# Patient Record
Sex: MALE | NOT HISPANIC OR LATINO | Employment: OTHER | URBAN - METROPOLITAN AREA
[De-identification: names, ages, dates, MRNs, and addresses within clinical notes are randomized per-mention and may not be internally consistent; named-entity substitution may affect disease eponyms.]

---

## 2017-02-13 ENCOUNTER — MYC MEDICAL ADVICE (OUTPATIENT)
Dept: FAMILY MEDICINE | Facility: CLINIC | Age: 72
End: 2017-02-13

## 2017-02-13 ENCOUNTER — TELEPHONE (OUTPATIENT)
Dept: FAMILY MEDICINE | Facility: CLINIC | Age: 72
End: 2017-02-13

## 2017-05-11 ENCOUNTER — RADIANT APPOINTMENT (OUTPATIENT)
Dept: GENERAL RADIOLOGY | Facility: CLINIC | Age: 72
End: 2017-05-11
Attending: FAMILY MEDICINE
Payer: COMMERCIAL

## 2017-05-11 ENCOUNTER — OFFICE VISIT (OUTPATIENT)
Dept: FAMILY MEDICINE | Facility: CLINIC | Age: 72
End: 2017-05-11
Payer: COMMERCIAL

## 2017-05-11 ENCOUNTER — TELEPHONE (OUTPATIENT)
Dept: FAMILY MEDICINE | Facility: CLINIC | Age: 72
End: 2017-05-11

## 2017-05-11 ENCOUNTER — DOCUMENTATION ONLY (OUTPATIENT)
Dept: VASCULAR SURGERY | Facility: CLINIC | Age: 72
End: 2017-05-11

## 2017-05-11 VITALS
BODY MASS INDEX: 26.66 KG/M2 | OXYGEN SATURATION: 95 % | TEMPERATURE: 98.7 F | HEART RATE: 76 BPM | WEIGHT: 180 LBS | DIASTOLIC BLOOD PRESSURE: 80 MMHG | HEIGHT: 69 IN | SYSTOLIC BLOOD PRESSURE: 126 MMHG

## 2017-05-11 DIAGNOSIS — M25.562 ACUTE PAIN OF LEFT KNEE: ICD-10-CM

## 2017-05-11 DIAGNOSIS — Z13.6 SCREENING FOR AAA (ABDOMINAL AORTIC ANEURYSM): ICD-10-CM

## 2017-05-11 DIAGNOSIS — M25.562 ACUTE PAIN OF LEFT KNEE: Primary | ICD-10-CM

## 2017-05-11 PROCEDURE — 73560 X-RAY EXAM OF KNEE 1 OR 2: CPT | Mod: LT

## 2017-05-11 PROCEDURE — 99213 OFFICE O/P EST LOW 20 MIN: CPT | Performed by: FAMILY MEDICINE

## 2017-05-11 RX ORDER — NAPROXEN SODIUM 220 MG
220 TABLET ORAL 2 TIMES DAILY WITH MEALS
Qty: 180 TABLET | Refills: 3 | COMMUNITY
Start: 2017-05-11 | End: 2017-12-29

## 2017-05-11 NOTE — NURSING NOTE
"Chief Complaint   Patient presents with     Musculoskeletal Problem       Initial /80 (BP Location: Right arm, Patient Position: Chair, Cuff Size: Adult Large)  Pulse 76  Temp 98.7  F (37.1  C) (Oral)  Ht 5' 9\" (1.753 m)  Wt 180 lb (81.6 kg)  SpO2 95%  BMI 26.58 kg/m2 Estimated body mass index is 26.58 kg/(m^2) as calculated from the following:    Height as of this encounter: 5' 9\" (1.753 m).    Weight as of this encounter: 180 lb (81.6 kg).  Medication Reconciliation: complete  "

## 2017-05-11 NOTE — MR AVS SNAPSHOT
After Visit Summary   5/11/2017    Amadou Wade    MRN: [id number]           Patient Information     Date Of Birth          1945        Visit Information        Provider Department      5/11/2017 11:40 AM Amadou Lema MD [facility]        Today's Diagnoses     Acute pain of left knee    -  1    Screening for AAA (abdominal aortic aneurysm)          Care Instructions      Naproxen or ibuprofen as needed for pain    Apply ice as needed for pain and after exercise.    Light range of motion and strengthening exercises.        Follow-ups after your visit        Your next 10 appointments already scheduled     May 11, 2017  1:15 PM CDT   XR KNEE STANDING LEFT 2 VIEWS with [facility]   [facility]    [address] [place] [id number] [phone]           Please bring a list of your current medicines to your exam. (Include vitamins, minerals and over-thecounter medicines.) Leave your valuables at home.  Tell your doctor if there is a chance you may be pregnant.  You do not need to do anything special for this exam.              Who to contact     If you have questions or need follow up information about today's clinic visit or your schedule please contact [facility] directly at [phone].  Normal or non-critical lab and imaging results will be communicated to you by MyChart, letter or phone within 4 business days after the clinic has received the results. If you do not hear from us within 7 days, please contact the clinic through MyChart or phone. If you have a critical or abnormal lab result, we will notify you by phone as soon as possible.  Submit refill requests through [employer] or call your pharmacy and they will forward the refill request to us. Please allow 3 business days for your refill to be completed.          Additional Information About Your Visit        MyChart Information     Doc[employer]  "gives you secure access to your electronic health record. If you see a primary care provider, you can also send messages to your care team and make appointments. If you have questions, please call your primary care clinic.  If you do not have a primary care provider, please call [phone] and they will assist you.        Care EveryWhere ID     This is your Care EveryWhere ID. This could be used by other organizations to access your [place] medical records  [id number]        Your Vitals Were     Pulse Temperature Height Pulse Oximetry BMI (Body Mass Index)       76 98.7  F (37.1  C) (Oral) 5' 9\" (1.753 m) 95% 26.58 kg/m2        Blood Pressure from Last 3 Encounters:   05/11/17 126/80   09/15/16 128/86   06/16/15 118/78    Weight from Last 3 Encounters:   05/11/17 180 lb (81.6 kg)   09/15/16 180 lb (81.6 kg)   06/16/15 178 lb (80.7 kg)              We Performed the Following     Abdominal Aortic Aneurysm Screening/Tracking          Today's Medication Changes          These changes are accurate as of: 5/11/17  1:12 PM.  If you have any questions, ask your nurse or doctor.               Start taking these medicines.        Dose/Directions    naproxen sodium 220 MG tablet   Commonly known as:  ALEVE   Used for:  Acute pain of left knee   Started by:  Amadou Lema MD        Dose:  220 mg   Take 1 tablet (220 mg) by mouth 2 times daily (with meals)   Quantity:  180 tablet   Refills:  3         Stop taking these medicines if you haven't already. Please contact your care team if you have questions.     atovaquone-proguanil 250-100 MG per tablet   Commonly known as:  MALARONE   Stopped by:  Amadou Lema MD           ciprofloxacin 500 MG tablet   Commonly known as:  CIPRO   Stopped by:  Amadou Lema MD                Where to get your medicines      Some of these will need a paper prescription and others can be bought over the counter.  Ask your nurse if you have questions.     You don't need a " prescription for these medications     naproxen sodium 220 MG tablet                Primary Care Provider Office Phone # Fax #    Johnathan Sampson [phone]       [facility] [address] [place] [id number]        Thank you!     Thank you for choosing [facility]  for your care. Our goal is always to provide you with excellent care. Hearing back from our patients is one way we can continue to improve our services. Please take a few minutes to complete the written survey that you may receive in the mail after your visit with us. Thank you!             Your Updated Medication List - Protect others around you: Learn how to safely use, store and throw away your medicines at www.disposemymeds.org.          This list is accurate as of: 5/11/17  1:12 PM.  Always use your most recent med list.                   Brand Name Dispense Instructions for use    aspirin 81 MG tablet      1 tab po QD (Once per day)       emollient cream      Apply topically as needed for other       naproxen sodium 220 MG tablet    ALEVE    180 tablet    Take 1 tablet (220 mg) by mouth 2 times daily (with meals)       simvastatin 40 MG tablet    ZOCOR    90 tablet    Take 1 tablet (40 mg) by mouth At Bedtime       vardenafil 20 MG tablet    LEVITRA    18 tablet    Take 1 tablet by mouth daily as needed.

## 2017-05-11 NOTE — TELEPHONE ENCOUNTER
Patient stopped at  on his way out of the clinic and inquired how long he should take the Aleve that Dr. Lema prescribed for him.  Can send him a [employer] message with this information or call him back at [phone].    Toyin Ojeda  Patient Representative

## 2017-05-11 NOTE — PATIENT INSTRUCTIONS
Naproxen or ibuprofen as needed for pain    Apply ice as needed for pain and after exercise.    Light range of motion and strengthening exercises.

## 2017-05-11 NOTE — PROGRESS NOTES
"  SUBJECTIVE:                                                    Amadou Wade is a 72 year old male who presents to clinic today for the following health issues:    Knee Pain     Onset: 2 weeks    Description:   Location: left knee  Character: Dull ache    Intensity: mild    Progression of Symptoms: worse    Accompanying Signs & Symptoms:  Other symptoms: radiation of pain to the hip  Pain with standing and walking     History:   Previous similar pain: no  Family history of arthritis: YES       Precipitating factors:   Trauma or overuse: pt started walking 7 days a week in January- 2 weeks ago walked 9 miles in one day and that is about the time of the pain starting- has not walked in 1 week      Alleviating factors:  Improved by: nothing    The symptoms started after the patient began doing different exercises. He states that the pain lessens with additional movement.     Therapies Tried and outcome: stanton fair did not help- tylenol helped some      Problem list and histories reviewed & adjusted, as indicated.  Additional history: as documented    ROS:  Constitutional, HEENT, cardiovascular, pulmonary, GI, , musculoskeletal, neuro, skin, endocrine and psych systems are negative, except as otherwise noted.    This document serves as a record of the services and decisions personally performed and made by Amadou Lema MD. It was created on his behalf by Malorie Garcia, a trained medical scribe. The creation of this document is based on the provider's statements to the medical scribe.  Malorie Garcia 8:20 AM 5/11/2017  OBJECTIVE:                                                    /80 (BP Location: Right arm, Patient Position: Chair, Cuff Size: Adult Large)  Pulse 76  Temp 98.7  F (37.1  C) (Oral)  Ht 1.753 m (5' 9\")  Wt 81.6 kg (180 lb)  SpO2 95%  BMI 26.58 kg/m2 Body mass index is 26.58 kg/(m^2).   GENERAL: healthy, alert, well nourished, well hydrated, no distress  HENT: ear canals- normal; TMs- " normal; Nose- normal; Mouth- no ulcers, no lesions  NECK: no tenderness, no adenopathy, no asymmetry, no masses, no stiffness; thyroid- normal to palpation  RESP: lungs clear to auscultation - no rales, no rhonchi, no wheezes  CV: regular rates and rhythm, normal S1 S2, no S3 or S4 and no murmur, no click or rub -  ABDOMEN: soft, no tenderness, no  hepatosplenomegaly, no masses, normal bowel sounds  MS: Left Knee: pain with palpation of the medial joint line along distal femur, normal ROM, ligaments intact x4, negative meniscus test, no effusion, increased heat at the affected area, otherwise extremities- no gross deformities noted, no edema  SKIN: no suspicious lesions, no rashes  BACK: no CVA tenderness, no paralumbar tenderness    Diagnostic test results:  Xray (Left Knee) - normal     ASSESSMENT/PLAN:         Amadou was seen today for musculoskeletal problem.    Diagnoses and all orders for this visit:    Acute pain of left knee - Naproxen or ibuprofen as needed for pain, apply ice as need, and light ROM and strengthening exercises. Formal physical therapy if symptoms persist. Consider steroid injection if the symptoms worsen.  -     XR Knee Standing Left 2 Views; Future  -     naproxen sodium (ALEVE) 220 MG tablet; Take 1 tablet (220 mg) by mouth 2 times daily (with meals)    Screening for AAA (abdominal aortic aneurysm)  -     Abdominal Aortic Aneurysm Screening/Tracking        Risks, benefits and alternatives of treatments discussed. Plan agreed on.      Followup: As needed    Will call, return to clinic, or go to ED if worsening or symptoms not improving as discussed.    See patient instructions.       Health Maintenance Topics with due status: Overdue       Topic Date Due    AORTIC ANEURYSM SCREENING (SYSTEM ASSIGNED) 02/27/2012    MEDICARE ANNUAL WELLNESS VISIT 02/27/2013       Health maintenance reviewed/updated? Yes    The information in this document, created by a scribe for me, accurately reflects the  services I personally performed and the decisions made by me. I have reviewed and approved this document for accuracy.      Alex Lema MD

## 2017-12-28 ASSESSMENT — ACTIVITIES OF DAILY LIVING (ADL)
CURRENT_FUNCTION: NO ASSISTANCE NEEDED
I_NEED_ASSISTANCE_FOR_THE_FOLLOWING_DAILY_ACTIVITIES:: NO ASSISTANCE IS NEEDED

## 2017-12-29 ENCOUNTER — TELEPHONE (OUTPATIENT)
Dept: FAMILY MEDICINE | Facility: CLINIC | Age: 72
End: 2017-12-29

## 2017-12-29 ENCOUNTER — OFFICE VISIT (OUTPATIENT)
Dept: FAMILY MEDICINE | Facility: CLINIC | Age: 72
End: 2017-12-29
Payer: COMMERCIAL

## 2017-12-29 VITALS
BODY MASS INDEX: 26.96 KG/M2 | DIASTOLIC BLOOD PRESSURE: 94 MMHG | SYSTOLIC BLOOD PRESSURE: 168 MMHG | WEIGHT: 182 LBS | OXYGEN SATURATION: 97 % | HEART RATE: 69 BPM | TEMPERATURE: 98 F | HEIGHT: 69 IN

## 2017-12-29 DIAGNOSIS — N52.9 ERECTILE DYSFUNCTION, UNSPECIFIED ERECTILE DYSFUNCTION TYPE: ICD-10-CM

## 2017-12-29 DIAGNOSIS — Z12.5 SCREENING FOR PROSTATE CANCER: ICD-10-CM

## 2017-12-29 DIAGNOSIS — I10 HYPERTENSION GOAL BP (BLOOD PRESSURE) < 140/90: ICD-10-CM

## 2017-12-29 DIAGNOSIS — Z12.11 SCREEN FOR COLON CANCER: ICD-10-CM

## 2017-12-29 DIAGNOSIS — Z51.81 MEDICATION MONITORING ENCOUNTER: ICD-10-CM

## 2017-12-29 DIAGNOSIS — Z91.81 AT RISK FOR FALLING: ICD-10-CM

## 2017-12-29 DIAGNOSIS — E78.5 HYPERLIPIDEMIA LDL GOAL <130: ICD-10-CM

## 2017-12-29 DIAGNOSIS — I49.9 CARDIAC ARRHYTHMIA, UNSPECIFIED CARDIAC ARRHYTHMIA TYPE: ICD-10-CM

## 2017-12-29 DIAGNOSIS — Z00.00 ENCOUNTER FOR ROUTINE ADULT HEALTH EXAMINATION WITHOUT ABNORMAL FINDINGS: Primary | ICD-10-CM

## 2017-12-29 LAB
ALBUMIN SERPL-MCNC: 3.6 G/DL (ref 3.4–5)
ALBUMIN UR-MCNC: NEGATIVE MG/DL
ALP SERPL-CCNC: 72 U/L (ref 40–150)
ALT SERPL W P-5'-P-CCNC: 22 U/L (ref 0–70)
ANION GAP SERPL CALCULATED.3IONS-SCNC: 7 MMOL/L (ref 3–14)
APPEARANCE UR: CLEAR
AST SERPL W P-5'-P-CCNC: 20 U/L (ref 0–45)
BILIRUB SERPL-MCNC: 0.7 MG/DL (ref 0.2–1.3)
BILIRUB UR QL STRIP: NEGATIVE
BUN SERPL-MCNC: 12 MG/DL (ref 7–30)
CALCIUM SERPL-MCNC: 8.6 MG/DL (ref 8.5–10.1)
CHLORIDE SERPL-SCNC: 107 MMOL/L (ref 94–109)
CHOLEST SERPL-MCNC: 184 MG/DL
CK SERPL-CCNC: 80 U/L (ref 30–300)
CO2 SERPL-SCNC: 28 MMOL/L (ref 20–32)
COLOR UR AUTO: YELLOW
CREAT SERPL-MCNC: 0.82 MG/DL (ref 0.66–1.25)
CREAT UR-MCNC: 146 MG/DL
ERYTHROCYTE [DISTWIDTH] IN BLOOD BY AUTOMATED COUNT: 13.1 % (ref 10–15)
GFR SERPL CREATININE-BSD FRML MDRD: >90 ML/MIN/1.7M2
GLUCOSE SERPL-MCNC: 81 MG/DL (ref 70–99)
GLUCOSE UR STRIP-MCNC: NEGATIVE MG/DL
HCT VFR BLD AUTO: 49.9 % (ref 40–53)
HDLC SERPL-MCNC: 57 MG/DL
HGB BLD-MCNC: 16.4 G/DL (ref 13.3–17.7)
HGB UR QL STRIP: NEGATIVE
KETONES UR STRIP-MCNC: NEGATIVE MG/DL
LDLC SERPL CALC-MCNC: 113 MG/DL
LEUKOCYTE ESTERASE UR QL STRIP: NEGATIVE
MCH RBC QN AUTO: 31 PG (ref 26.5–33)
MCHC RBC AUTO-ENTMCNC: 32.9 G/DL (ref 31.5–36.5)
MCV RBC AUTO: 94 FL (ref 78–100)
MICROALBUMIN UR-MCNC: 10 MG/L
MICROALBUMIN/CREAT UR: 7.12 MG/G CR (ref 0–17)
NITRATE UR QL: NEGATIVE
NONHDLC SERPL-MCNC: 127 MG/DL
PH UR STRIP: 6 PH (ref 5–7)
PLATELET # BLD AUTO: 146 10E9/L (ref 150–450)
POTASSIUM SERPL-SCNC: 4.8 MMOL/L (ref 3.4–5.3)
PROT SERPL-MCNC: 6.7 G/DL (ref 6.8–8.8)
PSA SERPL-ACNC: 3.85 UG/L (ref 0–4)
RBC # BLD AUTO: 5.29 10E12/L (ref 4.4–5.9)
SODIUM SERPL-SCNC: 142 MMOL/L (ref 133–144)
SOURCE: NORMAL
SP GR UR STRIP: 1.01 (ref 1–1.03)
TRIGL SERPL-MCNC: 69 MG/DL
TSH SERPL DL<=0.005 MIU/L-ACNC: 1.41 MU/L (ref 0.4–4)
UROBILINOGEN UR STRIP-ACNC: 0.2 EU/DL (ref 0.2–1)
WBC # BLD AUTO: 4.5 10E9/L (ref 4–11)

## 2017-12-29 PROCEDURE — 80061 LIPID PANEL: CPT | Performed by: FAMILY MEDICINE

## 2017-12-29 PROCEDURE — 85027 COMPLETE CBC AUTOMATED: CPT | Performed by: FAMILY MEDICINE

## 2017-12-29 PROCEDURE — G0103 PSA SCREENING: HCPCS | Performed by: FAMILY MEDICINE

## 2017-12-29 PROCEDURE — 80053 COMPREHEN METABOLIC PANEL: CPT | Performed by: FAMILY MEDICINE

## 2017-12-29 PROCEDURE — 81003 URINALYSIS AUTO W/O SCOPE: CPT | Performed by: FAMILY MEDICINE

## 2017-12-29 PROCEDURE — 84443 ASSAY THYROID STIM HORMONE: CPT | Performed by: FAMILY MEDICINE

## 2017-12-29 PROCEDURE — 82550 ASSAY OF CK (CPK): CPT | Performed by: FAMILY MEDICINE

## 2017-12-29 PROCEDURE — G0439 PPPS, SUBSEQ VISIT: HCPCS | Performed by: FAMILY MEDICINE

## 2017-12-29 PROCEDURE — 82043 UR ALBUMIN QUANTITATIVE: CPT | Performed by: FAMILY MEDICINE

## 2017-12-29 PROCEDURE — 36415 COLL VENOUS BLD VENIPUNCTURE: CPT | Performed by: FAMILY MEDICINE

## 2017-12-29 RX ORDER — SIMVASTATIN 40 MG
40 TABLET ORAL AT BEDTIME
Qty: 90 TABLET | Refills: 3 | Status: SHIPPED | OUTPATIENT
Start: 2017-12-29 | End: 2019-01-07

## 2017-12-29 RX ORDER — LOSARTAN POTASSIUM AND HYDROCHLOROTHIAZIDE 12.5; 5 MG/1; MG/1
1 TABLET ORAL DAILY
Qty: 90 TABLET | Refills: 3 | Status: SHIPPED | OUTPATIENT
Start: 2017-12-29 | End: 2018-01-23

## 2017-12-29 RX ORDER — DIPHENHYDRAMINE HCL 25 MG
50 TABLET ORAL AT BEDTIME
Qty: 60 TABLET | Refills: 1 | Status: ON HOLD | COMMUNITY
Start: 2017-12-29 | End: 2021-12-18

## 2017-12-29 ASSESSMENT — ACTIVITIES OF DAILY LIVING (ADL): CURRENT_FUNCTION: NO ASSISTANCE NEEDED

## 2017-12-29 NOTE — TELEPHONE ENCOUNTER
[facility].  Medication was filled yesterday.  Shannan Gonzalez RN - Triage  [facility]

## 2017-12-29 NOTE — TELEPHONE ENCOUNTER
Reason for Call:  Medication or medication refill:    Do you use a [facility]?  Name of the pharmacy and phone number for the current request:  [facility] in [facility] . [phone]    Name of the medication requested: losartan - hydrochlorothiazide 50-12.5 MG    Other request: [facility] Sampson's AVS & lab tests    Can we leave a detailed message on this number? YES    Phone number patient can be reached at: Home number on file [phone] (home)    Best Time: any    Call taken on 12/29/2017 at 10:42 AM by Tana Moreno

## 2017-12-29 NOTE — PATIENT INSTRUCTIONS
Initiate use of Losartan-Hydrochlorothiazide 50-12.5 mg daily, as direceted    [facility], as discussed    Follow up in 3-4 weeks for recheck      [facility]                        To reach your care team during and after hours:   [phone]  To reach our pharmacy:        [phone]    Clinic Hours                        Our clinic hours are:    Monday   7:30 am to 7:00 pm                  Tuesday through Friday 7:30 am to 5:00 pm                             Saturday   8:00 am to 12:00 pm      Sunday   Closed      Pharmacy Hours                        Our pharmacy hours are:    Monday   8:30 am to 7:00 pm       Tuesday to Friday  8:30 am to 6:00 pm                       Saturday    9:00 am to 1:00 pm              Sunday    Closed              There is also information available at our web site:  [email or web address]    If your provider ordered any lab tests and you do not receive the results within 10 business days, please call the clinic.    If you need a medication refill please contact your pharmacy.  Please allow 2-3 business days for your refill to be completed.    Our clinic offers telephone visits and e visits.  Please ask one of your team members to explain more.      Use [employer] (secure email communication and access to your chart) to send your primary care provider a message or make an appointment. Ask someone on your Team how to sign up for [employer].  Immunizations                      Immunization History   Administered Date(s) Administered     Influenza (IIV3) PF 11/30/2004, 10/20/2011, 10/30/2012, 10/15/2015     Influenza Vaccine IM 3yrs+ 4 Valent IIV4 10/15/2017     MMR 10/15/2004     Pneumo Conj 13-V (2010&after) 06/16/2015     Pneumococcal 23 valent 04/06/2006, 12/20/2012     Poliovirus, inactivated (IPV) 10/15/2004     TD (ADULT, 7+) 07/30/2001     TDAP Vaccine (Boostrix) 10/29/2009     Twinrix A/B 10/15/2004, 11/30/2004, 04/06/2006     Typhoid IM 10/15/2004, 10/14/2016      Typhoid Oral 10/29/2009     Zoster vaccine, live 12/30/2011        Health Maintenance                         Health Maintenance Due   Topic Date Due     Medicare Annual Wellness Visit  02/27/1963     Flu Vaccine - yearly  09/01/2017     FALL RISK ASSESSMENT  09/15/2017     Cholesterol Lab - yearly  09/15/2017     Prostate Test (PSA) - yearly  09/15/2017     Wellness Visit with your Primary Provider - yearly  09/15/2017     Colon Cancer Screening - FIT Test - yearly  09/25/2017       Preventive Health Recommendations:   Male Ages 65 and over    Yearly exam:             See your health care provider every year in order to  o   Review health changes.   o   Discuss preventive care.    o   Review your medicines if your doctor has prescribed any.    Talk with your health care provider about whether you should have a test to screen for prostate cancer (PSA).    Every 3 years, have a diabetes test (fasting glucose). If you are at risk for diabetes, you should have this test more often.    Every 5 years, have a cholesterol test. Have this test more often if you are at risk for high cholesterol or heart disease.     Every 10 years, have a colonoscopy. Or, have a yearly FIT test (stool test). These exams will check for colon cancer.    Talk to with your health care provider about screening for Abdominal Aortic Aneurysm if you have a family history of AAA or have a history of smoking.    Shots:     Get a flu shot each year.     Get a tetanus shot every 10 years.     Talk to your doctor about your pneumonia vaccines. There are now two you should receive - Pneumovax (PPSV 23) and Prevnar (PCV 13).     Talk to your doctor about a shingles vaccine.     Talk to your doctor about the hepatitis B vaccine.  Nutrition:     Eat at least 5 servings of fruits and vegetables each day.     Eat whole-grain bread, whole-wheat pasta and brown rice instead of white grains and rice.     Talk to your provider about Calcium and Vitamin D.    Lifestyle    Exercise for at least 150 minutes a week (30 minutes a day, 5 days a week). This will help you control your weight and prevent disease.     Limit alcohol to one drink per day.     No smoking.     Wear sunscreen to prevent skin cancer.     See your dentist every six months for an exam and cleaning.     See your eye doctor every 1 to 2 years to screen for conditions such as glaucoma, macular degeneration, cataracts, etc

## 2017-12-29 NOTE — LETTER
[facility]  [address] [place] [address]                  [phone]   January 2, 2018    Amadou Wade  [address] [address]      Dear Amadou,    Here is a summary of your recent test results:    Labs are overall quite good,     Minimally low platelets, stable for many years, watch for any unusual bleeding.     We advise:     Continue current cares.   Balanced low cholesterol diet.   Regular activity.     For additional lab test information, labtestsonline.org is an excellent reference.     Your test results are enclosed.      Please contact me if you have any questions.    In addition, here is a list of due or overdue Health Maintenance reminders.    Health Maintenance Due   Topic Date Due     Colon Cancer Screening - FIT Test - yearly  09/25/2017       Please call us at [phone] (or use [employer]) to address the above recommendations.            Thank you very much for trusting [facility]..     Healthy regards,        Johnathan Sampson M.D.        Results for orders placed or performed in visit on 12/29/17   Comprehensive metabolic panel   Result Value Ref Range    Sodium 142 133 - 144 mmol/L    Potassium 4.8 3.4 - 5.3 mmol/L    Chloride 107 94 - 109 mmol/L    Carbon Dioxide 28 20 - 32 mmol/L    Anion Gap 7 3 - 14 mmol/L    Glucose 81 70 - 99 mg/dL    Urea Nitrogen 12 7 - 30 mg/dL    Creatinine 0.82 0.66 - 1.25 mg/dL    GFR Estimate >90 >60 mL/min/1.7m2    GFR Estimate If Black >90 >60 mL/min/1.7m2    Calcium 8.6 8.5 - 10.1 mg/dL    Bilirubin Total 0.7 0.2 - 1.3 mg/dL    Albumin 3.6 3.4 - 5.0 g/dL    Protein Total 6.7 (L) 6.8 - 8.8 g/dL    Alkaline Phosphatase 72 40 - 150 U/L    ALT 22 0 - 70 U/L    AST 20 0 - 45 U/L   Lipid panel reflex to direct LDL Fasting   Result Value Ref Range    Cholesterol 184 <200 mg/dL    Triglycerides 69 <150 mg/dL    HDL Cholesterol 57 >39 mg/dL    LDL Cholesterol Calculated 113 (H) <100 mg/dL     Non HDL Cholesterol 127 <130 mg/dL   CK total   Result Value Ref Range    CK Total 80 30 - 300 U/L   CBC with platelets   Result Value Ref Range    WBC 4.5 4.0 - 11.0 10e9/L    RBC Count 5.29 4.4 - 5.9 10e12/L    Hemoglobin 16.4 13.3 - 17.7 g/dL    Hematocrit 49.9 40.0 - 53.0 %    MCV 94 78 - 100 fl    MCH 31.0 26.5 - 33.0 pg    MCHC 32.9 31.5 - 36.5 g/dL    RDW 13.1 10.0 - 15.0 %    Platelet Count 146 (L) 150 - 450 10e9/L   TSH with free T4 reflex   Result Value Ref Range    TSH 1.41 0.40 - 4.00 mU/L   Prostate spec antigen screen   Result Value Ref Range    PSA 3.85 0 - 4 ug/L   Albumin Random Urine Quantitative with Creat Ratio   Result Value Ref Range    Creatinine Urine 146 mg/dL    Albumin Urine mg/L 10 mg/L    Albumin Urine mg/g Cr 7.12 0 - 17 mg/g Cr   *UA reflex to Microscopic and Culture ([facility] (except [place] and [place])   Result Value Ref Range    Color Urine Yellow     Appearance Urine Clear     Glucose Urine Negative NEG^Negative mg/dL    Bilirubin Urine Negative NEG^Negative    Ketones Urine Negative NEG^Negative mg/dL    Specific Gravity Urine 1.015 1.003 - 1.035    Blood Urine Negative NEG^Negative    pH Urine 6.0 5.0 - 7.0 pH    Protein Albumin Urine Negative NEG^Negative mg/dL    Urobilinogen Urine 0.2 0.2 - 1.0 EU/dL    Nitrite Urine Negative NEG^Negative    Leukocyte Esterase Urine Negative NEG^Negative    Source Midstream Urine

## 2017-12-29 NOTE — MR AVS SNAPSHOT
After Visit Summary   12/29/2017    Amadou Wade    MRN: [id number]           Patient Information     Date Of Birth          1945        Visit Information        Provider Department      12/29/2017 8:40 AM Johnathan Sampson MD [facility]  [place]        Today's Diagnoses     Encounter for routine adult health examination without abnormal findings    -  1    Hypertension goal BP (blood pressure) < 140/90        Hyperlipidemia LDL goal <130        Cardiac arrhythmia, unspecified cardiac arrhythmia type        Erectile dysfunction, unspecified erectile dysfunction type        Screening for prostate cancer        Screen for colon cancer        Medication monitoring encounter        At risk for falling          Care Instructions    Initiate use of Losartan-Hydrochlorothiazide 50-12.5 mg daily, as direceted    [facility]    Follow up in 3-4 weeks for [facility] [place]      [facility]  [place]                        To reach your care team during and after hours:   [phone]  To reach our pharmacy:        [phone]    Clinic Hours                        Our clinic hours are:    Monday   7:30 am to 7:00 pm                  Tuesday through Friday 7:30 am to 5:00 pm                             Saturday   8:00 am to 12:00 pm      Sunday   Closed      Pharmacy Hours                        Our pharmacy hours are:    Monday   8:30 am to 7:00 pm       Tuesday to Friday  8:30 am to 6:00 pm                       Saturday    9:00 am to 1:00 pm              Sunday    Closed              There is also information available at our web site:  [email or web address]    If your provider ordered any lab tests and you do not receive the results within 10 business days, please call the clinic.    If you need a medication refill please contact your pharmacy.  Please allow 2-3 business days for your refill to be completed.    Our clinic offers telephone visits and e visits.  Please ask one of your  team members to explain more.      Use [employer] (secure email communication and access to your chart) to send your primary care provider a message or make an appointment. Ask someone on your Team how to sign up for [employer].  Immunizations                      Immunization History   Administered Date(s) Administered     Influenza (IIV3) PF 11/30/2004, 10/20/2011, 10/30/2012, 10/15/2015     Influenza Vaccine IM 3yrs+ 4 Valent IIV4 10/15/2017     MMR 10/15/2004     Pneumo Conj 13-V (2010&after) 06/16/2015     Pneumococcal 23 valent 04/06/2006, 12/20/2012     Poliovirus, inactivated (IPV) 10/15/2004     TD (ADULT, 7+) 07/30/2001     TDAP Vaccine (Boostrix) 10/29/2009     Twinrix A/B 10/15/2004, 11/30/2004, 04/06/2006     Typhoid IM 10/15/2004, 10/14/2016     Typhoid Oral 10/29/2009     Zoster vaccine, live 12/30/2011        Health Maintenance                         Health Maintenance Due   Topic Date Due     Medicare Annual Wellness Visit  02/27/1963     Flu Vaccine - yearly  09/01/2017     FALL RISK ASSESSMENT  09/15/2017     Cholesterol Lab - yearly  09/15/2017     Prostate Test (PSA) - yearly  09/15/2017     Wellness Visit with your Primary Provider - yearly  09/15/2017     Colon Cancer Screening - FIT Test - yearly  09/25/2017       Preventive Health Recommendations:   Male Ages 65 and over    Yearly exam:             See your health care provider every year in order to  o   Review health changes.   o   Discuss preventive care.    o   Review your medicines if your doctor has prescribed any.    Talk with your health care provider about whether you should have a test to screen for prostate cancer (PSA).    Every 3 years, have a diabetes test (fasting glucose). If you are at risk for diabetes, you should have this test more often.    Every 5 years, have a cholesterol test. Have this test more often if you are at risk for high cholesterol or heart disease.     Every 10 years, have a colonoscopy. Or, have a yearly FIT  test (stool test). These exams will check for colon cancer.    Talk to with your health care provider about screening for Abdominal Aortic Aneurysm if you have a family history of AAA or have a history of smoking.    Shots:     Get a flu shot each year.     Get a tetanus shot every 10 years.     Talk to your doctor about your pneumonia vaccines. There are now two you should receive - Pneumovax (PPSV 23) and Prevnar (PCV 13).     Talk to your doctor about a shingles vaccine.     Talk to your doctor about the hepatitis B vaccine.  Nutrition:     Eat at least 5 servings of fruits and vegetables each day.     Eat whole-grain bread, whole-wheat pasta and brown rice instead of white grains and rice.     Talk to your provider about Calcium and Vitamin D.   Lifestyle    Exercise for at least 150 minutes a week (30 minutes a day, 5 days a week). This will help you control your weight and prevent disease.     Limit alcohol to one drink per day.     No smoking.     Wear sunscreen to prevent skin cancer.     See your dentist every six months for an exam and cleaning.     See your eye doctor every 1 to 2 years to screen for conditions such as glaucoma, macular degeneration, cataracts, etc           Follow-ups after your visit        Future tests that were ordered for you today     Open Future Orders        Priority Expected Expires Ordered    Fecal colorectal cancer screen (FIT) Routine 1/19/2018 3/23/2018 12/29/2017            Who to contact     If you have questions or need follow up information about today's clinic visit or your schedule please contact [facility] directly at [phone].  Normal or non-critical lab and imaging results will be communicated to you by MyChart, letter or phone within 4 business days after the clinic has received the results. If you do not hear from us within 7 days, please contact the clinic through MyChart or phone. If you have a critical or abnormal lab result, we will notify  "you by phone as soon as possible.  Submit refill requests through [employer] or call your pharmacy and they will forward the refill request to us. Please allow 3 business days for your refill to be completed.          Additional Information About Your Visit        [employer] [facility] Information     [employer] gives you secure access to your electronic health record. If you see a primary care provider, you can also send messages to your care team and make appointments. If you have questions, please call your primary care clinic.  If you do not have a primary care provider, please call [phone] and they will assist you.        Care EveryWhere ID     This is your Care EveryWhere ID. This could be used by other organizations to access your [facility] medical records  [id number]        Your Vitals Were     Pulse Temperature Height Pulse Oximetry BMI (Body Mass Index)       69 98  F (36.7  C) (Oral) 5' 9\" (1.753 m) 97% 26.88 kg/m2        Blood Pressure from Last 3 Encounters:   12/29/17 (!) 168/94   05/11/17 126/80   09/15/16 128/86    Weight from Last 3 Encounters:   12/29/17 182 lb (82.6 kg)   05/11/17 180 lb (81.6 kg)   09/15/16 180 lb (81.6 kg)              We Performed the Following     *UA reflex to Microscopic and Culture ([place] and [facility] (except [place] and [place])     Albumin Random Urine Quantitative with Creat Ratio     CBC with platelets     CK total     Comprehensive metabolic panel     Lipid panel reflex to direct LDL Fasting     Prostate spec antigen screen     TSH with free T4 reflex          Today's Medication Changes          These changes are accurate as of: 12/29/17  9:35 AM.  If you have any questions, ask your nurse or doctor.               Start taking these medicines.        Dose/Directions    losartan-hydrochlorothiazide 50-12.5 MG per tablet   Commonly known as:  HYZAAR   Used for:  Hypertension goal BP (blood pressure) < 140/90   Started by:  Johnathan Sampson MD        Dose:  1 tablet   Take 1 " tablet by mouth daily   Quantity:  90 tablet   Refills:  3            Where to get your medicines      Some of these will need a paper prescription and others can be bought over the counter.  Ask your nurse if you have questions.     Bring a paper prescription for each of these medications     losartan-hydrochlorothiazide 50-12.5 MG per tablet    simvastatin 40 MG tablet                Primary Care Provider Office Phone # Fax #    Johnathan Sampson [phone]       [address] [place] [id number]        Equal Access to Services     DESIREE FAM : Hadii aad ku hadasho Soomaali, waaxda luqadaha, qaybta kaalmada adeegyada, waxay idiin hayaan adeeg hudson sifuentes. So [facility] [phone].    ATENCIÓN: Si habla español, tiene a martinez disposición servicios gratuitos de asistencia lingüística. [facility] [phone].    We comply with applicable federal civil rights laws and [place] laws. We do not discriminate on the basis of race, color, national origin, age, disability, sex, sexual orientation, or gender identity.            Thank you!     Thank you for choosing [facility]  for your care. Our goal is always to provide you with excellent care. Hearing back from our patients is one way we can continue to improve our services. Please take a few minutes to complete the written survey that you may receive in the mail after your visit with us. Thank you!             Your Updated Medication List - Protect others around you: Learn how to safely use, store and throw away your medicines at www.disposemymeds.org.          This list is accurate as of: 12/29/17  9:35 AM.  Always use your most recent med list.                   Brand Name Dispense Instructions for use Diagnosis    aspirin 81 MG tablet      1 tab po QD (Once per day)    Other specified counseling, Need for prophylactic vaccination and inoculation against other combinations of diseases       diphenhydrAMINE 25 MG tablet    BENADRYL     60 tablet    Take 1-2 tablets (25-50 mg) by mouth every 6 hours as needed for itching or allergies        emollient cream      Apply topically as needed for other        losartan-hydrochlorothiazide 50-12.5 MG per tablet    HYZAAR    90 tablet    Take 1 tablet by mouth daily    Hypertension goal BP (blood pressure) < 140/90       simvastatin 40 MG tablet    ZOCOR    90 tablet    Take 1 tablet (40 mg) by mouth At Bedtime    Hyperlipidemia LDL goal <130       vardenafil 20 MG tablet    LEVITRA    18 tablet    Take 1 tablet by mouth daily as needed.    ED (erectile dysfunction)

## 2017-12-29 NOTE — PROGRESS NOTES
"SUBJECTIVE:   Amadou Wade is a 72 year old male who presents for Preventive Visit.    Are you in the first 12 months of your Medicare coverage?  No    Physical   Annual:     Getting at least 3 servings of Calcium per day::  Yes    Bi-annual eye exam::  Yes    Dental care twice a year::  Yes    Sleep apnea or symptoms of sleep apnea::  Daytime drowsiness    Diet::  Regular (no restrictions)    Frequency of exercise::  4-5 days/week    Duration of exercise::  30-45 minutes    Taking medications regularly::  Yes    Medication side effects::  Not applicable    Ability to successfully perform activities of daily living: no assistance needed  Home Safety:  No safety concerns identified  Hearing Impairment: difficulty following a conversation in a noisy restaurant or crowded room, need to ask people to speak up or repeat themselves and difficulty understanding soft or whispered speech    Cardiac Dysrhythmia -- No concerns at this time.     Chronic Rhinitis -- Resolved at this time.     Sleep -- Amadou stated that he has not been using his mouth guard to help with sleeping - he and his wife felt that it \"was reshaping\" his mouth. [facility].     ED -- Levitra 20 mg prn.     Elevated Blood Pressure -- Amadou stated that he has been monitoring his BP at home recently, and has been having consistently elevated numbers. No hx of medication intervention.     BP Readings from Last 3 Encounters:   12/29/17 (!) 168/94   05/11/17 126/80   09/15/16 128/86     Lipids -- Simvastatin 40 mg daily, Aspirin 81 mg daily.     Recent Labs   Lab Test  09/15/16   0827  06/16/15   0919  04/16/14   0907   CHOL  194  145  147   HDL  52  50  43   LDL  131*  84  90   TRIG  57  53  74   CHOLHDLRATIO   --   2.9  3.4       Past/recent records reviewed and discussed for -- family hx, social hx, surgical hx, medications, immunizations, allergies.      Fall risk:  Fallen 2 or more times in the past year?: No  Any fall with " injury in the past year?: No    COGNITIVE SCREEN  1) Repeat 3 items (Banana, Sunrise, Chair)    2) Clock draw: NORMAL  3) 3 item recall: Recalls 2 objects   Results: NORMAL clock, 1-2 items recalled: COGNITIVE IMPAIRMENT LESS LIKELY    Mini-CogTM Copyright JOSE Rucker. Licensed by the author for use in [facility]; reprinted with permission (mike@[facility]). All rights reserved.      Reviewed and updated as needed this visit by clinical staff  Tobacco  Allergies  Meds  Med Hx  Surg Hx  Fam Hx  Soc Hx      Reviewed and updated as needed this visit by Provider  Allergies        Social History   Substance Use Topics     Smoking status: Never Smoker     Smokeless tobacco: Never Used     Alcohol use 0.0 - 0.5 oz/week     0 - 1 Standard drinks or equivalent per week      Comment: 0-1 per week avg       Alcohol Use 12/28/2017   If you drink alcohol, do you typically have greater than 3 drinks per day OR greater than 7 drinks per week?   No     Today's PHQ-2 Score:   PHQ-2 ( 1999 [employer]) 12/28/2017   Q1: Little interest or pleasure in doing things 0   Q2: Feeling down, depressed or hopeless 0   PHQ-2 Score 0   Q1: Little interest or pleasure in doing things Not at all   Q2: Feeling down, depressed or hopeless Not at all   PHQ-2 Score 0     Do you feel safe in your environment - Yes    Do you have a Health Care Directive?: Yes: Advance Directive has been received and scanned.    Current providers sharing in care for this patient include:   Patient Care Team:  Johnathan Sampson MD as PCP - General    The following health maintenance items are reviewed in Epic and correct as of today:  Health Maintenance   Topic Date Due     FALL RISK ASSESSMENT  09/15/2017     LIPID MONITORING Q1 YEAR  09/15/2017     PSA Q1 YR  09/15/2017     FIT Q1 YR  09/25/2017     WELLNESS VISIT Q1 YR  12/29/2018     TETANUS Q10 YR  10/29/2019     ADVANCE DIRECTIVE PLANNING Q5 YRS  10/05/2020     COLONOSCOPY Q10 YR  01/03/2022     INFLUENZA  VACCINE (SYSTEM ASSIGNED)  Completed     PNEUMOCOCCAL  Completed     AORTIC ANEURYSM SCREENING (SYSTEM ASSIGNED)  Completed     HEPATITIS C SCREENING  Completed       Health Maintenance     Colonoscopy:  10 years, due 1/22 (last 1/12)   FIT:  Yearly, due (last 9/16)              PSA:  Yearly, due (last 9/16)   DEXA:  n/a    Health Maintenance Due   Topic Date Due     FALL RISK ASSESSMENT  09/15/2017     LIPID MONITORING Q1 YEAR  09/15/2017     PSA Q1 YR  09/15/2017     FIT Q1 YR  09/25/2017       Current Problem List    Patient Active Problem List   Diagnosis     Cardiac dysrhythmia     Hyperlipidemia LDL goal <130     Advanced directives, counseling/discussion     ED (erectile dysfunction)     Hypertension goal BP (blood pressure) < 140/90       Past Medical History    Past Medical History:   Diagnosis Date     Cardiac dysrhythmia, unspecified 3/07    few pac's, few pvc's, few runs SVT     Chronic rhinitis     resolved     ED (erectile dysfunction)      [facility]     Hyperlipidemia LDL goal <130 2002     Hypertension goal BP (blood pressure) < 140/90 12/2017     Pneumonia, organism unspecified(486) 1993     Snoring     no sleep apnea - mouth guard     Unspecified hemorrhoids without mention of complication 4/06    internal       Past Surgical History    Past Surgical History:   Procedure Laterality Date     COLONOSCOPY  1/3/2012    incomplete - negative, ACBE incomplete - negative, CT colography negative     HC COLONOSCOPY THRU STOMA, DIAGNOSTIC  5/06    dr tejeda - normal - internal hemorrhiods - due 10 yrs     HC REMOVE TONSILS/ADENOIDS,<13 Y/O  1953    T & A <14y.o.     SIGMOIDOSCOPY,DIAGNOSTIC  1997    normal     STRESS ECHO (METRO)  3/07    normal few pac's, few pvc's       Current Medications    Current Outpatient Prescriptions   Medication Sig Dispense Refill     diphenhydrAMINE (BENADRYL) 25 MG tablet Take 1-2 tablets (25-50 mg) by mouth every 6 hours as needed for itching or allergies 60 tablet  1     simvastatin (ZOCOR) 40 MG tablet Take 1 tablet (40 mg) by mouth At Bedtime 90 tablet 3     losartan-hydrochlorothiazide (HYZAAR) 50-12.5 MG per tablet Take 1 tablet by mouth daily 90 tablet 3     emollient (VANICREAM) cream Apply topically as needed for other       vardenafil (LEVITRA) 20 MG tablet Take 1 tablet by mouth daily as needed. 18 tablet 3     ASPIRIN 81 MG OR TABS 1 tab po QD (Once per day)       [DISCONTINUED] simvastatin (ZOCOR) 40 MG tablet Take 1 tablet (40 mg) by mouth At Bedtime 90 tablet 3       Allergies    No Known Allergies    Immunizations    Immunization History   Administered Date(s) Administered     Influenza (IIV3) PF 2004, 10/20/2011, 10/30/2012, 10/15/2015     Influenza Vaccine IM 3yrs+ 4 Valent IIV4 10/15/2017     MMR 10/15/2004     Pneumo Conj 13-V (2010&after) 2015     Pneumococcal 23 valent 2006, 2012     Poliovirus, inactivated (IPV) 10/15/2004     TD (ADULT, 7+) 2001     TDAP Vaccine (Boostrix) 10/29/2009     Twinrix A/B 10/15/2004, 2004, 2006     Typhoid IM 10/15/2004, 10/14/2016     Typhoid Oral 10/29/2009     Zoster vaccine, live 2011       Family History    Family History   Problem Relation Age of Onset     Hypertension Mother      CANCER Father      lung     Eye Disorder Father      Glaucoma     Alzheimer Disease Father      HEART DISEASE Father      DIABETES Father      type 2     DIABETES Brother      type 2     Coronary Artery Disease Brother      CEREBROVASCULAR DISEASE Maternal Grandmother      HEART DISEASE Maternal Grandfather       young heartattack     DIABETES Paternal Grandfather      CANCER Brother      melanoma     Alzheimer Disease Brother      Prostate Cancer Brother      CEREBROVASCULAR DISEASE Brother        Social History    Social History     Social History     Marital status:      Spouse name: Dania     Number of children: 3     Years of education: 19     Occupational History         "    Social History Main Topics     Smoking status: Never Smoker     Smokeless tobacco: Never Used     Alcohol use 0.0 - 0.5 oz/week     0 - 1 Standard drinks or equivalent per week      Comment: 0-1 per week avg     Drug use: No     Sexual activity: Yes     Partners: Female     Other Topics Concern     Caffeine Concern Yes     occas     Exercise Yes     active     Seat Belt Yes     Parent/Sibling W/ Cabg, Mi Or Angioplasty Before 65f 55m? No     Social History Narrative         Review of Systems  Constitutional, HEENT, cardiovascular, pulmonary, GI, , musculoskeletal, neuro, skin, endocrine and psych systems are negative, except as in HPI or otherwise noted     This document serves as a record of the services and decisions personally performed and made by Johnathan Sampson MD [facility]. It was created on their behalf by Dixon Sibley, a trained medical scribe. The creation of this document is based the provider's statements to the medical scribe.  Dixon Sibley December 29, 2017 9:25 AM      OBJECTIVE:   BP (!) 168/94  Pulse 69  Temp 98  F (36.7  C) (Oral)  Ht 5' 9\" (1.753 m)  Wt 182 lb (82.6 kg)  SpO2 97%  BMI 26.88 kg/m2 Estimated body mass index is 26.88 kg/(m^2) as calculated from the following:    Height as of this encounter: 5' 9\" (1.753 m).    Weight as of this encounter: 182 lb (82.6 kg).  Physical Exam  GENERAL: healthy, alert and no distress  HENT: ear canals and TM's normal upon viewing with otoscope, nose and mouth without ulcers or lesions upon viewing with otoscope  RESP: lungs clear to auscultation - no rales, rhonchi or wheezes  CV: regular rate and rhythm, normal S1 S2, no S3 or S4, no murmur, click or rub, no peripheral edema and peripheral pulses strong - no carotid bruits   ABDOMEN: soft, nontender, no hepatosplenomegaly, no masses and bowel sounds normal   (male): testicles normal without atrophy or masses, no hernias and penis normal without urethral discharge  RECTAL: normal sphincter tone, no " rectal masses and prostate 1+ in size, smooth, nontender without masses/nodules  MS: no gross musculoskeletal defects noted, no edema  SKIN: no suspicious lesions or rashes to visible skin  NEURO: mentation intact and speech normal  PSYCH: mentation appears normal, affect normal/bright    ASSESSMENT / PLAN:       ICD-10-CM    1. Encounter for routine adult health examination without abnormal findings Z00.00 Comprehensive metabolic panel     Lipid panel reflex to direct LDL Fasting     CK total     CBC with platelets     TSH with free T4 reflex     Prostate spec antigen screen     Albumin Random Urine Quantitative with Creat Ratio     *UA reflex to Microscopic and Culture ([facility] (except [place] and [place])     Fecal colorectal cancer screen (FIT)   2. Hypertension goal BP (blood pressure) < 140/90 I10 losartan-hydrochlorothiazide (HYZAAR) 50-12.5 MG per tablet   3. Hyperlipidemia LDL goal <130 E78.5 Comprehensive metabolic panel     Albumin Random Urine Quantitative with Creat Ratio     *UA reflex to Microscopic and Culture ([facility] (except [place] and [place])     simvastatin (ZOCOR) 40 MG tablet   4. Cardiac arrhythmia, unspecified cardiac arrhythmia type I49.9    5. Erectile dysfunction, unspecified erectile dysfunction type N52.9    6. Screening for prostate cancer Z12.5 Prostate spec antigen screen   7. Screen for colon cancer Z12.11 Fecal colorectal cancer screen (FIT)   8. Medication monitoring encounter Z51.81 Comprehensive metabolic panel     Lipid panel reflex to direct LDL Fasting     CK total     CBC with platelets     TSH with free T4 reflex     Albumin Random Urine Quantitative with Creat Ratio     *UA reflex to Microscopic and Culture ([facility] (except [place] and [place])   9. At risk for falling Z91.81      Discussed treatment/modality options, including risk and benefits, he desires advised alcohol consumption 1oz per day or  "less, advised aspirin 81 mg po daily, advised 1 multivitamin per day, advised calcium 5920-0965 mg/d and Vitamin D 800-1200 IU/d, advised dentist every 6 months, advised diet and exercise, advised opthalmologist every 1-2 years, advised blood pressure checks, advised self testicular exam q month, diet discussed, follow up with another visit, further diagnostic(s), further health care maintenance, further lab(s), medication refill(s), new medications (Losartan-HCTZ 50-12.5 mg), OTC meds, and observation. All diagnosis above reviewed and noted above, otherwise stable.  See Ahaali orders for further details.  Follow up in 3-4 week(s) and as needed.    Health Maintenance Due   Topic Date Due     FALL RISK ASSESSMENT  09/15/2017     LIPID MONITORING Q1 YEAR  09/15/2017     PSA Q1 YR  09/15/2017     FIT Q1 YR  09/25/2017     End of Life Planning:  Patient currently has an advanced directive: Yes.  Practitioner is supportive of decision.    COUNSELING:  Reviewed preventive health counseling, as reflected in patient instructions    BP Screening:   Last 3 BP Readings:    BP Readings from Last 3 Encounters:   12/29/17 (!) 168/94   05/11/17 126/80   09/15/16 128/86     The following was recommended to the patient:  Re-screen within 4 weeks and recommend lifestyle modifications    Estimated body mass index is 26.88 kg/(m^2) as calculated from the following:    Height as of this encounter: 5' 9\" (1.753 m).    Weight as of this encounter: 182 lb (82.6 kg).     reports that he has never smoked. He has never used smokeless tobacco.    Appropriate preventive services were discussed with this patient, including applicable screening as appropriate for cardiovascular disease, diabetes, osteopenia/osteoporosis, and glaucoma.  As appropriate for age/gender, discussed screening for colorectal cancer, prostate cancer, breast cancer, and cervical cancer. Checklist reviewing preventive services available has been given to the " patient.    Reviewed patients plan of care and provided an AVS. The Basic Care Plan (routine screening as documented in Health Maintenance) for Amadou meets the Care Plan requirement. This Care Plan has been established and reviewed with the Patient.    Counseling Resources:  ATP IV Guidelines  Pooled Cohorts Equation Calculator  Breast Cancer Risk Calculator  FRAX Risk Assessment  ICSI Preventive Guidelines  Dietary Guidelines for Americans, 2010  USDA's MyPlate  ASA Prophylaxis  Lung CA Screening    The information in this document, created by the medical scribe for me, accurately reflects the services I personally performed and the decisions made by me. I have reviewed and approved this document for accuracy.   Johnathan Sampson MD FAAFP            Johnathan Sampson MD, FAAFP    [address]  [address] [phone] Fax

## 2017-12-29 NOTE — NURSING NOTE
"Chief Complaint   Patient presents with     Wellness Visit       Initial BP (!) 168/94  Pulse 69  Temp 98  F (36.7  C) (Oral)  Ht 5' 9\" (1.753 m)  Wt 182 lb (82.6 kg)  SpO2 97%  BMI 26.88 kg/m2 Estimated body mass index is 26.88 kg/(m^2) as calculated from the following:    Height as of this encounter: 5' 9\" (1.753 m).    Weight as of this encounter: 182 lb (82.6 kg)..  BP completed using cuff size: luis Rg MA  "

## 2018-01-02 PROCEDURE — G0328 FECAL BLOOD SCRN IMMUNOASSAY: HCPCS | Performed by: FAMILY MEDICINE

## 2018-01-04 DIAGNOSIS — Z12.11 SCREEN FOR COLON CANCER: ICD-10-CM

## 2018-01-04 DIAGNOSIS — Z00.00 ENCOUNTER FOR ROUTINE ADULT HEALTH EXAMINATION WITHOUT ABNORMAL FINDINGS: ICD-10-CM

## 2018-01-04 LAB — HEMOCCULT STL QL IA: NEGATIVE

## 2018-01-30 ENCOUNTER — OFFICE VISIT (OUTPATIENT)
Dept: FAMILY MEDICINE | Facility: CLINIC | Age: 73
End: 2018-01-30
Payer: COMMERCIAL

## 2018-01-30 VITALS
BODY MASS INDEX: 26.96 KG/M2 | SYSTOLIC BLOOD PRESSURE: 110 MMHG | OXYGEN SATURATION: 96 % | TEMPERATURE: 97.8 F | HEART RATE: 76 BPM | DIASTOLIC BLOOD PRESSURE: 72 MMHG | WEIGHT: 182 LBS | HEIGHT: 69 IN

## 2018-01-30 DIAGNOSIS — E78.5 HYPERLIPIDEMIA LDL GOAL <130: ICD-10-CM

## 2018-01-30 DIAGNOSIS — Z51.81 MEDICATION MONITORING ENCOUNTER: ICD-10-CM

## 2018-01-30 DIAGNOSIS — I10 HYPERTENSION GOAL BP (BLOOD PRESSURE) < 140/90: Primary | ICD-10-CM

## 2018-01-30 LAB
ANION GAP SERPL CALCULATED.3IONS-SCNC: 6 MMOL/L (ref 3–14)
BUN SERPL-MCNC: 18 MG/DL (ref 7–30)
CALCIUM SERPL-MCNC: 8.9 MG/DL (ref 8.5–10.1)
CHLORIDE SERPL-SCNC: 112 MMOL/L (ref 94–109)
CO2 SERPL-SCNC: 28 MMOL/L (ref 20–32)
CREAT SERPL-MCNC: 1 MG/DL (ref 0.66–1.25)
GFR SERPL CREATININE-BSD FRML MDRD: 74 ML/MIN/1.7M2
GLUCOSE SERPL-MCNC: 82 MG/DL (ref 70–99)
POTASSIUM SERPL-SCNC: 4.1 MMOL/L (ref 3.4–5.3)
SODIUM SERPL-SCNC: 146 MMOL/L (ref 133–144)

## 2018-01-30 PROCEDURE — 36415 COLL VENOUS BLD VENIPUNCTURE: CPT | Performed by: FAMILY MEDICINE

## 2018-01-30 PROCEDURE — 99214 OFFICE O/P EST MOD 30 MIN: CPT | Performed by: FAMILY MEDICINE

## 2018-01-30 PROCEDURE — 80048 BASIC METABOLIC PNL TOTAL CA: CPT | Performed by: FAMILY MEDICINE

## 2018-01-30 NOTE — NURSING NOTE
"Chief Complaint   Patient presents with     Hypertension       Initial /72  Pulse 76  Temp 97.8  F (36.6  C) (Oral)  Ht 5' 9\" (1.753 m)  Wt 182 lb (82.6 kg)  SpO2 96%  BMI 26.88 kg/m2 Estimated body mass index is 26.88 kg/(m^2) as calculated from the following:    Height as of this encounter: 5' 9\" (1.753 m).    Weight as of this encounter: 182 lb (82.6 kg)..  BP completed using cuff size: luis Rg MA  "

## 2018-01-30 NOTE — MR AVS SNAPSHOT
After Visit Summary   1/30/2018    Amadou Wade    MRN: [id number]           Patient Information     Date Of Birth          1945        Visit Information        Provider Department      1/30/2018 8:00 AM Johnathan Sampson MD [facility]        Today's Diagnoses     Hypertension goal BP (blood pressure) < 140/90    -  1    Hyperlipidemia LDL goal <130        Medication monitoring encounter          Care Instructions        [facility]                        To reach your care team during and after hours:   [phone]  To reach our pharmacy:        [phone]    Clinic Hours                        Our clinic hours are:    Monday   7:30 am to 7:00 pm                  Tuesday through Friday 7:30 am to 5:00 pm                             Saturday   8:00 am to 12:00 pm      Sunday   Closed      Pharmacy Hours                        Our pharmacy hours are:    Monday   8:30 am to 7:00 pm       Tuesday to Friday  8:30 am to 6:00 pm                       Saturday    9:00 am to 1:00 pm              Sunday    Closed              There is also information available at our web site:  [email or web address]    If your provider ordered any lab tests and you do not receive the results within 10 business days, please call the clinic.    If you need a medication refill please contact your pharmacy.  Please allow 2-3 business days for your refill to be completed.    Our clinic offers telephone visits and e visits.  Please ask one of your team members to explain more.      Use [employer] (secure email communication and access to your chart) to send your primary care provider a message or make an appointment. Ask someone on your Team how to sign up for [employer].  Immunizations                      Immunization History   Administered Date(s) Administered     Influenza (IIV3) PF 11/30/2004, 10/20/2011, 10/30/2012, 10/15/2015     Influenza Vaccine IM 3yrs+ 4 Valent IIV4 10/15/2017     MMR  "10/15/2004     Pneumo Conj 13-V (2010&after) 06/16/2015     Pneumococcal 23 valent 04/06/2006, 12/20/2012     Poliovirus, inactivated (IPV) 10/15/2004     TD (ADULT, 7+) 07/30/2001     TDAP Vaccine (Boostrix) 10/29/2009     Twinrix A/B 10/15/2004, 11/30/2004, 04/06/2006     Typhoid IM 10/15/2004, 10/14/2016     Typhoid Oral 10/29/2009     Zoster vaccine, live 12/30/2011        Health Maintenance                         There are no preventive care reminders to display for this patient.            Follow-ups after your visit        Who to contact     If you have questions or need follow up information about today's clinic visit or your schedule please contact [facility] directly at [phone].  Normal or non-critical lab and imaging results will be communicated to you by MyChart, letter or phone within 4 business days after the clinic has received the results. If you do not hear from us within 7 days, please contact the clinic through [employer] or phone. If you have a critical or abnormal lab result, we will notify you by phone as soon as possible.  Submit refill requests through [employer] or call your pharmacy and they will forward the refill request to us. Please allow 3 business days for your refill to be completed.          Additional Information About Your Visit        MyChart Information     [employer] gives you secure access to your electronic health record. If you see a primary care provider, you can also send messages to your care team and make appointments. If you have questions, please call your primary care clinic.  If you do not have a primary care provider, please call [phone] and they will assist you.        Care EveryWhere ID     This is your Care EveryWhere ID. This could be used by other organizations to access your [place] medical records  [id number]        Your Vitals Were     Pulse Temperature Height Pulse Oximetry BMI (Body Mass Index)       76 97.8  F (36.6  C) (Oral) 5' 9\" " (1.753 m) 96% 26.88 kg/m2        Blood Pressure from Last 3 Encounters:   01/30/18 110/72   12/29/17 (!) 168/94   05/11/17 126/80    Weight from Last 3 Encounters:   01/30/18 182 lb (82.6 kg)   12/29/17 182 lb (82.6 kg)   05/11/17 180 lb (81.6 kg)              We Performed the Following     Basic metabolic panel  (Ca, Cl, CO2, Creat, Gluc, K, Na, BUN)        Primary Care Provider Office Phone # Fax #    Johnathan Sampson [phone]       [address] [facility] [address]        Equal Access to Services     JOVANI FAM : Hadii amena hernandezo Sorosemary, waaxda luqadaha, qaybta kaalmada adeegyabryan, ephraim sifuentes. So [facility] [phone].    ATENCIÓN: Si habla español, tiene a martinez disposición servicios gratuitos de asistencia lingüística. Llame al [phone].    We comply with applicable federal civil rights laws and [place] laws. We do not discriminate on the basis of race, color, national origin, age, disability, sex, sexual orientation, or gender identity.            Thank you!     Thank you for choosing [facility]  for your care. Our goal is always to provide you with excellent care. Hearing back from our patients is one way we can continue to improve our services. Please take a few minutes to complete the written survey that you may receive in the mail after your visit with us. Thank you!             Your Updated Medication List - Protect others around you: Learn how to safely use, store and throw away your medicines at www.disposemymeds.org.          This list is accurate as of 1/30/18  8:34 AM.  Always use your most recent med list.                   Brand Name Dispense Instructions for use Diagnosis    aspirin 81 MG tablet      1 tab po QD (Once per day)    Other specified counseling, Need for prophylactic vaccination and inoculation against other combinations of diseases       diphenhydrAMINE 25 MG tablet    BENADRYL    60 tablet    Take 1-2  tablets (25-50 mg) by mouth every 6 hours as needed for itching or allergies        emollient cream      Apply topically as needed for other        losartan-hydrochlorothiazide 50-12.5 MG per tablet    HYZAAR    45 tablet    1/2 tablet daily    Hypertension goal BP (blood pressure) < 140/90       simvastatin 40 MG tablet    ZOCOR    90 tablet    Take 1 tablet (40 mg) by mouth At Bedtime    Hyperlipidemia LDL goal <130       vardenafil 20 MG tablet    LEVITRA    18 tablet    Take 1 tablet by mouth daily as needed.    ED (erectile dysfunction)

## 2018-01-30 NOTE — PROGRESS NOTES
SUBJECTIVE:   Amadou Wade is a 72 year old male who presents to clinic today for the following health issues:    Hypertension Follow-up      Outpatient blood pressures are being checked at home.  Results are normal up to 1 week ago when BP dropped to 80-91/70's - started taking 1/2 pill and it's normal now.    Low Salt Diet: no added salt      Amount of exercise or physical activity: 6-7 days/week for an average of less than 15 minutes    Problems taking medications regularly: No    Medication side effects: none    Diet: regular (no restrictions)    Blood pressure doing well today. He isn't planning any vacations in the near future. His last blood pressure was high, 168/94, at recent CPX. Patient states that he used to be able to count his heart beats when laying down it was so high. He was started on the Hyzaar 50 mg daily, after a while on that med his BP started dropping too low, he hit 79/57 at one point in time and called in, told him to go to a half pill a day, and it has been doing well since then. He had some dizziness when his BP was low. He has started an exercise program since christmas. Denies vision changes.     Problem list and histories reviewed & adjusted, as indicated.  Additional history: as documented    BP Readings from Last 3 Encounters:   01/30/18 110/72   12/29/17 (!) 168/94   05/11/17 126/80     Creatinine   Date Value Ref Range Status   12/29/2017 0.82 0.66 - 1.25 mg/dL Final     Wt Readings from Last 4 Encounters:   01/30/18 182 lb (82.6 kg)   12/29/17 182 lb (82.6 kg)   05/11/17 180 lb (81.6 kg)   09/15/16 180 lb (81.6 kg)       Health Maintenance    There are no preventive care reminders to display for this patient.    Current Problem List    Patient Active Problem List   Diagnosis     Cardiac dysrhythmia     Hyperlipidemia LDL goal <130     Advanced directives, counseling/discussion     ED (erectile dysfunction)     Hypertension goal BP (blood pressure) < 140/90       Past Medical  History    Past Medical History:   Diagnosis Date     Cardiac dysrhythmia, unspecified 3/07    few pac's, few pvc's, few runs SVT     Chronic rhinitis     resolved     ED (erectile dysfunction)      Hard of hearing     [facility]     Hyperlipidemia LDL goal <130 2002     Hypertension goal BP (blood pressure) < 140/90 12/2017     Pneumonia, organism unspecified(486) 1993     Snoring     no sleep apnea - mouth guard     Unspecified hemorrhoids without mention of complication 4/06    internal       Past Surgical History    Past Surgical History:   Procedure Laterality Date     COLONOSCOPY  1/3/2012    incomplete - negative, ACBE incomplete - negative, CT colography negative     HC COLONOSCOPY THRU STOMA, DIAGNOSTIC  5/06    dr tejeda - normal - internal hemorrhiods - due 10 yrs     HC REMOVE TONSILS/ADENOIDS,<13 Y/O  1953    T & A <14y.o.     SIGMOIDOSCOPY,DIAGNOSTIC  1997    normal     STRESS ECHO (METRO)  3/07    normal few pac's, few pvc's       Current Medications    Current Outpatient Prescriptions   Medication Sig Dispense Refill     losartan-hydrochlorothiazide (HYZAAR) 50-12.5 MG per tablet 1/2 tablet daily 45 tablet 3     diphenhydrAMINE (BENADRYL) 25 MG tablet Take 1-2 tablets (25-50 mg) by mouth every 6 hours as needed for itching or allergies 60 tablet 1     simvastatin (ZOCOR) 40 MG tablet Take 1 tablet (40 mg) by mouth At Bedtime 90 tablet 3     emollient (VANICREAM) cream Apply topically as needed for other       vardenafil (LEVITRA) 20 MG tablet Take 1 tablet by mouth daily as needed. 18 tablet 3     ASPIRIN 81 MG OR TABS 1 tab po QD (Once per day)         Allergies    No Known Allergies    Immunizations    Immunization History   Administered Date(s) Administered     Influenza (IIV3) PF 11/30/2004, 10/20/2011, 10/30/2012, 10/15/2015     Influenza Vaccine IM 3yrs+ 4 Valent IIV4 10/15/2017     MMR 10/15/2004     Pneumo Conj 13-V (2010&after) 06/16/2015     Pneumococcal 23 valent 04/06/2006, 12/20/2012      "Poliovirus, inactivated (IPV) 10/15/2004     TD (ADULT, 7+) 2001     TDAP Vaccine (Boostrix) 10/29/2009     Twinrix A/B 10/15/2004, 2004, 2006     Typhoid IM 10/15/2004, 10/14/2016     Typhoid Oral 10/29/2009     Zoster vaccine, live 2011       Family History    Family History   Problem Relation Age of Onset     Hypertension Mother      CANCER Father      lung     Eye Disorder Father      Glaucoma     Alzheimer Disease Father      HEART DISEASE Father      DIABETES Father      type 2     DIABETES Brother      type 2     Coronary Artery Disease Brother      CEREBROVASCULAR DISEASE Maternal Grandmother      HEART DISEASE Maternal Grandfather       young heartattack     DIABETES Paternal Grandfather      CANCER Brother      melanoma     Alzheimer Disease Brother      Prostate Cancer Brother      CEREBROVASCULAR DISEASE Brother        Social History    Social History     Social History     Marital status:      Spouse name: Dania     Number of children: 3     Years of education: 19     Occupational History            Social History Main Topics     Smoking status: Never Smoker     Smokeless tobacco: Never Used     Alcohol use 0.0 - 0.5 oz/week     0 - 1 Standard drinks or equivalent per week      Comment: 0-1 per week avg     Drug use: No     Sexual activity: Yes     Partners: Female     Other Topics Concern     Caffeine Concern Yes     occas     Exercise Yes     active     Seat Belt Yes     Parent/Sibling W/ Cabg, Mi Or Angioplasty Before 65f 55m? No     Social History Narrative       All above reviewed and updated, all stable unless otherwise noted    Recent labs reviewed    ROS:  Constitutional, HEENT, cardiovascular, pulmonary, GI, , musculoskeletal, neuro, skin, endocrine and psych systems are negative, except as in HPI or otherwise noted       OBJECTIVE:                                                    /72  Pulse 76  Temp 97.8  F (36.6  C) (Oral)  Ht 5' 9\" " (1.753 m)  Wt 182 lb (82.6 kg)  SpO2 96%  BMI 26.88 kg/m2  Body mass index is 26.88 kg/(m^2).  GENERAL: healthy, alert and no distress  HENT: ear canals and TM's normal upon viewing with otoscope, nose and mouth without ulcers or lesions upon viewing with otoscope  RESP: lungs clear to auscultation - no rales, no rhonchi, no wheezes  CV: regular rates and rhythm, normal S1 S2, no S3 or S4 and no murmur, no click or rub -  ABDOMEN: soft, no tenderness, no  hepatosplenomegaly, no masses, normal bowel sounds  MS: extremities- no gross deformities noted, no edema  SKIN: small mole on right side of face, other wise normal  NEURO: Mentation intact and speech normal  PSYCH: Alert and oriented times 3; speech- coherent , normal rate and volume; able to articulate logical thoughts, able to abstract reason, no tangential thoughts, no hallucinations or delusions, affect- normal      DIAGNOSTICS/PROCEDURES:                                                      No results found for this or any previous visit (from the past 24 hour(s)).     Reviewed recent cpx labs     ASSESSMENT/PLAN:                                                        ICD-10-CM    1. Hypertension goal BP (blood pressure) < 140/90 I10 Basic metabolic panel  (Ca, Cl, CO2, Creat, Gluc, K, Na, BUN)   2. Hyperlipidemia LDL goal <130 E78.5    3. Medication monitoring encounter Z51.81 Basic metabolic panel  (Ca, Cl, CO2, Creat, Gluc, K, Na, BUN)       Discussed treatment/modality options, including risk and benefits, he desires medication refill(s), labs, follow BP at home, low salt, regular exercise. All diagnosis above reviewed and noted above, otherwise stable.  See [place] orders for further details.  Follow up in 4-6 month(s) and as needed.      There are no preventive care reminders to display for this patient.    See Patient Instructions    The information in this document, created by the medical scribe for me, accurately reflects the services I  personally performed and the decisions made by me. I have reviewed and approved this document for accuracy.   Johnathan Sampson MD FAAFP              Johnathan Sampson MD FAAFP  [address]  [id number] [phone] Fax

## 2018-01-30 NOTE — PATIENT INSTRUCTIONS
[facility]                        To reach your care team during and after hours:   [phone]  To reach our pharmacy:        [phone]    Clinic Hours                        Our clinic hours are:    Monday   7:30 am to 7:00 pm                  Tuesday through Friday 7:30 am to 5:00 pm                             Saturday   8:00 am to 12:00 pm      Sunday   Closed      Pharmacy Hours                        Our pharmacy hours are:    Monday   8:30 am to 7:00 pm       Tuesday to Friday  8:30 am to 6:00 pm                       Saturday    9:00 am to 1:00 pm              Sunday    Closed              There is also information available at our web site:  [email or web address]    If your provider ordered any lab tests and you do not receive the results within 10 business days, please call the clinic.    If you need a medication refill please contact your pharmacy.  Please allow 2-3 business days for your refill to be completed.    Our clinic offers telephone visits and e visits.  Please ask one of your team members to explain more.      Use Drillstert (secure email communication and access to your chart) to send your primary care provider a message or make an appointment. Ask someone on your Team how to sign up for [employer].  Immunizations                      Immunization History   Administered Date(s) Administered     Influenza (IIV3) PF 11/30/2004, 10/20/2011, 10/30/2012, 10/15/2015     Influenza Vaccine IM 3yrs+ 4 Valent IIV4 10/15/2017     MMR 10/15/2004     Pneumo Conj 13-V (2010&after) 06/16/2015     Pneumococcal 23 valent 04/06/2006, 12/20/2012     Poliovirus, inactivated (IPV) 10/15/2004     TD (ADULT, 7+) 07/30/2001     TDAP Vaccine (Boostrix) 10/29/2009     Twinrix A/B 10/15/2004, 11/30/2004, 04/06/2006     Typhoid IM 10/15/2004, 10/14/2016     Typhoid Oral 10/29/2009     Zoster vaccine, live 12/30/2011        Health Maintenance                         There are no preventive care reminders  to display for this patient.

## 2018-01-30 NOTE — LETTER
[facility]  [address] [place] [address], [place] [address]                  [phone]   January 31, 2018    Amadou Wade  [address] [facility] [address]      Dear Amadou,    Here is a summary of your recent test results:    Labs are overall quite good, except minimally elevated sodium and chloride.     We advise:     Continue current cares.   Recheck labs in 2-4 weeks (BMP).     For additional lab test information, [email or web address] is an excellent reference.     Let us know if you have any questions or concerns.     Your test results are enclosed.      Please contact me if you have any questions.    In addition, here is a list of due or overdue Health Maintenance reminders.    There are no preventive care reminders to display for this patient.    Please call us at [phone] (or use [employer]) to address the above recommendations.            Thank you very much for trusting [facility]..     Healthy regards,        Johnathan Sampson M.D.        Results for orders placed or performed in visit on 01/30/18   Basic metabolic panel  (Ca, Cl, CO2, Creat, Gluc, K, Na, BUN)   Result Value Ref Range    Sodium 146 (H) 133 - 144 mmol/L    Potassium 4.1 3.4 - 5.3 mmol/L    Chloride 112 (H) 94 - 109 mmol/L    Carbon Dioxide 28 20 - 32 mmol/L    Anion Gap 6 3 - 14 mmol/L    Glucose 82 70 - 99 mg/dL    Urea Nitrogen 18 7 - 30 mg/dL    Creatinine 1.00 0.66 - 1.25 mg/dL    GFR Estimate 74 >60 mL/min/1.7m2    GFR Estimate If Black 89 >60 mL/min/1.7m2    Calcium 8.9 8.5 - 10.1 mg/dL

## 2018-02-25 ENCOUNTER — MYC MEDICAL ADVICE (OUTPATIENT)
Dept: FAMILY MEDICINE | Facility: CLINIC | Age: 73
End: 2018-02-25

## 2018-02-25 DIAGNOSIS — E87.0 SERUM SODIUM ELEVATED: Primary | ICD-10-CM

## 2018-02-26 NOTE — TELEPHONE ENCOUNTER
"Per 01/30/2018 Result Note: \"Recheck labs in 2-4 weeks (BMP).\"  Order futured    [employer] Message sent    Domitila Davis RN  [facility]    "

## 2018-02-27 DIAGNOSIS — E87.0 SERUM SODIUM ELEVATED: ICD-10-CM

## 2018-02-27 LAB
ANION GAP SERPL CALCULATED.3IONS-SCNC: 4 MMOL/L (ref 3–14)
BUN SERPL-MCNC: 16 MG/DL (ref 7–30)
CALCIUM SERPL-MCNC: 8.5 MG/DL (ref 8.5–10.1)
CHLORIDE SERPL-SCNC: 104 MMOL/L (ref 94–109)
CO2 SERPL-SCNC: 32 MMOL/L (ref 20–32)
CREAT SERPL-MCNC: 0.96 MG/DL (ref 0.66–1.25)
GFR SERPL CREATININE-BSD FRML MDRD: 78 ML/MIN/1.7M2
GLUCOSE SERPL-MCNC: 82 MG/DL (ref 70–99)
POTASSIUM SERPL-SCNC: 3.4 MMOL/L (ref 3.4–5.3)
SODIUM SERPL-SCNC: 140 MMOL/L (ref 133–144)

## 2018-02-27 PROCEDURE — 80048 BASIC METABOLIC PNL TOTAL CA: CPT | Performed by: FAMILY MEDICINE

## 2018-02-27 PROCEDURE — 36415 COLL VENOUS BLD VENIPUNCTURE: CPT | Performed by: FAMILY MEDICINE

## 2018-03-20 ENCOUNTER — OFFICE VISIT (OUTPATIENT)
Dept: FAMILY MEDICINE | Facility: CLINIC | Age: 73
End: 2018-03-20
Payer: COMMERCIAL

## 2018-03-20 ENCOUNTER — TELEPHONE (OUTPATIENT)
Dept: FAMILY MEDICINE | Facility: CLINIC | Age: 73
End: 2018-03-20

## 2018-03-20 ENCOUNTER — RADIANT APPOINTMENT (OUTPATIENT)
Dept: GENERAL RADIOLOGY | Facility: CLINIC | Age: 73
End: 2018-03-20
Attending: PHYSICIAN ASSISTANT
Payer: COMMERCIAL

## 2018-03-20 DIAGNOSIS — R07.81 RIB PAIN ON RIGHT SIDE: ICD-10-CM
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
## 2018-03-20 DIAGNOSIS — R20.2 PARESTHESIA: ICD-10-CM

## 2018-03-20 PROCEDURE — 99213 OFFICE O/P EST LOW 20 MIN: CPT | Performed by: PHYSICIAN ASSISTANT

## 2018-03-20 PROCEDURE — 71101 X-RAY EXAM UNILAT RIBS/CHEST: CPT | Mod: RT

## 2018-03-20 NOTE — TELEPHONE ENCOUNTER
Musculoskeletal problem/pain      Duration: 1 hour ago    Description  Location: Patient fell on right side of ribs while walking    Intensity:  moderate, 7/10    Accompanying signs and symptoms: radiation of pain to chest toward collar bone, worse with deep breathing    History  Previous similar problem: no   Previous evaluation:  none    Precipitating or alleviating factors:  Trauma or overuse: YES- fall on right side of ribs  Aggravating factors include: lifting right arm above shoulder    Therapies tried and outcome: nothing       He denies hitting his head at this time.    Patient can be reached [phone].       Patient scheduled with Mary HOLT At [facility] at 1:20 this afternoon.  He verbalized understanding and agreed with plan.      Jessica Sawyer, BS, RN, PHN  [facility] Triage  Ph) [phone]

## 2018-03-20 NOTE — MR AVS SNAPSHOT
After Visit Summary   3/20/2018    Amadou Wade    MRN: [id number]           Patient Information     Date Of Birth          1945        Visit Information        Provider Department      3/20/2018 1:20 PM Mary Iraheta PA-C [facility]        Today's Diagnoses     Fall    -  1      Care Instructions    I'm sorry you fell.  Will notify of different reading by radiology regarding x-ray.  Contusion versus fracture, but treatment is the same.  Ok to take pain reliever, ice and take it easy.  Please do practice taking deep breaths to protect against pneumonia and do not splint you chest with ACE wrap.  Re-check anytime with persistent concerns.    Electronically Signed By: Mary Iraheta PA-C            Follow-ups after your visit        Who to contact     If you have questions or need follow up information about today's clinic visit or your schedule please contact [facility] directly at [phone].  Normal or non-critical lab and imaging results will be communicated to you by Apps4Prohart, letter or phone within 4 business days after the clinic has received the results. If you do not hear from us within 7 days, please contact the clinic through [employer] or phone. If you have a critical or abnormal lab result, we will notify you by phone as soon as possible.  Submit refill requests through Testt or call your pharmacy and they will forward the refill request to us. Please allow 3 business days for your refill to be completed.          Additional Information About Your Visit        Apps4Prohart Information     Testt gives you secure access to your electronic health record. If you see a primary care provider, you can also send messages to your care team and make appointments. If you have questions, please call your primary care clinic.  If you do not have a primary care provider, please call [phone] and they will assist you.        Care EveryWhere ID     This is  "your Care EveryWhere ID. This could be used by other organizations to access your [facility] medical records  [id number]        Your Vitals Were     Pulse Temperature Height Pulse Oximetry BMI (Body Mass Index)       89 98.3  F (36.8  C) (Oral) 5' 9\" (1.753 m) 94% 27.02 kg/m2        Blood Pressure from Last 3 Encounters:   03/20/18 124/78   01/30/18 110/72   12/29/17 (!) 168/94    Weight from Last 3 Encounters:   03/20/18 183 lb (83 kg)   01/30/18 182 lb (82.6 kg)   12/29/17 182 lb (82.6 kg)               Primary Care Provider Office Phone # Fax #    Johnathan Sampson [phone] [address] [place] [address]        Equal Access to Services     [facility]: Hadii amena degladillo hadasho Sorosemary, waaxda luqadaha, qaybta kaalmada adeegyada, ephraim elkins . So [facility] [phone].    ATENCIÓN: Si habla español, tiene a martinez disposición servicios gratuitos de asistencia lingüística. Chuy al [phone].    We comply with applicable federal civil rights laws and [place] laws. We do not discriminate on the basis of race, color, national origin, age, disability, sex, sexual orientation, or gender identity.            Thank you!     Thank you for choosing [facility]  for your care. Our goal is always to provide you with excellent care. Hearing back from our patients is one way we can continue to improve our services. Please take a few minutes to complete the written survey that you may receive in the mail after your visit with us. Thank you!             Your Updated Medication List - Protect others around you: Learn how to safely use, store and throw away your medicines at www.disposemymeds.org.          This list is accurate as of 3/20/18  2:12 PM.  Always use your most recent med list.                   Brand Name Dispense Instructions for use Diagnosis    aspirin 81 MG tablet      1 tab po QD (Once per day)    Other specified counseling, Need for " prophylactic vaccination and inoculation against other combinations of diseases       diphenhydrAMINE 25 MG tablet    BENADRYL    60 tablet    Take 1-2 tablets (25-50 mg) by mouth every 6 hours as needed for itching or allergies        emollient cream      Apply topically as needed for other        losartan-hydrochlorothiazide 50-12.5 MG per tablet    HYZAAR    45 tablet    1/2 tablet daily    Hypertension goal BP (blood pressure) < 140/90       simvastatin 40 MG tablet    ZOCOR    90 tablet    Take 1 tablet (40 mg) by mouth At Bedtime    Hyperlipidemia LDL goal <130       vardenafil 20 MG tablet    LEVITRA    18 tablet    Take 1 tablet by mouth daily as needed.    ED (erectile dysfunction)

## 2018-03-20 NOTE — NURSING NOTE
"Chief Complaint   Patient presents with     Fall       Initial /78 (BP Location: Right arm, Cuff Size: Adult Regular)  Pulse 89  Temp 98.3  F (36.8  C) (Oral)  Ht 5' 9\" (1.753 m)  Wt 183 lb (83 kg)  SpO2 94%  BMI 27.02 kg/m2 Estimated body mass index is 27.02 kg/(m^2) as calculated from the following:    Height as of this encounter: 5' 9\" (1.753 m).    Weight as of this encounter: 183 lb (83 kg).  Medication Reconciliation: complete    "

## 2018-03-20 NOTE — PATIENT INSTRUCTIONS
I'm sorry you fell.  Will notify of different reading by radiology regarding x-ray.  Contusion versus fracture, but treatment is the same.  Ok to take pain reliever, ice and take it easy.  Please do practice taking deep breaths to protect against pneumonia and do not splint your chest with ACE wrap.  Re-check anytime with persistent concerns.    Electronically Signed By: Mary Irahtea PA-C

## 2018-03-20 NOTE — PROGRESS NOTES
SUBJECTIVE:   Amadou Wade is a 73 year old male who presents to clinic today for the following health issues:      Musculoskeletal problem/pain       Duration: happened about 1145    Description  Location: Patient fell on right side of ribs while walking - slipped on some ice/snow. Denies any head injury, LOC or vomiting.  No bruising since then.  No cough or hemoptysis.  Hurts in R ribs with taking a deep breath, but not SOB otherwise.  Non-smoker.  Remote hx of pneumonia many years ago.       Intensity: currently hurting a little worse then before 7-8/10    Accompanying signs and symptoms: radiation of pain to chest toward breast bone, worse with deep breathing    History  Previous similar problem: no   Previous evaluation:  none    Precipitating or alleviating factors:  Trauma or overuse: YES- fall on right side of ribs  Aggravating factors include: lifting right arm above shoulder    Therapies tried and outcome: 200mg of ibuprofen which helped a little.     No hx of GI bleed or CKD.      Problem list and histories reviewed & adjusted, as indicated.  Additional history: as documented    Patient Active Problem List   Diagnosis     Cardiac dysrhythmia     Hyperlipidemia LDL goal <130     Advanced directives, counseling/discussion     ED (erectile dysfunction)     Hypertension goal BP (blood pressure) < 140/90     Past Surgical History:   Procedure Laterality Date     COLONOSCOPY  1/3/2012    incomplete - negative, ACBE incomplete - negative, CT colography negative     HC COLONOSCOPY THRU STOMA, DIAGNOSTIC  5/06    dr tejeda - normal - internal hemorrhiods - due 10 yrs     HC REMOVE TONSILS/ADENOIDS,<11 Y/O  1953    T & A <14y.o.     SIGMOIDOSCOPY,DIAGNOSTIC  1997    normal     STRESS ECHO ([facility]RO)  3/07    normal few pac's, few pvc's       Social History   Substance Use Topics     Smoking status: Never Smoker     Smokeless tobacco: Never Used     Alcohol use 0.0 - 0.5 oz/week     0 - 1 Standard drinks or  equivalent per week      Comment: 0-1 per week avg     Family History   Problem Relation Age of Onset     Hypertension Mother      CANCER Father      lung     Eye Disorder Father      Glaucoma     Alzheimer Disease Father      HEART DISEASE Father      DIABETES Father      type 2     DIABETES Brother      type 2     Coronary Artery Disease Brother      CEREBROVASCULAR DISEASE Maternal Grandmother      HEART DISEASE Maternal Grandfather       young heartattack     DIABETES Paternal Grandfather      CANCER Brother      melanoma     Alzheimer Disease Brother      Prostate Cancer Brother      CEREBROVASCULAR DISEASE Brother          Current Outpatient Prescriptions   Medication Sig Dispense Refill     losartan-hydrochlorothiazide (HYZAAR) 50-12.5 MG per tablet 1/2 tablet daily 45 tablet 3     diphenhydrAMINE (BENADRYL) 25 MG tablet Take 1-2 tablets (25-50 mg) by mouth every 6 hours as needed for itching or allergies 60 tablet 1     simvastatin (ZOCOR) 40 MG tablet Take 1 tablet (40 mg) by mouth At Bedtime 90 tablet 3     emollient (VANICREAM) cream Apply topically as needed for other       vardenafil (LEVITRA) 20 MG tablet Take 1 tablet by mouth daily as needed. 18 tablet 3     ASPIRIN 81 MG OR TABS 1 tab po QD (Once per day)       No Known Allergies    Reviewed and updated as needed this visit by clinical staff  Tobacco  Allergies  Meds  Med Hx  Surg Hx  Fam Hx  Soc Hx      Reviewed and updated as needed this visit by Provider  Tobacco  Allergies  Meds  Med Hx  Surg Hx  Fam Hx  Soc Hx        ROS:  Constitutional, HEENT, cardiovascular, pulmonary, MSK, integumentary systems are negative, except as otherwise noted.  + for sensation like he got a bee sting on his L medial ankle. Onset a couple times over the past 1 week. Just lasts for a few seconds and then resolves. Can occur at rest or with walking. Pain was severe when it did occur. Wonders about the possibility of shingles. Never got a rash. Did have  "chicken pox when he was a kid.   No issues today. Not really worried about it, but wanted to bring it up as long as he was here.    OBJECTIVE:     /78 (BP Location: Right arm, Cuff Size: Adult Regular)  Pulse 89  Temp 98.3  F (36.8  C) (Oral)  Ht 5' 9\" (1.753 m)  Wt 183 lb (83 kg)  SpO2 94%  BMI 27.02 kg/m2  Body mass index is 27.02 kg/(m^2).  GENERAL: healthy, alert and no distress  EYES: Eyes grossly normal to inspection, PERRL and conjunctivae and sclerae normal  NECK: no adenopathy, no asymmetry, nieves.  RESP: lungs clear to auscultation - no rales, rhonchi or wheezes. Breath sounds symmetric.  CV: regular rate and rhythm, no murmur, click or rub.  MS: Is TTP along R distal lateral rib cage especially in region of T9-10 ribs in mid axillary line. Palpation here causes radiation of discomfort along R costosternal junctions as well. No deformity.  SKIN: no current ecchymosis or swelling is noted to R lateral chest.   NEURO: pt reports transient burning sensation along medial aspect of his L ankle, but there is no appreciable rash or evidence of shingles in this region.     Diagnostic Test Results:  Xray - personal reading shows no acute rib fracture or appreciable bony irregularity. No pleural effusion or infiltrate. Will await final reading by radiology.      ASSESSMENT/PLAN:       ICD-10-CM    1. Fall, initial encounter W19.XXXA XR Ribs & Chest Right G/E 3 Views   2. Rib pain on right side R07.81    3. Paresthesia R20.2    See Patient Instructions  Pt also brought up self-limited bee-sting type paresthesia along L medial ankle over the past week and I let him know it's unclear the origin of this. His main concern was for shingles, but there was no evidence for this on exam today. I encouraged him to follow-up should he develop any rash or with persistent concerns.  Patient Instructions   I'm sorry you fell.  Will notify of different reading by radiology regarding x-ray.  Contusion versus fracture, " but treatment is the same.  Ok to take pain reliever, ice and take it easy.  Please do practice taking deep breaths to protect against pneumonia and do not splint your chest with ACE wrap.  Re-check anytime with persistent concerns.    Electronically Signed By: Mary Iraheta PA-C

## 2018-03-21 NOTE — PROGRESS NOTES
Please call or write patient with the following results:    -Final xray report by radiology reports no rib fracture. Continue supportive management as discussed in clinic.    Electronically Signed By: Mary Iraheta PA-C

## 2018-03-24 VITALS
TEMPERATURE: 98.3 F | OXYGEN SATURATION: 95 % | BODY MASS INDEX: 27.11 KG/M2 | WEIGHT: 183 LBS | HEIGHT: 69 IN | HEART RATE: 89 BPM | DIASTOLIC BLOOD PRESSURE: 78 MMHG | SYSTOLIC BLOOD PRESSURE: 124 MMHG

## 2018-10-15 ENCOUNTER — TELEPHONE (OUTPATIENT)
Dept: FAMILY MEDICINE | Facility: CLINIC | Age: 73
End: 2018-10-15

## 2018-10-15 NOTE — TELEPHONE ENCOUNTER
Reason for Call:  Same Day Appointment, Requested Provider:  Johnathan Sampson MD    PCP: Johnathan Sampson    Reason for visit: The patient wants to see Dr. Sampson for left knee pain. He only wants to see Dr. Sampson, hopefully next week.    Duration of symptoms: Two days    Have you been treated for this in the past? No    Can we leave a detailed message on this number? YES    Phone number patient can be reached at: Cell number on file:    Telephone Information:   Mobile [phone]     Best Time: Anytime    Call taken on 10/15/2018 at 10:02 AM by Saundra Warren

## 2018-10-26 ENCOUNTER — RADIANT APPOINTMENT (OUTPATIENT)
Dept: GENERAL RADIOLOGY | Facility: CLINIC | Age: 73
End: 2018-10-26
Attending: FAMILY MEDICINE
Payer: COMMERCIAL

## 2018-10-26 ENCOUNTER — OFFICE VISIT (OUTPATIENT)
Dept: FAMILY MEDICINE | Facility: CLINIC | Age: 73
End: 2018-10-26
Payer: COMMERCIAL

## 2018-10-26 VITALS
SYSTOLIC BLOOD PRESSURE: 106 MMHG | TEMPERATURE: 98 F | HEART RATE: 77 BPM | OXYGEN SATURATION: 95 % | HEIGHT: 69 IN | WEIGHT: 183 LBS | DIASTOLIC BLOOD PRESSURE: 74 MMHG | BODY MASS INDEX: 27.11 KG/M2

## 2018-10-26 DIAGNOSIS — M79.672 LEFT FOOT PAIN: Primary | ICD-10-CM

## 2018-10-26 DIAGNOSIS — M79.672 LEFT FOOT PAIN: ICD-10-CM

## 2018-10-26 DIAGNOSIS — I10 HYPERTENSION GOAL BP (BLOOD PRESSURE) < 140/90: ICD-10-CM
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
## 2018-10-26 DIAGNOSIS — Z51.81 MEDICATION MONITORING ENCOUNTER: ICD-10-CM

## 2018-10-26 LAB
ERYTHROCYTE [DISTWIDTH] IN BLOOD BY AUTOMATED COUNT: 13 % (ref 10–15)
ERYTHROCYTE [SEDIMENTATION RATE] IN BLOOD BY WESTERGREN METHOD: 5 MM/H (ref 0–20)
HCT VFR BLD AUTO: 48 % (ref 40–53)
HGB BLD-MCNC: 15.6 G/DL (ref 13.3–17.7)
MCH RBC QN AUTO: 30.6 PG (ref 26.5–33)
MCHC RBC AUTO-ENTMCNC: 32.5 G/DL (ref 31.5–36.5)
MCV RBC AUTO: 94 FL (ref 78–100)
PLATELET # BLD AUTO: 164 10E9/L (ref 150–450)
RBC # BLD AUTO: 5.09 10E12/L (ref 4.4–5.9)
WBC # BLD AUTO: 4.6 10E9/L (ref 4–11)

## 2018-10-26 PROCEDURE — 99000 SPECIMEN HANDLING OFFICE-LAB: CPT | Performed by: FAMILY MEDICINE

## 2018-10-26 PROCEDURE — 86140 C-REACTIVE PROTEIN: CPT | Performed by: FAMILY MEDICINE

## 2018-10-26 PROCEDURE — 86757 RICKETTSIA ANTIBODY: CPT | Mod: 90 | Performed by: FAMILY MEDICINE

## 2018-10-26 PROCEDURE — 73630 X-RAY EXAM OF FOOT: CPT | Mod: LT

## 2018-10-26 PROCEDURE — 36415 COLL VENOUS BLD VENIPUNCTURE: CPT | Performed by: FAMILY MEDICINE

## 2018-10-26 PROCEDURE — 80053 COMPREHEN METABOLIC PANEL: CPT | Performed by: FAMILY MEDICINE

## 2018-10-26 PROCEDURE — 99214 OFFICE O/P EST MOD 30 MIN: CPT | Performed by: FAMILY MEDICINE

## 2018-10-26 PROCEDURE — 86666 EHRLICHIA ANTIBODY: CPT | Mod: 90 | Performed by: FAMILY MEDICINE

## 2018-10-26 PROCEDURE — 85652 RBC SED RATE AUTOMATED: CPT | Performed by: FAMILY MEDICINE

## 2018-10-26 PROCEDURE — 85027 COMPLETE CBC AUTOMATED: CPT | Performed by: FAMILY MEDICINE

## 2018-10-26 PROCEDURE — 86618 LYME DISEASE ANTIBODY: CPT | Performed by: FAMILY MEDICINE

## 2018-10-26 NOTE — PROGRESS NOTES
SUBJECTIVE:   Amadou Wade is a 73 year old male who presents to clinic today for the following health issues:    Left Foot Pain    Onset: X3 months     Description:   Location: Left Foot   Character: Sharp and Dull ache    Intensity: moderate    Progression of Symptoms: same    Accompanying Signs & Symptoms:  Other symptoms: none    History:   Previous similar pain: no       Precipitating factors:   Trauma or overuse: no     Alleviating factors:  Improved by: nothing  Therapies Tried and outcome: none   Patient reports that it does not wake him up at night and he denies pain while sitting. Patient reports that wearing sandals around the house helps with the pain. Patient reports a stinging pain every once and a while on his left inner calf where he was bit by a bug a few months ago. Patient reports he noticed the bug bite about 2 months ago.    Hypertension: Stable. Patient presents today as normotensive. Patient is currently prescribed 1/2 of 50-12.5 mg Losartan-hydrochlorothiazide tablet daily for hypertension management.    BP Readings from Last 5 Encounters:   10/26/18 106/74   03/20/18 124/78   01/30/18 110/72   12/29/17 (!) 168/94   05/11/17 126/80     Creatinine   Date Value Ref Range Status   02/27/2018 0.96 0.66 - 1.25 mg/dL Final     Hyperlipidemia: Stable. Patient is currently prescribed 40 mg Simvastatin daily for hyperlipidemia management.    Recent Labs   Lab Test  12/29/17   0855  09/15/16   0827  06/16/15   0919  04/16/14   0907   CHOL  184  194  145  147   HDL  57  52  50  43   LDL  113*  131*  84  90   TRIG  69  57  53  74   CHOLHDLRATIO   --    --   2.9  3.4     Problem list and histories reviewed & adjusted, as indicated.  Additional history: as documented    body mass index is 27.02 kg/(m^2).    Wt Readings from Last 4 Encounters:   10/26/18 183 lb (83 kg)   03/20/18 183 lb (83 kg)   01/30/18 182 lb (82.6 kg)   12/29/17 182 lb (82.6 kg)       Health Maintenance    Health Maintenance Due    Topic Date Due     INFLUENZA VACCINE (1) 09/01/2018       Current Problem List    Patient Active Problem List   Diagnosis     Cardiac dysrhythmia     Hyperlipidemia LDL goal <130     Advanced directives, counseling/discussion     ED (erectile dysfunction)     Hypertension goal BP (blood pressure) < 140/90       Past Medical History    Past Medical History:   Diagnosis Date     Cardiac dysrhythmia, unspecified 3/07    few pac's, few pvc's, few runs SVT     Chronic rhinitis     resolved     ED (erectile dysfunction)      Hard of hearing     [place]     Hyperlipidemia LDL goal <130 2002     Hypertension goal BP (blood pressure) < 140/90 12/2017     Pneumonia, organism unspecified(486) 1993     Snoring     no sleep apnea - mouth guard     Unspecified hemorrhoids without mention of complication 4/06    internal       Past Surgical History    Past Surgical History:   Procedure Laterality Date     COLONOSCOPY  1/3/2012    incomplete - negative, ACBE incomplete - negative, CT colography negative     HC COLONOSCOPY THRU STOMA, DIAGNOSTIC  5/06    dr tejeda - normal - internal hemorrhiods - due 10 yrs     HC REMOVE TONSILS/ADENOIDS,<13 Y/O  1953    T & A <14y.o.     SIGMOIDOSCOPY,DIAGNOSTIC  1997    normal     STRESS ECHO ([facility])  3/07    normal few pac's, few pvc's       Current Medications    Current Outpatient Prescriptions   Medication Sig Dispense Refill     losartan-hydrochlorothiazide (HYZAAR) 50-12.5 MG per tablet 1/2 tablet daily 45 tablet 3     simvastatin (ZOCOR) 40 MG tablet Take 1 tablet (40 mg) by mouth At Bedtime 90 tablet 3     ASPIRIN 81 MG OR TABS 1 tab po QD (Once per day)       diphenhydrAMINE (BENADRYL) 25 MG tablet Take 1-2 tablets (25-50 mg) by mouth every 6 hours as needed for itching or allergies 60 tablet 1     emollient (VANICREAM) cream Apply topically as needed for other       vardenafil (LEVITRA) 20 MG tablet Take 1 tablet by mouth daily as needed. 18 tablet 3       Allergies    No Known  Allergies    Immunizations    Immunization History   Administered Date(s) Administered     Influenza (IIV3) PF 2004, 10/20/2011, 10/30/2012, 10/15/2015     Influenza Vaccine IM 3yrs+ 4 Valent IIV4 10/15/2017     MMR 10/15/2004     Pneumo Conj 13-V (2010&after) 2015     Pneumococcal 23 valent 2006, 2012     Poliovirus, inactivated (IPV) 10/15/2004     TD (ADULT, 7+) 2001     TDAP Vaccine (Boostrix) 10/29/2009     Twinrix A/B 10/15/2004, 2004, 2006     Typhoid IM 10/15/2004, 10/14/2016     Typhoid Oral 10/29/2009     Zoster vaccine, live 2011       Family History    Family History   Problem Relation Age of Onset     Hypertension Mother      Cancer Father      lung     Eye Disorder Father      Glaucoma     Alzheimer Disease Father      HEART DISEASE Father      Diabetes Father      type 2     Diabetes Brother      type 2     Coronary Artery Disease Brother      Cerebrovascular Disease Maternal Grandmother      HEART DISEASE Maternal Grandfather       young heartattack     Diabetes Paternal Grandfather      Cancer Brother      melanoma     Alzheimer Disease Brother      Prostate Cancer Brother      Cerebrovascular Disease Brother        Social History    Social History     Social History     Marital status:      Spouse name: Dania     Number of children: 3     Years of education: 19     Occupational History            Social History Main Topics     Smoking status: Never Smoker     Smokeless tobacco: Never Used     Alcohol use 0.0 - 0.5 oz/week     0 - 1 Standard drinks or equivalent per week      Comment: 0-1 per week avg     Drug use: No     Sexual activity: Yes     Partners: Female     Other Topics Concern     Caffeine Concern Yes     occas     Exercise Yes     active     Seat Belt Yes     Parent/Sibling W/ Cabg, Mi Or Angioplasty Before 65f 55m? No     Social History Narrative       All above reviewed and updated, all stable unless otherwise  "noted    Recent labs reviewed    ROS:  Constitutional, HEENT, cardiovascular, pulmonary, GI, , musculoskeletal, neuro, skin, endocrine and psych systems are negative, except as otherwise noted.    OBJECTIVE:                                                    /74  Pulse 77  Temp 98  F (36.7  C) (Oral)  Ht 5' 9\" (1.753 m)  Wt 183 lb (83 kg)  SpO2 95%  BMI 27.02 kg/m2  Body mass index is 27.02 kg/(m^2).  GENERAL: healthy, alert and no distress  EYES: Eyes grossly normal to inspection  HENT:ear canals and TM's normal upon viewing with otoscope, nose and mouth without ulcers or lesions upon viewing with otoscope  NECK: no tenderness, no adenopathy, no asymmetry, no masses, no stiffness; thyroid- normal to palpation  RESP: lungs clear to auscultation - no rales, no rhonchi, no wheezes  CV: regular rates and rhythm, normal S1 S2, no S3 or S4 and no murmur, no click or rub -  ABDOMEN: soft, no tenderness, no  hepatosplenomegaly, no masses, normal bowel sounds  MS: extremities- no gross deformities noted, no edema  SKIN: no suspicious lesions, no rashes, 4 mm residual red bite on left middle inner calf  NEURO: strength and tone- normal, sensory exam- grossly normal, mentation- intact, speech- normal  BACK: no CVA tenderness, no paralumbar tenderness  PSYCH: Alert and oriented times 3; speech- coherent , normal rate and volume; able to articulate logical thoughts, able to abstract reason, no tangential thoughts, no hallucinations or delusions, affect- normal    DIAGNOSTICS/PROCEDURES:                                                      Results for orders placed or performed in visit on 10/26/18 (from the past 24 hour(s))   CBC with platelets   Result Value Ref Range    WBC 4.6 4.0 - 11.0 10e9/L    RBC Count 5.09 4.4 - 5.9 10e12/L    Hemoglobin 15.6 13.3 - 17.7 g/dL    Hematocrit 48.0 40.0 - 53.0 %    MCV 94 78 - 100 fl    MCH 30.6 26.5 - 33.0 pg    MCHC 32.5 31.5 - 36.5 g/dL    RDW 13.0 10.0 - 15.0 %    " Platelet Count 164 150 - 450 10e9/L   Erythrocyte sedimentation rate auto   Result Value Ref Range    Sed Rate 5 0 - 20 mm/h      Xray: Negative for fractures     ASSESSMENT/PLAN:                                                        ICD-10-CM    1. Left foot pain M79.672 Comprehensive metabolic panel     CBC with platelets     Erythrocyte sedimentation rate auto     CRP inflammation     XR Foot Left G/E 3 Views   2. Tick bite, initial encounter W57.XXXA Lyme Disease Vee with reflex to WB Serum     Anaplasma phagocytoph antibody IgG IgM     Rickettsia rickettsii antibody IgG & IgM   3. Hypertension goal BP (blood pressure) < 140/90 I10 Comprehensive metabolic panel   4. Medication monitoring encounter Z51.81        Discussed treatment/modality options, including risk and benefits, he desires advised aspirin 81 mg po daily, advised dentist every 6 months, advised diet and exercise and advised opthalmologist every 1-2 years. All diagnosis above reviewed and noted above, otherwise stable.  See [employer] orders for further details.  Follow up as needed.    1) Patient presents today with left foot pain that started 3 months ago. Patient had Xray performed today and the results were negative for fractures. Recommend Heat/Ice/Stretching and 800 mg Ibuprofen every 8 hours for foot pain treatment. Recommend stiff shoes for greater support. Consider MRI, PT, Podiatrist, Orthopedic Specialist for further treatment. Follow up if symptoms persist or worsen.    2) Patient presents today as normotensive. Patient is currently prescribed 1/2 of 50-12.5 mg Losartan-hydrochlorothiazide tablet daily for hypertension management. Advised continued use.    3) Patient is currently prescribed 40 mg Simvastatin daily for hyperlipidemia management. Advised continued use.    4) Recommend flu shot. Recommend Shingrix vaccine.    5) Follow up in 3 months for annual physical.    Health Maintenance Due   Topic Date Due     INFLUENZA VACCINE (1)  09/01/2018     This document serves as a record of the services and decisions personally performed and made by Johnathan Sampson MD. It was created on his behalf by Boaz Ley, a trained medical scribe. The creation of this document is based on the provider's statements to the medical scribe.  Boaz Ley October 26, 2018 3:13 PM     The information in this document, created by the medical scribe for me, accurately reflects the services I personally performed and the decisions made by me. I have reviewed and approved this document for accuracy prior to leaving the patient care area.  October 26, 2018            Johnathan Sampson MD [address]  [address] [phone] Fax

## 2018-10-26 NOTE — PATIENT INSTRUCTIONS
Recommend Heat/Ice/Stretching and 800 mg Ibuprofen every 8 hours for foot pain treatment. Recommend stiff shoes for greater support. Consider MRI, PT, Pediatrist, Orthopedic Specialist for further treatment.    [facility] [place]                        To reach your care team during and after hours:   [phone]  To reach our pharmacy:        [phone]    Clinic Hours                        Our clinic hours are:    Monday   7:30 am to 7:00 pm                  Tuesday through Friday 7:30 am to 5:00 pm                             Saturday   8:00 am to 12:00 pm      Sunday   Closed      Pharmacy Hours                        Our pharmacy hours are:    Monday   8:30 am to 7:00 pm       Tuesday to Friday  8:30 am to 6:00 pm                       Saturday    9:00 am to 1:00 pm              Sunday    Closed              There is also information available at our web site:  [email or web address]    If your provider ordered any lab tests and you do not receive the results within 10 business days, please call the clinic.    If you need a medication refill please contact your pharmacy.  Please allow 2-3 business days for your refill to be completed.    Our clinic offers telephone visits and e visits.  Please ask one of your team members to explain more.      Use [employer] (secure email communication and access to your chart) to send your primary care provider a message or make an appointment. Ask someone on your Team how to sign up for [employer].  Immunizations                      Immunization History   Administered Date(s) Administered     Influenza (IIV3) PF 11/30/2004, 10/20/2011, 10/30/2012, 10/15/2015     Influenza Vaccine IM 3yrs+ 4 Valent IIV4 10/15/2017     MMR 10/15/2004     Pneumo Conj 13-V (2010&after) 06/16/2015     Pneumococcal 23 valent 04/06/2006, 12/20/2012     Poliovirus, inactivated (IPV) 10/15/2004     TD (ADULT, 7+) 07/30/2001     TDAP Vaccine (Boostrix) 10/29/2009     Twinrix A/B 10/15/2004,  11/30/2004, 04/06/2006     Typhoid IM 10/15/2004, 10/14/2016     Typhoid Oral 10/29/2009     Zoster vaccine, live 12/30/2011        Health Maintenance                         Health Maintenance Due   Topic Date Due     Flu Vaccine (1) 09/01/2018

## 2018-10-26 NOTE — MR AVS SNAPSHOT
After Visit Summary   10/26/2018    Amadou Wade    MRN: [id number]           Patient Information     Date Of Birth          1945        Visit Information        Provider Department      10/26/2018 3:00 PM Johanthan Sampson MD [facility]        Today's Diagnoses     Left foot pain    -  1    Tick bite, initial encounter        Hypertension goal BP (blood pressure) < 140/90        Medication monitoring encounter          Care Instructions    Recommend Heat/Ice/Stretching and 800 mg Ibuprofen every 8 hours for foot pain treatment.    [facility]                        To reach your care team during and after hours:   [phone]  To reach our pharmacy:        [phone]    Clinic Hours                        Our clinic hours are:    Monday   7:30 am to 7:00 pm                  Tuesday through Friday 7:30 am to 5:00 pm                             Saturday   8:00 am to 12:00 pm      Sunday   Closed      Pharmacy Hours                        Our pharmacy hours are:    Monday   8:30 am to 7:00 pm       Tuesday to Friday  8:30 am to 6:00 pm                       Saturday    9:00 am to 1:00 pm              Sunday    Closed              There is also information available at our web site:  [email or web address]    If your provider ordered any lab tests and you do not receive the results within 10 business days, please call the clinic.    If you need a medication refill please contact your pharmacy.  Please allow 2-3 business days for your refill to be completed.    Our clinic offers telephone visits and e visits.  Please ask one of your team members to explain more.      Use BookMyShowhart (secure email communication and access to your chart) to send your primary care provider a message or make an appointment. Ask someone on your Team how to sign up for [employer].  Immunizations                      Immunization History   Administered Date(s) Administered     Influenza (IIV3) PF  11/30/2004, 10/20/2011, 10/30/2012, 10/15/2015     Influenza Vaccine IM 3yrs+ 4 Valent IIV4 10/15/2017     MMR 10/15/2004     Pneumo Conj 13-V (2010&after) 06/16/2015     Pneumococcal 23 valent 04/06/2006, 12/20/2012     Poliovirus, inactivated (IPV) 10/15/2004     TD (ADULT, 7+) 07/30/2001     TDAP Vaccine (Boostrix) 10/29/2009     Twinrix A/B 10/15/2004, 11/30/2004, 04/06/2006     Typhoid IM 10/15/2004, 10/14/2016     Typhoid Oral 10/29/2009     Zoster vaccine, live 12/30/2011        Health Maintenance                         Health Maintenance Due   Topic Date Due     Flu Vaccine (1) 09/01/2018               Follow-ups after your visit        Follow-up notes from your care team     Return in about 3 months (around 1/26/2019), or if symptoms worsen or fail to improve, for Physical Exam.      Who to contact     If you have questions or need follow up information about today's clinic visit or your schedule please contact [facility] directly at [phone].  Normal or non-critical lab and imaging results will be communicated to you by MyChart, letter or phone within 4 business days after the clinic has received the results. If you do not hear from us within 7 days, please contact the clinic through [employer] or phone. If you have a critical or abnormal lab result, we will notify you by phone as soon as possible.  Submit refill requests through [employer] App or call your pharmacy and they will forward the refill request to us. Please allow 3 business days for your refill to be completed.          Additional Information About Your Visit        [employer] Information     TV [employer] Wizard App gives you secure access to your electronic health record. If you see a primary care provider, you can also send messages to your care team and make appointments. If you have questions, please call your primary care clinic.  If you do not have a primary care provider, please call [phone] and they will assist you.        Care  "EveryWhere ID     This is your Care EveryWhere ID. This could be used by other organizations to access your [facility] medical records  [id number]        Your Vitals Were     Pulse Temperature Height Pulse Oximetry BMI (Body Mass Index)       77 98  F (36.7  C) (Oral) 5' 9\" (1.753 m) 95% 27.02 kg/m2        Blood Pressure from Last 3 Encounters:   10/26/18 106/74   03/20/18 124/78   01/30/18 110/72    Weight from Last 3 Encounters:   10/26/18 183 lb (83 kg)   03/20/18 183 lb (83 kg)   01/30/18 182 lb (82.6 kg)              We Performed the Following     Anaplasma phagocytoph antibody IgG IgM     CBC with platelets     Comprehensive metabolic panel     CRP inflammation     Erythrocyte sedimentation rate auto     Lyme Disease Vee with reflex to WB Serum     Rickettsia rickettsii antibody IgG & IgM        Primary Care Provider Office Phone # Fax #    Johnathan Sampson [phone]       [address] [place] [address]        Equal Access to Services     [facility] : Hadii amena ku hadasho Soomaali, waaxda luqadaha, qaybta kaalmada adeegyada, waxay idiin hayqin meeta elkins . So [place] [phone].    ATENCIÓN: Si habla español, tiene a martinez disposición servicios gratuitos de asistencia lingüística. Llame al [phone].    We comply with applicable federal civil rights laws and [place] laws. We do not discriminate on the basis of race, color, national origin, age, disability, sex, sexual orientation, or gender identity.            Thank you!     Thank you for choosing [facility]  for your care. Our goal is always to provide you with excellent care. Hearing back from our patients is one way we can continue to improve our services. Please take a few minutes to complete the written survey that you may receive in the mail after your visit with us. Thank you!             Your Updated Medication List - Protect others around you: Learn how to safely use, store and throw away your " medicines at www.disposemymeds.org.          This list is accurate as of 10/26/18  3:43 PM.  Always use your most recent med list.                   Brand Name Dispense Instructions for use Diagnosis    aspirin 81 MG tablet      1 tab po QD (Once per day)    Other specified counseling, Need for prophylactic vaccination and inoculation against other combinations of diseases       diphenhydrAMINE 25 MG tablet    BENADRYL    60 tablet    Take 1-2 tablets (25-50 mg) by mouth every 6 hours as needed for itching or allergies        emollient cream      Apply topically as needed for other        losartan-hydrochlorothiazide 50-12.5 MG per tablet    HYZAAR    45 tablet    1/2 tablet daily    Hypertension goal BP (blood pressure) < 140/90       simvastatin 40 MG tablet    ZOCOR    90 tablet    Take 1 tablet (40 mg) by mouth At Bedtime    Hyperlipidemia LDL goal <130       vardenafil 20 MG tablet    LEVITRA    18 tablet    Take 1 tablet by mouth daily as needed.    ED (erectile dysfunction)

## 2018-10-26 NOTE — LETTER
[facility]  [address] [place] [address], [place] [address]                  [phone]   October 30, 2018    Amadou Wade  [address] [address] [facility] [address]      Dear Amadou,    Here is a summary of your recent test results:    Labs are overall quite good.     We advise:     Continue cares as discussed.   Follow up if any issues persist.     Your test results are enclosed.      Please contact me if you have any questions.            Thank you very much for trusting [facility]..     Healthy regards,        Johnathan Sampson M.D.        Results for orders placed or performed in visit on 10/26/18   Comprehensive metabolic panel   Result Value Ref Range    Sodium 141 133 - 144 mmol/L    Potassium 4.1 3.4 - 5.3 mmol/L    Chloride 107 94 - 109 mmol/L    Carbon Dioxide 28 20 - 32 mmol/L    Anion Gap 6 3 - 14 mmol/L    Glucose 85 70 - 99 mg/dL    Urea Nitrogen 17 7 - 30 mg/dL    Creatinine 0.83 0.66 - 1.25 mg/dL    GFR Estimate >90 >60 mL/min/1.7m2    GFR Estimate If Black >90 >60 mL/min/1.7m2    Calcium 9.0 8.5 - 10.1 mg/dL    Bilirubin Total 0.5 0.2 - 1.3 mg/dL    Albumin 3.4 3.4 - 5.0 g/dL    Protein Total 6.9 6.8 - 8.8 g/dL    Alkaline Phosphatase 58 40 - 150 U/L    ALT 20 0 - 70 U/L    AST 24 0 - 45 U/L   CBC with platelets   Result Value Ref Range    WBC 4.6 4.0 - 11.0 10e9/L    RBC Count 5.09 4.4 - 5.9 10e12/L    Hemoglobin 15.6 13.3 - 17.7 g/dL    Hematocrit 48.0 40.0 - 53.0 %    MCV 94 78 - 100 fl    MCH 30.6 26.5 - 33.0 pg    MCHC 32.5 31.5 - 36.5 g/dL    RDW 13.0 10.0 - 15.0 %    Platelet Count 164 150 - 450 10e9/L   Erythrocyte sedimentation rate auto   Result Value Ref Range    Sed Rate 5 0 - 20 mm/h   CRP inflammation   Result Value Ref Range    CRP Inflammation <2.9 0.0 - 8.0 mg/L   Lyme Disease Vee with reflex to WB Serum   Result Value Ref Range    Lyme Disease Antibodies Serum 0.05 0.00 - 0.89   Anaplasma phagocytoph antibody IgG IgM   Result Value Ref  Range    A Phagocytophil IgG <1:80 <1:80    A Phagocytophil IgM < 1:16 <1:16   Rickettsia rickettsii antibody IgG & IgM   Result Value Ref Range    Rickettsia IgG Antibody <1:64 <1:64    Rickettsial Fever IgM <1:64 <1:64

## 2018-10-27 LAB
ALBUMIN SERPL-MCNC: 3.4 G/DL (ref 3.4–5)
ALP SERPL-CCNC: 58 U/L (ref 40–150)
ALT SERPL W P-5'-P-CCNC: 20 U/L (ref 0–70)
ANION GAP SERPL CALCULATED.3IONS-SCNC: 6 MMOL/L (ref 3–14)
AST SERPL W P-5'-P-CCNC: 24 U/L (ref 0–45)
BILIRUB SERPL-MCNC: 0.5 MG/DL (ref 0.2–1.3)
BUN SERPL-MCNC: 17 MG/DL (ref 7–30)
CALCIUM SERPL-MCNC: 9 MG/DL (ref 8.5–10.1)
CHLORIDE SERPL-SCNC: 107 MMOL/L (ref 94–109)
CO2 SERPL-SCNC: 28 MMOL/L (ref 20–32)
CREAT SERPL-MCNC: 0.83 MG/DL (ref 0.66–1.25)
CRP SERPL-MCNC: <2.9 MG/L (ref 0–8)
GFR SERPL CREATININE-BSD FRML MDRD: >90 ML/MIN/1.7M2
GLUCOSE SERPL-MCNC: 85 MG/DL (ref 70–99)
POTASSIUM SERPL-SCNC: 4.1 MMOL/L (ref 3.4–5.3)
PROT SERPL-MCNC: 6.9 G/DL (ref 6.8–8.8)
SODIUM SERPL-SCNC: 141 MMOL/L (ref 133–144)

## 2018-10-28 LAB
R RICKETTSI IGG TITR SER IF: NORMAL {TITER}
R RICKETTSI IGM TITR SER IF: NORMAL {TITER}

## 2018-10-29 LAB
A PHAGOCYTOPH IGG TITR SER IF: NORMAL {TITER}
A PHAGOCYTOPH IGM TITR SER IF: NORMAL {TITER}
B BURGDOR IGG+IGM SER QL: 0.05 (ref 0–0.89)

## 2019-01-07 ENCOUNTER — OFFICE VISIT (OUTPATIENT)
Dept: FAMILY MEDICINE | Facility: CLINIC | Age: 74
End: 2019-01-07
Payer: COMMERCIAL

## 2019-01-07 VITALS
HEART RATE: 79 BPM | BODY MASS INDEX: 27.11 KG/M2 | TEMPERATURE: 97.6 F | DIASTOLIC BLOOD PRESSURE: 76 MMHG | OXYGEN SATURATION: 95 % | HEIGHT: 69 IN | WEIGHT: 183 LBS | SYSTOLIC BLOOD PRESSURE: 118 MMHG

## 2019-01-07 DIAGNOSIS — N52.9 ERECTILE DYSFUNCTION, UNSPECIFIED ERECTILE DYSFUNCTION TYPE: ICD-10-CM

## 2019-01-07 DIAGNOSIS — I10 HYPERTENSION GOAL BP (BLOOD PRESSURE) < 140/90: ICD-10-CM

## 2019-01-07 DIAGNOSIS — Z51.81 MEDICATION MONITORING ENCOUNTER: ICD-10-CM

## 2019-01-07 DIAGNOSIS — Z00.00 ENCOUNTER FOR ROUTINE ADULT HEALTH EXAMINATION WITHOUT ABNORMAL FINDINGS: Primary | ICD-10-CM

## 2019-01-07 DIAGNOSIS — E78.5 HYPERLIPIDEMIA LDL GOAL <130: ICD-10-CM

## 2019-01-07 DIAGNOSIS — I49.9 CARDIAC ARRHYTHMIA, UNSPECIFIED CARDIAC ARRHYTHMIA TYPE: ICD-10-CM

## 2019-01-07 DIAGNOSIS — B00.1 RECURRENT COLD SORES: ICD-10-CM

## 2019-01-07 DIAGNOSIS — Z12.11 SCREEN FOR COLON CANCER: ICD-10-CM

## 2019-01-07 DIAGNOSIS — Z12.5 SCREENING FOR PROSTATE CANCER: ICD-10-CM

## 2019-01-07 DIAGNOSIS — Z23 NEED FOR TDAP VACCINATION: ICD-10-CM

## 2019-01-07 LAB
ALBUMIN SERPL-MCNC: 3.6 G/DL (ref 3.4–5)
ALBUMIN UR-MCNC: NEGATIVE MG/DL
ALP SERPL-CCNC: 64 U/L (ref 40–150)
ALT SERPL W P-5'-P-CCNC: 22 U/L (ref 0–70)
ANION GAP SERPL CALCULATED.3IONS-SCNC: 8 MMOL/L (ref 3–14)
APPEARANCE UR: CLEAR
AST SERPL W P-5'-P-CCNC: 21 U/L (ref 0–45)
BILIRUB SERPL-MCNC: 0.8 MG/DL (ref 0.2–1.3)
BILIRUB UR QL STRIP: NEGATIVE
BUN SERPL-MCNC: 21 MG/DL (ref 7–30)
CALCIUM SERPL-MCNC: 9 MG/DL (ref 8.5–10.1)
CHLORIDE SERPL-SCNC: 106 MMOL/L (ref 94–109)
CHOLEST SERPL-MCNC: 184 MG/DL
CK SERPL-CCNC: 68 U/L (ref 30–300)
CO2 SERPL-SCNC: 26 MMOL/L (ref 20–32)
COLOR UR AUTO: YELLOW
CREAT SERPL-MCNC: 0.9 MG/DL (ref 0.66–1.25)
CREAT UR-MCNC: 169 MG/DL
ERYTHROCYTE [DISTWIDTH] IN BLOOD BY AUTOMATED COUNT: 13 % (ref 10–15)
GFR SERPL CREATININE-BSD FRML MDRD: 85 ML/MIN/{1.73_M2}
GLUCOSE SERPL-MCNC: 84 MG/DL (ref 70–99)
GLUCOSE UR STRIP-MCNC: NEGATIVE MG/DL
HCT VFR BLD AUTO: 50.4 % (ref 40–53)
HDLC SERPL-MCNC: 51 MG/DL
HGB BLD-MCNC: 16.8 G/DL (ref 13.3–17.7)
HGB UR QL STRIP: NEGATIVE
KETONES UR STRIP-MCNC: NEGATIVE MG/DL
LDLC SERPL CALC-MCNC: 116 MG/DL
LEUKOCYTE ESTERASE UR QL STRIP: NEGATIVE
MCH RBC QN AUTO: 31.3 PG (ref 26.5–33)
MCHC RBC AUTO-ENTMCNC: 33.3 G/DL (ref 31.5–36.5)
MCV RBC AUTO: 94 FL (ref 78–100)
MICROALBUMIN UR-MCNC: 8 MG/L
MICROALBUMIN/CREAT UR: 4.6 MG/G CR (ref 0–17)
NITRATE UR QL: NEGATIVE
NONHDLC SERPL-MCNC: 133 MG/DL
PH UR STRIP: 6 PH (ref 5–7)
PLATELET # BLD AUTO: 154 10E9/L (ref 150–450)
POTASSIUM SERPL-SCNC: 3.9 MMOL/L (ref 3.4–5.3)
PROT SERPL-MCNC: 6.9 G/DL (ref 6.8–8.8)
PSA SERPL-ACNC: 3.83 UG/L (ref 0–4)
RBC # BLD AUTO: 5.37 10E12/L (ref 4.4–5.9)
SODIUM SERPL-SCNC: 140 MMOL/L (ref 133–144)
SOURCE: NORMAL
SP GR UR STRIP: 1.02 (ref 1–1.03)
TRIGL SERPL-MCNC: 86 MG/DL
TSH SERPL DL<=0.005 MIU/L-ACNC: 1.53 MU/L (ref 0.4–4)
UROBILINOGEN UR STRIP-ACNC: 0.2 EU/DL (ref 0.2–1)
WBC # BLD AUTO: 4.8 10E9/L (ref 4–11)

## 2019-01-07 PROCEDURE — 80061 LIPID PANEL: CPT | Performed by: FAMILY MEDICINE

## 2019-01-07 PROCEDURE — 84443 ASSAY THYROID STIM HORMONE: CPT | Performed by: FAMILY MEDICINE

## 2019-01-07 PROCEDURE — 82550 ASSAY OF CK (CPK): CPT | Performed by: FAMILY MEDICINE

## 2019-01-07 PROCEDURE — G0439 PPPS, SUBSEQ VISIT: HCPCS | Performed by: FAMILY MEDICINE

## 2019-01-07 PROCEDURE — 90471 IMMUNIZATION ADMIN: CPT | Performed by: FAMILY MEDICINE

## 2019-01-07 PROCEDURE — G0103 PSA SCREENING: HCPCS | Performed by: FAMILY MEDICINE

## 2019-01-07 PROCEDURE — 36415 COLL VENOUS BLD VENIPUNCTURE: CPT | Performed by: FAMILY MEDICINE

## 2019-01-07 PROCEDURE — 81003 URINALYSIS AUTO W/O SCOPE: CPT | Performed by: FAMILY MEDICINE

## 2019-01-07 PROCEDURE — 82043 UR ALBUMIN QUANTITATIVE: CPT | Performed by: FAMILY MEDICINE

## 2019-01-07 PROCEDURE — 80053 COMPREHEN METABOLIC PANEL: CPT | Performed by: FAMILY MEDICINE

## 2019-01-07 PROCEDURE — 85027 COMPLETE CBC AUTOMATED: CPT | Performed by: FAMILY MEDICINE

## 2019-01-07 PROCEDURE — 90715 TDAP VACCINE 7 YRS/> IM: CPT | Performed by: FAMILY MEDICINE

## 2019-01-07 RX ORDER — SIMVASTATIN 40 MG
40 TABLET ORAL AT BEDTIME
Qty: 90 TABLET | Refills: 3 | Status: SHIPPED | OUTPATIENT
Start: 2019-01-07 | End: 2020-02-24

## 2019-01-07 RX ORDER — VALACYCLOVIR HYDROCHLORIDE 1 G/1
TABLET, FILM COATED ORAL
Qty: 20 TABLET | Refills: 1 | Status: SHIPPED | OUTPATIENT
Start: 2019-01-07 | End: 2019-11-12

## 2019-01-07 RX ORDER — LOSARTAN POTASSIUM AND HYDROCHLOROTHIAZIDE 12.5; 5 MG/1; MG/1
TABLET ORAL
Qty: 45 TABLET | Refills: 3 | Status: SHIPPED | OUTPATIENT
Start: 2019-01-07 | End: 2020-02-24

## 2019-01-07 ASSESSMENT — MIFFLIN-ST. JEOR: SCORE: 1575.46

## 2019-01-07 NOTE — PROGRESS NOTES
"  SUBJECTIVE:   Amadou Wade is a 73 year old male who presents for Preventive Visit.    Are you in the first 12 months of your Medicare Part B coverage?  No    Physical Health:    In general, how would you rate your overall physical health? good    Outside of work, how many days during the week do you exercise? 2-3 days/week    Outside of work, approximately how many minutes a day do you exercise?30-45 minutes    If you drink alcohol do you typically have >3 drinks per day or >7 drinks per week? No    Do you usually eat at least 4 servings of fruit and vegetables a day, include whole grains & fiber and avoid regularly eating high fat or \"junk\" foods? Yes    Do you have any problems taking medications regularly?  No    Do you have any side effects from medications? none    Needs assistance for the following daily activities: no assistance needed    Which of the following safety concerns are present in your home?  none identified     Hearing impairment: No    In the past 6 months, have you been bothered by leaking of urine? no    Mental Health:    In general, how would you rate your overall mental or emotional health? excellent  PHQ-2 Score:      Do you feel safe in your environment? Yes    Do you have a Health Care Directive? Yes: Advance Directive has been received and scanned.    Additional concerns to address?  YES - growth in right ear    Fall risk:  Fallen 2 or more times in the past year?: Yes  Any fall with injury in the past year?: Yes    Cognitive Screenin) Repeat 3 items (Leader, Season, Table)    2) Clock draw: NORMAL  3) 3 item recall: Recalls 1 object   Results: NORMAL clock, 1-2 items recalled: COGNITIVE IMPAIRMENT LESS LIKELY    Mini-CogTM Copyright JOSE Rucker. Licensed by the author for use in [facility]; reprinted with permission ([email or web address]). All rights reserved.      Do you have sleep apnea, excessive snoring or daytime drowsiness?: yes    Hypertension: Patient presents today " as normotensive. Patient is currently prescribed 25-6.25 Losartan-hydrochlorothiazide daily for hypertension management. Patient has a BP log that he brought in recording his blood pressures for the months of December and January. Readings are overall mostly normotensive.     BP Readings from Last 5 Encounters:   01/07/19 118/76   10/26/18 106/74   03/20/18 124/78   01/30/18 110/72   12/29/17 (!) 168/94     Creatinine   Date Value Ref Range Status   10/26/2018 0.83 0.66 - 1.25 mg/dL Final     Hyperlipidemia: Patient's hyperlipidemia is well controlled. Patient is currently prescribed 40 mg Simvastatin daily for hyperlipidemia management.    Recent Labs   Lab Test 12/29/17  0855 09/15/16  0827 06/16/15  0919 04/16/14  0907   CHOL 184 194 145 147   HDL 57 52 50 43   * 131* 84 90   TRIG 69 57 53 74   CHOLHDLRATIO  --   --  2.9 3.4     Sleep: Patient has been tested for sleep apnea and was informed that he does not have sleep apnea. Patient is currently prescribed a Mouth Guard at night. Patient reports that he only uses his mouth guard when he travels.     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 Patient reports they are not as bad as when he was younger.    Reviewed and updated as needed this visit by clinical staff  Tobacco  Allergies  Meds  Med Hx  Surg Hx  Fam Hx  Soc Hx      Reviewed and updated as needed this visit by Provider  Allergies        Social History     Tobacco Use     Smoking status: Never Smoker     Smokeless tobacco: Never Used   Substance Use Topics     Alcohol use: Yes     Alcohol/week: 0.0 - 0.5 oz     Comment: 0-1 per week avg                           Current providers sharing in care for this patient include:   Patient Care Team:  Johnathan Sampson MD as PCP - General  Johnathan Sampson MD as PCP - Assigned PCP    The following health maintenance items are reviewed in Epic and correct as of today:  Health Maintenance   Topic Date Due     ZOSTER IMMUNIZATION (2 of 3)  02/24/2012     LIPID MONITORING Q1 YEAR  12/29/2018     PSA Q1 YR  12/29/2018     FALL RISK ASSESSMENT  12/29/2018     WELLNESS VISIT Q1 YR  12/29/2018     FIT Q1 YR  01/02/2019     DTAP/TDAP/TD IMMUNIZATION (3 - Td) 10/29/2019     PHQ-2 Q1 YR  01/07/2020     COLONOSCOPY Q10 YR  01/03/2022     ADVANCE DIRECTIVE PLANNING Q5 YRS  01/07/2024     INFLUENZA VACCINE  Completed     PNEUMOVAX IMMUNIZATION 65+ LOW/MEDIUM RISK  Completed     AORTIC ANEURYSM SCREENING (SYSTEM ASSIGNED)  Completed     HEPATITIS C SCREENING  Completed     IPV IMMUNIZATION  Aged Out     MENINGITIS IMMUNIZATION  Aged Out     Labs reviewed in [facility]    Health Maintenance     Colonoscopy:  Last 1/3/2012, Due 1/2022   FIT:  Last 1/2/2018, Due              PSA:  Last 12/29/2017, Due   DEXA:  N/A    Health Maintenance Due   Topic Date Due     ZOSTER IMMUNIZATION (2 of 3) 02/24/2012     LIPID MONITORING Q1 YEAR  12/29/2018     PSA Q1 YR  12/29/2018     FALL RISK ASSESSMENT  12/29/2018     WELLNESS VISIT Q1 YR  12/29/2018     FIT Q1 YR  01/02/2019       Current Problem List    Patient Active Problem List   Diagnosis     Cardiac dysrhythmia     Hyperlipidemia LDL goal <130     Advanced directives, counseling/discussion     ED (erectile dysfunction)     Hypertension goal BP (blood pressure) < 140/90     Recurrent cold sores       Past Medical History    Past Medical History:   Diagnosis Date     Cardiac dysrhythmia, unspecified 3/07    few pac's, few pvc's, few runs SVT     Chronic rhinitis     resolved     ED (erectile dysfunction)      [facility]     Hyperlipidemia LDL goal <130 2002     Hypertension goal BP (blood pressure) < 140/90 12/2017     Pneumonia, organism unspecified(486) 1993     Snoring     no sleep apnea - mouth guard     Unspecified hemorrhoids without mention of complication 4/06    internal       Past Surgical History    Past Surgical History:   Procedure Laterality Date     COLONOSCOPY  1/3/2012    incomplete - negative, ACBE  incomplete - negative, CT colography negative     HC COLONOSCOPY THRU STOMA, DIAGNOSTIC  5/06    dr tejeda - normal - internal hemorrhiods - due 10 yrs     HC REMOVE TONSILS/ADENOIDS,<13 Y/O  1953    T & A <14y.o.     SIGMOIDOSCOPY,DIAGNOSTIC  1997    normal     STRESS ECHO (METRO)  3/07    normal few pac's, few pvc's       Current Medications    Current Outpatient Medications   Medication Sig Dispense Refill     ASPIRIN 81 MG OR TABS 1 tab po QD (Once per day)       diphenhydrAMINE (BENADRYL) 25 MG tablet Take 1-2 tablets (25-50 mg) by mouth every 6 hours as needed for itching or allergies 60 tablet 1     emollient (VANICREAM) cream Apply topically as needed for other       losartan-hydrochlorothiazide (HYZAAR) 50-12.5 MG tablet 1/2 tablet daily 45 tablet 3     simvastatin (ZOCOR) 40 MG tablet Take 1 tablet (40 mg) by mouth At Bedtime 90 tablet 3     valACYclovir (VALTREX) 1000 mg tablet 2 tabs at onset of cold sore, repeat in 12 hrs 20 tablet 1       Allergies    No Known Allergies    Immunizations    Immunization History   Administered Date(s) Administered     Influenza (IIV3) PF 11/30/2004, 10/20/2011, 10/30/2012, 10/15/2015     Influenza Vaccine IM 3yrs+ 4 Valent IIV4 10/15/2017, 11/01/2018     MMR 10/15/2004     Pneumo Conj 13-V (2010&after) 06/16/2015     Pneumococcal 23 valent 04/06/2006, 12/20/2012     Poliovirus, inactivated (IPV) 10/15/2004     TD (ADULT, 7+) 07/30/2001     TDAP Vaccine (Boostrix) 10/29/2009     Twinrix A/B 10/15/2004, 11/30/2004, 04/06/2006     Typhoid IM 10/15/2004, 10/14/2016     Typhoid Oral 10/29/2009     Zoster vaccine, live 12/30/2011       Family History    Family History   Problem Relation Age of Onset     Hypertension Mother      Cancer Father         lung     Eye Disorder Father         Glaucoma     Alzheimer Disease Father      Heart Disease Father      Diabetes Father         type 2     Diabetes Brother         type 2     Coronary Artery Disease Brother      Cerebrovascular  "Disease Maternal Grandmother      Heart Disease Maternal Grandfather          young heartattack     Diabetes Paternal Grandfather      Cancer Brother         melanoma     Alzheimer Disease Brother      Prostate Cancer Brother      Cerebrovascular Disease Brother        Social History    Social History     Socioeconomic History     Marital status:      Spouse name: Dania     Number of children: 3     Years of education: 19     Highest education level: Not on file   Social Needs     Financial resource strain: Not on file     Food insecurity - worry: Not on file     Food insecurity - inability: Not on file     Transportation needs - medical: Not on file     Transportation needs - non-medical: Not on file   Occupational History     Occupation:       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   Tobacco Use     Smoking status: Never Smoker     Smokeless tobacco: Never Used   Substance and Sexual Activity     Alcohol use: Yes     Alcohol/week: 0.0 - 0.5 oz     Comment: 0-1 per week avg     Drug use: No     Sexual activity: Yes     Partners: Female   Other Topics Concern      Service Not Asked     Blood Transfusions Not Asked     Caffeine Concern Yes     Comment: occas     Occupational Exposure Not Asked     Hobby Hazards Not Asked     Sleep Concern Not Asked     Stress Concern Not Asked     Weight Concern Not Asked     Special Diet Not Asked     Back Care Not Asked     Exercise Yes     Comment: active     Bike Helmet Not Asked     Seat Belt Yes     Self-Exams Not Asked     Parent/sibling w/ CABG, MI or angioplasty before 65F 55M? No   Social History Narrative     Not on file       ROS:  Constitutional, HEENT, cardiovascular, pulmonary, GI, , musculoskeletal, neuro, skin, endocrine and psych systems are negative, except as otherwise noted.    OBJECTIVE:   /76   Pulse 79   Temp 97.6  F (36.4  C) (Oral)   Ht 1.753 m (5' 9\")   Wt 83 kg (183 lb)   SpO2 95%   BMI 27.02 kg/m   Estimated body mass index " "is 27.02 kg/m  as calculated from the following:    Height as of this encounter: 1.753 m (5' 9\").    Weight as of this encounter: 83 kg (183 lb).  EXAM:   GENERAL: healthy, alert and no distress  EYES: Eyes grossly normal to inspection  HENT:ear canals and TM's normal upon viewing with otoscope, nose and mouth without ulcers or lesions upon viewing with otoscope, 8 mm Seborheic Keratosis in inner right ear - [facility]  NECK: no adenopathy, no asymmetry, masses, or scars and thyroid normal to palpation  RESP: lungs clear to auscultation - no rales, rhonchi or wheezes  CV: regular rate and rhythm, normal S1 S2, no S3 or S4, no murmur, click or rub, no peripheral edema and peripheral pulses strong  ABDOMEN: soft, nontender, no hepatosplenomegaly, no masses and bowel sounds normal   (male): normal male genitalia without lesions or urethral discharge, no hernia, 1x2 cm right epididymal cyst - unchanged  RECTAL: normal sphincter tone, no rectal masses, prostate 1+ size, smooth, nontender without nodules or masses  MS: no gross musculoskeletal defects noted, no edema  SKIN: no suspicious lesions or rashes  NEURO: Normal strength and tone, mentation intact and speech normal  PSYCH: mentation appears normal, affect normal/bright  BACK: no CVA tenderness, no paralumbar tenderness    Diagnostic Test Results:  Results for orders placed or performed in visit on 01/07/19 (from the past 24 hour(s))   CBC with platelets   Result Value Ref Range    WBC 4.8 4.0 - 11.0 10e9/L    RBC Count 5.37 4.4 - 5.9 10e12/L    Hemoglobin 16.8 13.3 - 17.7 g/dL    Hematocrit 50.4 40.0 - 53.0 %    MCV 94 78 - 100 fl    MCH 31.3 26.5 - 33.0 pg    MCHC 33.3 31.5 - 36.5 g/dL    RDW 13.0 10.0 - 15.0 %    Platelet Count 154 150 - 450 10e9/L   UA reflex to Microscopic and Culture   Result Value Ref Range    Color Urine Yellow     Appearance Urine Clear     Glucose Urine Negative NEG^Negative mg/dL    Bilirubin Urine Negative NEG^Negative    " Ketones Urine Negative NEG^Negative mg/dL    Specific Gravity Urine 1.020 1.003 - 1.035    Blood Urine Negative NEG^Negative    pH Urine 6.0 5.0 - 7.0 pH    Protein Albumin Urine Negative NEG^Negative mg/dL    Urobilinogen Urine 0.2 0.2 - 1.0 EU/dL    Nitrite Urine Negative NEG^Negative    Leukocyte Esterase Urine Negative NEG^Negative    Source Midstream Urine         Labs Pending    ASSESSMENT / PLAN:       ICD-10-CM    1. Encounter for routine adult health examination without abnormal findings Z00.00 Comprehensive metabolic panel     Lipid panel reflex to direct LDL Fasting     CK total     CBC with platelets     TSH with free T4 reflex     Prostate spec antigen screen     Albumin Random Urine Quantitative with Creat Ratio     UA reflex to Microscopic and Culture     Fecal colorectal cancer screen (FIT)   2. Hypertension goal BP (blood pressure) < 140/90 I10 Comprehensive metabolic panel     Albumin Random Urine Quantitative with Creat Ratio     UA reflex to Microscopic and Culture     losartan-hydrochlorothiazide (HYZAAR) 50-12.5 MG tablet   3. Hyperlipidemia LDL goal <130 E78.5 Comprehensive metabolic panel     Lipid panel reflex to direct LDL Fasting     CK total     simvastatin (ZOCOR) 40 MG tablet   4. Recurrent cold sores B00.1 valACYclovir (VALTREX) 1000 mg tablet   5. Cardiac arrhythmia, unspecified cardiac arrhythmia type I49.9    6. Erectile dysfunction, unspecified erectile dysfunction type N52.9    7. Screen for colon cancer Z12.11 Fecal colorectal cancer screen (FIT)   8. Screening for prostate cancer Z12.5 Prostate spec antigen screen   9. Medication monitoring encounter Z51.81 Comprehensive metabolic panel     Lipid panel reflex to direct LDL Fasting     CK total     CBC with platelets     TSH with free T4 reflex     Albumin Random Urine Quantitative with Creat Ratio     UA reflex to Microscopic and Culture   10. Need for Tdap vaccination Z23 TDAP, IM (10 - 64 YRS) - Adacel     Discussed  "treatment/modality options, including risk and benefits, he desires advised aspirin 81 mg po daily, advised 1 multivitamin per day, advised dentist every 6 months, advised diet and exercise and advised opthalmologist every 1-2 years. All diagnosis above reviewed and noted above, otherwise stable.  See [facility] orders for further details.      1) Patient presents today as normotensive. Patient is currently prescribed 25-6.25 Losartan-hydrochlorothiazide daily for hypertension management. Advised continued use.     2) Patient's hyperlipidemia is well controlled. Patient is currently prescribed 40 mg Simvastatin daily for hyperlipidemia management. Advised continued use.     3) Patient prescribed Valtrex today for cold sore management.     4) Received Tdap today. Recommend Shingrix vaccine.    5) Follow up in 1 year for annual physical exam.     [facility]    Health Maintenance Due   Topic Date Due     ZOSTER IMMUNIZATION (2 of 3) 02/24/2012     LIPID MONITORING Q1 YEAR  12/29/2018     PSA Q1 YR  12/29/2018     FALL RISK ASSESSMENT  12/29/2018     WELLNESS VISIT Q1 YR  12/29/2018     FIT Q1 YR  01/02/2019       End of Life Planning:  Patient currently has an advanced directive: Yes.  Practitioner is supportive of decision.    COUNSELING:  Reviewed preventive health counseling, as reflected in patient instructions    BP Readings from Last 1 Encounters:   01/07/19 118/76     Estimated body mass index is 27.02 kg/m  as calculated from the following:    Height as of this encounter: 1.753 m (5' 9\").    Weight as of this encounter: 83 kg (183 lb).     reports that  has never smoked. he has never used smokeless tobacco.    Appropriate preventive services were discussed with this patient, including applicable screening as appropriate for cardiovascular disease, diabetes, osteopenia/osteoporosis, and glaucoma.  As appropriate for age/gender, discussed screening for colorectal cancer, prostate " cancer, breast cancer, and cervical cancer. Checklist reviewing preventive services available has been given to the patient.    Reviewed patients plan of care and provided an AVS. The Basic Care Plan (routine screening as documented in Health Maintenance) for Amadou meets the Care Plan requirement. This Care Plan has been established and reviewed with the Patient.    Counseling Resources:  ATP IV Guidelines  Pooled Cohorts Equation Calculator  Breast Cancer Risk Calculator  FRAX Risk Assessment  ICSI Preventive Guidelines  Dietary Guidelines for Americans, 2010  USDA's MyPlate  ASA Prophylaxis  Lung CA Screening    This document serves as a record of the services and decisions personally performed and made by Johnathan Sapmson MD. It was created on his behalf by Boaz Ley, a trained medical scribe. The creation of this document is based on the provider's statements to the medical scribe.  Boaz Ley January 7, 2019 7:55 AM     The information in this document, created by the medical scribe for me, accurately reflects the services I personally performed and the decisions made by me. I have reviewed and approved this document for accuracy prior to leaving the patient care area.  January 7, 2019          Johnathan Sampson MD, FAAFP    [address]  [address] [phone] Fax

## 2019-01-07 NOTE — LETTER
[facility]  [address] [place] [address]                  [phone]   January 8, 2019    Amadou Wade  [address] [address] [address]      Dear Amadou,    Here is a summary of your recent test results:    Labs are overall quite good.     We advise:     Continue current cares.   Balanced low cholesterol diet.   Regular exercise.     For additional lab test information, [email or web address] is an excellent reference.     Your test results are enclosed.      Please contact me if you have any questions.    In addition, here is a list of due or overdue Health Maintenance reminders.    Health Maintenance Due   Topic Date Due     Zoster (Chicken Pox) Vaccine (2 of 3) 02/24/2012     FALL RISK ASSESSMENT  12/29/2018     Colon Cancer Screening - FIT Test - yearly  01/02/2019       Please call us at [phone] (or use [facility]) to address the above recommendations.            Thank you very much for trusting [facility]..     Healthy regards,        Johnathan Sampson M.D.        Results for orders placed or performed in visit on 01/07/19   Comprehensive metabolic panel   Result Value Ref Range    Sodium 140 133 - 144 mmol/L    Potassium 3.9 3.4 - 5.3 mmol/L    Chloride 106 94 - 109 mmol/L    Carbon Dioxide 26 20 - 32 mmol/L    Anion Gap 8 3 - 14 mmol/L    Glucose 84 70 - 99 mg/dL    Urea Nitrogen 21 7 - 30 mg/dL    Creatinine 0.90 0.66 - 1.25 mg/dL    GFR Estimate 85 >60 mL/min/[1.73_m2]    GFR Estimate If Black >90 >60 mL/min/[1.73_m2]    Calcium 9.0 8.5 - 10.1 mg/dL    Bilirubin Total 0.8 0.2 - 1.3 mg/dL    Albumin 3.6 3.4 - 5.0 g/dL    Protein Total 6.9 6.8 - 8.8 g/dL    Alkaline Phosphatase 64 40 - 150 U/L    ALT 22 0 - 70 U/L    AST 21 0 - 45 U/L   Lipid panel reflex to direct LDL Fasting   Result Value Ref Range    Cholesterol 184 <200 mg/dL    Triglycerides 86 <150 mg/dL    HDL Cholesterol 51 >39 mg/dL    LDL Cholesterol Calculated 116 (H) <100  mg/dL    Non HDL Cholesterol 133 (H) <130 mg/dL   CK total   Result Value Ref Range    CK Total 68 30 - 300 U/L   CBC with platelets   Result Value Ref Range    WBC 4.8 4.0 - 11.0 10e9/L    RBC Count 5.37 4.4 - 5.9 10e12/L    Hemoglobin 16.8 13.3 - 17.7 g/dL    Hematocrit 50.4 40.0 - 53.0 %    MCV 94 78 - 100 fl    MCH 31.3 26.5 - 33.0 pg    MCHC 33.3 31.5 - 36.5 g/dL    RDW 13.0 10.0 - 15.0 %    Platelet Count 154 150 - 450 10e9/L   TSH with free T4 reflex   Result Value Ref Range    TSH 1.53 0.40 - 4.00 mU/L   Prostate spec antigen screen   Result Value Ref Range    PSA 3.83 0 - 4 ug/L   Albumin Random Urine Quantitative with Creat Ratio   Result Value Ref Range    Creatinine Urine 169 mg/dL    Albumin Urine mg/L 8 mg/L    Albumin Urine mg/g Cr 4.60 0 - 17 mg/g Cr   UA reflex to Microscopic and Culture   Result Value Ref Range    Color Urine Yellow     Appearance Urine Clear     Glucose Urine Negative NEG^Negative mg/dL    Bilirubin Urine Negative NEG^Negative    Ketones Urine Negative NEG^Negative mg/dL    Specific Gravity Urine 1.020 1.003 - 1.035    Blood Urine Negative NEG^Negative    pH Urine 6.0 5.0 - 7.0 pH    Protein Albumin Urine Negative NEG^Negative mg/dL    Urobilinogen Urine 0.2 0.2 - 1.0 EU/dL    Nitrite Urine Negative NEG^Negative    Leukocyte Esterase Urine Negative NEG^Negative    Source Midstream Urine

## 2019-01-07 NOTE — PATIENT INSTRUCTIONS
Received Tdap tetanus booster today. Recommend Shingrix vaccine for shingles. Check with insurance to see where the Shingrix vaccine is the cheapest. Follow up 2-6 months after receiving the first shot for the second shot. Prescribed Valtrex today for cold sore management.     [facility]                        To reach your care team during and after hours:   [phone]  To reach our pharmacy:        [phone]    Clinic Hours                        Our clinic hours are:    Monday   7:30 am to 7:00 pm                  Tuesday through Friday 7:30 am to 5:00 pm                             Saturday   8:00 am to 12:00 pm      Sunday   Closed      Pharmacy Hours                        Our pharmacy hours are:    Monday   8:30 am to 7:00 pm       Tuesday to Friday  8:30 am to 6:00 pm                       Saturday    9:00 am to 1:00 pm              Sunday    Closed              There is also information available at our web site:  [email or web address]    If your provider ordered any lab tests and you do not receive the results within 10 business days, please call the clinic.    If you need a medication refill please contact your pharmacy.  Please allow 2-3 business days for your refill to be completed.    Our clinic offers telephone visits and e visits.  Please ask one of your team members to explain more.      Use [facility] (secure email communication and access to your chart) to send your primary care provider a message or make an appointment. Ask someone on your Team how to sign up for [facility].  Immunizations                      Immunization History   Administered Date(s) Administered     Influenza (IIV3) PF 11/30/2004, 10/20/2011, 10/30/2012, 10/15/2015     Influenza Vaccine IM 3yrs+ 4 Valent IIV4 10/15/2017, 11/01/2018     MMR 10/15/2004     Pneumo Conj 13-V (2010&after) 06/16/2015     Pneumococcal 23 valent 04/06/2006, 12/20/2012     Poliovirus, inactivated (IPV) 10/15/2004     TD (ADULT, 7+)  07/30/2001     TDAP Vaccine (Boostrix) 10/29/2009     Twinrix A/B 10/15/2004, 11/30/2004, 04/06/2006     Typhoid IM 10/15/2004, 10/14/2016     Typhoid Oral 10/29/2009     Zoster vaccine, live 12/30/2011        Health Maintenance                         Health Maintenance Due   Topic Date Due     Zoster (Chicken Pox) Vaccine (2 of 3) 02/24/2012     Cholesterol Lab - yearly  12/29/2018     Prostate Test (PSA) - yearly  12/29/2018     FALL RISK ASSESSMENT  12/29/2018     Wellness Visit with your Primary Provider - yearly  12/29/2018     Colon Cancer Screening - FIT Test - yearly  01/02/2019       Preventive Health Recommendations:     See your health care provider every year to    Review health changes.     Discuss preventive care.      Review your medicines if your doctor has prescribed any.    Talk with your health care provider about whether you should have a test to screen for prostate cancer (PSA).    Every 3 years, have a diabetes test (fasting glucose). If you are at risk for diabetes, you should have this test more often.    Every 5 years, have a cholesterol test. Have this test more often if you are at risk for high cholesterol or heart disease.     Every 10 years, have a colonoscopy. Or, have a yearly FIT test (stool test). These exams will check for colon cancer.    Talk to with your health care provider about screening for Abdominal Aortic Aneurysm if you have a family history of AAA or have a history of smoking.  Shots:     Get a flu shot each year.     Get a tetanus shot every 10 years.     Talk to your doctor about your pneumonia vaccines. There are now two you should receive - Pneumovax (PPSV 23) and Prevnar (PCV 13).    Talk to your pharmacist about a shingles vaccine.     Talk to your doctor about the hepatitis B vaccine.  Nutrition:     Eat at least 5 servings of fruits and vegetables each day.     Eat whole-grain bread, whole-wheat pasta and brown rice instead of white grains and rice.     Get  adequate Calcium and Vitamin D.   Lifestyle    Exercise for at least 150 minutes a week (30 minutes a day, 5 days a week). This will help you control your weight and prevent disease.     Limit alcohol to one drink per day.     No smoking.     Wear sunscreen to prevent skin cancer.     See your dentist every six months for an exam and cleaning.     See your eye doctor every 1 to 2 years to screen for conditions such as glaucoma, macular degeneration and cataracts.    Personalized Prevention Plan  You are due for the preventive services outlined below.  Your care team is available to assist you in scheduling these services.  If you have already completed any of these items, please share that information with your care team to update in your medical record.    Health Maintenance Due   Topic Date Due     Zoster (Chicken Pox) Vaccine (2 of 3) 02/24/2012     Flu Vaccine (1) 09/01/2018     Cholesterol Lab - yearly  12/29/2018     Prostate Test (PSA) - yearly  12/29/2018     FALL RISK ASSESSMENT  12/29/2018     Wellness Visit with your Primary Provider - yearly  12/29/2018     Colon Cancer Screening - FIT Test - yearly  01/02/2019

## 2019-01-09 PROCEDURE — G0328 FECAL BLOOD SCRN IMMUNOASSAY: HCPCS | Performed by: FAMILY MEDICINE

## 2019-01-12 DIAGNOSIS — Z00.00 ENCOUNTER FOR ROUTINE ADULT HEALTH EXAMINATION WITHOUT ABNORMAL FINDINGS: ICD-10-CM

## 2019-01-12 DIAGNOSIS — Z12.11 SCREEN FOR COLON CANCER: ICD-10-CM

## 2019-01-12 LAB — HEMOCCULT STL QL IA: NEGATIVE

## 2019-04-23 ENCOUNTER — MYC MEDICAL ADVICE (OUTPATIENT)
Dept: FAMILY MEDICINE | Facility: CLINIC | Age: 74
End: 2019-04-23

## 2019-04-23 DIAGNOSIS — M79.672 LEFT FOOT PAIN: Primary | ICD-10-CM

## 2019-04-29 ENCOUNTER — OFFICE VISIT (OUTPATIENT)
Dept: PODIATRY | Facility: CLINIC | Age: 74
End: 2019-04-29
Payer: COMMERCIAL

## 2019-04-29 VITALS
BODY MASS INDEX: 27.11 KG/M2 | DIASTOLIC BLOOD PRESSURE: 70 MMHG | WEIGHT: 183 LBS | HEIGHT: 69 IN | SYSTOLIC BLOOD PRESSURE: 104 MMHG

## 2019-04-29 DIAGNOSIS — M21.42 PES PLANUS OF BOTH FEET: ICD-10-CM

## 2019-04-29 DIAGNOSIS — M77.8 CAPSULITIS OF FOOT, RIGHT: ICD-10-CM

## 2019-04-29 DIAGNOSIS — M79.671 RIGHT FOOT PAIN: Primary | ICD-10-CM

## 2019-04-29 DIAGNOSIS — M21.41 PES PLANUS OF BOTH FEET: ICD-10-CM

## 2019-04-29 PROCEDURE — 99203 OFFICE O/P NEW LOW 30 MIN: CPT | Performed by: PODIATRIST

## 2019-04-29 ASSESSMENT — MIFFLIN-ST. JEOR: SCORE: 1570.46

## 2019-04-29 NOTE — PROGRESS NOTES
PATIENT HISTORY:  Dr. Sampson requested I see this patient for their foot issue.  Amadou Wade is a 74 year old male who presents to clinic for pain to right foot. Notes that it has been going on 6 months. Denies injury. Notes it can be 6/10. Worse with walking. Better with rest and ibuprofen. Has tried stiffer shoes which help some. Did have xrays done. Wondering what is causing the pain and what can be done for it.     Review of Systems:  Patient denies fever, chills, rash, wound, numbness, weakness, heart burn, blood in stool, chest pain with activity, calf pain when walking, shortness of breath with activity, chronic cough, easy bleeding/bruising, swelling of ankles, excessive thirst, fatigue, depression, anxiety.  Patient admits to limping, stiffness.     PAST MEDICAL HISTORY:   Past Medical History:   Diagnosis Date     Cardiac dysrhythmia, unspecified 3/07    few pac's, few pvc's, few runs SVT     Chronic rhinitis     resolved     ED (erectile dysfunction)      Hard of hearing     [facility]     Hyperlipidemia LDL goal <130 2002     Hypertension goal BP (blood pressure) < 140/90 12/2017     Pneumonia, organism unspecified(486) 1993     Snoring     no sleep apnea - mouth guard     Unspecified hemorrhoids without mention of complication 4/06    internal        PAST SURGICAL HISTORY:   Past Surgical History:   Procedure Laterality Date     COLONOSCOPY  1/3/2012    incomplete - negative, ACBE incomplete - negative, CT colography negative     HC COLONOSCOPY THRU STOMA, DIAGNOSTIC  5/06    dr tejeda - normal - internal hemorrhiods - due 10 yrs     HC REMOVE TONSILS/ADENOIDS,<11 Y/O  1953    T & A <14y.o.     SIGMOIDOSCOPY,DIAGNOSTIC  1997    normal     STRESS ECHO (METRO)  3/07    normal few pac's, few pvc's        MEDICATIONS:   Current Outpatient Medications:      ASPIRIN 81 MG OR TABS, 1 tab po QD (Once per day), Disp: , Rfl:      diphenhydrAMINE (BENADRYL) 25 MG tablet, Take 1-2 tablets (25-50 mg) by mouth every 6 hours  as needed for itching or allergies, Disp: 60 tablet, Rfl: 1     emollient (VANICREAM) cream, Apply topically as needed for other, Disp: , Rfl:      losartan-hydrochlorothiazide (HYZAAR) 50-12.5 MG tablet, 1/2 tablet daily, Disp: 45 tablet, Rfl: 3     simvastatin (ZOCOR) 40 MG tablet, Take 1 tablet (40 mg) by mouth At Bedtime, Disp: 90 tablet, Rfl: 3     valACYclovir (VALTREX) 1000 mg tablet, 2 tabs at onset of cold sore, repeat in 12 hrs, Disp: 20 tablet, Rfl: 1     ALLERGIES:  No Known Allergies     SOCIAL HISTORY:   Social History     Socioeconomic History     Marital status:      Spouse name: Dania     Number of children: 3     Years of education: 19     Highest education level: Not on file   Occupational History     Occupation:       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   Social Needs     Financial resource strain: Not on file     Food insecurity:     Worry: Not on file     Inability: Not on file     Transportation needs:     Medical: Not on file     Non-medical: Not on file   Tobacco Use     Smoking status: Never Smoker     Smokeless tobacco: Never Used   Substance and Sexual Activity     Alcohol use: Yes     Alcohol/week: 0.0 - 0.5 oz     Comment: 0-1 per week avg     Drug use: No     Sexual activity: Yes     Partners: Female   Lifestyle     Physical activity:     Days per week: Not on file     Minutes per session: Not on file     Stress: Not on file   Relationships     Social connections:     Talks on phone: Not on file     Gets together: Not on file     Attends Synagogue service: Not on file     Active member of club or organization: Not on file     Attends meetings of clubs or organizations: Not on file     Relationship status: Not on file     Intimate partner violence:     Fear of current or ex partner: Not on file     Emotionally abused: Not on file     Physically abused: Not on file     Forced sexual activity: Not on file   Other Topics Concern      Service Not Asked     Blood  "Transfusions Not Asked     Caffeine Concern Yes     Comment: occas     Occupational Exposure Not Asked     Hobby Hazards Not Asked     Sleep Concern Not Asked     Stress Concern Not Asked     Weight Concern Not Asked     Special Diet Not Asked     Back Care Not Asked     Exercise Yes     Comment: active     Bike Helmet Not Asked     Seat Belt Yes     Self-Exams Not Asked     Parent/sibling w/ CABG, MI or angioplasty before 65F 55M? No   Social History Narrative     Not on file        FAMILY HISTORY:   Family History   Problem Relation Age of Onset     Hypertension Mother      Cancer Father         lung     Eye Disorder Father         Glaucoma     Alzheimer Disease Father      Heart Disease Father      Diabetes Father         type 2     Diabetes Brother         type 2     Coronary Artery Disease Brother      Cerebrovascular Disease Maternal Grandmother      Heart Disease Maternal Grandfather          young heartattack     Diabetes Paternal Grandfather      Cancer Brother         melanoma     Alzheimer Disease Brother      Prostate Cancer Brother      Cerebrovascular Disease Brother         EXAM:Vitals: Ht 1.753 m (5' 9\")   Wt 83 kg (183 lb)   BMI 27.02 kg/m    BMI= Body mass index is 27.02 kg/m .    General appearance: Patient is alert and fully cooperative with history & exam.  No sign of distress is noted during the visit.     Psychiatric: Affect is pleasant & appropriate.  Patient appears motivated to improve health.     Respiratory: Breathing is regular & unlabored while sitting.     HEENT: Hearing is intact to spoken word.  Speech is clear.  No gross evidence of visual impairment that would impact ambulation.     Dermatologic: Skin is intact to both lower extremities without significant lesions, rash or abrasion.  No paronychia or evidence of soft tissue infection is noted.     Vascular: DP & PT pulses are intact & regular bilaterally.  No significant edema or varicosities noted.  CFT and skin temperature " is normal to both lower extremities.     Neurologic: Lower extremity sensation is intact to light touch.  No evidence of weakness or contracture in the lower extremities.  No evidence of neuropathy.     Musculoskeletal: Patient is ambulatory without assistive device or brace.     Radiographs:  Right foot xray - Bones are normally aligned. No acute fracture. Elongated 2nd metatarsal with minimal arthritic changes to 1st metatarsal phalangeal joint.      ASSESSMENT:    Right foot pain  Capsulitis of foot, right  Pes planus of both feet    PLAN:  Review[facility] in [facility].  Reviewed xrays. Reviewed and discussed causes of capsulitis.  Talked about how the elongated 2nd metatarsal can cause more pressure to occur to the joint.  We talked about treatments such as padding, orthotics, injection, physical therapy, immobilization, MRI, and possible surgery to shorten metatarsal.    Recommend orthotics, icing, topical pain cream and not going barefoot around the house.        Katie Izaguirre DPM, Podiatry/Foot and Ankle Surgery    Weight management plan: Patient was referred to their PCP to discuss a diet and exercise plan.    Recommended to Amadou Wade to follow up with Primary Care provider regarding elevated blood pressure.

## 2019-04-29 NOTE — PATIENT INSTRUCTIONS
Thank you for choosing [facility]!    DR. LIU'S CLINIC SCHEDULE  MONDAY AM - [place] TUESDAY - [place]   [address] [address] [place] [address] [place] [address]   [phone] / [phone] / [phone]       WEDNESDAY - [place] FRIDAY AM - [facility]   [address]   [place] [address] [place] [id number] [phone] / [phone]       FRIDAY PM - [place] SCHEDULE SURGERY: [phone]   [address] BILLING QUESTIONS: [phone]   [place] [address] AFTER HOURS: [phone]   [phone] / [phone] APPOINTMENTS: [phone]     Consumer Price Line (CPL) [phone]       [place] [facility] LOCATIONS  [facility]  [address]  [place] [address]  Phone: [phone]  Fax: [phone] [facility]  [address]  [place] [address]  Phone: [phone]   Fax: [phone]   [facility]  [address]  [place] [address]  Phone: [phone]  Fax: [phone] [facility]  [address]  [place] [address]  Phone: [phone]   Fax: [phone]   [facility]   [address]  [place] [address]  Phone: [phone]   Fax: [phone] * Please call any location listed to make an appointment for a casting/fitting. Your referral was sent to their central office and they will all have the order on file.     WEARING YOUR CUSTOM FOOT ORTHOTICS   Most insurance plans cover one pair of orthotics per year. You must check with your   insurance plan to see what your payment responsibility will be. Please call your   insurance company by calling the number on the back of your insurance card.   Orthotic's are non-refundable and non-returnable.   Orthotics are made of various designs. Some  orthotics are covered with material that extends beyond your toes. If your orthotic is of this design, you will likely need to trim the toe end to get a proper fit. The insole from your shoe can be used as a template. Simply overlay the shoe insert on top of the custom orthotic. Align the heel end while tracing the length of the insert onto the custom orthotic. Use a large scissor to trim the toe end until you get a proper fit in the shoe.   The orthotic needs to be pushed as far back in the shoe as possible. The heel portion should not ride forward so as not to irritate your heel.   Orthotics are designed to work with socks. Excessive perspiration will shorten the life span of the orthotics. Remove the orthotic from the shoe frequently for proper drying.   The break-in period lasts for weeks. People new to orthotics will likely experience new aches and pains. The orthotic is forcing your foot into a new position. Arch, foot and leg muscle aches and fatigue are common during these weeks. Minor discomfort can be considered normal break in phenomenon. Start wearing your orthotic around your home your first day. Limited activity for one to two hours is recommended. You can increase one or two additional hours each day provided the aches and pains are subsiding. The degree of discomfort, fatigue and problems will dictate the speed of break in. You may require multiple weeks to work up to full time use.   Do not continue wearing your orthotics if they are creating problems such as blisters or sores. Do not hesitate to call the clinic to speak with a nurse regarding orthotic   break in, fit, trimming, etc. You may also need to see the doctor if the orthotics are   simply not working out. Adjustments are sometimes made to improve orthotic   function.     Orthotics will only work in certain styles and types of shoes. Orthotics rarely work in dress shoes. Slip-ons, clogs, sandals and heels are particularly troublesome.  Specially designed orthotics may be necessary for these types of shoes. Your custom orthotic was designed for activities that require appropriate walking or running shoes. Lace up athletic shoes, walking shoes or work boots should work appropriately. You may need a wider or longer shoe. Shoes with a removable  or insert work best. In general, you want to remove an insert from the shoe before placing the orthotic into the shoe. Shoes without a removable liner may not work as well.     When purchasing new shoes, bring your orthotics along to get a proper fit. Shop at stores that are familiar with orthotics.   Frequent washing of the orthotic may shorten the life span of the top cover. The top cover can be replaced but will generally last one to five years depending on use and foot perspiration.       CAPSULITIS / METATARSALGIA  All joints in the body are surrounded by a capsule, or a covering of soft tissue and ligaments. The capsule holds bones together and secretes joint fluid to help lubricate the joint. If a joint capsule is exposed to excessive force, it can develop microscopic tears and become inflamed. This commonly occurs in the foot due to mild variation in anatomy. Hammertoes, bunions, irregular bone length, joint immobility, etc. can all lead to excessive force on the joint. Capsule injury can also occur due to repetitive stress from exercise, insufficient support from shoes, excessive bare foot walking and excessive weight.      Conservative treatments include ice, rest from the aggravating activity, weight loss, orthotic inserts, improving shoes and shoe modifications. Appropriate shoes will protect the inflamed tissue improving the chances of healing. Avoidance of standing or walking barefoot, including around the house, is necessary to allow healing. Casts are sometimes used for more aggressive protection.  NSAIDs such as Advil are also used to help with pain and decreasing inflammation. If  pain continues over a period of weeks with continuous rest and icing, Corticosteroid injections can be a treatment option to try and help decrease inflammation.    Surgery is often necessary to correct the underlying structural problem. Surgery might include shortening an excessively long bone, repairing bunion or hammertoe, lengthening a tight Achilles  tendon, etc. These are same day surgeries that might be pursued if more conservative measures fail to provide relief.      The inflamed joint capsule has the potential to completely tear. This will allow the toe to drift off the ground, curving toward the other toes. The involved toe may under or overlap the adjacent toes as drift continues. The pain may improve after the joint tears or this new position will be permanent. Surgery can address the toe alignment. Your goal of treating capsulitis is to avoid this scenario.              BODY WEIGHT AND YOUR FEET  The following information is included in the after visit summary for all patients. Body weight can be a sensitive issue to discuss in clinic, but we think the following information is very important. Although we focus on the feet and ankles, we do support the overall health of our patients.     Many things can cause foot and ankle problems. Foot structure, activity level, foot mechanics and injuries are common causes of pain. One very important issue that often goes unmentioned, is body weight. Extra weight can cause increased stress on muscles, ligaments, bones and tendons. Sometimes just a few extra pounds is all it takes to put one over her/his threshold. Without reducing that stress, it can be difficult to alleviate pain. As Foot & Ankle specialists, our job is addressing the lower extremity problem and possible causes. Regarding extra body weight, we encourage patients to discuss diet and weight management plans with their primary care doctors. It is this team approach that gives you the best opportunity  for pain relief and getting you back on your feet.      [facility] has a Comprehensive Weight Management Program. This program includes counseling, education, non-surgical and surgical approaches to weight loss. If you are interested in learning more either talk to you primary care provider or call [phone].

## 2019-04-29 NOTE — LETTER
4/29/2019         RE: Amadou Wade  [id number] [facility] [address]        Dear Colleague,    Thank you for referring your patient, Amadou Wade, to the [facility]. Please see a copy of my visit note below.    PATIENT HISTORY:  Dr. Sampson requested I see this patient for their foot issue.  Amadou Wade is a 74 year old male who presents to clinic for pain to right foot. Notes that it has been going on 6 months. Denies injury. Notes it can be 6/10. Worse with walking. Better with rest and ibuprofen. Has tried stiffer shoes which help some. Did have xrays done. Wondering what is causing the pain and what can be done for it.     Review of Systems:  Patient denies fever, chills, rash, wound, numbness, weakness, heart burn, blood in stool, chest pain with activity, calf pain when walking, shortness of breath with activity, chronic cough, easy bleeding/bruising, swelling of ankles, excessive thirst, fatigue, depression, anxiety.  Patient admits to limping, stiffness.     PAST MEDICAL HISTORY:   Past Medical History:   Diagnosis Date     Cardiac dysrhythmia, unspecified 3/07    few pac's, few pvc's, few runs SVT     Chronic rhinitis     resolved     ED (erectile dysfunction)      [place] of hearing     [place]     Hyperlipidemia LDL goal <130 2002     Hypertension goal BP (blood pressure) < 140/90 12/2017     Pneumonia, organism unspecified(486) 1993     Snoring     no sleep apnea - mouth guard     Unspecified hemorrhoids without mention of complication 4/06    internal        PAST SURGICAL HISTORY:   Past Surgical History:   Procedure Laterality Date     COLONOSCOPY  1/3/2012    incomplete - negative, ACBE incomplete - negative, CT colography negative     HC COLONOSCOPY THRU STOMA, DIAGNOSTIC  5/06    dr tejeda - normal - internal hemorrhiods - due 10 yrs     HC REMOVE TONSILS/ADENOIDS,<15 Y/O  1953    T & A <14y.o.     SIGMOIDOSCOPY,DIAGNOSTIC  1997    normal     STRESS ECHO (METRO)  3/07    normal  few pac's, few pvc's        MEDICATIONS:   Current Outpatient Medications:      ASPIRIN 81 MG OR TABS, 1 tab po QD (Once per day), Disp: , Rfl:      diphenhydrAMINE (BENADRYL) 25 MG tablet, Take 1-2 tablets (25-50 mg) by mouth every 6 hours as needed for itching or allergies, Disp: 60 tablet, Rfl: 1     emollient (VANICREAM) cream, Apply topically as needed for other, Disp: , Rfl:      losartan-hydrochlorothiazide (HYZAAR) 50-12.5 MG tablet, 1/2 tablet daily, Disp: 45 tablet, Rfl: 3     simvastatin (ZOCOR) 40 MG tablet, Take 1 tablet (40 mg) by mouth At Bedtime, Disp: 90 tablet, Rfl: 3     valACYclovir (VALTREX) 1000 mg tablet, 2 tabs at onset of cold sore, repeat in 12 hrs, Disp: 20 tablet, Rfl: 1     ALLERGIES:  No Known Allergies     SOCIAL HISTORY:   Social History     Socioeconomic History     Marital status:      Spouse name: Dania     Number of children: 3     Years of education: 19     Highest education level: Not on file   Occupational History     Occupation:       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   Social Needs     Financial resource strain: Not on file     Food insecurity:     Worry: Not on file     Inability: Not on file     Transportation needs:     Medical: Not on file     Non-medical: Not on file   Tobacco Use     Smoking status: Never Smoker     Smokeless tobacco: Never Used   Substance and Sexual Activity     Alcohol use: Yes     Alcohol/week: 0.0 - 0.5 oz     Comment: 0-1 per week avg     Drug use: No     Sexual activity: Yes     Partners: Female   Lifestyle     Physical activity:     Days per week: Not on file     Minutes per session: Not on file     Stress: Not on file   Relationships     Social connections:     Talks on phone: Not on file     Gets together: Not on file     Attends Baptism service: Not on file     Active member of club or organization: Not on file     Attends meetings of clubs or organizations: Not on file     Relationship status: Not on file     Intimate partner  "violence:     Fear of current or ex partner: Not on file     Emotionally abused: Not on file     Physically abused: Not on file     Forced sexual activity: Not on file   Other Topics Concern      Service Not Asked     Blood Transfusions Not Asked     Caffeine Concern Yes     Comment: occas     Occupational Exposure Not Asked     Hobby Hazards Not Asked     Sleep Concern Not Asked     Stress Concern Not Asked     Weight Concern Not Asked     Special Diet Not Asked     Back Care Not Asked     Exercise Yes     Comment: active     Bike Helmet Not Asked     Seat Belt Yes     Self-Exams Not Asked     Parent/sibling w/ CABG, MI or angioplasty before 65F 55M? No   Social History Narrative     Not on file        FAMILY HISTORY:   Family History   Problem Relation Age of Onset     Hypertension Mother      Cancer Father         lung     Eye Disorder Father         Glaucoma     Alzheimer Disease Father      Heart Disease Father      Diabetes Father         type 2     Diabetes Brother         type 2     Coronary Artery Disease Brother      Cerebrovascular Disease Maternal Grandmother      Heart Disease Maternal Grandfather          young heartattack     Diabetes Paternal Grandfather      Cancer Brother         melanoma     Alzheimer Disease Brother      Prostate Cancer Brother      Cerebrovascular Disease Brother         EXAM:Vitals: Ht 1.753 m (5' 9\")   Wt 83 kg (183 lb)   BMI 27.02 kg/m     BMI= Body mass index is 27.02 kg/m .    General appearance: Patient is alert and fully cooperative with history & exam.  No sign of distress is noted during the visit.     Psychiatric: Affect is pleasant & appropriate.  Patient appears motivated to improve health.     Respiratory: Breathing is regular & unlabored while sitting.     HEENT: Hearing is intact to spoken word.  Speech is clear.  No gross evidence of visual impairment that would impact ambulation.     Dermatologic: Skin is intact to both lower extremities without " significant lesions, rash or abrasion.  No paronychia or evidence of soft tissue infection is noted.     Vascular: DP & PT pulses are intact & regular bilaterally.  No significant edema or varicosities noted.  CFT and skin temperature is normal to both lower extremities.     Neurologic: Lower extremity sensation is intact to light touch.  No evidence of weakness or contracture in the lower extremities.  No evidence of neuropathy.     Musculoskeletal: Patient is ambulatory without assistive device or brace.     Radiographs:  Right foot xray - Bones are normally aligned. No acute fracture. Elongated 2nd metatarsal with minimal arthritic changes to 1st metatarsal phalangeal joint.      ASSESSMENT:    Right foot pain  Capsulitis of foot, right  Pes planus of both feet    PLAN:  Review[facility] in [facility].  Reviewed xrays. Reviewed and discussed causes of capsulitis.  Talked about how the elongated 2nd metatarsal can cause more pressure to occur to the joint.  We talked about treatments such as padding, orthotics, injection, physical therapy, immobilization, MRI, and possible surgery to shorten metatarsal.    Recommend orthotics, icing, topical pain cream and not going barefoot around the house.        Katie Izaguirre DPM, Podiatry/Foot and Ankle Surgery    Weight management plan: Patient was referred to their PCP to discuss a diet and exercise plan.    Recommended to Amadou Wade to follow up with Primary Care provider regarding elevated blood pressure.        Again, thank you for allowing me to participate in the care of your patient.        Sincerely,        Katie Izaguirre DPM, Podiatry/Foot and Ankle Surgery

## 2019-06-08 ENCOUNTER — OFFICE VISIT (OUTPATIENT)
Dept: FAMILY MEDICINE | Facility: CLINIC | Age: 74
End: 2019-06-08
Payer: COMMERCIAL

## 2019-06-08 VITALS
HEIGHT: 69 IN | HEART RATE: 76 BPM | TEMPERATURE: 98.3 F | BODY MASS INDEX: 26.51 KG/M2 | DIASTOLIC BLOOD PRESSURE: 62 MMHG | WEIGHT: 179 LBS | OXYGEN SATURATION: 96 % | SYSTOLIC BLOOD PRESSURE: 102 MMHG

## 2019-06-08 DIAGNOSIS — L23.7 CONTACT DERMATITIS DUE TO POISON IVY: Primary | ICD-10-CM

## 2019-06-08 PROCEDURE — 99213 OFFICE O/P EST LOW 20 MIN: CPT | Performed by: NURSE PRACTITIONER

## 2019-06-08 RX ORDER — TRIAMCINOLONE ACETONIDE 1 MG/G
CREAM TOPICAL 2 TIMES DAILY
Qty: 80 G | Refills: 0 | Status: SHIPPED | OUTPATIENT
Start: 2019-06-08 | End: 2019-11-12

## 2019-06-08 ASSESSMENT — MIFFLIN-ST. JEOR: SCORE: 1552.32

## 2019-06-08 NOTE — PATIENT INSTRUCTIONS
Patient Education     Understanding Contact Dermatitis     A cool, moist compress can help reduce itching.     Contact dermatitis is a common type of skin rash. It s caused by something that touches the skin and makes it irritated and inflamed. It can occur on skin on any part of the body, such as the face, neck, hands, arms, and legs. Contact dermatitis is not spread from person to person.  Often, the reaction of contact dermatitis occurs 1 to 2 days after contact with the offending agent.  How to say it  DELORES-tact [id number]   What causes contact dermatitis?  It s caused by something that irritates the skin, or that creates an allergic reaction on the skin. People can get contact dermatitis from many kinds of things. These include:    Plant oils in poison ivy, oak, and sumac    Chemicals in household , solvents, and glue    Chemicals in makeup, soap, laundry detergent, perfume, acne cream, and hair products    Certain medicines, such as neomycin, bacitracin, benzocaine, and thimerosal    Metals such as nickel, found in some jewelry and watch bands     The sticky material on the back of bandages and tape (adhesive)    Things that can cause tiny breaks in the skin, such as wood, fiberglass, metal tools, and plant thorns    Rubber latex in surgical gloves and other medical supplies  Dermatitis can also be caused by the skin being damp for long periods of time. This can happen from washing your hands too often, or working with wet materials.  Symptoms of contact dermatitis  Symptoms can include skin that is:    Blistered    Burning    Cracked    Dry    Itchy    Painful    Red    Rough, thickened, and leathery    Swollen    Warm  The blisters may ooze fluid and form crusts.  Treatment for contact dermatitis  Treatment is done to help relieve itching and reduce inflammation. The rash should go away in a few days to a few weeks. Treatments include:    Cool, moist compress. Use a clean damp cloth. Put it on  the area for 20 to 30 minutes, 5 to 6 times a day for the first 3 days.    Steroid cream or ointment. You can apply this medicine several times a day on clean skin.    Oral corticosteroid. Your healthcare provider may prescribe this medicine if you have severe skin symptoms on a large part of your body.  Your healthcare provider may give you a steroid injection instead of pills.    Oral antihistamine. This medicine can help reduce itching.    Colloidal oatmeal bath. Soaking in water with colloidal oatmeal can help soothe skin.    Plain cream, lotion, or ointment. Cream, lotion, or ointment without medicine can help to soothe and protect your skin.  Living with contact dermatitis  Talk with your healthcare provider about what may have caused your contact dermatitis. Patch testing may help you figure out what caused the rash so you can avoid further contact with it. Once you learn what caused your rash, make sure to avoid that substance. If your skin comes into contact with it again, make sure to wash your skin right away. If you can t avoid the substance, wear gloves or other protective clothing before you touch it. Or use a cream, lotion, or ointment to protect your skin.  When to call your healthcare provider  Call your healthcare provider right away if you have any of these:    Fever of 100.4 F (38 C) or higher, or as directed    Symptoms that don t get better, or get worse    New symptoms   Date Last Reviewed: 5/1/2016 2000-2018 [employer]. [address] [place] [address], [place] [address]. All rights reserved. This information is not intended as a substitute for professional medical care. Always follow your healthcare professional's instructions.

## 2019-06-08 NOTE — PROGRESS NOTES
Subjective     Amadou Wade is a 74 year old male who presents to clinic today for the following health issues:    HPI   Rash  Onset: on Wednesday    Description:   Location: both arms  Character: poison ivy  Itching (Pruritis): YES    Progression of Symptoms:  worsening    Accompanying Signs & Symptoms:  Fever: no   Body aches or joint pain: no   Sore throat symptoms: no   Recent cold symptoms: no     History:   Previous similar rash: YES    Precipitating factors:   Exposure to similar rash: no   New exposures: None   Recent travel: no     Alleviating factors:      Therapies Tried and outcome:         Was doing some yard work.      Patient Active Problem List   Diagnosis     Cardiac dysrhythmia     Hyperlipidemia LDL goal <130     Advanced directives, counseling/discussion     ED (erectile dysfunction)     Hypertension goal BP (blood pressure) < 140/90     Recurrent cold sores     Past Surgical History:   Procedure Laterality Date     COLONOSCOPY  1/3/2012    incomplete - negative, ACBE incomplete - negative, CT colography negative     HC COLONOSCOPY THRU STOMA, DIAGNOSTIC      dr tejeda - normal - internal hemorrhiods - due 10 yrs     HC REMOVE TONSILS/ADENOIDS,<15 Y/O      T & A <14y.o.     SIGMOIDOSCOPY,DIAGNOSTIC      normal     STRESS ECHO (METRO)  3/07    normal few pac's, few pvc's       Social History     Tobacco Use     Smoking status: Never Smoker     Smokeless tobacco: Never Used   Substance Use Topics     Alcohol use: Yes     Alcohol/week: 0.0 - 0.5 oz     Comment: 0-1 per week avg     Family History   Problem Relation Age of Onset     Hypertension Mother      Cancer Father         lung     Eye Disorder Father         Glaucoma     Alzheimer Disease Father      Heart Disease Father      Diabetes Father         type 2     Diabetes Brother         type 2     Coronary Artery Disease Brother      Cerebrovascular Disease Maternal Grandmother      Heart Disease Maternal Grandfather           young [employer]     Diabetes Paternal Grandfather      Cancer Brother         melanoma     Alzheimer Disease Brother      Prostate Cancer Brother      Cerebrovascular Disease Brother          Current Outpatient Medications   Medication Sig Dispense Refill     ASPIRIN 81 MG OR TABS 1 tab po QD (Once per day)       losartan-hydrochlorothiazide (HYZAAR) 50-12.5 MG tablet 1/2 tablet daily 45 tablet 3     simvastatin (ZOCOR) 40 MG tablet Take 1 tablet (40 mg) by mouth At Bedtime 90 tablet 3     triamcinolone (KENALOG) 0.1 % external cream Apply topically 2 times daily 80 g 0     diclofenac (VOLTAREN) 1 % topical gel Place 2 g onto the skin 4 times daily 100 g 1     diphenhydrAMINE (BENADRYL) 25 MG tablet Take 1-2 tablets (25-50 mg) by mouth every 6 hours as needed for itching or allergies 60 tablet 1     emollient (VANICREAM) cream Apply topically as needed for other       valACYclovir (VALTREX) 1000 mg tablet 2 tabs at onset of cold sore, repeat in 12 hrs 20 tablet 1     No Known Allergies  Recent Labs   Lab Test 01/07/19  0816 10/26/18  1525  12/29/17  0855 09/15/16  0827 06/16/15  0919   A1C  --   --   --   --   --  5.3   *  --   --  113* 131* 84   HDL 51  --   --  57 52 50   TRIG 86  --   --  69 57 53   ALT 22 20  --  22 20 17   CR 0.90 0.83   < > 0.82 0.91 0.82   GFRESTIMATED 85 >90   < > >90 83 >90  Non  GFR Calc     GFRESTBLACK >90 >90   < > >90 >90   GFR Calc   >90   GFR Calc     POTASSIUM 3.9 4.1   < > 4.8 4.2 4.3   TSH 1.53  --   --  1.41  --  1.39    < > = values in this interval not displayed.      BP Readings from Last 3 Encounters:   06/08/19 102/62   04/29/19 104/70   01/07/19 118/76    Wt Readings from Last 3 Encounters:   06/08/19 81.2 kg (179 lb)   04/29/19 83 kg (183 lb)   01/07/19 83 kg (183 lb)           Reviewed and updated as needed this visit by Provider       Review of Systems   ROS COMP: Constitutional, HEENT, cardiovascular, pulmonary, gi  "and gu systems are negative, except as otherwise noted.      Objective    /62 (BP Location: Right arm, Cuff Size: Adult Regular)   Pulse 76   Temp 98.3  F (36.8  C) (Oral)   Ht 1.753 m (5' 9\")   Wt 81.2 kg (179 lb)   SpO2 96%   BMI 26.43 kg/m    Body mass index is 26.43 kg/m .  Physical Exam   GENERAL: healthy, alert and no distress  RESP: lungs clear to auscultation - no rales, rhonchi or wheezes  CV: regular rate and rhythm, normal S1 S2, no S3 or S4, no murmur, click or rub, no peripheral edema and peripheral pulses strong  SKIN: bilateral forearms erythematous papules and vesicles noted     NEURO: Normal strength and tone, mentation intact and speech normal    Diagnostic Test Results:  Labs reviewed in Epic        Assessment & Plan     Amadou was seen today for derm problem.    Diagnoses and all orders for this visit:    Contact dermatitis due to poison ivy  -     triamcinolone (KENALOG) 0.1 % external cream; Apply topically 2 times daily      See Patient Instructions  Triamcinolone cream  Return to clinic if no improvement or symptoms worsen.  Patient verbalized understanding & agreed with plan of care.      ROSENDO Diaz, CNP  [facility] [place]            "

## 2019-10-03 ENCOUNTER — HEALTH MAINTENANCE LETTER (OUTPATIENT)
Age: 74
End: 2019-10-03

## 2019-11-12 ENCOUNTER — OFFICE VISIT (OUTPATIENT)
Dept: FAMILY MEDICINE | Facility: CLINIC | Age: 74
End: 2019-11-12
Payer: COMMERCIAL

## 2019-11-12 VITALS
TEMPERATURE: 98.1 F | OXYGEN SATURATION: 96 % | DIASTOLIC BLOOD PRESSURE: 82 MMHG | HEIGHT: 69 IN | BODY MASS INDEX: 25.77 KG/M2 | WEIGHT: 174 LBS | SYSTOLIC BLOOD PRESSURE: 136 MMHG | HEART RATE: 85 BPM

## 2019-11-12 DIAGNOSIS — L98.9 SKIN LESION: Primary | ICD-10-CM

## 2019-11-12 DIAGNOSIS — I10 HYPERTENSION GOAL BP (BLOOD PRESSURE) < 140/90: ICD-10-CM

## 2019-11-12 DIAGNOSIS — E78.5 HYPERLIPIDEMIA LDL GOAL <130: ICD-10-CM

## 2019-11-12 DIAGNOSIS — Z51.81 MEDICATION MONITORING ENCOUNTER: ICD-10-CM

## 2019-11-12 PROCEDURE — 99214 OFFICE O/P EST MOD 30 MIN: CPT | Performed by: FAMILY MEDICINE

## 2019-11-12 ASSESSMENT — MIFFLIN-ST. JEOR: SCORE: 1529.64

## 2019-11-12 NOTE — PROGRESS NOTES
SUBJECTIVE:                                                    Amadou Wade is a 74 year old male who presents to clinic today for the following health issues:    Right Ear Lesion: Patient presented today with a spot in his right ear. Patient states spot has been there for a tleast a year and states his wife looked in there now and spot is black now. [facility]. Patient is wondering if he should be worried about the lesion.     Hypertension: Patient presents today as normotensive. Patient is currently prescribed 25-6.25 mg Losartan-hydrochlorothiazide daily for hypertension management.    BP Readings from Last 5 Encounters:   11/12/19 136/82   06/08/19 102/62   04/29/19 104/70   01/07/19 118/76   10/26/18 106/74     Creatinine   Date Value Ref Range Status   01/07/2019 0.90 0.66 - 1.25 mg/dL Final     Hyperlipidemia: Patient's hyperlipidemia is well controlled. Patient is currently prescribed 40 mg Simvastatin daily for hyperlipidemia management.    Recent Labs   Lab Test 01/07/19  0816 12/29/17  0855  06/16/15  0919 04/16/14  0907   CHOL 184 184   < > 145 147   HDL 51 57   < > 50 43   * 113*   < > 84 90   TRIG 86 69   < > 53 74   CHOLHDLRATIO  --   --   --  2.9 3.4    < > = values in this interval not displayed.     Reviewed and updated as needed this visit by Provider       body mass index is 25.7 kg/m .    Wt Readings from Last 4 Encounters:   11/12/19 78.9 kg (174 lb)   06/08/19 81.2 kg (179 lb)   04/29/19 83 kg (183 lb)   01/07/19 83 kg (183 lb)       Health Maintenance    Health Maintenance Due   Topic Date Due     ZOSTER IMMUNIZATION (2 of 3) 02/24/2012       Current Problem List    Patient Active Problem List   Diagnosis     Cardiac dysrhythmia     Hyperlipidemia LDL goal <130     Advanced directives, counseling/discussion     ED (erectile dysfunction)     Hypertension goal BP (blood pressure) < 140/90     Recurrent cold sores       Past Medical  History    Past Medical History:   Diagnosis Date     Cardiac dysrhythmia, unspecified 3/07    few pac's, few pvc's, few runs SVT     Chronic rhinitis     resolved     ED (erectile dysfunction)      [facility]     Hyperlipidemia LDL goal <130 2002     Hypertension goal BP (blood pressure) < 140/90 12/2017     Pneumonia, organism unspecified(486) 1993     Snoring     no sleep apnea - mouth guard     Unspecified hemorrhoids without mention of complication 4/06    internal       Past Surgical History    Past Surgical History:   Procedure Laterality Date     COLONOSCOPY  1/3/2012    incomplete - negative, ACBE incomplete - negative, CT colography negative     HC COLONOSCOPY THRU STOMA, DIAGNOSTIC  5/06    dr tejeda - normal - internal hemorrhiods - due 10 yrs     HC REMOVE TONSILS/ADENOIDS,<11 Y/O  1953    T & A <14y.o.     SIGMOIDOSCOPY,DIAGNOSTIC  1997    normal     STRESS ECHO (METRO)  3/07    normal few pac's, few pvc's       Current Medications    Current Outpatient Medications   Medication Sig Dispense Refill     ASPIRIN 81 MG OR TABS 1 tab po QD (Once per day)       diphenhydrAMINE (BENADRYL) 25 MG tablet Take 1-2 tablets (25-50 mg) by mouth every 6 hours as needed for itching or allergies 60 tablet 1     emollient (VANICREAM) cream Apply topically as needed for other       losartan-hydrochlorothiazide (HYZAAR) 50-12.5 MG tablet 1/2 tablet daily 45 tablet 3     simvastatin (ZOCOR) 40 MG tablet Take 1 tablet (40 mg) by mouth At Bedtime 90 tablet 3       Allergies    No Known Allergies    Immunizations    Immunization History   Administered Date(s) Administered     Flu, Unspecified 10/09/2019     Influenza (IIV3) PF 11/30/2004, 10/20/2011, 10/30/2012, 10/15/2015     Influenza Vaccine IM > 6 months Valent IIV4 10/15/2017, 11/01/2018     MMR 10/15/2004     Pneumo Conj 13-V (2010&after) 06/16/2015     Pneumococcal 23 valent 04/06/2006, 12/20/2012     Poliovirus, inactivated (IPV) 10/15/2004     TD (ADULT,  7+) 2001     TDAP Vaccine (Adacel) 2019     TDAP Vaccine (Boostrix) 10/29/2009     Twinrix A/B 10/15/2004, 2004, 2006     Typhoid IM 10/15/2004, 10/14/2016     Typhoid Oral 10/29/2009     Zoster vaccine, live 2011       Family History    Family History   Problem Relation Age of Onset     Hypertension Mother      Cancer Father         lung     Eye Disorder Father         Glaucoma     Alzheimer Disease Father      Heart Disease Father      Diabetes Father         type 2     Diabetes Brother         type 2     Coronary Artery Disease Brother      Cerebrovascular Disease Maternal Grandmother      Heart Disease Maternal Grandfather          young heartattack     Diabetes Paternal Grandfather      Cancer Brother         melanoma     Alzheimer Disease Brother      Prostate Cancer Brother      Cerebrovascular Disease Brother        Social History    Social History     Socioeconomic History     Marital status:      Spouse name: Dania     Number of children: 3     Years of education: 19     Highest education level: Not on file   Occupational History     Occupation:       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   Social Needs     Financial resource strain: Not on file     Food insecurity:     Worry: Not on file     Inability: Not on file     Transportation needs:     Medical: Not on file     Non-medical: Not on file   Tobacco Use     Smoking status: Never Smoker     Smokeless tobacco: Never Used   Substance and Sexual Activity     Alcohol use: Yes     Alcohol/week: 0.0 - 0.8 standard drinks     Comment: 0-1 per week avg     Drug use: No     Sexual activity: Yes     Partners: Female   Lifestyle     Physical activity:     Days per week: Not on file     Minutes per session: Not on file     Stress: Not on file   Relationships     Social connections:     Talks on phone: Not on file     Gets together: Not on file     Attends Confucianist service: Not on file     Active member of club or  "organization: Not on file     Attends meetings of clubs or organizations: Not on file     Relationship status: Not on file     Intimate partner violence:     Fear of current or ex partner: Not on file     Emotionally abused: Not on file     Physically abused: Not on file     Forced sexual activity: Not on file   Other Topics Concern      Service Not Asked     Blood Transfusions Not Asked     Caffeine Concern Yes     Comment: occas     Occupational Exposure Not Asked     Hobby Hazards Not Asked     Sleep Concern Not Asked     Stress Concern Not Asked     Weight Concern Not Asked     Special Diet Not Asked     Back Care Not Asked     Exercise Yes     Comment: active     Bike Helmet Not Asked     Seat Belt Yes     Self-Exams Not Asked     Parent/sibling w/ CABG, MI or angioplasty before 65F 55M? No   Social History Narrative     Not on file       All above reviewed and updated, all stable unless otherwise noted    Recent labs reviewed    ROS:  Constitutional, HEENT, cardiovascular, pulmonary, GI, , musculoskeletal, neuro, skin, endocrine and psych systems are negative, except as otherwise noted.    OBJECTIVE:                                                    /82   Pulse 85   Temp 98.1  F (36.7  C) (Oral)   Ht 1.753 m (5' 9\")   Wt 78.9 kg (174 lb)   SpO2 96%   BMI 25.70 kg/m    Body mass index is 25.7 kg/m .  GENERAL: healthy, alert and no distress  EYES: Eyes grossly normal to inspection  HENT:ear canals and TM's normal upon viewing with otoscope, nose and mouth without ulcers or lesions upon viewing with otoscope  NECK: no tenderness, no adenopathy, no asymmetry, no masses, no stiffness; thyroid- normal to palpation  RESP: lungs clear to auscultation - no rales, no rhonchi, no wheezes  CV: regular rates and rhythm, normal S1 S2, no S3 or S4 and no murmur, no click or rub -  ABDOMEN: soft, no tenderness, no  hepatosplenomegaly, no masses, normal bowel sounds  MS: extremities- no gross deformities " noted, no edema  SKIN: no suspicious lesions, no rashes, 8x11 mm probable seborrheic keratosis in right ear lobe  NEURO: strength and tone- normal, sensory exam- grossly normal, mentation- intact, speech- normal, reflexes- symmetric  BACK: no CVA tenderness, no paralumbar tenderness  PSYCH: Alert and oriented times 3; speech- coherent , normal rate and volume; able to articulate logical thoughts, able to abstract reason, no tangential thoughts, no hallucinations or delusions, affect- normal    DIAGNOSTICS/PROCEDURES:                                                      No results found for this or any previous visit (from the past 24 hour(s)).     ASSESSMENT:                                                        ICD-10-CM    1. Skin lesion L98.9 DERMATOLOGY REFERRAL     SKIN CARE REFERRAL   2. Hypertension goal BP (blood pressure) < 140/90 I10    3. Hyperlipidemia LDL goal <130 E78.5    4. Medication monitoring encounter Z51.81        PLAN:                                                    Discussed treatment/modality options, including risk and benefits he desires:    advised aspirin 81 mg po daily, advised 1 multivitamin per day, advised dentist every 6 months, advised diet and exercise and advised opthalmologist every 1-2 years.     1) Patient presented today with 8x11 mm probable seborrheic keratosis on right inner ear lobe. Patient referred to dermatology today for further evaluation of lesion.     2) Patient presents today as normotensive. Patient is currently prescribed 25-6.25 mg Losartan-hydrochlorothiazide daily for hypertension management. Advised continued use.     3) Patient's hyperlipidemia is well controlled. Patient is currently prescribed 40 mg Simvastatin daily for hyperlipidemia management. Advised continued use.     All diagnosis above reviewed and noted above, otherwise stable.  See Md7 orders for further details.     Return in about 2 months (around 1/12/2020), or if symptoms worsen or  fail to improve, for Complete Physical.    Health Maintenance Due   Topic Date Due     ZOSTER IMMUNIZATION (2 of 3) 02/24/2012     This document serves as a record of the services and decisions personally performed and made by Johnathan Sampson MD. It was created on his behalf by Boaz Ley, a trained medical scribe. The creation of this document is based on the provider's statements to the medical scribe.  Boaz Ley November 12, 2019 11:52 AM     The information in this document, created by the medical scribe for me, accurately reflects the services I personally performed and the decisions made by me. I have reviewed and approved this document for accuracy prior to leaving the patient care area.  November 12, 2019            Johnathan Sampson MD [address]  [id number] [phone] Fax

## 2019-12-10 ENCOUNTER — OFFICE VISIT (OUTPATIENT)
Dept: FAMILY MEDICINE | Facility: CLINIC | Age: 74
End: 2019-12-10
Payer: COMMERCIAL

## 2019-12-10 VITALS — DIASTOLIC BLOOD PRESSURE: 72 MMHG | SYSTOLIC BLOOD PRESSURE: 122 MMHG

## 2019-12-10 DIAGNOSIS — D48.5 NEOPLASM OF UNCERTAIN BEHAVIOR OF SKIN: Primary | ICD-10-CM

## 2019-12-10 PROCEDURE — 11311 SHAVE SKIN LESION 0.6-1.0 CM: CPT | Performed by: FAMILY MEDICINE

## 2019-12-10 PROCEDURE — 88305 TISSUE EXAM BY PATHOLOGIST: CPT | Mod: TC | Performed by: FAMILY MEDICINE

## 2019-12-10 NOTE — PROGRESS NOTES
[facility] - PRIMARY CARE SKIN    CC: Lesion(s)  SUBJECTIVE:   Amadou Wade is a(n) 74 year old male who presents to clinic today because of a lesion on the right ear that started about 1 year ago.    Issue One: Lesion on the right ear. 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  Onset: 1 year ago.  Enlarging: NO.  Bleeding: NO.  Itchy or irritating: NO.  Pain or tenderness: NO.  Changing color: NO.    Personal Medical History  Skin cancer: YES - a skin cancer on the left shin  Eczema Psoriasis Lupus   NO NO NO     Family Medical History  Skin cancer: NO  Eczema Psoriasis Lupus   NO NO NO     Sun Exposure History  Previous history of significant sun exposure: YES  Blistering sunburns: YES - when younger.  Sunscreen usage: YES, frequency: when outdoors.    Occupation: retired  (indoor).    Refer to electronic medical record (EMR) for past medical history and medications.    INTEGUMENTARY/SKIN: POSITIVE for non-healing lesion  ROS: 14 point review of systems was negative except the symptoms listed above in the HPI.    This document serves as a record of the services and decisions personally performed and made by Teresa Abernathy MD and was created by Robson James, a trained medical scribe, based on personal observations and provider statements to the medical scribe.  December 10, 2019 7:31 AM   Robson James    OBJECTIVE:   GENERAL: healthy, alert and no distress.  SKIN: Christopher Skin Type - II.  Ear examined. The dermatoscope was used to help evaluate pigmented lesions.  Skin Pertinent Findings:  Right ear, conchal bowl: 10 mm in size raised coarse-textured flesh-colored lesion with dried blood. ? Seborrheic keratosis ? Other          ASSESSMENT:     Encounter Diagnosis   Name Primary?     Neoplasm of uncertain behavior of skin Yes         PLAN:   Patient Instructions   FUTURE APPOINTMENTS  Follow up per pathology report.    WOUND  "CARE INSTRUCTIONS  1. Wash hands before every dressing change.  2. After 24 hours, change dressing daily.  3. Wash the wound area with a mild soap, then rinse.  4. Gently pat dry with a sterile gauze or Q-tip.  5. Using a Q-tip, apply Vaseline or Aquaphor only over entire wound. Do NOT use Neosporin - as many people react to neomycin.  6. Finally, cover with a bandage or sterile non-stick gauze with micropore paper tape.  7. Repeat once daily until wound has healed.      Soap, water and shampoo will not hurt this area.    Do not go swimming or take baths, but showering is encouraged.    Limit use of the area where the procedure was done for a few days to allow for optimal healing.    If you experience bleeding:  Wash hands and hold firm pressure on the area for 10 minutes without checking to see if the bleeding has stopped. \"Checking\" pulls off the protective wound clot and restarts the bleeding all over again. Re-apply pressure for 10 minutes if necessary to stop bleeding.  Use additional sterile gauze and tape to maintain pressure once bleeding has stopped.  If bleeding continues, then call back to clinic at [phone].    Signs of Infection:  Infection can occur in any area where skin has been disrupted.  If you notice persistent redness, swelling, colored drainage, increasing pain, fever or other signs of infection, please call us at: [phone] and ask to have me or my colleague paged. We will call you back to discuss.    Pathology Results:  You will be notified, generally via letter or MyChart, in approximately 10 days. If there is anything we need to discuss or further treatment needed, I will call you to discuss it.    PATIENT INFORMATION : WOUNDS  During the healing process you will notice a number of changes. All wounds develop a small halo of redness surrounding the wound.  This means healing is occurring. Severe itching with extensive redness usually indicates sensitivity to the ointment or " bandage tape used to dress the wound.  You should call our office if this develops.      Swelling  and/or discoloration around your surgical site is common, particularly when performed around the eye.    All wounds normally drain.  The larger the wound the more drainage there will be.  After 7-10 days, you will notice the wound beginning to shrink and new skin will begin to grow.  The wound is healed when you can see skin has formed over the entire area.  A healed wound has a healthy, shiny look to the surface and is red to dark pink in color to normalize.  Wounds may take approximately 4-6 weeks to heal.  Larger wounds may take 6-8 weeks. After the wound is healed you may discontinue dressing changes.    You may experience a sensation of tightness as your wound heals. This is normal and will gradually subside.    Your healed wound may be sensitive to temperature changes. This sensitivity improves with time, but if you re having a lot of discomfort, try to avoid temperature extremes.    Patients frequently experience itching after their wound appears to have healed because of the continue healing under the skin.  Plain Vaseline will help relieve the itching.          TT: 20 minutes.  CT: 15 minutes.    The information in this document, created by the medical scribe for me, accurately reflects the services I personally performed and the decisions made by me. I have reviewed and approved this document for accuracy prior to leaving the patient care area.  December 10, 2019 7:31 AM  Teresa Abernathy MD  [facility] [place]

## 2019-12-10 NOTE — PROCEDURES
Name : Shave Excision  Indication : Excision of tissue for pathology evaluation.  Location(s) : Right ear, conchal bowl: 10 mm in size raised coarse-textured flesh-colored lesion with dried blood. ? Seborrheic keratosis ? Other.  Completed by : Maxine Abernathy MD  Photo Taken : yes.  Anesthesia : Patient was anesthetized by infiltrating the area surrounding the lesion with 1% lidocaine.   epinephrine [id number] : Yes.  Note : Discussed the risk of pain, infection, scarring, hypo- or hyperpigmentation and recurrence or need for re-treatment. The benefits of treatment and alternative treatments were also discussed.    During this procedure, the universal protocol was utilized. The patient's identity was confirmed by no less than two patient identifiers, correct procedure was verified, correct site was verified and marked as applicable and a final pause was completed.    Sterile technique was used throughout the procedure. The skin was cleaned and prepped with surgical cleanser. Once adequate anesthesia was obtained, the lesion was removed with a deep scallop shave procedure. The specimen was sent to pathology.    Direct pressure and aluminum chloride and monopolar cautery was applied for hemostasis. No bleeding was present upon the completion of the procedure. The wound was coated with antibacterial ointment. A dry sterile dressing was applied. Patient tolerated the procedure well and left in satisfactory condition.    Primary provider and referring provider will be informed regarding the tissue report when it returns.

## 2019-12-10 NOTE — LETTER
12/10/2019         RE: Amadou Wade  [address] [address] [address]        Dear Colleague,    Thank you for referring your patient, Amadou Wade, to the [facility]. Please see a copy of my visit note below.    [facility] - PRIMARY CARE SKIN    CC: Lesion(s)  SUBJECTIVE:   Amadou Wade is a(n) 74 year old male who presents to clinic today because of a lesion on the right ear that started about 1 year ago.    Issue One: Lesion on the right ear. 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  Onset: 1 year ago.  Enlarging: NO.  Bleeding: NO.  Itchy or irritating: NO.  Pain or tenderness: NO.  Changing color: NO.    Personal Medical History  Skin cancer: YES - a skin cancer on the left shin  Eczema Psoriasis Lupus   NO NO NO     Family Medical History  Skin cancer: NO  Eczema Psoriasis Lupus   NO NO NO     Sun Exposure History  Previous history of significant sun exposure: YES  Blistering sunburns: YES - when younger.  Sunscreen usage: YES, frequency: when outdoors.    Occupation: retired  (indoor).    Refer to electronic medical record (EMR) for past medical history and medications.    INTEGUMENTARY/SKIN: POSITIVE for non-healing lesion  ROS: 14 point review of systems was negative except the symptoms listed above in the HPI.    This document serves as a record of the services and decisions personally performed and made by Teresa Abernathy MD and was created by Robson James, a trained medical scribe, based on personal observations and provider statements to the medical scribe.  December 10, 2019 7:31 AM   Robson James    OBJECTIVE:   GENERAL: healthy, alert and no distress.  SKIN: Christopher Skin Type - II.  Ear examined. The dermatoscope was used to help evaluate pigmented lesions.  Skin Pertinent Findings:  Right ear, conchal bowl: 10 mm in size raised coarse-textured flesh-colored lesion with dried  "blood. ? Seborrheic keratosis ? Other          ASSESSMENT:     Encounter Diagnosis   Name Primary?     Neoplasm of uncertain behavior of skin Yes         PLAN:   Patient Instructions   FUTURE APPOINTMENTS  Follow up per pathology report.    WOUND CARE INSTRUCTIONS  1. Wash hands before every dressing change.  2. After 24 hours, change dressing daily.  3. Wash the wound area with a mild soap, then rinse.  4. Gently pat dry with a sterile gauze or Q-tip.  5. Using a Q-tip, apply Vaseline or Aquaphor only over entire wound. Do NOT use Neosporin - as many people react to neomycin.  6. Finally, cover with a bandage or sterile non-stick gauze with micropore paper tape.  7. Repeat once daily until wound has healed.      Soap, water and shampoo will not hurt this area.    Do not go swimming or take baths, but showering is encouraged.    Limit use of the area where the procedure was done for a few days to allow for optimal healing.    If you experience bleeding:  Wash hands and hold firm pressure on the area for 10 minutes without checking to see if the bleeding has stopped. \"Checking\" pulls off the protective wound clot and restarts the bleeding all over again. Re-apply pressure for 10 minutes if necessary to stop bleeding.  Use additional sterile gauze and tape to maintain pressure once bleeding has stopped.  If bleeding continues, then call back to clinic at [phone].    Signs of Infection:  Infection can occur in any area where skin has been disrupted.  If you notice persistent redness, swelling, colored drainage, increasing pain, fever or other signs of infection, please call us at: [phone] and ask to have me or my colleague paged. We will call you back to discuss.    Pathology Results:  You will be notified, generally via letter or MyChart, in approximately 10 days. If there is anything we need to discuss or further treatment needed, I will call you to discuss it.    PATIENT INFORMATION : WOUNDS  During " the healing process you will notice a number of changes. All wounds develop a small halo of redness surrounding the wound.  This means healing is occurring. Severe itching with extensive redness usually indicates sensitivity to the ointment or bandage tape used to dress the wound.  You should call our office if this develops.      Swelling  and/or discoloration around your surgical site is common, particularly when performed around the eye.    All wounds normally drain.  The larger the wound the more drainage there will be.  After 7-10 days, you will notice the wound beginning to shrink and new skin will begin to grow.  The wound is healed when you can see skin has formed over the entire area.  A healed wound has a healthy, shiny look to the surface and is red to dark pink in color to normalize.  Wounds may take approximately 4-6 weeks to heal.  Larger wounds may take 6-8 weeks. After the wound is healed you may discontinue dressing changes.    You may experience a sensation of tightness as your wound heals. This is normal and will gradually subside.    Your healed wound may be sensitive to temperature changes. This sensitivity improves with time, but if you re having a lot of discomfort, try to avoid temperature extremes.    Patients frequently experience itching after their wound appears to have healed because of the continue healing under the skin.  Plain Vaseline will help relieve the itching.          TT: 20 minutes.  CT: 15 minutes.    The information in this document, created by the medical scribe for me, accurately reflects the services I personally performed and the decisions made by me. I have reviewed and approved this document for accuracy prior to leaving the patient care area.  December 10, 2019 7:31 AM  Teresa Abernathy MD  [facility] [place]    Again, thank you for allowing me to participate in the care of your patient.        Sincerely,        Teresa Abernathy MD

## 2019-12-10 NOTE — PATIENT INSTRUCTIONS
"FUTURE APPOINTMENTS  Follow up per pathology report.    WOUND CARE INSTRUCTIONS  1. Wash hands before every dressing change.  2. After 24 hours, change dressing daily.  3. Wash the wound area with a mild soap, then rinse.  4. Gently pat dry with a sterile gauze or Q-tip.  5. Using a Q-tip, apply Vaseline or Aquaphor only over entire wound. Do NOT use Neosporin - as many people react to neomycin.  6. Finally, cover with a bandage or sterile non-stick gauze with micropore paper tape.  7. Repeat once daily until wound has healed.      Soap, water and shampoo will not hurt this area.    Do not go swimming or take baths, but showering is encouraged.    Limit use of the area where the procedure was done for a few days to allow for optimal healing.    If you experience bleeding:  Wash hands and hold firm pressure on the area for 10 minutes without checking to see if the bleeding has stopped. \"Checking\" pulls off the protective wound clot and restarts the bleeding all over again. Re-apply pressure for 10 minutes if necessary to stop bleeding.  Use additional sterile gauze and tape to maintain pressure once bleeding has stopped.  If bleeding continues, then call back to clinic at [phone].    Signs of Infection:  Infection can occur in any area where skin has been disrupted.  If you notice persistent redness, swelling, colored drainage, increasing pain, fever or other signs of infection, please call us at: [phone] and ask to have me or my colleague paged. We will call you back to discuss.    Pathology Results:  You will be notified, generally via letter or MyChart, in approximately 10 days. If there is anything we need to discuss or further treatment needed, I will call you to discuss it.    PATIENT INFORMATION : WOUNDS  During the healing process you will notice a number of changes. All wounds develop a small halo of redness surrounding the wound.  This means healing is occurring. Severe itching with extensive " redness usually indicates sensitivity to the ointment or bandage tape used to dress the wound.  You should call our office if this develops.      Swelling  and/or discoloration around your surgical site is common, particularly when performed around the eye.    All wounds normally drain.  The larger the wound the more drainage there will be.  After 7-10 days, you will notice the wound beginning to shrink and new skin will begin to grow.  The wound is healed when you can see skin has formed over the entire area.  A healed wound has a healthy, shiny look to the surface and is red to dark pink in color to normalize.  Wounds may take approximately 4-6 weeks to heal.  Larger wounds may take 6-8 weeks. After the wound is healed you may discontinue dressing changes.    You may experience a sensation of tightness as your wound heals. This is normal and will gradually subside.    Your healed wound may be sensitive to temperature changes. This sensitivity improves with time, but if you re having a lot of discomfort, try to avoid temperature extremes.    Patients frequently experience itching after their wound appears to have healed because of the continue healing under the skin.  Plain Vaseline will help relieve the itching.

## 2019-12-16 LAB — COPATH REPORT: NORMAL

## 2019-12-17 ENCOUNTER — TELEPHONE (OUTPATIENT)
Dept: FAMILY MEDICINE | Facility: CLINIC | Age: 74
End: 2019-12-17

## 2019-12-17 NOTE — TELEPHONE ENCOUNTER
----- Message from Teresa Abernathy MD sent at 12/17/2019 10:52 AM CST -----  Amadou,      Good news, the lesion was benign ( not cancer ) and called a seborrheic keratosis .    Thank you for allowing me to be involved in your health care.  If you have any questions or concerns please feel free to contact me.  Teresa Abernathy M.D.  [facility] [place]

## 2019-12-18 NOTE — TELEPHONE ENCOUNTER
Viewed by Amadou Wade on 12/17/2019  4:35 PM   Written by Teresa Abernathy MD on 12/17/2019 10:52 AM

## 2020-02-23 ENCOUNTER — MEDICAL CORRESPONDENCE (OUTPATIENT)
Dept: HEALTH INFORMATION MANAGEMENT | Facility: CLINIC | Age: 75
End: 2020-02-23

## 2020-02-24 ENCOUNTER — OFFICE VISIT (OUTPATIENT)
Dept: FAMILY MEDICINE | Facility: CLINIC | Age: 75
End: 2020-02-24
Payer: COMMERCIAL

## 2020-02-24 VITALS
WEIGHT: 174 LBS | OXYGEN SATURATION: 95 % | DIASTOLIC BLOOD PRESSURE: 72 MMHG | SYSTOLIC BLOOD PRESSURE: 120 MMHG | TEMPERATURE: 97.9 F | HEIGHT: 69 IN | HEART RATE: 76 BPM | BODY MASS INDEX: 25.77 KG/M2

## 2020-02-24 DIAGNOSIS — I49.9 CARDIAC ARRHYTHMIA, UNSPECIFIED CARDIAC ARRHYTHMIA TYPE: ICD-10-CM

## 2020-02-24 DIAGNOSIS — Z12.5 SCREENING FOR PROSTATE CANCER: ICD-10-CM

## 2020-02-24 DIAGNOSIS — Z00.00 ENCOUNTER FOR MEDICARE ANNUAL WELLNESS EXAM: ICD-10-CM

## 2020-02-24 DIAGNOSIS — Z12.11 SCREEN FOR COLON CANCER: ICD-10-CM

## 2020-02-24 DIAGNOSIS — E78.5 HYPERLIPIDEMIA LDL GOAL <130: ICD-10-CM

## 2020-02-24 DIAGNOSIS — Z00.00 ENCOUNTER FOR ROUTINE ADULT HEALTH EXAMINATION WITHOUT ABNORMAL FINDINGS: Primary | ICD-10-CM

## 2020-02-24 DIAGNOSIS — I10 HYPERTENSION GOAL BP (BLOOD PRESSURE) < 140/90: ICD-10-CM

## 2020-02-24 DIAGNOSIS — Z51.81 MEDICATION MONITORING ENCOUNTER: ICD-10-CM

## 2020-02-24 DIAGNOSIS — B00.1 RECURRENT COLD SORES: ICD-10-CM

## 2020-02-24 LAB
ALBUMIN SERPL-MCNC: 3.4 G/DL (ref 3.4–5)
ALBUMIN UR-MCNC: NEGATIVE MG/DL
ALP SERPL-CCNC: 67 U/L (ref 40–150)
ALT SERPL W P-5'-P-CCNC: 19 U/L (ref 0–70)
ANION GAP SERPL CALCULATED.3IONS-SCNC: 6 MMOL/L (ref 3–14)
APPEARANCE UR: CLEAR
AST SERPL W P-5'-P-CCNC: 16 U/L (ref 0–45)
BILIRUB SERPL-MCNC: 0.7 MG/DL (ref 0.2–1.3)
BILIRUB UR QL STRIP: NEGATIVE
BUN SERPL-MCNC: 16 MG/DL (ref 7–30)
CALCIUM SERPL-MCNC: 9.1 MG/DL (ref 8.5–10.1)
CHLORIDE SERPL-SCNC: 107 MMOL/L (ref 94–109)
CHOLEST SERPL-MCNC: 165 MG/DL
CK SERPL-CCNC: 83 U/L (ref 30–300)
CO2 SERPL-SCNC: 29 MMOL/L (ref 20–32)
COLOR UR AUTO: YELLOW
CREAT SERPL-MCNC: 0.87 MG/DL (ref 0.66–1.25)
CREAT UR-MCNC: 224 MG/DL
ERYTHROCYTE [DISTWIDTH] IN BLOOD BY AUTOMATED COUNT: 12.9 % (ref 10–15)
GFR SERPL CREATININE-BSD FRML MDRD: 86 ML/MIN/{1.73_M2}
GLUCOSE SERPL-MCNC: 88 MG/DL (ref 70–99)
GLUCOSE UR STRIP-MCNC: NEGATIVE MG/DL
HCT VFR BLD AUTO: 49.1 % (ref 40–53)
HDLC SERPL-MCNC: 51 MG/DL
HGB BLD-MCNC: 16.1 G/DL (ref 13.3–17.7)
HGB UR QL STRIP: NEGATIVE
KETONES UR STRIP-MCNC: NEGATIVE MG/DL
LDLC SERPL CALC-MCNC: 100 MG/DL
LEUKOCYTE ESTERASE UR QL STRIP: NEGATIVE
MCH RBC QN AUTO: 30.4 PG (ref 26.5–33)
MCHC RBC AUTO-ENTMCNC: 32.8 G/DL (ref 31.5–36.5)
MCV RBC AUTO: 93 FL (ref 78–100)
MICROALBUMIN UR-MCNC: 10 MG/L
MICROALBUMIN/CREAT UR: 4.55 MG/G CR (ref 0–17)
NITRATE UR QL: NEGATIVE
NONHDLC SERPL-MCNC: 114 MG/DL
PH UR STRIP: 6 PH (ref 5–7)
PLATELET # BLD AUTO: 151 10E9/L (ref 150–450)
POTASSIUM SERPL-SCNC: 4.9 MMOL/L (ref 3.4–5.3)
PROT SERPL-MCNC: 6.9 G/DL (ref 6.8–8.8)
PSA SERPL-ACNC: 4.38 UG/L (ref 0–4)
RBC # BLD AUTO: 5.29 10E12/L (ref 4.4–5.9)
SODIUM SERPL-SCNC: 142 MMOL/L (ref 133–144)
SOURCE: NORMAL
SP GR UR STRIP: >1.03 (ref 1–1.03)
TRIGL SERPL-MCNC: 69 MG/DL
TSH SERPL DL<=0.005 MIU/L-ACNC: 1.23 MU/L (ref 0.4–4)
UROBILINOGEN UR STRIP-ACNC: 1 EU/DL (ref 0.2–1)
WBC # BLD AUTO: 4.2 10E9/L (ref 4–11)

## 2020-02-24 PROCEDURE — 85027 COMPLETE CBC AUTOMATED: CPT | Performed by: FAMILY MEDICINE

## 2020-02-24 PROCEDURE — G0103 PSA SCREENING: HCPCS | Performed by: FAMILY MEDICINE

## 2020-02-24 PROCEDURE — 81003 URINALYSIS AUTO W/O SCOPE: CPT | Performed by: FAMILY MEDICINE

## 2020-02-24 PROCEDURE — 99397 PER PM REEVAL EST PAT 65+ YR: CPT | Performed by: FAMILY MEDICINE

## 2020-02-24 PROCEDURE — 80053 COMPREHEN METABOLIC PANEL: CPT | Performed by: FAMILY MEDICINE

## 2020-02-24 PROCEDURE — 84443 ASSAY THYROID STIM HORMONE: CPT | Performed by: FAMILY MEDICINE

## 2020-02-24 PROCEDURE — 80061 LIPID PANEL: CPT | Performed by: FAMILY MEDICINE

## 2020-02-24 PROCEDURE — 82043 UR ALBUMIN QUANTITATIVE: CPT | Performed by: FAMILY MEDICINE

## 2020-02-24 PROCEDURE — 36415 COLL VENOUS BLD VENIPUNCTURE: CPT | Performed by: FAMILY MEDICINE

## 2020-02-24 PROCEDURE — 82550 ASSAY OF CK (CPK): CPT | Performed by: FAMILY MEDICINE

## 2020-02-24 RX ORDER — SIMVASTATIN 40 MG
40 TABLET ORAL AT BEDTIME
Qty: 90 TABLET | Refills: 3 | Status: SHIPPED | OUTPATIENT
Start: 2020-02-24 | End: 2021-03-01

## 2020-02-24 RX ORDER — LOSARTAN POTASSIUM AND HYDROCHLOROTHIAZIDE 12.5; 5 MG/1; MG/1
TABLET ORAL
Qty: 45 TABLET | Refills: 3 | Status: SHIPPED | OUTPATIENT
Start: 2020-02-24 | End: 2020-07-29

## 2020-02-24 ASSESSMENT — MIFFLIN-ST. JEOR: SCORE: 1529.64

## 2020-02-24 ASSESSMENT — ACTIVITIES OF DAILY LIVING (ADL): CURRENT_FUNCTION: NO ASSISTANCE NEEDED

## 2020-02-24 NOTE — PROGRESS NOTES
"[facility]    Amadou Wade is a 74 year old male who presents for Preventive Visit.    Are you in the first 12 months of your Medicare coverage?  No    Healthy Habits:    In general, how would you rate your overall health?  Very good    Frequency of exercise:  6-7 days/week    Duration of exercise:  45-60 minutes    Do you usually eat at least 4 servings of fruit and vegetables a day, include whole grains    & fiber and avoid regularly eating high fat or \"junk\" foods?  Yes    Taking medications regularly:  Yes    Barriers to taking medications:  None    Medication side effects:  None    Ability to successfully perform activities of daily living:  No assistance needed    Home Safety:  No safety concerns identified    Hearing Impairment:  No hearing concerns    In the past 6 months, have you been bothered by leaking of urine?  No    In general, how would you rate your overall mental or emotional health?  Excellent      PHQ-2 Total Score:    Additional concerns today:  No    Do you feel safe in your environment? Yes    Have you ever done Advance Care Planning? (For example, a Health Directive, POLST, or a discussion with a medical provider or your loved ones about your wishes): Yes, advance care planning is on file.      Fall risk  Fallen 2 or more times in the past year?: No  Any fall with injury in the past year?: No    Cognitive Screening   1) Repeat 3 items (Leader, Season, Table)    2) Clock draw: NORMAL  3) 3 item recall: Recalls 3 objects  Results: 3 items recalled: COGNITIVE IMPAIRMENT LESS LIKELY    Mini-CogTM Copyright JOSE Rucker. Licensed by the author for use in [facility]; reprinted with permission (mike@.[email or web address]). All rights reserved.      Do you have sleep apnea, excessive snoring or daytime drowsiness?: no    Reviewed and updated as needed this visit by clinical staff  Tobacco  Allergies  Meds  Med Hx  Surg Hx  Fam Hx  Soc Hx        Reviewed and " updated as needed this visit by Provider  Allergies        Social History     Tobacco Use     Smoking status: Never Smoker     Smokeless tobacco: Never Used   Substance Use Topics     Alcohol use: Yes     Alcohol/week: 0.0 - 0.8 standard drinks     Comment: 0-1 per week avg     If you drink alcohol do you typically have >3 drinks per day or >7 drinks per week? No    Alcohol Use 2/24/2020   Prescreen: >3 drinks/day or >7 drinks/week? No       Current providers sharing in care for this patient include:   Patient Care Team:  Johnathan Sampson MD as PCP - General  Johnathan Sampson MD as Assigned PCP    The following health maintenance items are reviewed in Epic and correct as of today:  Health Maintenance   Topic Date Due     ZOSTER IMMUNIZATION (2 of 3) 02/24/2012     PHQ-2  01/01/2020     MEDICARE ANNUAL WELLNESS VISIT  01/07/2020     BMP  01/07/2020     LIPID  01/07/2020     MICROALBUMIN  01/07/2020     PSA  01/07/2020     FALL RISK ASSESSMENT  01/07/2020     FIT  01/09/2020     ADVANCE CARE PLANNING  02/24/2025     DTAP/TDAP/TD IMMUNIZATION (4 - Td) 01/07/2029     HEPATITIS C SCREENING  Completed     INFLUENZA VACCINE  Completed     PNEUMOCOCCAL IMMUNIZATION 65+ LOW/MEDIUM RISK  Completed     AORTIC ANEURYSM SCREENING (SYSTEM ASSIGNED)  Completed     IPV IMMUNIZATION  Aged Out     MENINGITIS IMMUNIZATION  Aged Out     Htn    BP Readings from Last 3 Encounters:   02/24/20 120/72   12/10/19 122/72   11/12/19 136/82     Lipids    Recent Labs   Lab Test 01/07/19  0816 12/29/17  0855  06/16/15  0919 04/16/14  0907   CHOL 184 184   < > 145 147   HDL 51 57   < > 50 43   * 113*   < > 84 90   TRIG 86 69   < > 53 74   CHOLHDLRATIO  --   --   --  2.9 3.4    < > = values in this interval not displayed.     Hx PVC's, PAC's, PSVT - no issues    Snoring - no sleep apnea - optional mouth guard    Health Maintenance     Colonoscopy:  Due 1/2022   FIT:  given              PSA:  ordered   DEXA:  NA    Health Maintenance Due   Topic  Date Due     ZOSTER IMMUNIZATION (2 of 3) 02/24/2012     PHQ-2  01/01/2020     MEDICARE ANNUAL WELLNESS VISIT  01/07/2020     BMP  01/07/2020     LIPID  01/07/2020     MICROALBUMIN  01/07/2020     PSA  01/07/2020     FALL RISK ASSESSMENT  01/07/2020     FIT  01/09/2020       Current Problem List    Patient Active Problem List   Diagnosis     Cardiac dysrhythmia     Hyperlipidemia LDL goal <130     Advanced directives, counseling/discussion     ED (erectile dysfunction)     Hypertension goal BP (blood pressure) < 140/90     Recurrent cold sores       Past Medical History    Past Medical History:   Diagnosis Date     Cardiac dysrhythmia, unspecified 3/07    few pac's, few pvc's, few runs SVT     Chronic rhinitis     resolved     ED (erectile dysfunction)      Hard of hearing     [place]     Hyperlipidemia LDL goal <130 2002     Hypertension goal BP (blood pressure) < 140/90 12/2017     Pneumonia, organism unspecified(486) 1993     Snoring     no sleep apnea - mouth guard     Unspecified hemorrhoids without mention of complication 4/06    internal       Past Surgical History    Past Surgical History:   Procedure Laterality Date     COLONOSCOPY  1/3/2012    incomplete - negative, ACBE incomplete - negative, CT colography negative     HC COLONOSCOPY THRU STOMA, DIAGNOSTIC  5/06    dr tejeda - normal - internal hemorrhiods - due 10 yrs     HC REMOVE TONSILS/ADENOIDS,<11 Y/O  1953    T & A <14y.o.     SIGMOIDOSCOPY,DIAGNOSTIC  1997    normal     STRESS ECHO (METRO)  3/07    normal few pac's, few pvc's       Current Medications    Current Outpatient Medications   Medication Sig Dispense Refill     ASPIRIN 81 MG OR TABS 1 tab po QD (Once per day)       diphenhydrAMINE (BENADRYL) 25 MG tablet Take 1-2 tablets (25-50 mg) by mouth every 6 hours as needed for itching or allergies 60 tablet 1     emollient (VANICREAM) cream Apply topically as needed for other       losartan-hydrochlorothiazide (HYZAAR) 50-12.5 MG tablet 1/2 tablet  daily 45 tablet 3     simvastatin (ZOCOR) 40 MG tablet Take 1 tablet (40 mg) by mouth At Bedtime 90 tablet 3       Allergies    No Known Allergies    Immunizations    Immunization History   Administered Date(s) Administered     Flu, Unspecified 10/09/2019     Influenza (IIV3) PF 2004, 10/20/2011, 10/30/2012, 10/15/2015     Influenza Vaccine IM > 6 months Valent IIV4 10/15/2017, 2018     MMR 10/15/2004     Pneumo Conj 13-V (2010&after) 2015     Pneumococcal 23 valent 2006, 2012     Poliovirus, inactivated (IPV) 10/15/2004     TD (ADULT, 7+) 2001     TDAP Vaccine (Adacel) 2019     TDAP Vaccine (Boostrix) 10/29/2009     Twinrix A/B 10/15/2004, 2004, 2006     Typhoid IM 10/15/2004, 10/14/2016     Typhoid Oral 10/29/2009     Zoster vaccine recombinant adjuvanted (SHINGRIX) 2020     Zoster vaccine, live 2011       Family History    Family History   Problem Relation Age of Onset     Hypertension Mother      Cancer Father         lung     Eye Disorder Father         Glaucoma     Alzheimer Disease Father      Heart Disease Father      Diabetes Father         type 2     Diabetes Brother         type 2     Coronary Artery Disease Brother      Cerebrovascular Disease Maternal Grandmother      Heart Disease Maternal Grandfather          young heartattack     Diabetes Paternal Grandfather      Cancer Brother         melanoma     Alzheimer Disease Brother      Prostate Cancer Brother      Cerebrovascular Disease Brother        Social History    Social History     Socioeconomic History     Marital status:      Spouse name: Dania     Number of children: 3     Years of education: 19     Highest education level: Not on file   Occupational History     Occupation:       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   Social Needs     Financial resource strain: Not on file     Food insecurity:     Worry: Not on file     Inability: Not on file     Transportation needs:      Medical: Not on file     Non-medical: Not on file   Tobacco Use     Smoking status: Never Smoker     Smokeless tobacco: Never Used   Substance and Sexual Activity     Alcohol use: Yes     Alcohol/week: 0.0 - 0.8 standard drinks     Comment: 0-1 per week avg     Drug use: No     Sexual activity: Yes     Partners: Female   Lifestyle     Physical activity:     Days per week: Not on file     Minutes per session: Not on file     Stress: Not on file   Relationships     Social connections:     Talks on phone: Not on file     Gets together: Not on file     Attends Yarsanism service: Not on file     Active member of club or organization: Not on file     Attends meetings of clubs or organizations: Not on file     Relationship status: Not on file     Intimate partner violence:     Fear of current or ex partner: Not on file     Emotionally abused: Not on file     Physically abused: Not on file     Forced sexual activity: Not on file   Other Topics Concern      Service Not Asked     Blood Transfusions Not Asked     Caffeine Concern Yes     Comment: occas     Occupational Exposure Not Asked     Hobby Hazards Not Asked     Sleep Concern Not Asked     Stress Concern Not Asked     Weight Concern Not Asked     Special Diet Not Asked     Back Care Not Asked     Exercise Yes     Comment: active     Bike Helmet Not Asked     Seat Belt Yes     Self-Exams Not Asked     Parent/sibling w/ CABG, MI or angioplasty before 65F 55M? No   Social History Narrative     Not on file       ROS    CONSTITUTIONAL: NEGATIVE for fever, chills, change in weight  INTEGUMENTARY/SKIN: NEGATIVE for worrisome rashes, moles or lesions  EYES: NEGATIVE for vision changes or irritation  ENT/MOUTH: NEGATIVE for ear, mouth and throat problems  RESP: NEGATIVE for significant cough or SOB  CV: NEGATIVE for chest pain, palpitations or peripheral edema  GI: NEGATIVE for nausea, abdominal pain, heartburn, or change in bowel habits  : NEGATIVE for frequency,  "dysuria, or hematuria  MUSCULOSKELETAL: NEGATIVE for significant arthralgias or myalgia  NEURO: NEGATIVE for weakness, dizziness or paresthesias  ENDOCRINE: NEGATIVE for temperature intolerance, skin/hair changes  HEME: NEGATIVE for bleeding problems  PSYCHIATRIC: NEGATIVE for changes in mood or affect    OBJECTIVE    /72   Pulse 76   Temp 97.9  F (36.6  C) (Oral)   Ht 1.753 m (5' 9\")   Wt 78.9 kg (174 lb)   SpO2 95%   BMI 25.70 kg/m   Estimated body mass index is 25.7 kg/m  as calculated from the following:    Height as of this encounter: 1.753 m (5' 9\").    Weight as of this encounter: 78.9 kg (174 lb).  EXAM:   GENERAL: healthy, alert and no distress  EYES: Eyes grossly normal to inspection, PERRL and conjunctivae and sclerae normal  HENT: ear canals and TM's normal, nose and mouth without ulcers or lesions  NECK: no adenopathy, no asymmetry, masses, or scars and thyroid normal to palpation  RESP: lungs clear to auscultation - no rales, rhonchi or wheezes  CV: regular rate and rhythm, normal S1 S2, no S3 or S4, no murmur, click or rub, no peripheral edema and peripheral pulses strong  ABDOMEN: soft, nontender, no hepatosplenomegaly, no masses and bowel sounds normal   (male): testicles normal without atrophy or masses, no hernias and penis normal without urethral discharge  RECTAL: normal sphincter tone, no rectal masses and prostate of normal size for age, smooth, nontender without masses/nodules  MS: no gross musculoskeletal defects noted, no edema  NEURO: Normal strength and tone, mentation intact and speech normal  PSYCH: mentation appears normal, affect normal/bright  LYMPH: no cervical, supraclavicular, axillary, or inguinal adenopathy    DIAGNOSTICS/PROCEDURES    Pending    ASSESSMENT      ICD-10-CM    1. Encounter for routine adult health examination without abnormal findings Z00.00 Comprehensive metabolic panel     Lipid panel reflex to direct LDL Fasting     CK total     CBC with platelets "     TSH with free T4 reflex     UA reflex to Microscopic and Culture     Albumin Random Urine Quantitative with Creat Ratio     Prostate spec antigen screen     Fecal colorectal cancer screen FIT   2. Encounter for Medicare annual wellness exam Z00.00 Comprehensive metabolic panel     Lipid panel reflex to direct LDL Fasting     CK total     CBC with platelets     TSH with free T4 reflex     UA reflex to Microscopic and Culture     Albumin Random Urine Quantitative with Creat Ratio     Prostate spec antigen screen     Fecal colorectal cancer screen FIT   3. Hypertension goal BP (blood pressure) < 140/90 I10 Comprehensive metabolic panel     TSH with free T4 reflex     UA reflex to Microscopic and Culture     Albumin Random Urine Quantitative with Creat Ratio     losartan-hydrochlorothiazide (HYZAAR) 50-12.5 MG tablet   4. Hyperlipidemia LDL goal <130 E78.5 Comprehensive metabolic panel     Lipid panel reflex to direct LDL Fasting     CK total     TSH with free T4 reflex     simvastatin (ZOCOR) 40 MG tablet   5. Cardiac arrhythmia, unspecified cardiac arrhythmia type I49.9 TSH with free T4 reflex   6. Recurrent cold sores B00.1    7. Screen for colon cancer Z12.11 Fecal colorectal cancer screen FIT   8. Screening for prostate cancer Z12.5 Prostate spec antigen screen   9. Medication monitoring encounter Z51.81 Comprehensive metabolic panel     Lipid panel reflex to direct LDL Fasting     CK total     CBC with platelets     TSH with free T4 reflex     UA reflex to Microscopic and Culture     Albumin Random Urine Quantitative with Creat Ratio       PLAN    Discussed treatment/modality options, including risk and benefits, he desires:    advised alcohol consumption 1oz per day or less, advised aspirin 81 mg po daily, advised 1 multivitamin per day, advised calcium 4485-9706 mg/d and Vitamin D 800-1200 IU/d, advised dentist every 6 months, advised diet and exercise, advised opthalmologist every 1-2 years, further health  "care maintenance, further lab(s), medication refill(s) and observation    All diagnosis above reviewed and noted above, otherwise stable.      See [facility] orders for further details.      1) labs    2) med refills    3) continue diet, exercise, and weight loss    Return in about 6 months (around 8/24/2020), or if symptoms worsen or fail to improve, for Annual Wellness Visit, Medication Recheck Visit, Follow Up Chronic.    Health Maintenance Due   Topic Date Due     ZOSTER IMMUNIZATION (2 of 3) 02/24/2012     PHQ-2  01/01/2020     MEDICARE ANNUAL WELLNESS VISIT  01/07/2020     BMP  01/07/2020     LIPID  01/07/2020     MICROALBUMIN  01/07/2020     PSA  01/07/2020     FALL RISK ASSESSMENT  01/07/2020     FIT  01/09/2020       End of Life Planning:  Patient currently has an advanced directive: Yes.  Practitioner is supportive of decision.    COUNSELING    Reviewed preventive health counseling, as reflected in patient instructions    Estimated body mass index is 25.7 kg/m  as calculated from the following:    Height as of this encounter: 1.753 m (5' 9\").    Weight as of this encounter: 78.9 kg (174 lb).         reports that he has never smoked. He has never used smokeless tobacco.      Appropriate preventive services were discussed with this patient, including applicable screening as appropriate for cardiovascular disease, diabetes, osteopenia/osteoporosis, and glaucoma.  As appropriate for age/gender, discussed screening for colorectal cancer, prostate cancer, breast cancer, and cervical cancer. Checklist reviewing preventive services available has been given to the patient.    Reviewed patients plan of care and provided an AVS. The Intermediate Care Plan ( asthma action plan, low back pain action plan, and migraine action plan) for Amadou meets the Care Plan requirement. This Care Plan has been established and reviewed with the Patient.         Johnathan Sampson MD, [facility] " Geriatric Services  [address] [place] [id number]  [email or web address]  [email or web address]   Office: [phone]  Fax: [phone]  Pager: [phone]

## 2020-02-24 NOTE — PATIENT INSTRUCTIONS
Patient Education   Personalized Prevention Plan  You are due for the preventive services outlined below.  Your care team is available to assist you in scheduling these services.  If you have already completed any of these items, please share that information with your care team to update in your medical record.  Health Maintenance Due   Topic Date Due     Zoster (Shingles) Vaccine (2 of 3) 02/24/2012     PHQ-2  01/01/2020     Annual Wellness Visit  01/07/2020     Basic Metabolic Panel  01/07/2020     Cholesterol Lab  01/07/2020     Kidney Microalbumin Urine Test  01/07/2020     Prostate Test  01/07/2020     FALL RISK ASSESSMENT  01/07/2020     FIT Test  01/09/2020

## 2020-03-02 DIAGNOSIS — R97.20 ELEVATED PROSTATE SPECIFIC ANTIGEN (PSA): Primary | ICD-10-CM

## 2020-03-10 DIAGNOSIS — Z12.11 SCREEN FOR COLON CANCER: ICD-10-CM

## 2020-03-10 DIAGNOSIS — Z00.00 ENCOUNTER FOR MEDICARE ANNUAL WELLNESS EXAM: ICD-10-CM

## 2020-03-10 DIAGNOSIS — Z00.00 ENCOUNTER FOR ROUTINE ADULT HEALTH EXAMINATION WITHOUT ABNORMAL FINDINGS: ICD-10-CM

## 2020-03-10 PROCEDURE — 82274 ASSAY TEST FOR BLOOD FECAL: CPT | Performed by: FAMILY MEDICINE

## 2020-03-16 LAB — HEMOCCULT STL QL IA: NEGATIVE

## 2020-03-27 ENCOUNTER — MYC MEDICAL ADVICE (OUTPATIENT)
Dept: FAMILY MEDICINE | Facility: CLINIC | Age: 75
End: 2020-03-27

## 2020-03-27 NOTE — TELEPHONE ENCOUNTER
Mychart note sent to patient.      HAKEEM Crawford, RN, PHN  [facility]  Office: [phone]  Fax: [phone]

## 2020-03-27 NOTE — LETTER
[facility]  [address] [place] [address]  [phone]                    March 30, 2020    Amadou Wade  [address] [address] [address]      Dear Amadou,    Here is a summary of your recent test results:    Your test results are enclosed.      Please contact me if you have any questions.    In addition, here is a list of due or overdue Health Maintenance reminders.    Health Maintenance Due   Topic Date Due     Zoster (Shingles) Vaccine (3 of 3) 02/27/2020       Please call us at [phone] (or use [employer]) to address the above recommendations.            Thank you very much for trusting [facility]..     Healthy regards,          Johnathan Sampson M.D.

## 2020-05-06 ENCOUNTER — VIRTUAL VISIT (OUTPATIENT)
Dept: UROLOGY | Facility: CLINIC | Age: 75
End: 2020-05-06
Payer: COMMERCIAL

## 2020-05-06 VITALS — BODY MASS INDEX: 24.59 KG/M2 | WEIGHT: 166 LBS | HEIGHT: 69 IN

## 2020-05-06 DIAGNOSIS — R97.20 ELEVATED PROSTATE SPECIFIC ANTIGEN (PSA): Primary | ICD-10-CM

## 2020-05-06 PROCEDURE — 99203 OFFICE O/P NEW LOW 30 MIN: CPT | Mod: 95 | Performed by: UROLOGY

## 2020-05-06 ASSESSMENT — MIFFLIN-ST. JEOR: SCORE: 1488.35

## 2020-05-06 ASSESSMENT — PAIN SCALES - GENERAL: PAINLEVEL: NO PAIN (0)

## 2020-05-06 NOTE — PROGRESS NOTES
"Amadou Wade is a 75 year old male who is being evaluated via a billable video visit.      The patient has been notified of following:     \"This video visit will be conducted via a call between you and your physician/provider. We have found that certain health care needs can be provided without the need for an in-person physical exam.  This service lets us provide the care you need with a video conversation.  If a prescription is necessary we can send it directly to your pharmacy.  If lab work is needed we can place an order for that and you can then stop by our lab to have the test done at a later time.    Video visits are billed at different rates depending on your insurance coverage.  Please reach out to your insurance provider with any questions.    If during the course of the call the physician/provider feels a video visit is not appropriate, you will not be charged for this service.\"    Patient has given verbal consent for Video visit? Yes    How would you like to obtain your AVS? Reza    Patient would like the video invitation sent by: Text to cell phone: [phone]    Will anyone else be joining your video visit? [facility]  Main Office: [address]  [address]  [place] [address]       CHIEF COMPLAINT:  Elevated PSA    HISTORY:   I was asked by Dr Sampson to see this 75 year old gentleman with an elevated PSA. He had a PSA in 2011 that was elevated but then resolved on its own. PSA was normal until this year it went up to 4.38.    He has no family history of prostate cancer.  He has no URI symptoms or complaints.  He has a normal urinary stream.  No gross hematuria or history infections.  No nocturia.      PAST MEDICAL HISTORY:   Past Medical History:   Diagnosis Date     Cardiac dysrhythmia, unspecified 3/07    few pac's, few pvc's, few runs SVT     Chronic rhinitis     resolved     ED (erectile dysfunction)      [facility]     Hyperlipidemia LDL goal <130 2002     " Hypertension goal BP (blood pressure) < 140/90 2017     Pneumonia, organism unspecified(486)      Snoring     no sleep apnea - mouth guard     Unspecified hemorrhoids without mention of complication     internal       PAST SURGICAL HISTORY:   Past Surgical History:   Procedure Laterality Date     COLONOSCOPY  1/3/2012    incomplete - negative, ACBE incomplete - negative, CT colography negative     HC COLONOSCOPY THRU STOMA, DIAGNOSTIC      dr tejeda - normal - internal hemorrhiods - due 10 yrs     HC REMOVE TONSILS/ADENOIDS,<11 Y/O      T & A <14y.o.     SIGMOIDOSCOPY,DIAGNOSTIC      normal     STRESS ECHO (METRO)  3/07    normal few pac's, few pvc's       FAMILY HISTORY:   Family History   Problem Relation Age of Onset     Hypertension Mother      Cancer Father         lung     Eye Disorder Father         Glaucoma     Alzheimer Disease Father      Heart Disease Father      Diabetes Father         type 2     Diabetes Brother         type 2     Coronary Artery Disease Brother      Cerebrovascular Disease Maternal Grandmother      Heart Disease Maternal Grandfather          young heartattack     Diabetes Paternal Grandfather      Cancer Brother         melanoma     Alzheimer Disease Brother      Prostate Cancer Brother      Cerebrovascular Disease Brother        SOCIAL HISTORY:   Social History     Tobacco Use     Smoking status: Never Smoker     Smokeless tobacco: Never Used   Substance Use Topics     Alcohol use: Yes     Alcohol/week: 0.0 - 0.8 standard drinks     Comment: 0-1 per week avg        No Known Allergies      Current Outpatient Medications:      ASPIRIN 81 MG OR TABS, 1 tab po QD (Once per day), Disp: , Rfl:      diphenhydrAMINE (BENADRYL) 25 MG tablet, Take 1-2 tablets (25-50 mg) by mouth every 6 hours as needed for itching or allergies, Disp: 60 tablet, Rfl: 1     emollient (VANICREAM) cream, Apply topically as needed for other, Disp: , Rfl:      losartan-hydrochlorothiazide  "(HYZAAR) 50-12.5 MG tablet, 1/2 tablet daily, Disp: 45 tablet, Rfl: 3     simvastatin (ZOCOR) 40 MG tablet, Take 1 tablet (40 mg) by mouth At Bedtime, Disp: 90 tablet, Rfl: 3    Review Of Systems:  Skin: No rash, pruritis, or skin pigmentation  Eyes: No changes in vision  Ears/Nose/Throat: No changes in hearing, no nosebleeds  Respiratory: No shortness of breath, dyspnea on exertion, cough, or hemoptysis  Cardiovascular: No chest pain or palpitations  Gastrointestinal: No diarrhea or constipation. No abdominal pain. No hematochezia  Genitourinary: see HPI  Musculoskeletal: No pain or swelling of joints, normal range of motion  Neurologic: No weakness or tremors  Psychiatric: No recent changes in memory or mood  Hematologic/Lymphatic/Immunologic: No easy bruising or enlarged lymph nodes  Endocrine: No weight gain or loss      PHYSICAL EXAM:    Ht 1.753 m (5' 9\")   Wt 75.3 kg (166 lb)   BMI 24.51 kg/m      General: Alert and oriented to time, place, and self. In NAD   HEENT: Head AT/NC, EOMI, CN Grossly intact   Lungs: no respiratory distress, or pursed lip breathing   Heart: No obvious jugular venous distension present   Musculoskeltal: Normal movements. Normal appearing musculature  Skin: no suspicious lesions or rashes   Neuro: Alert, oriented, speech and mentation normal; moving all 4 extremities equally.   Psych: affect and mood normal      UA RESULTS:  Recent Labs   Lab Test 02/24/20  0805   COLOR Yellow   APPEARANCE Clear   URINEGLC Negative   URINEBILI Negative   URINEKETONE Negative   SG >1.030   UBLD Negative   URINEPH 6.0   PROTEIN Negative   UROBILINOGEN 1.0   NITRITE Negative   LEUKEST Negative       Bladder Scan:     Other Labs:      Imaging Studies: None      CLINICAL IMPRESSION:   Elevated PSA    PLAN:   This is his first PSA elevation since 2011.  At that time the PSA resolved.  He has no urinary symptoms or complaints at this time.  At this time, before proceeding with any further follow-up or exam " I recommended that we simply recheck the PSA.  We will plan on doing so in the coming weeks.  If PSA remains elevated then we will need to proceed with further testing.  I will schedule another video visit with him to go over his next PSA results.      Cordell Anderson MD        Video-Visit Details    Type of service:  Video Visit    Video Start Time: 11:41 AM  Video End Time: 11:56 AM    Originating Location (pt. Location): Home    Distant Location (provider location):  [facility] [place]     Platform used for Video Visit: Paula Anderson M.D.  [facility]

## 2020-05-11 DIAGNOSIS — R97.20 ELEVATED PROSTATE SPECIFIC ANTIGEN (PSA): ICD-10-CM

## 2020-05-11 PROCEDURE — 84153 ASSAY OF PSA TOTAL: CPT | Performed by: UROLOGY

## 2020-05-11 PROCEDURE — 36415 COLL VENOUS BLD VENIPUNCTURE: CPT | Performed by: UROLOGY

## 2020-05-12 ENCOUNTER — VIRTUAL VISIT (OUTPATIENT)
Dept: UROLOGY | Facility: CLINIC | Age: 75
End: 2020-05-12
Payer: COMMERCIAL

## 2020-05-12 VITALS — HEIGHT: 70 IN | BODY MASS INDEX: 23.62 KG/M2 | WEIGHT: 165 LBS

## 2020-05-12 DIAGNOSIS — R97.20 ELEVATED PROSTATE SPECIFIC ANTIGEN (PSA): Primary | ICD-10-CM

## 2020-05-12 LAB — PSA SERPL-MCNC: 4.08 UG/L (ref 0–4)

## 2020-05-12 PROCEDURE — 99212 OFFICE O/P EST SF 10 MIN: CPT | Mod: 95 | Performed by: UROLOGY

## 2020-05-12 ASSESSMENT — PAIN SCALES - GENERAL: PAINLEVEL: NO PAIN (0)

## 2020-05-12 ASSESSMENT — MIFFLIN-ST. JEOR: SCORE: 1499.69

## 2020-05-12 NOTE — NURSING NOTE
Chief Complaint   Patient presents with     Clinic Care Coordination - Follow-up     PSA   Domitila Corrales LPN

## 2020-05-12 NOTE — PROGRESS NOTES
"Amadou Wade is a 75 year old male who is being evaluated via a billable video visit.      The patient has been notified of following:     \"This video visit will be conducted via a call between you and your physician/provider. We have found that certain health care needs can be provided without the need for an in-person physical exam.  This service lets us provide the care you need with a video conversation.  If a prescription is necessary we can send it directly to your pharmacy.  If lab work is needed we can place an order for that and you can then stop by our lab to have the test done at a later time.    Video visits are billed at different rates depending on your insurance coverage.  Please reach out to your insurance provider with any questions.    If during the course of the call the physician/provider feels a video visit is not appropriate, you will not be charged for this service.\"    Patient has given verbal consent for Video visit? Yes    How would you like to obtain your AVS? [place]    Patient would like the video invitation sent by: Text to cell phone: [phone]    Will anyone else be joining your video visit? No      Office Visit Note  [facility]  [phone]    UROLOGIC DIAGNOSES:   Elevated PSA    CURRENT INTERVENTIONS:       HISTORY:   Amadou is set up for PSA follow up. PSA was rechecked and is down slightly at 4.08. He continues to have no urinary symptoms or complaints      PAST MEDICAL HISTORY:   Past Medical History:   Diagnosis Date     Cardiac dysrhythmia, unspecified 3/07    few pac's, few pvc's, few runs SVT     Chronic rhinitis     resolved     ED (erectile dysfunction)      [facility]     Hyperlipidemia LDL goal <130 2002     Hypertension goal BP (blood pressure) < 140/90 12/2017     Pneumonia, organism unspecified(486) 1993     Snoring     no sleep apnea - mouth guard     Unspecified hemorrhoids without mention of complication 4/06    internal       PAST " SURGICAL HISTORY:   Past Surgical History:   Procedure Laterality Date     COLONOSCOPY  1/3/2012    incomplete - negative, ACBE incomplete - negative, CT colography negative     HC COLONOSCOPY THRU STOMA, DIAGNOSTIC      dr tejeda - normal - internal hemorrhiods - due 10 yrs     HC REMOVE TONSILS/ADENOIDS,<11 Y/O      T & A <14y.o.     SIGMOIDOSCOPY,DIAGNOSTIC      normal     STRESS ECHO (METRO)  3/07    normal few pac's, few pvc's       FAMILY HISTORY:   Family History   Problem Relation Age of Onset     Hypertension Mother      Cancer Father         lung     Eye Disorder Father         Glaucoma     Alzheimer Disease Father      Heart Disease Father      Diabetes Father         type 2     Diabetes Brother         type 2     Coronary Artery Disease Brother      Cerebrovascular Disease Maternal Grandmother      Heart Disease Maternal Grandfather          young heartattack     Diabetes Paternal Grandfather      Cancer Brother         melanoma     Alzheimer Disease Brother      Prostate Cancer Brother      Cerebrovascular Disease Brother        SOCIAL HISTORY:   Social History     Tobacco Use     Smoking status: Never Smoker     Smokeless tobacco: Never Used   Substance Use Topics     Alcohol use: Yes     Alcohol/week: 0.0 - 0.8 standard drinks     Comment: 0-1 per week avg       Current Outpatient Medications   Medication     ASPIRIN 81 MG OR TABS     diphenhydrAMINE (BENADRYL) 25 MG tablet     emollient (VANICREAM) cream     losartan-hydrochlorothiazide (HYZAAR) 50-12.5 MG tablet     simvastatin (ZOCOR) 40 MG tablet     No current facility-administered medications for this visit.          PHYSICAL EXAM:    General: Alert and oriented to time, place, and self. In NAD   HEENT: Head AT/NC, EOMI, CN Grossly intact   Lungs: no respiratory distress, or pursed lip breathing   Heart: No obvious jugular venous distension present   Musculoskeltal: Normal movements. Normal appearing musculature  Skin: no  suspicious lesions or rashes   Neuro: Alert, oriented, speech and mentation normal; moving all 4 extremities equally.   Psych: affect and mood normal      Imaging: None    Urinalysis: UA RESULTS:  Recent Labs   Lab Test 02/24/20  0805   COLOR Yellow   APPEARANCE Clear   URINEGLC Negative   URINEBILI Negative   URINEKETONE Negative   SG >1.030   UBLD Negative   URINEPH 6.0   PROTEIN Negative   UROBILINOGEN 1.0   NITRITE Negative   LEUKEST Negative       PSA: 4.08    Post Void Residual:     Other labs: None today      IMPRESSION:  Elevated PSA    PLAN:  His PSA has improved from previous levels.  It is now actually in the high normal range for his age group.  We discussed the possibility of evaluating this further with an MRI or prostate biopsy but for now I recommended that we simply continue to follow the PSA closely.  I recommended that he have another PSA checked in 6 months and he agreed to this plan.  If the PSA does rise more in the future he will need to undergo further evaluation.      Cordell Anderson M.D.              Video-Visit Details    Type of service:  Video Visit    Video Start Time: 12:24 PM  Video End Time: 12:35 PM    Originating Location (pt. Location): Home    Distant Location (provider location):  [facility] [place]     Platform used for Video Visit: [employer]

## 2020-07-28 ENCOUNTER — NURSE TRIAGE (OUTPATIENT)
Dept: FAMILY MEDICINE | Facility: CLINIC | Age: 75
End: 2020-07-28

## 2020-07-28 ENCOUNTER — MEDICAL CORRESPONDENCE (OUTPATIENT)
Dept: HEALTH INFORMATION MANAGEMENT | Facility: CLINIC | Age: 75
End: 2020-07-28

## 2020-07-28 NOTE — TELEPHONE ENCOUNTER
"Patient returned call to clinic.  Patient states he pulled over to call clinic.  Patient was feeling \"woozy\" around noon time, had an appointment around 2 pm.  Patient does not drink very much water during the day and does not get \"thirsty\" so he doesn't think to drink.     Patient states these episodes of hypotension come out of no where.  [facility] wants patient to go off of the losartan-hydrochlorothiazide and just be on the losartan.    Denies any invasive procedures, dizzy, lightheaded, vision    Advised patient to drink a glass of water and recheck BP and call clinic back with readings to determine the best course of action.  Patient stated understanding and was agreeable with plan.  Did advise patient is SBP is <80 to go to ED.      Patient returned call to clinic and BP was 124/81 after sitting quietly for 15 minutes. Again reviewed when patient should go to the ED, patient stated understanding and was agreeable with plan.    Routing to provider for review and advise as appropriate.     Additional Information    Negative: Systolic BP < 90 and feeling dizzy, lightheaded, or weak    Negative: Started suddenly after an allergic medicine, an allergic food, or bee sting    Negative: Shock suspected (e.g., cold/pale/clammy skin, too weak to stand, low BP, rapid pulse)    Negative: Difficult to awaken or acting confused  (e.g., disoriented, slurred speech)    Negative: Fainted    Negative: Chest pain    Negative: Bleeding (e.g., vomiting blood, rectal bleeding or tarry stools, severe vaginal bleeding)    Negative: Extra heart beats or heart is beating fast  (i.e., \"palpitations\")    Negative: Sounds like a life-threatening emergency to the triager    Negative: Systolic BP < 80 and NOT dizzy, lightheaded or weak (feels normal)    Negative: Abdominal pain    Negative: Major surgery in the past month    Negative: Fever > 100.5 F (38.1 C)    Negative: Drinking very " "little and has signs of dehydration (e.g., no urine > 12 hours, very dry mouth, very lightheaded)    Negative: Fall in systolic BP > 20 mm Hg from normal and feeling dizzy, lightheaded, or weak    Negative: Patient sounds very sick or weak to the triager    Negative: Systolic BP < 90 and NOT dizzy, lightheaded or weak    Negative: Systolic BP  while taking blood pressure medications and NOT dizzy, lightheaded or weak    Negative: Fall in systolic BP > 20 mm Hg from normal and NOT dizzy, lightheaded, or weak    Negative: Fall in systolic BP > 20 mmHg after standing up    Negative: Fall in systolic BP > 20 mmHg after eating a meal    Negative: Diastolic BP < 50 mm Hg    Negative: Brief dizziness or lightheadedness after standing up or eating    Negative: Wants doctor to measure BP    Answer Assessment - Initial Assessment Questions  1. BLOOD PRESSURE: \"What is the blood pressure?\" \"Did you take at least two measurements 5 minutes apart?\"      71/48 this morning  2. ONSET: \"When did you take your blood pressure?\"      This morning  3. HOW: \"How did you obtain the blood pressure?\" (e.g., visiting nurse, automatic home BP monitor)      Automatic BP arm cuff home monitor  4. HISTORY: \"Do you have a history of low blood pressure?\" \"What is your blood pressure normally?\"      120's/70's   5. MEDICATIONS: \"Are you taking any medications for blood pressure?\" If yes: \"Have they been changed recently?\"      no  6. PULSE RATE: \"Do you know what your pulse rate is?\"       Not sure, doesn't pay too much attention to it.    7. OTHER SYMPTOMS: \"Have you been sick recently?\" \"Have you had a recent injury?\"      no  8. PREGNANCY: \"Is there any chance you are pregnant?\" \"When was your last menstrual period?\"      n/a    Protocols used: LOW BLOOD PRESSURE-A-OH    HAKEEM LambN, RN  [employer] [facility]    "

## 2020-07-28 NOTE — TELEPHONE ENCOUNTER
BP readings 7/27: 71/48 ; 7/28 76/48  Other readings:6/30:99/69  5/21 :109/65  3/20: 91/58  3/25: 84/52  02/21:96/53    Susy Parada  Patient

## 2020-07-28 NOTE — TELEPHONE ENCOUNTER
Symptoms  Describe your symptoms: Low blood pressure, lightheaded dizziness. Pt tried to send a [employer] message., He came into the clinic and dropped off a written letter with attached bp readings. Pt info was placed in Dr Adrián whitlock up front  Any pain: No  How long have you been having symptoms: on going    Have you been seen for this:    Appointment offered?: No  Triage offered?: No  Home remedies tried:   Requested Pharmacy:   Okay to leave a detailed message? Yes at Cell number on file:    Telephone Information:   Mobile [phone]

## 2020-07-29 ENCOUNTER — VIRTUAL VISIT (OUTPATIENT)
Dept: FAMILY MEDICINE | Facility: CLINIC | Age: 75
End: 2020-07-29
Payer: COMMERCIAL

## 2020-07-29 DIAGNOSIS — Z51.81 MEDICATION MONITORING ENCOUNTER: ICD-10-CM

## 2020-07-29 DIAGNOSIS — I10 HYPERTENSION GOAL BP (BLOOD PRESSURE) < 140/90: Primary | ICD-10-CM

## 2020-07-29 PROCEDURE — 99213 OFFICE O/P EST LOW 20 MIN: CPT | Mod: 95 | Performed by: FAMILY MEDICINE

## 2020-07-29 NOTE — PROGRESS NOTES
[facility] - [place]    Telephone visit  - [phone]    Subjective    Amadou Wade is a 75 year old male who is being evaluated via a billable telephone visit.      Chief Complaint   Patient presents with     Hypotension     Low blood pressure, dizziness, weightloss    BP this morning at 7:30 was 118/71  BP at 2:30 today was 116/53    Notes 10 lbs weight loss over last 6 months (on purpose), since then blood pressure has been decreasing, with some pretty low days, lowest 71/48, associated with dizziness, lightheadedness, tired, unsteady on his feet, infrequent, since February 7 times of low BP, 3 with symptoms. No cp, no sob, no edema, no issues with heat, no issues with exertion    [facility]    BP Readings from Last 3 Encounters:   02/24/20 120/72   12/10/19 122/72   11/12/19 136/82     Creatinine   Date Value Ref Range Status   02/24/2020 0.87 0.66 - 1.25 mg/dL Final     GFR Estimate   Date Value Ref Range Status   02/24/2020 86 >60 mL/min/[1.73_m2] Final     Comment:     Non  GFR Calc  Starting 12/18/2018, serum creatinine based estimated GFR (eGFR) will be   calculated using the Chronic Kidney Disease Epidemiology Collaboration   (CKD-EPI) equation.       PMH    Past Medical History:   Diagnosis Date     Cardiac dysrhythmia, unspecified 3/07    few pac's, few pvc's, few runs SVT     Chronic rhinitis     resolved     ED (erectile dysfunction)      [facility]     [facility]     Hyperlipidemia LDL goal <130 2002     Hypertension goal BP (blood pressure) < 140/90 12/2017     Pneumonia, organism unspecified(486) 1993     Snoring     no sleep apnea - mouth guard     Unspecified hemorrhoids without mention of complication 4/06    internal       PSH    Past Surgical History:   Procedure Laterality Date     COLONOSCOPY  1/3/2012    incomplete - negative, ACBE incomplete - negative, CT colography negative     HC COLONOSCOPY THRU STOMA, DIAGNOSTIC  5/06    dr tejeda - normal  - internal hemorrhiods - due 10 yrs     HC REMOVE TONSILS/ADENOIDS,<11 Y/O      T & A <14y.o.     SIGMOIDOSCOPY,DIAGNOSTIC      normal     STRESS ECHO (METRO)  3/07    normal few pac's, few pvc's       Medications    Current Outpatient Medications   Medication Sig Dispense Refill     ASPIRIN 81 MG OR TABS 1 tab po QD (Once per day)       diphenhydrAMINE (BENADRYL) 25 MG tablet Take 1-2 tablets (25-50 mg) by mouth every 6 hours as needed for itching or allergies 60 tablet 1     simvastatin (ZOCOR) 40 MG tablet Take 1 tablet (40 mg) by mouth At Bedtime 90 tablet 3       Allergies    Patient has no known allergies.    Family History    Family History   Problem Relation Age of Onset     Hypertension Mother      Cancer Father         lung     Eye Disorder Father         Glaucoma     Alzheimer Disease Father      Heart Disease Father      Diabetes Father         type 2     Diabetes Brother         type 2     Coronary Artery Disease Brother      Cerebrovascular Disease Maternal Grandmother      Heart Disease Maternal Grandfather          young heartattack     Diabetes Paternal Grandfather      Cancer Brother         melanoma     Alzheimer Disease Brother      Prostate Cancer Brother      Cerebrovascular Disease Brother        Social History    Social History     Socioeconomic History     Marital status:      Spouse name: Dania     Number of children: 3     Years of education: 19     Highest education level: Not on file   Occupational History     Occupation:       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   Social Needs     Financial resource strain: Not on file     Food insecurity     Worry: Not on file     Inability: Not on file     Transportation needs     Medical: Not on file     Non-medical: Not on file   Tobacco Use     Smoking status: Never Smoker     Smokeless tobacco: Never Used   Substance and Sexual Activity     Alcohol use: Yes     Alcohol/week: 0.0 - 0.8 standard drinks     Comment: 0-1 per week  avg     Drug use: No     Sexual activity: Yes     Partners: Female   Lifestyle     Physical activity     Days per week: Not on file     Minutes per session: Not on file     Stress: Not on file   Relationships     Social connections     Talks on phone: Not on file     Gets together: Not on file     Attends Mandaen service: Not on file     Active member of club or organization: Not on file     Attends meetings of clubs or organizations: Not on file     Relationship status: Not on file     Intimate partner violence     Fear of current or ex partner: Not on file     Emotionally abused: Not on file     Physically abused: Not on file     Forced sexual activity: Not on file   Other Topics Concern      Service Not Asked     Blood Transfusions Not Asked     Caffeine Concern Yes     Comment: occas     Occupational Exposure Not Asked     Hobby Hazards Not Asked     Sleep Concern Not Asked     Stress Concern Not Asked     Weight Concern Not Asked     Special Diet Not Asked     Back Care Not Asked     Exercise Yes     Comment: active     Bike Helmet Not Asked     Seat Belt Yes     Self-Exams Not Asked     Parent/sibling w/ CABG, MI or angioplasty before 65F 55M? No   Social History Narrative     Not on file       Reviewed and updated as needed this visit by Provider           Review of Systems     CONSTITUTIONAL: NEGATIVE for fever, chills, change in weight  INTEGUMENTARY/SKIN: NEGATIVE for worrisome rashes, moles or lesions  EYES: NEGATIVE for vision changes or irritation  ENT/MOUTH: NEGATIVE for ear, mouth and throat problems  RESP: NEGATIVE for significant cough or SOB  CV: NEGATIVE for chest pain, palpitations or peripheral edema  GI: NEGATIVE for nausea, abdominal pain, heartburn, or change in bowel habits  : NEGATIVE for frequency, dysuria, or hematuria  MUSCULOSKELETAL: NEGATIVE for significant arthralgias or myalgia  NEURO: NEGATIVE for weakness, dizziness or paresthesias  ENDOCRINE: NEGATIVE for temperature  "intolerance, skin/hair changes  HEME: NEGATIVE for bleeding problems  PSYCHIATRIC: NEGATIVE for changes in mood or affect    Objective    Reported vitals:  There were no vitals taken for this visit.     healthy, alert and no distress  Psych: Alert and oriented times 3; coherent speech, normal   rate and volume, able to articulate logical thoughts, able   to abstract reason, no tangential thoughts, no hallucinations   or delusions  His affect is normal     Diagnostic Test Results:  Labs reviewed in Epic    Assessment/Plan:      ICD-10-CM    1. Hypertension goal BP (blood pressure) < 140/90  I10    2. Medication monitoring encounter  Z51.81        Low salt diet, regular exercise  Check BP daily  Check weight daily  Hold losartan/HCTZ    No follow-ups on file.    Phone call duration:  11 minutes    The patient has been notified of following:     \"This telephone visit will be conducted via a call between you and your physician/provider. We have found that certain health care needs can be provided without the need for a physical exam.  This service lets us provide the care you need with a short phone conversation.  If a prescription is necessary we can send it directly to your pharmacy.  If lab work is needed we can place an order for that and you can then stop by our lab to have the test done at a later time.    Telephone visits are billed at different rates depending on your insurance coverage. During this emergency period, for some insurers they may be billed the same as an in-person visit.  Please reach out to your insurance provider with any questions.    If during the course of the call the physician/provider feels a telephone visit is not appropriate, you will not be charged for this service.\"    Patient has given verbal consent for Telephone visit?  Yes    How would you like to obtain your AVS? Reza Sampson MD, FAAFP     [facility]  [address] [place] [address] " [place] [address]  [email or web address]  [email or web address]   Office: [phone]  Fax: [phone]  Pager: [phone]

## 2020-07-29 NOTE — TELEPHONE ENCOUNTER
Scheduled VV for this afternoon - pt informed via a detailed VM - told to call only to cancel if it doesn't work

## 2020-08-25 ENCOUNTER — VIRTUAL VISIT (OUTPATIENT)
Dept: FAMILY MEDICINE | Facility: CLINIC | Age: 75
End: 2020-08-25
Payer: COMMERCIAL

## 2020-08-25 VITALS — WEIGHT: 161 LBS | BODY MASS INDEX: 23.1 KG/M2 | SYSTOLIC BLOOD PRESSURE: 121 MMHG | DIASTOLIC BLOOD PRESSURE: 78 MMHG

## 2020-08-25 DIAGNOSIS — I95.2 HYPOTENSION DUE TO DRUGS: ICD-10-CM

## 2020-08-25 DIAGNOSIS — Z51.81 MEDICATION MONITORING ENCOUNTER: ICD-10-CM

## 2020-08-25 DIAGNOSIS — I10 HYPERTENSION GOAL BP (BLOOD PRESSURE) < 140/90: Primary | ICD-10-CM

## 2020-08-25 PROCEDURE — 99214 OFFICE O/P EST MOD 30 MIN: CPT | Mod: 95 | Performed by: FAMILY MEDICINE

## 2020-08-25 NOTE — PROGRESS NOTES
[facility] - [place]    Telephone visit  - [phone]    Subjective    Amadou Wade is a 75 year old male who is being evaluated via a billable telephone visit.      Chief Complaint   Patient presents with     Hypotension     Hypertension - long term, recent hypotension with weight loss    Notes no issues with hypotension, holding losartan/hctz (50/12.5), average 128 systolic, range 112/78 - 147/95    No LH, no dizziness, no cp, no sob, no edema, feeling, exercising without issues    [facility]    BP Readings from Last 3 Encounters:   08/25/20 121/78   02/24/20 120/72   12/10/19 122/72     Creatinine   Date Value Ref Range Status   02/24/2020 0.87 0.66 - 1.25 mg/dL Final     Hypotension Follow-up      Do you check your blood pressure regularly outside of the clinic? Yes     Today 121/82 lowest he's had over last month  112/78 early this month   145/95 highest  138/91      Are you following a low salt diet? Yes    Are your blood pressures ever more than 140 on the top number (systolic) OR more   than 90 on the bottom number (diastolic), for example 140/90? Yes    7/29    Chief Complaint   Patient presents with     Hypotension      Low blood pressure, dizziness, weightloss     BP this morning at 7:30 was 118/71  BP at 2:30 today was 116/53     Notes 10 lbs weight loss over last 6 months (on purpose), since then blood pressure has been decreasing, with some pretty low days, lowest 71/48, associated with dizziness, lightheadedness, tired, unsteady on his feet, infrequent, since February 7 times of low BP, 3 with symptoms. No cp, no sob, no edema, no issues with heat, no issues with exertion     [facility]         BP Readings from Last 3 Encounters:   02/24/20 120/72   12/10/19 122/72   11/12/19 136/82            Creatinine   Date Value Ref Range Status   02/24/2020 0.87 0.66 - 1.25 mg/dL Final              GFR Estimate   Date Value Ref Range Status   02/24/2020 86 >60  mL/min/[1.73_m2] Final       Comment:       Non  GFR Calc  Starting 12/18/2018, serum creatinine based estimated GFR (eGFR) will be   calculated using the Chronic Kidney Disease Epidemiology Collaboration   (CKD-EPI) equation.         PMH    Past Medical History:   Diagnosis Date     Cardiac dysrhythmia, unspecified 3/07    few pac's, few pvc's, few runs SVT     Chronic rhinitis     resolved     ED (erectile dysfunction)      [facility] of hearing     [facility]     Hyperlipidemia LDL goal <130 2002     Hypertension goal BP (blood pressure) < 140/90 12/2017     Pneumonia, organism unspecified(486) 1993     Snoring     no sleep apnea - mouth guard     Unspecified hemorrhoids without mention of complication 4/06    internal       PSH    Past Surgical History:   Procedure Laterality Date     COLONOSCOPY  1/3/2012    incomplete - negative, ACBE incomplete - negative, CT colography negative     HC COLONOSCOPY THRU STOMA, DIAGNOSTIC  5/06    dr tejeda - normal - internal hemorrhiods - due 10 yrs     HC REMOVE TONSILS/ADENOIDS,<13 Y/O  1953    T & A <14y.o.     SIGMOIDOSCOPY,DIAGNOSTIC  1997    normal     STRESS ECHO (METRO)  3/07    normal few pac's, few pvc's       Medications    Current Outpatient Medications   Medication Sig Dispense Refill     ASPIRIN 81 MG OR TABS 1 tab po QD (Once per day)       diphenhydrAMINE (BENADRYL) 25 MG tablet Take 1-2 tablets (25-50 mg) by mouth every 6 hours as needed for itching or allergies 60 tablet 1     simvastatin (ZOCOR) 40 MG tablet Take 1 tablet (40 mg) by mouth At Bedtime 90 tablet 3       Allergies    Patient has no known allergies.    Family History    Family History   Problem Relation Age of Onset     Hypertension Mother      Cancer Father         lung     Eye Disorder Father         Glaucoma     Alzheimer Disease Father      Heart Disease Father      Diabetes Father         type 2     Diabetes Brother         type 2     Coronary Artery Disease Brother      Cerebrovascular  Disease Maternal Grandmother      Heart Disease Maternal Grandfather          young heartattack     Diabetes Paternal Grandfather      Cancer Brother         melanoma     Alzheimer Disease Brother      Prostate Cancer Brother      Cerebrovascular Disease Brother        Social History    Social History     Socioeconomic History     Marital status:      Spouse name: Dania     Number of children: 3     Years of education: 19     Highest education level: Not on file   Occupational History     Occupation:       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   Social Needs     Financial resource strain: Not on file     Food insecurity     Worry: Not on file     Inability: Not on file     Transportation needs     Medical: Not on file     Non-medical: Not on file   Tobacco Use     Smoking status: Never Smoker     Smokeless tobacco: Never Used   Substance and Sexual Activity     Alcohol use: Yes     Alcohol/week: 0.0 - 0.8 standard drinks     Comment: 0-1 per week avg     Drug use: No     Sexual activity: Yes     Partners: Female   Lifestyle     Physical activity     Days per week: Not on file     Minutes per session: Not on file     Stress: Not on file   Relationships     Social connections     Talks on phone: Not on file     Gets together: Not on file     Attends Pentecostalism service: Not on file     Active member of club or organization: Not on file     Attends meetings of clubs or organizations: Not on file     Relationship status: Not on file     Intimate partner violence     Fear of current or ex partner: Not on file     Emotionally abused: Not on file     Physically abused: Not on file     Forced sexual activity: Not on file   Other Topics Concern      Service Not Asked     Blood Transfusions Not Asked     Caffeine Concern Yes     Comment: occas     Occupational Exposure Not Asked     Hobby Hazards Not Asked     Sleep Concern Not Asked     Stress Concern Not Asked     Weight Concern Not Asked     Special Diet  Not Asked     Back Care Not Asked     Exercise Yes     Comment: active     Bike Helmet Not Asked     Seat Belt Yes     Self-Exams Not Asked     Parent/sibling w/ CABG, MI or angioplasty before 65F 55M? No   Social History Narrative     Not on file       Reviewed and updated as needed this visit by Provider           Review of Systems     CONSTITUTIONAL: NEGATIVE for fever, chills, change in weight  INTEGUMENTARY/SKIN: NEGATIVE for worrisome rashes, moles or lesions  EYES: NEGATIVE for vision changes or irritation  ENT/MOUTH: NEGATIVE for ear, mouth and throat problems  RESP: NEGATIVE for significant cough or SOB  CV: NEGATIVE for chest pain, palpitations or peripheral edema  GI: NEGATIVE for nausea, abdominal pain, heartburn, or change in bowel habits  : NEGATIVE for frequency, dysuria, or hematuria  MUSCULOSKELETAL: NEGATIVE for significant arthralgias or myalgia  NEURO: NEGATIVE for weakness, dizziness or paresthesias  ENDOCRINE: NEGATIVE for temperature intolerance, skin/hair changes  HEME: NEGATIVE for bleeding problems  PSYCHIATRIC: NEGATIVE for changes in mood or affect    Objective    Reported vitals:  /78   Wt 73 kg (161 lb)   BMI 23.10 kg/m       healthy, alert and no distress  Psych: Alert and oriented times 3; coherent speech, normal   rate and volume, able to articulate logical thoughts, able   to abstract reason, no tangential thoughts, no hallucinations   or delusions  His affect is normal     Diagnostic Test Results:  Labs reviewed in Epic    Assessment/Plan:      ICD-10-CM    1. Hypertension goal BP (blood pressure) < 140/90  I10    2. Hypotension due to drugs  I95.2    3. Medication monitoring encounter  Z51.81      Trial of losartan only, 50 mg, 1/2 tab daily, watch BP closely, low salt diet, regular exercise    Return in about 1 month (around 9/25/2020), or if symptoms worsen or fail to improve, for Medication Recheck Visit, Follow Up Chronic, Telephone/Video visit.    Phone call  "duration:  16 minutes    The patient has been notified of following:     \"This telephone visit will be conducted via a call between you and your physician/provider. We have found that certain health care needs can be provided without the need for a physical exam.  This service lets us provide the care you need with a short phone conversation.  If a prescription is necessary we can send it directly to your pharmacy.  If lab work is needed we can place an order for that and you can then stop by our lab to have the test done at a later time.    Telephone visits are billed at different rates depending on your insurance coverage. During this emergency period, for some insurers they may be billed the same as an in-person visit.  Please reach out to your insurance provider with any questions.    If during the course of the call the physician/provider feels a telephone visit is not appropriate, you will not be charged for this service.\"    Patient has given verbal consent for Telephone visit?  Yes    How would you like to obtain your AVS? Reza Sampson MD, FAAFP     [facility]  [address] [place] [id number]  [email or web address]   Office: [phone]  Fax: [phone]  Pager: [phone]     "

## 2020-09-22 ENCOUNTER — OFFICE VISIT (OUTPATIENT)
Dept: FAMILY MEDICINE | Facility: CLINIC | Age: 75
End: 2020-09-22
Payer: COMMERCIAL

## 2020-09-22 VITALS
WEIGHT: 166 LBS | HEIGHT: 70 IN | OXYGEN SATURATION: 97 % | TEMPERATURE: 97.8 F | BODY MASS INDEX: 23.77 KG/M2 | SYSTOLIC BLOOD PRESSURE: 110 MMHG | HEART RATE: 77 BPM | DIASTOLIC BLOOD PRESSURE: 82 MMHG

## 2020-09-22 DIAGNOSIS — H25.9 AGE-RELATED CATARACT OF BOTH EYES, UNSPECIFIED AGE-RELATED CATARACT TYPE: ICD-10-CM

## 2020-09-22 DIAGNOSIS — Z01.818 PREOP GENERAL PHYSICAL EXAM: Primary | ICD-10-CM

## 2020-09-22 DIAGNOSIS — E78.5 HYPERLIPIDEMIA LDL GOAL <130: ICD-10-CM

## 2020-09-22 DIAGNOSIS — Z23 NEED FOR INFLUENZA VACCINATION: ICD-10-CM

## 2020-09-22 DIAGNOSIS — I10 HYPERTENSION GOAL BP (BLOOD PRESSURE) < 140/90: ICD-10-CM

## 2020-09-22 LAB
ALBUMIN UR-MCNC: NEGATIVE MG/DL
APPEARANCE UR: CLEAR
BILIRUB UR QL STRIP: ABNORMAL
COLOR UR AUTO: YELLOW
ERYTHROCYTE [DISTWIDTH] IN BLOOD BY AUTOMATED COUNT: 13 % (ref 10–15)
GLUCOSE UR STRIP-MCNC: NEGATIVE MG/DL
HCT VFR BLD AUTO: 47.9 % (ref 40–53)
HGB BLD-MCNC: 15.8 G/DL (ref 13.3–17.7)
HGB UR QL STRIP: NEGATIVE
KETONES UR STRIP-MCNC: ABNORMAL MG/DL
LEUKOCYTE ESTERASE UR QL STRIP: NEGATIVE
MCH RBC QN AUTO: 30.9 PG (ref 26.5–33)
MCHC RBC AUTO-ENTMCNC: 33 G/DL (ref 31.5–36.5)
MCV RBC AUTO: 94 FL (ref 78–100)
NITRATE UR QL: NEGATIVE
PH UR STRIP: 5.5 PH (ref 5–7)
PLATELET # BLD AUTO: 147 10E9/L (ref 150–450)
RBC # BLD AUTO: 5.11 10E12/L (ref 4.4–5.9)
SOURCE: ABNORMAL
SP GR UR STRIP: >1.03 (ref 1–1.03)
UROBILINOGEN UR STRIP-ACNC: 1 EU/DL (ref 0.2–1)
WBC # BLD AUTO: 5.5 10E9/L (ref 4–11)

## 2020-09-22 PROCEDURE — 85027 COMPLETE CBC AUTOMATED: CPT | Performed by: FAMILY MEDICINE

## 2020-09-22 PROCEDURE — 81003 URINALYSIS AUTO W/O SCOPE: CPT | Performed by: FAMILY MEDICINE

## 2020-09-22 PROCEDURE — 93000 ELECTROCARDIOGRAM COMPLETE: CPT | Performed by: FAMILY MEDICINE

## 2020-09-22 PROCEDURE — 36415 COLL VENOUS BLD VENIPUNCTURE: CPT | Performed by: FAMILY MEDICINE

## 2020-09-22 PROCEDURE — 80053 COMPREHEN METABOLIC PANEL: CPT | Performed by: FAMILY MEDICINE

## 2020-09-22 PROCEDURE — 90662 IIV NO PRSV INCREASED AG IM: CPT | Performed by: FAMILY MEDICINE

## 2020-09-22 PROCEDURE — G0008 ADMIN INFLUENZA VIRUS VAC: HCPCS | Performed by: FAMILY MEDICINE

## 2020-09-22 PROCEDURE — 99214 OFFICE O/P EST MOD 30 MIN: CPT | Mod: 25 | Performed by: FAMILY MEDICINE

## 2020-09-22 RX ORDER — LOSARTAN POTASSIUM 50 MG/1
50 TABLET ORAL DAILY
COMMUNITY
End: 2020-09-22

## 2020-09-22 RX ORDER — LOSARTAN POTASSIUM 50 MG/1
25 TABLET ORAL DAILY
Qty: 45 TABLET | Refills: 3 | Status: SHIPPED | OUTPATIENT
Start: 2020-09-22 | End: 2021-03-01

## 2020-09-22 ASSESSMENT — MIFFLIN-ST. JEOR: SCORE: 1504.22

## 2020-09-22 NOTE — LETTER
September 24, 2020      Amadou Wade  [address] [facility] [address]        Dear ,    We are writing to inform you of your test results.    Your recent results have been reviewed.     They showed:     Labs are overall quite good     Glucose is elevated (not fasting)     We advise:     Please proceed with surgery.     For additional lab test information, [email or web address] is an excellent reference.     Let us know if you have any questions or concerns.     Thank you for choosing us for your health care needs!     Resulted Orders   Comprehensive metabolic panel   Result Value Ref Range    Sodium 142 133 - 144 mmol/L    Potassium 4.1 3.4 - 5.3 mmol/L    Chloride 110 (H) 94 - 109 mmol/L    Carbon Dioxide 25 20 - 32 mmol/L    Anion Gap 7 3 - 14 mmol/L    Glucose 131 (H) 70 - 99 mg/dL    Urea Nitrogen 20 7 - 30 mg/dL    Creatinine 0.88 0.66 - 1.25 mg/dL    GFR Estimate 85 >60 mL/min/[1.73_m2]      Comment:      Non  GFR Calc  Starting 12/18/2018, serum creatinine based estimated GFR (eGFR) will be   calculated using the Chronic Kidney Disease Epidemiology Collaboration   (CKD-EPI) equation.      GFR Estimate If Black >90 >60 mL/min/[1.73_m2]      Comment:       GFR Calc  Starting 12/18/2018, serum creatinine based estimated GFR (eGFR) will be   calculated using the Chronic Kidney Disease Epidemiology Collaboration   (CKD-EPI) equation.      Calcium 8.8 8.5 - 10.1 mg/dL    Bilirubin Total 0.7 0.2 - 1.3 mg/dL    Albumin 3.3 (L) 3.4 - 5.0 g/dL    Protein Total 6.7 (L) 6.8 - 8.8 g/dL    Alkaline Phosphatase 77 40 - 150 U/L    ALT 15 0 - 70 U/L    AST 16 0 - 45 U/L   CBC with platelets   Result Value Ref Range    WBC 5.5 4.0 - 11.0 10e9/L    RBC Count 5.11 4.4 - 5.9 10e12/L    Hemoglobin 15.8 13.3 - 17.7 g/dL    Hematocrit 47.9 40.0 - 53.0 %    MCV 94 78 - 100 fl    MCH 30.9 26.5 - 33.0 pg    MCHC 33.0 31.5 - 36.5 g/dL    RDW 13.0 10.0 - 15.0 %    Platelet Count 147 (L) 150 -  450 10e9/L   UA reflex to Microscopic and Culture   Result Value Ref Range    Color Urine Yellow     Appearance Urine Clear     Glucose Urine Negative NEG^Negative mg/dL    Bilirubin Urine Small (A) NEG^Negative      Comment:      This is an unconfirmed screening test result. A positive result may be false.    Ketones Urine Trace (A) NEG^Negative mg/dL    Specific Gravity Urine >1.030 1.003 - 1.035    Blood Urine Negative NEG^Negative    pH Urine 5.5 5.0 - 7.0 pH    Protein Albumin Urine Negative NEG^Negative mg/dL    Urobilinogen Urine 1.0 0.2 - 1.0 EU/dL    Nitrite Urine Negative NEG^Negative    Leukocyte Esterase Urine Negative NEG^Negative    Source Midstream Urine        If you have any questions or concerns, please call the clinic at the number listed above.       Sincerely,        Johnathan Sampson MD

## 2020-09-22 NOTE — PROGRESS NOTES
[address] [address]  Phone: [phone]  Primary Provider: Johnathan Sampson  {FV AMB Performing Provider (Optional):352800}    PREOPERATIVE EVALUATION:  Today's date: 9/22/2020    Amadou Wade is a 75 year old male who presents for a preoperative evaluation.    Surgical Information:  No flowsheet data found.  Fax number for surgical facility: {SURGERY FAX NUMBER:[id number]}  Type of Anesthesia Anticipated: {ANESTHESIA:897147}    Subjective     HPI related to upcoming procedure: ***  No flowsheet data found.    RX monitoring program (MNPMP) reviewed: {Mnpmpreport:803412}  {Review MNPMP for all patients per [employer] [email or web address]}  {Chronic problem details (Optional):812438}    Review of Systems  {ROS Preop Choices:646062}    Patient Active Problem List    Diagnosis Date Noted     Hyperlipidemia LDL goal <130 10/31/2010     Priority: High     Cardiac dysrhythmia      Priority: High     few pac's, few pvc's  Problem list name updated by automated process. 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  Document not previously scanned.  Validation form completed and sent with document to be scanned.  Code Status needs to be updated to reflect choices in most recent ACP document. Orders placed.  Confirmed/documented designated decision maker(s).  Added by Jessica Piña    Advance Care Planning 10/5/2015: Initial facilitation introduction:   Amadou Wade presented for ACP Facilitation session at the clinic. He was accompanied by wife. Honoring Choices information  provided and resources reviewed. He currently wishes to complete an ACP document and needs further clarification and/or consideration prior to documenting choices.  He currently has the following questions or concerns about Advance Care Planning: none.  Confirmed/documented legally designated decision maker(s). Code status needs to be updated to reflect choices in most recent ACP document. Orders placed. Follow-up meeting: not needed/applicable. Added by Jessica Piña        Patient states has Advance Directive and will bring in a copy to clinic. 3/21/2012 Michaela Rg, Medical Assistant                  Past Medical History:   Diagnosis Date     Cardiac dysrhythmia, unspecified 3/07    few pac's, few pvc's, few runs SVT     Chronic rhinitis     resolved     ED (erectile dysfunction)      [place] of hearing     [place]     Hyperlipidemia LDL goal <130 2002     Hypertension goal BP (blood pressure) < 140/90 12/2017     Pneumonia, organism unspecified(486) 1993     Snoring     no sleep apnea - mouth guard     Unspecified hemorrhoids without mention of complication 4/06    internal     Past Surgical History:   Procedure Laterality Date     COLONOSCOPY  1/3/2012    incomplete - negative, ACBE incomplete - negative, CT colography negative     HC COLONOSCOPY THRU STOMA, DIAGNOSTIC  5/06    dr tejeda - normal - internal hemorrhiods - due 10 yrs     HC REMOVE TONSILS/ADENOIDS,<11 Y/O  1953    T & A <14y.o.     SIGMOIDOSCOPY,DIAGNOSTIC  1997    normal     STRESS ECHO (METRO)  3/07    normal few pac's, few pvc's     Current Outpatient Medications   Medication Sig Dispense Refill     ASPIRIN 81 MG OR TABS 1 tab po QD (Once per day)       diphenhydrAMINE (BENADRYL) 25 MG tablet Take 1-2 tablets (25-50 mg) by mouth every 6 hours as needed for itching or allergies 60 tablet 1     losartan (COZAAR) 50 MG tablet Take 50 mg by mouth daily 1/2 tab       simvastatin (ZOCOR) 40 MG tablet Take 1 tablet (40 mg) by mouth At Bedtime 90 tablet 3  "      No Known Allergies     Social History     Tobacco Use     Smoking status: Never Smoker     Smokeless tobacco: Never Used   Substance Use Topics     Alcohol use: Yes     Alcohol/week: 0.0 - 0.8 standard drinks     Comment: 0-1 per week avg     {FAMILY HISTORY (Optional):668944815}  History   Drug Use No            Objective   /82   Pulse 77   Temp 97.8  F (36.6  C)   Ht 1.778 m (5' 10\")   Wt 75.3 kg (166 lb)   SpO2 97%   BMI 23.82 kg/m    Physical Exam  {EXAM Preop Choices:619563}    Recent Labs   Lab Test 20  0803 19  0816   HGB 16.1 16.8    154    140   POTASSIUM 4.9 3.9   CR 0.87 0.90        PRE-OP Diagnostics:  {LABS:257104}  {EK}    {Provider  Link to PREOP [facility] :119130}     Assessment & Plan   The proposed surgical procedure is considered {HIGH=major cardiovascular or procedures requiring prolonged anesthesia >4 hours or large fluid shifts;    INTERMEDIATE=abdominal, most orthopedic and intrathoracic surgery; LOW= endoscopy, cataract and breast surgery:526055} risk.    REVISED CARDIAC RISK INDEX  The patient has the following serious cardiovascular risks for perioperative complications:  {PREOP REVISED CARDIAC RISK INDEX (RCRI):345260::\"No serious cardiac risks = 0 points\"}    INTERPRETATION: {REVISED CARDIAC RISK INTERPRETATION:658813}    {Diag Picklist :177261}    The patient has the following additional risks and recommendations for perioperative complications:    {IMPORTANT - Conditions - complete carefully!!:251681}     MEDICATION INSTRUCTIONS:  {IMPORTANT - Medications:547431}    RECOMMENDATION:  {IMPORTANT - Approval:437537::\"APPROVAL GIVEN to proceed with proposed procedure, without further diagnostic evaluation.\"}    No follow-ups on file.    Signed Electronically by: Johnathan Sampson MD    Copy of this evaluation report is provided to requesting physician.    [facility] Preop Guidelines    Revised Cardiac Risk Index  "

## 2020-09-22 NOTE — PROGRESS NOTES
[address] [address]  Phone: [phone]  Primary Provider: Johnathan Sampson  Pre-op Performing Provider: JOHNATHAN SAMPSON    PREOPERATIVE EVALUATION:  Today's date: 9/22/2020    Amadou Wade is a 75 year old male who presents for a preoperative evaluation.    Surgical Information:  Surgery Details 9/22/2020   Surgery/Procedure: Bilateral cataracts   Surgery Location: [facility] in [place]   Surgeon: Dr Rojas   Surgery Date: 10/19/20 (rt), followed by (lt) ?   Time of Surgery: unknown   Where patient plans to recover: At home with family     Fax number for surgical facility: [facility] in [place]  Type of Anesthesia Anticipated: Local with MAC    Subjective     HPI related to upcoming procedure:     Bilateral cataracts - R>L    Dr Rojas    [facility]    Preop Questions 9/22/2020   1. Have you ever had a heart attack or stroke? No   2. Have you ever had surgery on your heart or blood vessels, such as a stent placement, a coronary artery bypass, or surgery on an artery in your head, neck, heart, or legs? No   3. Do you have chest pain with activity? No   4. Do you have a history of  heart failure? No   5. Do you currently have a cold, bronchitis or symptoms of other infection? No   6. Do you have a cough, shortness of breath, or wheezing? No   7. Do you or anyone in your family have previous history of blood clots? No   8. Do you or does anyone in your family have a serious bleeding problem such as prolonged bleeding following surgeries or cuts? No   9. Have you ever had problems with anemia or been told to take iron pills? No   10. Have you had any abnormal blood loss such as black, tarry or bloody stools? No   11. Have you ever had a blood transfusion? No   Are you willing to have a blood transfusion if it is medically needed before, during, or after your surgery? Yes   13. Have you or any of your relatives ever had problems with  anesthesia? No   14. Do you have sleep apnea, excessive snoring or daytime drowsiness? YES - difficulty sleeping at night, naps at times   14a. Do you have a CPAP machine? No   15. Do you have any artifical heart valves or other implanted medical devices like a pacemaker, defibrillator, or continuous glucose monitor? No   16. Do you have artificial joints? No   17. Are you allergic to latex? No     RX monitoring program (MNPMP) reviewed: no concerns    HTN    BP Readings from Last 3 Encounters:   09/22/20 110/82   08/25/20 121/78   02/24/20 120/72     Creatinine   Date Value Ref Range Status   02/24/2020 0.87 0.66 - 1.25 mg/dL Final     Recent range 97/65 to 144/93 - average 128/84    Lipids    Recent Labs   Lab Test 02/24/20  0803 01/07/19  0816  06/16/15  0919 04/16/14  0907   CHOL 165 184   < > 145 147   HDL 51 51   < > 50 43   * 116*   < > 84 90   TRIG 69 86   < > 53 74   CHOLHDLRATIO  --   --   --  2.9 3.4    < > = values in this interval not displayed.     Review of Systems  CONSTITUTIONAL: NEGATIVE for fever, chills, change in weight  INTEGUMENTARY/SKIN: NEGATIVE for worrisome rashes, moles or lesions  EYES: NEGATIVE for vision changes or irritation  ENT/MOUTH: NEGATIVE for ear, mouth and throat problems  RESP: NEGATIVE for significant cough or SOB  CV: NEGATIVE for chest pain, palpitations or peripheral edema  GI: NEGATIVE for nausea, abdominal pain, heartburn, or change in bowel habits  : NEGATIVE for frequency, dysuria, or hematuria  MUSCULOSKELETAL: NEGATIVE for significant arthralgias or myalgia  NEURO: NEGATIVE for weakness, dizziness or paresthesias  ENDOCRINE: NEGATIVE for temperature intolerance, skin/hair changes  HEME: NEGATIVE for bleeding problems  PSYCHIATRIC: NEGATIVE for changes in mood or affect    Patient Active Problem List    Diagnosis Date Noted     Hypertension goal BP (blood pressure) < 140/90      Priority: High     Hyperlipidemia LDL goal <130 10/31/2010     Priority: High      Cardiac dysrhythmia      Priority: High     few pac's, few pvc's  Problem list name updated by automated process. 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  Document not previously scanned.  Validation form completed and sent with document to be scanned.  Code Status needs to be updated to reflect choices in most recent ACP document. Orders placed.  Confirmed/documented designated decision maker(s).  Added by Jessica Piña    Advance Care Planning 10/5/2015: Initial facilitation introduction:   Amadou Wade presented for ACP Facilitation session at the clinic. He was accompanied by wife. Honoring Choices information provided and resources reviewed. He currently wishes to complete an ACP document and needs further clarification and/or consideration prior to documenting choices.  He currently has the following questions or concerns about Advance Care Planning: none.  Confirmed/documented legally designated decision maker(s). Code status needs to be updated to reflect choices in most recent ACP document. Orders placed. Follow-up meeting: not needed/applicable. Added by Jessica Piña        Patient states has Advance Directive and will bring in a copy to clinic. 3/21/2012 Michaela Rg, Medical Assistant                  Past Medical History:   Diagnosis Date     Cardiac dysrhythmia, unspecified 3/07    few pac's, few pvc's, few runs SVT     Chronic rhinitis     resolved     ED (erectile dysfunction)      Hard of hearing     [place]     Hyperlipidemia LDL goal <130 2002     Hypertension goal BP (blood pressure) < 140/90 12/2017     Pneumonia, organism unspecified(486) 1993     Snoring     no sleep apnea - mouth  "guard     Unspecified hemorrhoids without mention of complication     internal     Past Surgical History:   Procedure Laterality Date     COLONOSCOPY  1/3/2012    incomplete - negative, ACBE incomplete - negative, CT colography negative     HC COLONOSCOPY THRU STOMA, DIAGNOSTIC      dr tejeda - normal - internal hemorrhiods - due 10 yrs     HC REMOVE TONSILS/ADENOIDS,<15 Y/O      T & A <14y.o.     SIGMOIDOSCOPY,DIAGNOSTIC      normal     STRESS ECHO (METRO)  3/07    normal few pac's, few pvc's     Current Outpatient Medications   Medication Sig Dispense Refill     ASPIRIN 81 MG OR TABS 1 tab po QD (Once per day)       diphenhydrAMINE (BENADRYL) 25 MG tablet Take 1-2 tablets (25-50 mg) by mouth every 6 hours as needed for itching or allergies 60 tablet 1     losartan (COZAAR) 50 MG tablet Take 0.5 tablets (25 mg) by mouth daily 1/2 tab 45 tablet 3     simvastatin (ZOCOR) 40 MG tablet Take 1 tablet (40 mg) by mouth At Bedtime 90 tablet 3       No Known Allergies     Social History     Tobacco Use     Smoking status: Never Smoker     Smokeless tobacco: Never Used   Substance Use Topics     Alcohol use: Yes     Alcohol/week: 0.0 - 0.8 standard drinks     Comment: 0-1 per week avg     Family History   Problem Relation Age of Onset     Hypertension Mother      Cancer Father         lung     Eye Disorder Father         Glaucoma     Alzheimer Disease Father      Heart Disease Father      Diabetes Father         type 2     Diabetes Brother         type 2     Coronary Artery Disease Brother      Cerebrovascular Disease Maternal Grandmother      Heart Disease Maternal Grandfather          young heartattack     Diabetes Paternal Grandfather      Cancer Brother         melanoma     Alzheimer Disease Brother      Prostate Cancer Brother      Cerebrovascular Disease Brother      History   Drug Use No            Objective   /82   Pulse 77   Temp 97.8  F (36.6  C)   Ht 1.778 m (5' 10\")   Wt 75.3 kg (166 " lb)   SpO2 97%   BMI 23.82 kg/m    Physical Exam    GENERAL APPEARANCE: healthy, alert and no distress     EYES: EOMI,  PERRL     HENT: ear canals and TM's normal and nose and mouth without ulcers or lesions     NECK: no adenopathy, no asymmetry, masses, or scars and thyroid normal to palpation     RESP: lungs clear to auscultation - no rales, rhonchi or wheezes     CV: regular rates and rhythm, normal S1 S2, no S3 or S4 and no murmur, click or rub     ABDOMEN:  soft, nontender, no HSM or masses and bowel sounds normal     MS: extremities normal- no gross deformities noted, no evidence of inflammation in joints, FROM in all extremities.     SKIN: no suspicious lesions or rashes     NEURO: Normal strength and tone, sensory exam grossly normal, mentation intact and speech normal     PSYCH: mentation appears normal. and affect normal/bright     LYMPHATICS: No cervical adenopathy    Recent Labs   Lab Test 02/24/20  0803 01/07/19  0816   HGB 16.1 16.8    154    140   POTASSIUM 4.9 3.9   CR 0.87 0.90        PRE-OP Diagnostics:  Labs pending at this time.  Results will be reviewed when available.  EKG: appears normal, NSR, normal axis, normal intervals, no acute ST/T changes c/w ischemia, no LVH by voltage criteria, Right Bundle Branch Block         Assessment & Plan   The proposed surgical procedure is considered LOW risk.    REVISED CARDIAC RISK INDEX  The patient has the following serious cardiovascular risks for perioperative complications:  No serious cardiac risks = 0 points    INTERPRETATION: 0 points: Class I (very low risk - 0.4% complication rate)         ICD-10-CM    1. Preop general physical exam  Z01.818 EKG 12-lead complete w/read - Clinics     Comprehensive metabolic panel     CBC with platelets     UA reflex to Microscopic and Culture   2. Age-related cataract of both eyes, unspecified age-related cataract type  H25.9    3. Hypertension goal BP (blood pressure) < 140/90  I10 losartan (COZAAR)  50 MG tablet   4. Hyperlipidemia LDL goal <130  E78.5    5. Need for influenza vaccination  Z23        The patient has the following additional risks and recommendations for perioperative complications:     - No identified additional risk factors other than previously addressed     MEDICATION INSTRUCTIONS:  Patient is to take all scheduled medications on the day of surgery    RECOMMENDATION:  APPROVAL GIVEN to proceed with proposed procedure, without further diagnostic evaluation.    Return in about 5 months (around 2/22/2021), or if symptoms worsen or fail to improve, for Complete Physical, Medication Recheck Visit, Follow Up Chronic.    Signed Electronically by:            Johnathan Sampson MD, [facility]  [address] [place] [id number]  [email or web address]   Office: [phone]  Fax: [phone]  Pager: [phone]           Copy of this evaluation report is provided to requesting physician.    Preop SmartSet    [facility] Preop Guidelines    Revised Cardiac Risk Index

## 2020-09-23 LAB
ALBUMIN SERPL-MCNC: 3.3 G/DL (ref 3.4–5)
ALP SERPL-CCNC: 77 U/L (ref 40–150)
ALT SERPL W P-5'-P-CCNC: 15 U/L (ref 0–70)
ANION GAP SERPL CALCULATED.3IONS-SCNC: 7 MMOL/L (ref 3–14)
AST SERPL W P-5'-P-CCNC: 16 U/L (ref 0–45)
BILIRUB SERPL-MCNC: 0.7 MG/DL (ref 0.2–1.3)
BUN SERPL-MCNC: 20 MG/DL (ref 7–30)
CALCIUM SERPL-MCNC: 8.8 MG/DL (ref 8.5–10.1)
CHLORIDE SERPL-SCNC: 110 MMOL/L (ref 94–109)
CO2 SERPL-SCNC: 25 MMOL/L (ref 20–32)
CREAT SERPL-MCNC: 0.88 MG/DL (ref 0.66–1.25)
GFR SERPL CREATININE-BSD FRML MDRD: 85 ML/MIN/{1.73_M2}
GLUCOSE SERPL-MCNC: 131 MG/DL (ref 70–99)
POTASSIUM SERPL-SCNC: 4.1 MMOL/L (ref 3.4–5.3)
PROT SERPL-MCNC: 6.7 G/DL (ref 6.8–8.8)
SODIUM SERPL-SCNC: 142 MMOL/L (ref 133–144)

## 2020-10-23 ENCOUNTER — MYC MEDICAL ADVICE (OUTPATIENT)
Dept: FAMILY MEDICINE | Facility: CLINIC | Age: 75
End: 2020-10-23

## 2020-10-26 NOTE — TELEPHONE ENCOUNTER
Please see my chart message below - Pre op PX was on 9/22/2020    please review and advise     Thank you     Annamarie Goodrich RN, BSN  [place] [facility]

## 2020-11-02 ENCOUNTER — VIRTUAL VISIT (OUTPATIENT)
Dept: FAMILY MEDICINE | Facility: CLINIC | Age: 75
End: 2020-11-02
Payer: COMMERCIAL

## 2020-11-02 VITALS — DIASTOLIC BLOOD PRESSURE: 75 MMHG | SYSTOLIC BLOOD PRESSURE: 124 MMHG

## 2020-11-02 DIAGNOSIS — Z01.818 PREOPERATIVE EXAMINATION: Primary | ICD-10-CM

## 2020-11-02 DIAGNOSIS — H25.9 AGE-RELATED CATARACT OF BOTH EYES, UNSPECIFIED AGE-RELATED CATARACT TYPE: ICD-10-CM

## 2020-11-02 DIAGNOSIS — Z51.81 MEDICATION MONITORING ENCOUNTER: ICD-10-CM

## 2020-11-02 DIAGNOSIS — I49.9 CARDIAC ARRHYTHMIA, UNSPECIFIED CARDIAC ARRHYTHMIA TYPE: ICD-10-CM

## 2020-11-02 DIAGNOSIS — E78.5 HYPERLIPIDEMIA LDL GOAL <130: ICD-10-CM

## 2020-11-02 DIAGNOSIS — I10 HYPERTENSION GOAL BP (BLOOD PRESSURE) < 140/90: ICD-10-CM

## 2020-11-02 PROCEDURE — 99214 OFFICE O/P EST MOD 30 MIN: CPT | Mod: 95 | Performed by: FAMILY MEDICINE

## 2020-11-02 NOTE — PROGRESS NOTES
[address] [address]  Phone: [phone]  Primary Provider: Johnathan Sampson  Pre-op Performing Provider: JOHNATHAN SAMPSON     PREOPERATIVE EVALUATION:  ADDENDUM   Today's date: 9/22/2020     Amadou Wade is a 75 year old male who presents for a preoperative evaluation.     Surgical Information:  Surgery Details 9/22/2020   Surgery/Procedure: Bilateral cataracts   Surgery Location: [facility] in [place]   Surgeon: Dr Rjoas   Surgery Date: 10/19/20 (rt), followed by (lt) 11/9/20   Time of Surgery: unknown   Where patient plans to recover: At home with family      Fax number for surgical facility: [facility] in [place]  Type of Anesthesia Anticipated: Local with MAC        Subjective         HPI related to upcoming procedure:      Bilateral cataracts - R>L     Dr Rojas     [facility]     Preop Questions 9/22/2020   1. Have you ever had a heart attack or stroke? No   2. Have you ever had surgery on your heart or blood vessels, such as a stent placement, a coronary artery bypass, or surgery on an artery in your head, neck, heart, or legs? No   3. Do you have chest pain with activity? No   4. Do you have a history of  heart failure? No   5. Do you currently have a cold, bronchitis or symptoms of other infection? No   6. Do you have a cough, shortness of breath, or wheezing? No   7. Do you or anyone in your family have previous history of blood clots? No   8. Do you or does anyone in your family have a serious bleeding problem such as prolonged bleeding following surgeries or cuts? No   9. Have you ever had problems with anemia or been told to take iron pills? No   10. Have you had any abnormal blood loss such as black, tarry or bloody stools? No   11. Have you ever had a blood transfusion? No   Are you willing to have a blood transfusion if it is medically needed before, during, or after your surgery? Yes   13. Have you or any of your relatives  ever had problems with anesthesia? No   14. Do you have sleep apnea, excessive snoring or daytime drowsiness? YES - difficulty sleeping at night, naps at times   14a. Do you have a CPAP machine? No   15. Do you have any artifical heart valves or other implanted medical devices like a pacemaker, defibrillator, or continuous glucose monitor? No   16. Do you have artificial joints? No   17. Are you allergic to latex? No      RX monitoring [place] ([place]) reviewed: no concerns     HTN         BP Readings from Last 3 Encounters:   09/22/20 110/82   08/25/20 121/78   02/24/20 120/72            Creatinine   Date Value Ref Range Status   02/24/2020 0.87 0.66 - 1.25 mg/dL Final      Recent range 97/65 to 144/93 - average 128/84     Lipids             Recent Labs   Lab Test 02/24/20  0803 01/07/19  0816   06/16/15  0919 04/16/14  0907   CHOL 165 184   < > 145 147   HDL 51 51   < > 50 43   * 116*   < > 84 90   TRIG 69 86   < > 53 74   CHOLHDLRATIO  --   --   --  2.9 3.4    < > = values in this interval not displayed.      Review of Systems  CONSTITUTIONAL: NEGATIVE for fever, chills, change in weight  INTEGUMENTARY/SKIN: NEGATIVE for worrisome rashes, moles or lesions  EYES: NEGATIVE for vision changes or irritation  ENT/MOUTH: NEGATIVE for ear, mouth and throat problems  RESP: NEGATIVE for significant cough or SOB  CV: NEGATIVE for chest pain, palpitations or peripheral edema  GI: NEGATIVE for nausea, abdominal pain, heartburn, or change in bowel habits  : NEGATIVE for frequency, dysuria, or hematuria  MUSCULOSKELETAL: NEGATIVE for significant arthralgias or myalgia  NEURO: NEGATIVE for weakness, dizziness or paresthesias  ENDOCRINE: NEGATIVE for temperature intolerance, skin/hair changes  HEME: NEGATIVE for bleeding problems  PSYCHIATRIC: NEGATIVE for changes in mood or affect           Patient Active Problem List     Diagnosis Date Noted     Hypertension goal BP (blood pressure) < 140/90         Priority: High      Hyperlipidemia LDL goal <130 10/31/2010       Priority: High     Cardiac dysrhythmia         Priority: High       few pac's, few pvc's  Problem list name updated by automated process. 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  Document not previously scanned.  Validation form completed and sent with document to be scanned.  Code Status needs to be updated to reflect choices in most recent ACP document. Orders placed.  Confirmed/documented designated decision maker(s).  Added by eJssica Piña     Advance Care Planning 10/5/2015: Initial facilitation introduction:   Amadou Wade presented for ACP Facilitation session at the clinic. He was accompanied by wife. [employer] information provided and resources reviewed. He currently wishes to complete an ACP document and needs further clarification and/or consideration prior to documenting choices.  He currently has the following questions or concerns about Advance Care Planning: none.  Confirmed/documented legally designated decision maker(s). Code status needs to be updated to reflect choices in most recent ACP document. Orders placed. Follow-up meeting: not needed/applicable. Added by Jessica Piña           Patient states has Advance Directive and will bring in a copy to clinic. 3/21/2012 Michaela Rg, Medical Assistant                        Past Medical History        Past Medical History:   Diagnosis Date     Cardiac dysrhythmia, unspecified 3/07     few pac's, few pvc's, few runs SVT     Chronic rhinitis       resolved     ED (erectile dysfunction)       Hard of hearing       [place]     Hyperlipidemia LDL goal <130 2002     Hypertension goal BP  (blood pressure) < 140/90 2017     Pneumonia, organism unspecified(486)      Snoring       no sleep apnea - mouth guard     Unspecified hemorrhoids without mention of complication      internal         Past Surgical History         Past Surgical History:   Procedure Laterality Date     COLONOSCOPY   1/3/2012     incomplete - negative, ACBE incomplete - negative, CT colography negative     HC COLONOSCOPY THRU STOMA, DIAGNOSTIC        dr tejeda - normal - internal hemorrhiods - due 10 yrs     HC REMOVE TONSILS/ADENOIDS,<15 Y/O        T & A <14y.o.     SIGMOIDOSCOPY,DIAGNOSTIC        normal     STRESS ECHO ([facility])   3/07     normal few pac's, few pvc's         Current Outpatient Prescriptions          Current Outpatient Medications   Medication Sig Dispense Refill     ASPIRIN 81 MG OR TABS 1 tab po QD (Once per day)         diphenhydrAMINE (BENADRYL) 25 MG tablet Take 1-2 tablets (25-50 mg) by mouth every 6 hours as needed for itching or allergies 60 tablet 1     losartan (COZAAR) 50 MG tablet Take 0.5 tablets (25 mg) by mouth daily 1/2 tab 45 tablet 3     simvastatin (ZOCOR) 40 MG tablet Take 1 tablet (40 mg) by mouth At Bedtime 90 tablet 3            No Known Allergies      Social History            Tobacco Use     Smoking status: Never Smoker     Smokeless tobacco: Never Used   Substance Use Topics     Alcohol use: Yes       Alcohol/week: 0.0 - 0.8 standard drinks       Comment: 0-1 per week avg      Family History         Family History   Problem Relation Age of Onset     Hypertension Mother       Cancer Father           lung     Eye Disorder Father           Glaucoma     Alzheimer Disease Father       Heart Disease Father       Diabetes Father           type 2     Diabetes Brother           type 2     Coronary Artery Disease Brother       Cerebrovascular Disease Maternal Grandmother       Heart Disease Maternal Grandfather            young heartattack     Diabetes Paternal Grandfather    "    Cancer Brother           melanoma     Alzheimer Disease Brother       Prostate Cancer Brother       Cerebrovascular Disease Brother               History   Drug Use No                  Objective      /82   Pulse 77   Temp 97.8  F (36.6  C)   Ht 1.778 m (5' 10\")   Wt 75.3 kg (166 lb)   SpO2 97%   BMI 23.82 kg/m    Physical Exam    GENERAL APPEARANCE: healthy, alert and no distress     EYES: EOMI,  PERRL     HENT: ear canals and TM's normal and nose and mouth without ulcers or lesions     NECK: no adenopathy, no asymmetry, masses, or scars and thyroid normal to palpation     RESP: lungs clear to auscultation - no rales, rhonchi or wheezes     CV: regular rates and rhythm, normal S1 S2, no S3 or S4 and no murmur, click or rub     ABDOMEN:  soft, nontender, no HSM or masses and bowel sounds normal     MS: extremities normal- no gross deformities noted, no evidence of inflammation in joints, FROM in all extremities.     SKIN: no suspicious lesions or rashes     NEURO: Normal strength and tone, sensory exam grossly normal, mentation intact and speech normal     PSYCH: mentation appears normal. and affect normal/bright     LYMPHATICS: No cervical adenopathy          Recent Labs   Lab Test 02/24/20  0803 01/07/19  0816   HGB 16.1 16.8    154    140   POTASSIUM 4.9 3.9   CR 0.87 0.90         PRE-OP Diagnostics:  Labs pending at this time.  Results will be reviewed when available.  EKG: appears normal, NSR, normal axis, normal intervals, no acute ST/T changes c/w ischemia, no LVH by voltage criteria, Right Bundle Branch Block     Assessment & Plan      The proposed surgical procedure is considered LOW risk.     REVISED CARDIAC RISK INDEX  The patient has the following serious cardiovascular risks for perioperative complications:  No serious cardiac risks = 0 points     INTERPRETATION: 0 points: Class I (very low risk - 0.4% complication rate)        ICD-10-CM    1. Preoperative examination  " Z01.818    2. Age-related cataract of both eyes, unspecified age-related cataract type  H25.9    3. Hypertension goal BP (blood pressure) < 140/90  I10    4. Cardiac arrhythmia, unspecified cardiac arrhythmia type  I49.9    5. Hyperlipidemia LDL goal <130  E78.5    6. Medication monitoring encounter  Z51.81        The patient has the following additional risks and recommendations for perioperative complications:      - No identified additional risk factors other than previously addressed    Initial IOL on 10/19/2020 on right, Dr Rojas, excellent results, left scheduled for 11/9, no issues since preop, COVID 19 negative, with repeat on 11/4, no f/c/s, no uri sx, no change in health since preop, no concerns, recent BP increase, has been exercising, last /75, ok to proceed with Left IOL on 11/9//2020    D[employer] Video visit 5:07 to 5:23 pm     MEDICATION INSTRUCTIONS:  Patient is to take all scheduled medications on the day of surgery     RECOMMENDATION:    APPROVAL GIVEN to proceed with proposed procedure, without further diagnostic evaluation.     Return in about 4 months for Complete Physical, Medication Recheck Visit, Follow Up Chronic.     Signed Electronically by:              Johnathan Sampson MD, FAAFP      [facility]  [address] [place] [id number]  [email or web address]   Office: [phone]  Fax: [phone]  Pager: [phone]               Copy of this evaluation report is provided to requesting physician

## 2021-01-20 ENCOUNTER — MYC MEDICAL ADVICE (OUTPATIENT)
Dept: FAMILY MEDICINE | Facility: CLINIC | Age: 76
End: 2021-01-20

## 2021-01-20 NOTE — TELEPHONE ENCOUNTER
Last office visit: 9/22/2020   Future Office Visit:   Next 5 appointments (look out 90 days)    Mar 01, 2021  9:30 AM  PHYSICAL with Johnathan Sampson MD  [facility] ([facility] [address] [place] [id number] [phone]               Current Outpatient Medications   Medication     ASPIRIN 81 MG OR TABS     diphenhydrAMINE (BENADRYL) 25 MG tablet     losartan (COZAAR) 50 MG tablet     simvastatin (ZOCOR) 40 MG tablet     No current facility-administered medications for this visit.      My chart message sent     Awaiting reply     Annamarie Goodrich RN, BSN  [facility]

## 2021-01-25 ENCOUNTER — ALLIED HEALTH/NURSE VISIT (OUTPATIENT)
Dept: NURSING | Facility: CLINIC | Age: 76
End: 2021-01-25
Payer: COMMERCIAL

## 2021-01-25 VITALS — DIASTOLIC BLOOD PRESSURE: 68 MMHG | SYSTOLIC BLOOD PRESSURE: 128 MMHG

## 2021-01-25 DIAGNOSIS — I10 HYPERTENSION GOAL BP (BLOOD PRESSURE) < 140/90: Primary | ICD-10-CM

## 2021-01-25 NOTE — PROGRESS NOTES
Hypertension Follow-up      Do you check your blood pressure regularly outside of the clinic? Yes     Are you following a low salt diet? Yes    Are your blood pressures ever more than 140 on the top number (systolic) OR more   than 90 on the bottom number (diastolic), for example 140/90? Yes   160-100/103-74      How many servings of fruits and vegetables do you eat daily?  2-3    On average, how many sweetened beverages do you drink each day (Examples: soda, juice, sweet tea, etc.  Do NOT count diet or artificially sweetened beverages)?   0    How many days per week do you exercise enough to make your heart beat faster? 5    How many minutes a day do you exercise enough to make your heart beat faster? 30 - 60    How many days per week do you miss taking your medication? 0     Creatinine   Date Value Ref Range Status   09/22/2020 0.88 0.66 - 1.25 mg/dL Final     BP Readings from Last 3 Encounters:   11/02/20 124/75   09/22/20 110/82   08/25/20 121/78     Denies: CP, SOB, headaches, blurred vision, N/V, dizziness,  numbness or tingling.     Reviewed diet and exercise with pt     Annamarie Goodrich RN, BSN  [facility]

## 2021-03-01 ENCOUNTER — OFFICE VISIT (OUTPATIENT)
Dept: FAMILY MEDICINE | Facility: CLINIC | Age: 76
End: 2021-03-01
Payer: COMMERCIAL

## 2021-03-01 VITALS
OXYGEN SATURATION: 97 % | HEIGHT: 70 IN | WEIGHT: 167 LBS | HEART RATE: 69 BPM | BODY MASS INDEX: 23.91 KG/M2 | TEMPERATURE: 97.2 F | SYSTOLIC BLOOD PRESSURE: 132 MMHG | DIASTOLIC BLOOD PRESSURE: 78 MMHG

## 2021-03-01 DIAGNOSIS — E78.5 HYPERLIPIDEMIA LDL GOAL <130: ICD-10-CM

## 2021-03-01 DIAGNOSIS — B00.1 RECURRENT COLD SORES: ICD-10-CM

## 2021-03-01 DIAGNOSIS — Z00.00 ENCOUNTER FOR MEDICARE ANNUAL WELLNESS EXAM: ICD-10-CM

## 2021-03-01 DIAGNOSIS — Z12.11 SCREEN FOR COLON CANCER: ICD-10-CM

## 2021-03-01 DIAGNOSIS — Z00.00 ROUTINE GENERAL MEDICAL EXAMINATION AT A HEALTH CARE FACILITY: Primary | ICD-10-CM

## 2021-03-01 DIAGNOSIS — N52.9 ERECTILE DYSFUNCTION, UNSPECIFIED ERECTILE DYSFUNCTION TYPE: ICD-10-CM

## 2021-03-01 DIAGNOSIS — Z51.81 MEDICATION MONITORING ENCOUNTER: ICD-10-CM

## 2021-03-01 DIAGNOSIS — I10 HYPERTENSION GOAL BP (BLOOD PRESSURE) < 140/90: ICD-10-CM

## 2021-03-01 DIAGNOSIS — Z12.5 SCREENING FOR PROSTATE CANCER: ICD-10-CM

## 2021-03-01 DIAGNOSIS — I49.9 CARDIAC ARRHYTHMIA, UNSPECIFIED CARDIAC ARRHYTHMIA TYPE: ICD-10-CM

## 2021-03-01 LAB
ALBUMIN UR-MCNC: NEGATIVE MG/DL
APPEARANCE UR: CLEAR
BILIRUB UR QL STRIP: NEGATIVE
COLOR UR AUTO: YELLOW
ERYTHROCYTE [DISTWIDTH] IN BLOOD BY AUTOMATED COUNT: 13 % (ref 10–15)
GLUCOSE UR STRIP-MCNC: NEGATIVE MG/DL
HCT VFR BLD AUTO: 50.5 % (ref 40–53)
HGB BLD-MCNC: 16.3 G/DL (ref 13.3–17.7)
HGB UR QL STRIP: NEGATIVE
KETONES UR STRIP-MCNC: NEGATIVE MG/DL
LEUKOCYTE ESTERASE UR QL STRIP: NEGATIVE
MCH RBC QN AUTO: 31.1 PG (ref 26.5–33)
MCHC RBC AUTO-ENTMCNC: 32.3 G/DL (ref 31.5–36.5)
MCV RBC AUTO: 96 FL (ref 78–100)
NITRATE UR QL: NEGATIVE
PH UR STRIP: 6 PH (ref 5–7)
PLATELET # BLD AUTO: 160 10E9/L (ref 150–450)
RBC # BLD AUTO: 5.24 10E12/L (ref 4.4–5.9)
SOURCE: NORMAL
SP GR UR STRIP: 1.02 (ref 1–1.03)
UROBILINOGEN UR STRIP-ACNC: 1 EU/DL (ref 0.2–1)
WBC # BLD AUTO: 4.5 10E9/L (ref 4–11)

## 2021-03-01 PROCEDURE — G0103 PSA SCREENING: HCPCS | Performed by: FAMILY MEDICINE

## 2021-03-01 PROCEDURE — 36415 COLL VENOUS BLD VENIPUNCTURE: CPT | Performed by: FAMILY MEDICINE

## 2021-03-01 PROCEDURE — 80061 LIPID PANEL: CPT | Performed by: FAMILY MEDICINE

## 2021-03-01 PROCEDURE — 84443 ASSAY THYROID STIM HORMONE: CPT | Performed by: FAMILY MEDICINE

## 2021-03-01 PROCEDURE — 99397 PER PM REEVAL EST PAT 65+ YR: CPT | Performed by: FAMILY MEDICINE

## 2021-03-01 PROCEDURE — 81003 URINALYSIS AUTO W/O SCOPE: CPT | Performed by: FAMILY MEDICINE

## 2021-03-01 PROCEDURE — 82550 ASSAY OF CK (CPK): CPT | Performed by: FAMILY MEDICINE

## 2021-03-01 PROCEDURE — 85027 COMPLETE CBC AUTOMATED: CPT | Performed by: FAMILY MEDICINE

## 2021-03-01 PROCEDURE — 80053 COMPREHEN METABOLIC PANEL: CPT | Performed by: FAMILY MEDICINE

## 2021-03-01 PROCEDURE — 82043 UR ALBUMIN QUANTITATIVE: CPT | Performed by: FAMILY MEDICINE

## 2021-03-01 RX ORDER — SIMVASTATIN 40 MG
40 TABLET ORAL AT BEDTIME
Qty: 90 TABLET | Refills: 3 | Status: ON HOLD | OUTPATIENT
Start: 2021-03-01 | End: 2021-12-18

## 2021-03-01 RX ORDER — LOSARTAN POTASSIUM 50 MG/1
25 TABLET ORAL 2 TIMES DAILY
Qty: 90 TABLET | Refills: 3 | Status: SHIPPED | OUTPATIENT
Start: 2021-03-01 | End: 2021-12-23

## 2021-03-01 ASSESSMENT — ACTIVITIES OF DAILY LIVING (ADL): CURRENT_FUNCTION: NO ASSISTANCE NEEDED

## 2021-03-01 ASSESSMENT — MIFFLIN-ST. JEOR: SCORE: 1503.76

## 2021-03-01 NOTE — PROGRESS NOTES
"[facility]    Amadou Wade is a 76 year old male who presents for Preventive Visit.    Patient has been advised of split billing requirements and indicates understanding: Yes   Are you in the first 12 months of your Medicare coverage?  No    Healthy Habits:    In general, how would you rate your overall health?  Very good    Frequency of exercise:: walking 2-3 miles per day.    Do you usually eat at least 4 servings of fruit and vegetables a day, include whole grains    & fiber and avoid regularly eating high fat or \"junk\" foods?  Yes    Taking medications regularly:  Yes    Barriers to taking medications:  None    Medication side effects:  None    Ability to successfully perform activities of daily living:  No assistance needed    Home Safety:  No safety concerns identified    Hearing Impairment:  No hearing concerns    In the past 6 months, have you been bothered by leaking of urine?  No    In general, how would you rate your overall mental or emotional health?  Excellent      PHQ-2 Total Score:    Additional concerns today:  Yes (blood pressure issue)    Do you feel safe in your environment? Yes    Have you ever done Advance Care Planning? (For example, a Health Directive, POLST, or a discussion with a medical provider or your loved ones about your wishes): Yes, advance care planning is on file.       Fall risk  Fallen 2 or more times in the past year?: No  Any fall with injury in the past year?: No    Cognitive Screening   1) Repeat 3 items (Leader, Season, Table)    2) Clock draw: NORMAL  3) 3 item recall: Recalls 2 objects   Results: NORMAL clock, 1-2 items recalled: COGNITIVE IMPAIRMENT LESS LIKELY    Mini-CogTM Copyright JOSE Rucker. Licensed by the author for use in [facility]; reprinted with permission ([email or web address].[email or web address]). All rights reserved.      Do you have sleep apnea, excessive snoring or daytime drowsiness?: no    Reviewed and updated as needed this visit by " clinical staff  Tobacco  Allergies  Meds   Med Hx  Surg Hx  Fam Hx  Soc Hx        Reviewed and updated as needed this visit by Provider                Social History     Tobacco Use     Smoking status: Never Smoker     Smokeless tobacco: Never Used   Substance Use Topics     Alcohol use: Yes     Alcohol/week: 0.0 - 0.8 standard drinks     Comment: 0-1 per week avg     If you drink alcohol do you typically have >3 drinks per day or >7 drinks per week? No    Alcohol Use 3/1/2021   Prescreen: >3 drinks/day or >7 drinks/week? No     Current providers sharing in care for this patient include:   Patient Care Team:  Johnathan Sampson MD as PCP - General  Johnathan Sampson MD as Assigned PCP  Cordell Anderson MD as Assigned Surgical Provider    The following health maintenance items are reviewed in Epic and correct as of today:  Health Maintenance   Topic Date Due     FALL RISK ASSESSMENT  02/24/2021     PSA  05/11/2021     COLORECTAL CANCER SCREENING  01/03/2022     MEDICARE ANNUAL WELLNESS VISIT  03/01/2022     BMP  03/01/2022     LIPID  03/01/2022     MICROALBUMIN  03/01/2022     ANNUAL REVIEW OF HM ORDERS  03/01/2022     ADVANCE CARE PLANNING  03/01/2026     DTAP/TDAP/TD IMMUNIZATION (4 - Td) 01/07/2029     HEPATITIS C SCREENING  Completed     PHQ-2  Completed     INFLUENZA VACCINE  Completed     Pneumococcal Vaccine: 65+ Years  Completed     ZOSTER IMMUNIZATION  Completed     AORTIC ANEURYSM SCREENING (SYSTEM ASSIGNED)  Completed     Pneumococcal Vaccine: Pediatrics (0 to 5 Years) and At-Risk Patients (6 to 64 Years)  Aged Out     IPV IMMUNIZATION  Aged Out     MENINGITIS IMMUNIZATION  Aged Out     HEPATITIS B IMMUNIZATION  Aged Out     Htn - average at home 140/90 - on losartan 25 mg daily - [facility] - [facility]    BP Readings from Last 3 Encounters:   03/01/21 132/78   01/25/21 128/68   11/02/20 124/75     Creatinine   Date Value Ref Range Status   09/22/2020 0.88 0.66 - 1.25 mg/dL Final     GFR Estimate    Date Value Ref Range Status   09/22/2020 85 >60 mL/min/[1.73_m2] Final     Comment:     Non  GFR Calc  Starting 12/18/2018, serum creatinine based estimated GFR (eGFR) will be   calculated using the Chronic Kidney Disease Epidemiology Collaboration   (CKD-EPI) equation.       Lipids    Recent Labs   Lab Test 02/24/20  0803 01/07/19  0816 06/16/15  0919 06/16/15  0919 04/16/14  0907   CHOL 165 184   < > 145 147   HDL 51 51   < > 50 43   * 116*   < > 84 90   TRIG 69 86   < > 53 74   CHOLHDLRATIO  --   --   --  2.9 3.4    < > = values in this interval not displayed.     PAC's, PVC's - no issues    Snoring - uses mouth guard prn    Hx of ED    Erratic BM's - constipation/diarrhea/gas    Health Maintenance     Colonoscopy:  Due 1/2022   FIT:  given              PSA:  pending   DEXA:  NA    Health Maintenance Due   Topic Date Due     FALL RISK ASSESSMENT  02/24/2021       Current Problem List    Patient Active Problem List   Diagnosis     Cardiac dysrhythmia     Hyperlipidemia LDL goal <130     Advanced directives, counseling/discussion     ED (erectile dysfunction)     Hypertension goal BP (blood pressure) < 140/90     Recurrent cold sores       Past Medical History    Past Medical History:   Diagnosis Date     Cardiac dysrhythmia, unspecified 3/07    few pac's, few pvc's, few runs SVT     Chronic rhinitis     resolved     ED (erectile dysfunction)      [facility] of hearing     [facility]     Hyperlipidemia LDL goal <130 2002     Hypertension goal BP (blood pressure) < 140/90 12/2017     Pneumonia, organism unspecified(486) 1993     Snoring     no sleep apnea - mouth guard     Unspecified hemorrhoids without mention of complication 4/06    internal       Past Surgical History    Past Surgical History:   Procedure Laterality Date     COLONOSCOPY  01/03/2012    incomplete - negative, ACBE incomplete - negative, CT colography negative     HC COLONOSCOPY THRU STOMA, DIAGNOSTIC  05/2006    dr tejeda - normal -  internal hemorrhiods - due 10 yrs     HC REMOVE TONSILS/ADENOIDS,<11 Y/O  1953    T & A <14y.o.     SIGMOIDOSCOPY,DIAGNOSTIC  1997    normal     STRESS ECHO (METRO)  03/2007    normal few pac's, few pvc's     SURGICAL HISTORY OF -  Bilateral     fall, 2020 - bilateral IOL       Current Medications    Current Outpatient Medications   Medication Sig Dispense Refill     ASPIRIN 81 MG OR TABS 1 tab po QD (Once per day)       diphenhydrAMINE (BENADRYL) 25 MG tablet Take 1-2 tablets (25-50 mg) by mouth every 6 hours as needed for itching or allergies 60 tablet 1     losartan (COZAAR) 50 MG tablet Take 0.5 tablets (25 mg) by mouth 2 times daily 1/2 tab 90 tablet 3     simvastatin (ZOCOR) 40 MG tablet Take 1 tablet (40 mg) by mouth At Bedtime 90 tablet 3       Allergies    No Known Allergies    Immunizations    Immunization History   Administered Date(s) Administered     Flu, Unspecified 10/09/2019     Influenza (IIV3) PF 11/30/2004, 10/20/2011, 10/30/2012, 10/15/2015     Influenza Vaccine IM > 6 months Valent IIV4 10/15/2017, 11/01/2018     Influenza, Quad, High Dose, Pf, 65yr + 09/22/2020     MMR 10/15/2004     Pneumo Conj 13-V (2010&after) 06/16/2015     Pneumococcal 23 valent 04/06/2006, 12/20/2012     Poliovirus, inactivated (IPV) 10/15/2004     TD (ADULT, 7+) 07/30/2001     TDAP Vaccine (Adacel) 01/07/2019     TDAP Vaccine (Boostrix) 10/29/2009     Twinrix A/B 10/15/2004, 11/30/2004, 04/06/2006     Typhoid IM 10/15/2004, 10/14/2016     Typhoid Oral 10/29/2009     Zoster vaccine recombinant adjuvanted (SHINGRIX) 01/02/2020, 09/01/2020     Zoster vaccine, live 12/30/2011       Family History    Family History   Problem Relation Age of Onset     Hypertension Mother      Cancer Father         lung     Eye Disorder Father         Glaucoma     Alzheimer Disease Father      Heart Disease Father      Diabetes Father         type 2     Diabetes Brother         type 2     Coronary Artery Disease Brother      Cerebrovascular  Disease Maternal Grandmother      Heart Disease Maternal Grandfather          young heartattack     Diabetes Paternal Grandfather      Cancer Brother         melanoma     Alzheimer Disease Brother      Prostate Cancer Brother      Cerebrovascular Disease Brother        Social History    Social History     Socioeconomic History     Marital status:      Spouse name: Dania     Number of children: 3     Years of education: 19     Highest education level: Not on file   Occupational History     Occupation:       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   Social Needs     Financial resource strain: Not on file     Food insecurity     Worry: Not on file     Inability: Not on file     Transportation needs     Medical: Not on file     Non-medical: Not on file   Tobacco Use     Smoking status: Never Smoker     Smokeless tobacco: Never Used   Substance and Sexual Activity     Alcohol use: Yes     Alcohol/week: 0.0 - 0.8 standard drinks     Comment: 0-1 per week avg     Drug use: No     Sexual activity: Yes     Partners: Female   Lifestyle     Physical activity     Days per week: Not on file     Minutes per session: Not on file     Stress: Not on file   Relationships     Social connections     Talks on phone: Not on file     Gets together: Not on file     Attends Zoroastrianism service: Not on file     Active member of club or organization: Not on file     Attends meetings of clubs or organizations: Not on file     Relationship status: Not on file     Intimate partner violence     Fear of current or ex partner: Not on file     Emotionally abused: Not on file     Physically abused: Not on file     Forced sexual activity: Not on file   Other Topics Concern      Service Not Asked     Blood Transfusions Not Asked     Caffeine Concern Yes     Comment: occas     Occupational Exposure Not Asked     Hobby Hazards Not Asked     Sleep Concern Not Asked     Stress Concern Not Asked     Weight Concern Not Asked     Special Diet  "Not Asked     Back Care Not Asked     Exercise Yes     Comment: active     Bike Helmet Not Asked     Seat Belt Yes     Self-Exams Not Asked     Parent/sibling w/ CABG, MI or angioplasty before 65F 55M? No   Social History Narrative     Not on file       ROS    CONSTITUTIONAL: NEGATIVE for fever, chills, change in weight  INTEGUMENTARY/SKIN: NEGATIVE for worrisome rashes, moles or lesions  EYES: NEGATIVE for vision changes or irritation  ENT/MOUTH: NEGATIVE for ear, mouth and throat problems  RESP: NEGATIVE for significant cough or SOB  CV: NEGATIVE for chest pain, palpitations or peripheral edema  GI: NEGATIVE for nausea, abdominal pain, heartburn, or change in bowel habits  : NEGATIVE for frequency, dysuria, or hematuria  MUSCULOSKELETAL: NEGATIVE for significant arthralgias or myalgia  NEURO: NEGATIVE for weakness, dizziness or paresthesias  ENDOCRINE: NEGATIVE for temperature intolerance, skin/hair changes  HEME: NEGATIVE for bleeding problems  PSYCHIATRIC: NEGATIVE for changes in mood or affect    OBJECTIVE    /78   Pulse 69   Temp 97.2  F (36.2  C) (Tympanic)   Ht 1.778 m (5' 10\")   Wt 75.8 kg (167 lb)   SpO2 97%   BMI 23.96 kg/m   Estimated body mass index is 23.96 kg/m  as calculated from the following:    Height as of this encounter: 1.778 m (5' 10\").    Weight as of this encounter: 75.8 kg (167 lb).  EXAM:   GENERAL: healthy, alert and no distress  EYES: Eyes grossly normal to inspection, PERRL and conjunctivae and sclerae normal  HENT: ear canals and TM's normal, nose and mouth without ulcers or lesions  NECK: no adenopathy, no asymmetry, masses, or scars and thyroid normal to palpation  RESP: lungs clear to auscultation - no rales, rhonchi or wheezes  CV: regular rate and rhythm, normal S1 S2, no S3 or S4, no murmur, click or rub, no peripheral edema and peripheral pulses strong  ABDOMEN: soft, nontender, no hepatosplenomegaly, no masses and bowel sounds normal   (male): testicles normal " without atrophy or masses, epididymal enlargement right and no hernias  RECTAL: normal sphincter tone, no rectal masses, prostate of normal size for age, smooth, nontender without masses/nodules and prostate 1+ enlarged, nontender  MS: no gross musculoskeletal defects noted, no edema  NEURO: Normal strength and tone, mentation intact and speech normal  PSYCH: mentation appears normal, affect normal/bright  LYMPH: no cervical, supraclavicular, axillary, or inguinal adenopathy    DIAGNOSTICS/PROCEDURES    Pending    ASSESSMENT      ICD-10-CM    1. Routine general medical examination at a health care facility  Z00.00 Comprehensive metabolic panel     Lipid panel reflex to direct LDL Fasting     CK total     CBC with platelets     TSH with free T4 reflex     UA reflex to Microscopic and Culture     Albumin Random Urine Quantitative with Creat Ratio     Prostate spec antigen screen     Fecal colorectal cancer screen FIT   2. Encounter for Medicare annual wellness exam  Z00.00    3. Hypertension goal BP (blood pressure) < 140/90  I10 losartan (COZAAR) 50 MG tablet   4. Hyperlipidemia LDL goal <130  E78.5 simvastatin (ZOCOR) 40 MG tablet   5. Cardiac arrhythmia, unspecified cardiac arrhythmia type  I49.9    6. Erectile dysfunction, unspecified erectile dysfunction type  N52.9    7. Recurrent cold sores  B00.1    8. Screen for colon cancer  Z12.11 Fecal colorectal cancer screen FIT   9. Screening for prostate cancer  Z12.5 Prostate spec antigen screen   10. Medication monitoring encounter  Z51.81        PLAN    Discussed treatment/modality options, including risk and benefits, he desires:    advised alcohol consumption 1oz per day or less, advised aspirin 81 mg po daily, advised 1 multivitamin per day, advised calcium 7440-0669 mg/d and Vitamin D 800-1200 IU/d, advised dentist every 6 months, advised diet and exercise, advised opthalmologist every 1-2 years, advised self testicular exam q month, further health care  "maintenance, further lab(s), immunization(s), medication refill(s) and observation    All diagnosis above reviewed and noted above, otherwise stable.      See [facility] orders for further details.      1) med refills, with slight adjustment in losartan    2) labs pending    3) Urology follow up soon    4) COVID vaccination when able    5) Lt cheek lesion - shave - 4 mm brown macule    Return in about 53 weeks (around 3/7/2022) for Annual Wellness Visit.    Health Maintenance Due   Topic Date Due     FALL RISK ASSESSMENT  02/24/2021       End of Life Planning:  Patient currently has an advanced directive: Yes.  Practitioner is supportive of decision.    COUNSELING    Reviewed preventive health counseling, as reflected in patient instructions    Estimated body mass index is 23.96 kg/m  as calculated from the following:    Height as of this encounter: 1.778 m (5' 10\").    Weight as of this encounter: 75.8 kg (167 lb).         reports that he has never smoked. He has never used smokeless tobacco.      Appropriate preventive services were discussed with this patient, including applicable screening as appropriate for cardiovascular disease, diabetes, osteopenia/osteoporosis, and glaucoma.  As appropriate for age/gender, discussed screening for colorectal cancer, prostate cancer, breast cancer, and cervical cancer. Checklist reviewing preventive services available has been given to the patient.    Reviewed patients plan of care and provided an AVS. The Intermediate Care Plan ( asthma action plan, low back pain action plan, and migraine action plan) for Amadou meets the Care Plan requirement. This Care Plan has been established and reviewed with the Patient.         Johnathan Sampson MD, FAAFP     [facility]  [address] [place] [id number]  [email or web address]   Office: [phone]  Fax: [phone]  Pager: [phone]       "

## 2021-03-01 NOTE — PATIENT INSTRUCTIONS
Patient Education   Personalized Prevention Plan  You are due for the preventive services outlined below.  Your care team is available to assist you in scheduling these services.  If you have already completed any of these items, please share that information with your care team to update in your medical record.  Health Maintenance Due   Topic Date Due     ANNUAL REVIEW OF HM ORDERS  1945     PHQ-2  01/01/2021     Cholesterol Lab  02/24/2021     Kidney Microalbumin Urine Test  02/24/2021     FALL RISK ASSESSMENT  02/24/2021     Annual Wellness Visit  02/24/2021

## 2021-03-02 LAB
ALBUMIN SERPL-MCNC: 3.6 G/DL (ref 3.4–5)
ALP SERPL-CCNC: 73 U/L (ref 40–150)
ALT SERPL W P-5'-P-CCNC: 18 U/L (ref 0–70)
ANION GAP SERPL CALCULATED.3IONS-SCNC: 2 MMOL/L (ref 3–14)
AST SERPL W P-5'-P-CCNC: 18 U/L (ref 0–45)
BILIRUB SERPL-MCNC: 0.9 MG/DL (ref 0.2–1.3)
BUN SERPL-MCNC: 14 MG/DL (ref 7–30)
CALCIUM SERPL-MCNC: 9 MG/DL (ref 8.5–10.1)
CHLORIDE SERPL-SCNC: 108 MMOL/L (ref 94–109)
CHOLEST SERPL-MCNC: 172 MG/DL
CK SERPL-CCNC: 73 U/L (ref 30–300)
CO2 SERPL-SCNC: 30 MMOL/L (ref 20–32)
CREAT SERPL-MCNC: 0.96 MG/DL (ref 0.66–1.25)
CREAT UR-MCNC: 162 MG/DL
GFR SERPL CREATININE-BSD FRML MDRD: 78 ML/MIN/{1.73_M2}
GLUCOSE SERPL-MCNC: 72 MG/DL (ref 70–99)
HDLC SERPL-MCNC: 58 MG/DL
LDLC SERPL CALC-MCNC: 102 MG/DL
MICROALBUMIN UR-MCNC: 12 MG/L
MICROALBUMIN/CREAT UR: 7.47 MG/G CR (ref 0–17)
NONHDLC SERPL-MCNC: 114 MG/DL
POTASSIUM SERPL-SCNC: 4 MMOL/L (ref 3.4–5.3)
PROT SERPL-MCNC: 6.9 G/DL (ref 6.8–8.8)
PSA SERPL-ACNC: 4.04 UG/L (ref 0–4)
SODIUM SERPL-SCNC: 140 MMOL/L (ref 133–144)
TRIGL SERPL-MCNC: 60 MG/DL
TSH SERPL DL<=0.005 MIU/L-ACNC: 1.34 MU/L (ref 0.4–4)

## 2021-03-08 ENCOUNTER — ANCILLARY PROCEDURE (OUTPATIENT)
Dept: GENERAL RADIOLOGY | Facility: CLINIC | Age: 76
End: 2021-03-08
Attending: NURSE PRACTITIONER
Payer: COMMERCIAL

## 2021-03-08 ENCOUNTER — OFFICE VISIT (OUTPATIENT)
Dept: FAMILY MEDICINE | Facility: CLINIC | Age: 76
End: 2021-03-08
Payer: COMMERCIAL

## 2021-03-08 VITALS
WEIGHT: 170 LBS | BODY MASS INDEX: 24.34 KG/M2 | OXYGEN SATURATION: 97 % | TEMPERATURE: 97.6 F | DIASTOLIC BLOOD PRESSURE: 92 MMHG | SYSTOLIC BLOOD PRESSURE: 146 MMHG | HEIGHT: 70 IN | HEART RATE: 87 BPM

## 2021-03-08 DIAGNOSIS — S89.92XA LEG INJURY, LEFT, INITIAL ENCOUNTER: ICD-10-CM

## 2021-03-08 DIAGNOSIS — M79.662 PAIN OF LEFT LOWER LEG: Primary | ICD-10-CM

## 2021-03-08 DIAGNOSIS — M79.662 PAIN OF LEFT LOWER LEG: ICD-10-CM

## 2021-03-08 DIAGNOSIS — M79.662 PAIN OF LEFT CALF: ICD-10-CM

## 2021-03-08 DIAGNOSIS — M25.472 LEFT ANKLE SWELLING: ICD-10-CM

## 2021-03-08 DIAGNOSIS — M79.89 LEFT LEG SWELLING: ICD-10-CM

## 2021-03-08 PROCEDURE — 73610 X-RAY EXAM OF ANKLE: CPT | Mod: LT | Performed by: RADIOLOGY

## 2021-03-08 PROCEDURE — 99214 OFFICE O/P EST MOD 30 MIN: CPT | Performed by: NURSE PRACTITIONER

## 2021-03-08 PROCEDURE — 73590 X-RAY EXAM OF LOWER LEG: CPT | Mod: LT | Performed by: RADIOLOGY

## 2021-03-08 ASSESSMENT — MIFFLIN-ST. JEOR: SCORE: 1517.36

## 2021-03-08 NOTE — PATIENT INSTRUCTIONS
Patient Education     ACE Wrap  Minor muscle or joint injuries are often treated with an elastic bandage. The bandage provides support and compression to the injured area. An elastic bandage is a stretchy, rolled bandage. Elastic bandages range in width from 2 to 6 inches. They can be used for a variety of injuries. The bandages are often called ACE bandages, after the most common brand name.  If used correctly, elastic bandages help control swelling and ease pain. An elastic bandage is also a good reminder not to overuse the injured area. However, elastic bandages do not provide a lot of support and will not prevent reinjury.  Home care    To apply an elastic bandage:    Check the skin before wrapping the injury. It should be clean, dry, and free of drainage.    Start wrapping below the injury and work your way toward the body. For an ankle sprain, start wrapping around the foot and work up toward the calf. This will help control swelling.    Overlap the edges of the bandage so it stays snuggly in place.    Wrap the bandage firmly, but not too tightly. A tight bandage can increase swelling on either end of the bandage. Make sure the bandage is wrinkle free.    Leave fingers and toes exposed.    Secure ends of the bandage (even self-sticking ones) with clips or tape.    Check often to be sure there is good circulation, especially in the fingers and toes. Loosen the bandage if there is local swelling, numbness, tingling, discomfort, coldness, or discoloration (skin pale or bluish in color).    Rewrap the bandage as needed during the day. Reroll the bandage as you unwind it.  Continue using the elastic bandage until the pain and swelling are gone or as your healthcare provider advises.  If you have been told to ice the area, the ice can be secured in place with the elastic bandage. Wrap the ice pack with a thin towel to protect the skin. Don't put ice or an ice pack directly on the skin.  Ice the area for no more  than 20 minutes at a time.    Follow-up care  Follow up with your healthcare provider, or as advised.  When to seek medical advice  Call your healthcare provider for any of the following:    Pain and swelling that doesn't get better or gets worse    Trouble moving injured area    Skin discoloration, numbness, or tingling that doesn t go away after bandage is removed  Savi last reviewed this educational content on 5/1/2018 2000-2020 [employer]. All rights reserved. This information is not intended as a substitute for professional medical care. Always follow your healthcare professional's instructions.           Patient Education     Treating Ankle Sprains  Treatment will depend on how bad your sprain is. For a severe sprain, healing may take 3 months or more.  Right after your injury: Use R.I.C.E.    BIG: Rest: At first, keep weight off the ankle as much as you can. You may be given crutches to help you walk without putting weight on the ankle.    BIG: Ice: Put an ice pack on the ankle for 20 minutes. Remove the pack and wait at least 30 minutes. Repeat for up to 3 days. This helps reduce swelling.    BIG: Compression: To reduce swelling and keep the joint stable, you may need to wrap the ankle with an elastic bandage. For more severe sprains, you may need an ankle brace, a boot, or a cast.    BIG: Elevation: To reduce swelling, keep your ankle raised above your heart when you sit or lie down.  Medicine  Your healthcare provider may suggest oral nonsteroidal anti-inflammatory medicine (NSAIDs), such as ibuprofen. This relieves the pain and helps reduce swelling. Be sure to take your medicine as directed.  Exercises    After about 2 to 3 weeks, you may be given exercises to strengthen the ligaments and muscles in the ankle. Doing these exercises will help prevent another ankle sprain. Exercises may include standing on your toes and then on your heels and doing ankle curls.    Sit on the edge of a  sturdy table or lie on your back.    Pull your toes toward you. Then point them away from you. Repeat for 2 to 3 minutes.  Zhou Heiya last reviewed this educational content on 1/1/2018 2000-2020 [employer]. All rights reserved. This information is not intended as a substitute for professional medical care. Always follow your healthcare professional's instructions.           Patient Education     Hematoma  A hematoma is a collection of blood trapped outside of a blood vessel. It is what we think of as a bruise or a contusion. It is usually seen under the skin as a black and blue spot on your arm or leg, or a bump on your head after an injury. It can be almost anywhere on or in your body. It can also occur in an internal organ where it can be more serious.   A hematoma is caused by an injury with damage to small blood vessels. This causes blood to leak into the tissues. Blood forms a pocket under the skin that swells and looks like a purplish patch. Hematomas sometimes form under the skin from bleeding during childbirth and can be particularly serious. Another serious form of hematoma forms after a fall on the head, called a subdural hematoma.   Gradually the blood in the hematoma is absorbed back into the body. The swelling and pain of the hematoma will go away. This takes from 1 to 4 weeks, depending on the size of the hematoma. The skin over the hematoma may turn bluish then brown and yellow as the blood is dissolved and absorbed. Usually, this only takes a couple of weeks but can last months.   Home care    Limit motion of the joints near the hematoma. If the hematoma is large and painful, avoid sports and other vigorous physical activity until the swelling and pain goes away.    Apply an ice pack (ice cubes in a plastic bag, or a frozen bag of peas, wrapped in a thin towel) over the injured area for 20 minutes every 1 to 2 hours the first day. Continue with ice packs 3 to 4 times a day for the  next 2 days. Continue the use of ice packs for relief of pain and swelling as needed.    If you need anything for pain, you can take acetaminophen, unless you were given a different pain medicine to use. Talk with your healthcare provider before using this medicine if you have chronic liver or kidney disease. Also talk with your healthcare provider if you have had a stomach ulcer or digestive tract bleeding, or are taking blood-thinner medicines.    Follow-up care  Follow up with your healthcare provider, or as advised. If X-rays or a CT scan were done, you will be notified if there is a change in the reading, especially if it affects treatment.   When to seek medical advice  Call your healthcare provider right away if any of the following occur:    Redness around the hematoma    Increase in pain or warmth in the hematoma    Increase in size of the hematoma    Fever of 100.4 F (38 C) or higher, or as directed by your healthcare provider    If the hematoma is on the arm or leg, watch for:  ? Increased swelling or pain in the extremity  ? Numbness or tingling or blue color of the hand or foot  Savi last reviewed this educational content on 4/1/2018 2000-2020 [employer]. All rights reserved. This information is not intended as a substitute for professional medical care. Always follow your healthcare professional's instructions.
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
## 2021-03-09 ENCOUNTER — OFFICE VISIT (OUTPATIENT)
Dept: PEDIATRICS | Facility: CLINIC | Age: 76
End: 2021-03-09
Attending: NURSE PRACTITIONER
Payer: COMMERCIAL

## 2021-03-09 ENCOUNTER — TELEPHONE (OUTPATIENT)
Dept: FAMILY MEDICINE | Facility: CLINIC | Age: 76
End: 2021-03-09

## 2021-03-09 ENCOUNTER — HOSPITAL ENCOUNTER (OUTPATIENT)
Dept: ULTRASOUND IMAGING | Facility: CLINIC | Age: 76
Discharge: HOME | End: 2021-03-09
Attending: PHYSICIAN ASSISTANT | Admitting: PHYSICIAN ASSISTANT
Payer: COMMERCIAL

## 2021-03-09 VITALS
RESPIRATION RATE: 18 BRPM | WEIGHT: 170 LBS | SYSTOLIC BLOOD PRESSURE: 147 MMHG | DIASTOLIC BLOOD PRESSURE: 87 MMHG | BODY MASS INDEX: 24.39 KG/M2 | TEMPERATURE: 97.8 F | HEART RATE: 73 BPM | OXYGEN SATURATION: 97 %

## 2021-03-09 DIAGNOSIS — S89.92XA LEG INJURY, LEFT, INITIAL ENCOUNTER: ICD-10-CM

## 2021-03-09 DIAGNOSIS — M79.89 LEFT LEG SWELLING: ICD-10-CM

## 2021-03-09 DIAGNOSIS — M79.662 PAIN OF LEFT CALF: ICD-10-CM

## 2021-03-09 PROCEDURE — 99215 OFFICE O/P EST HI 40 MIN: CPT | Performed by: PHYSICIAN ASSISTANT

## 2021-03-09 PROCEDURE — 93971 EXTREMITY STUDY: CPT | Mod: LT

## 2021-03-09 NOTE — TELEPHONE ENCOUNTER
Losartan at 50 mg twice daily is max dose (100 mg daily), last notes in Epic state 1/2 tab, please confirm    BP Readings from Last 3 Encounters:   03/08/21 (!) 146/92   03/01/21 132/78   01/25/21 128/68     Creatinine   Date Value Ref Range Status   03/01/2021 0.96 0.66 - 1.25 mg/dL Final     GFR Estimate   Date Value Ref Range Status   03/01/2021 78 >60 mL/min/[1.73_m2] Final     Comment:     Non  GFR Calc  Starting 12/18/2018, serum creatinine based estimated GFR (eGFR) will be   calculated using the Chronic Kidney Disease Epidemiology Collaboration   (CKD-EPI) equation.       Potassium   Date Value Ref Range Status   03/01/2021 4.0 3.4 - 5.3 mmol/L Final
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
## 2021-03-09 NOTE — TELEPHONE ENCOUNTER
LOV: 3/8/2021     Called patient regarding recent lab results.     Patient asks about his blood pressure readings. He increased losartan 50 mg tab twice daily after visit on 3/8.  He is checking his BP at home. Last three readings:     3/9: 143/81  3/8: 146/93  3/7: 131/72    He notes he has not been able to exercise (walk 2-3 miles) like he normally does after hurting his leg on 3/8 after appointment.     Patient wonders if he needs to adjust medication.    Routing to provider to review and advise.     HAKEEM AbelN, RN  [facility]

## 2021-03-09 NOTE — PATIENT INSTRUCTIONS
Patient Education     Hematoma  A hematoma is a collection of blood trapped outside of a blood vessel. It is what we think of as a bruise or a contusion. It is usually seen under the skin as a black and blue spot on your arm or leg, or a bump on your head after an injury. It can be almost anywhere on or in your body. It can also occur in an internal organ where it can be more serious.   A hematoma is caused by an injury with damage to small blood vessels. This causes blood to leak into the tissues. Blood forms a pocket under the skin that swells and looks like a purplish patch. Hematomas sometimes form under the skin from bleeding during childbirth and can be particularly serious. Another serious form of hematoma forms after a fall on the head, called a subdural hematoma.   Gradually the blood in the hematoma is absorbed back into the body. The swelling and pain of the hematoma will go away. This takes from 1 to 4 weeks, depending on the size of the hematoma. The skin over the hematoma may turn bluish then brown and yellow as the blood is dissolved and absorbed. Usually, this only takes a couple of weeks but can last months.   Home care    Limit motion of the joints near the hematoma. If the hematoma is large and painful, avoid sports and other vigorous physical activity until the swelling and pain goes away.    Apply an ice pack (ice cubes in a plastic bag, or a frozen bag of peas, wrapped in a thin towel) over the injured area for 20 minutes every 1 to 2 hours the first day. Continue with ice packs 3 to 4 times a day for the next 2 days. Continue the use of ice packs for relief of pain and swelling as needed.    If you need anything for pain, you can take acetaminophen, unless you were given a different pain medicine to use. Talk with your healthcare provider before using this medicine if you have chronic liver or kidney disease. Also talk with your healthcare provider if you have had a stomach ulcer  or digestive tract bleeding, or are taking blood-thinner medicines.    Follow-up care  Follow up with your healthcare provider, or as advised. If X-rays or a CT scan were done, you will be notified if there is a change in the reading, especially if it affects treatment.   When to seek medical advice  Call your healthcare provider right away if any of the following occur:    Redness around the hematoma    Increase in pain or warmth in the hematoma    Increase in size of the hematoma    Fever of 100.4 F (38 C) or higher, or as directed by your healthcare provider    If the hematoma is on the arm or leg, watch for:  ? Increased swelling or pain in the extremity  ? Numbness or tingling or blue color of the hand or foot  [employer] last reviewed this educational content on 4/1/2018 2000-2020 [employer]. All rights reserved. This information is not intended as a substitute for professional medical care. Always follow your healthcare professional's instructions.

## 2021-03-09 NOTE — RESULT ENCOUNTER NOTE
Dear Amadou,    Here is a summary of your recent test results:    Your x-rays are negative for fracture proceed with plan of care to be seen today for ultrasound of your leg.    [address] [facility] [address]  [place], [place]  [phone]    For additional lab test information, [email or web address] is an excellent reference.    In addition, here is a list of due or overdue Health Maintenance reminders:    There are no preventive care reminders to display for this patient.    Please call us at [phone] (or use [employer]) to address the above recommendations if needed.    Thank you for choosing [facility].  It was an honor and a privilege to participate in your care.       Healthy regards,    Heidi Uribe, CORETTA  [facility]

## 2021-03-10 DIAGNOSIS — Z12.11 SCREEN FOR COLON CANCER: ICD-10-CM

## 2021-03-10 DIAGNOSIS — Z00.00 ROUTINE GENERAL MEDICAL EXAMINATION AT A HEALTH CARE FACILITY: ICD-10-CM

## 2021-03-10 PROCEDURE — 82274 ASSAY TEST FOR BLOOD FECAL: CPT | Performed by: FAMILY MEDICINE

## 2021-03-10 NOTE — TELEPHONE ENCOUNTER
Pt noted he is taking Losartan 25 mg BID. Pt noted he has been on the combo medication with hydrochlorothiazide in past.   Writer advised with recent med change to have a BP recheck in clinic.    Patient stated an understanding and agreed with plan.    Next 5 appointments (look out 90 days)    Mar 12, 2021 11:00 AM  Nurse Only with RV RN VISITS  [facility] ([facility] [place] ) [address] [place] [id number]  [phone]     Marc PARIS RN   [facility] [place] Triage

## 2021-03-10 NOTE — TELEPHONE ENCOUNTER
Attempt # 1  Called # [phone]   losartan (COZAAR) 50 MG tablet 90 tablet 3 3/1/2021  No   Sig - Route: Take 0.5 tablets (25 mg) by mouth 2 times daily 1/2 tab - Oral       Left a non detailed VM to call back at [phone] and ask for any available Triage Nurse.    Marc Castillo RN   [facility] Triage

## 2021-03-12 ENCOUNTER — ALLIED HEALTH/NURSE VISIT (OUTPATIENT)
Dept: NURSING | Facility: CLINIC | Age: 76
End: 2021-03-12
Payer: COMMERCIAL

## 2021-03-12 VITALS
SYSTOLIC BLOOD PRESSURE: 124 MMHG | WEIGHT: 168.4 LBS | DIASTOLIC BLOOD PRESSURE: 76 MMHG | BODY MASS INDEX: 24.16 KG/M2 | OXYGEN SATURATION: 97 % | HEART RATE: 73 BPM

## 2021-03-12 DIAGNOSIS — Z01.30 BP CHECK: ICD-10-CM

## 2021-03-12 DIAGNOSIS — I10 HYPERTENSION GOAL BP (BLOOD PRESSURE) < 140/90: Primary | ICD-10-CM

## 2021-03-12 NOTE — PROGRESS NOTES
Amadou Wade is being followed for Blood Pressure management.    BP Readings from Last 3 Encounters:   03/12/21 124/76   03/09/21 (!) 147/87   03/08/21 (!) 146/92     Wt Readings from Last 2 Encounters:   03/12/21 76.4 kg (168 lb 6.4 oz)   03/09/21 77.1 kg (170 lb)       Is pulse 55 or greater? - Yes    Pulse Readings from Last 1 Encounters:   03/09/21 73       Current blood pressure medication(s):  Current Outpatient Medications   Medication Sig Dispense Refill     ASPIRIN 81 MG OR TABS 1 tab po QD (Once per day)       diphenhydrAMINE (BENADRYL) 25 MG tablet Take 1-2 tablets (25-50 mg) by mouth every 6 hours as needed for itching or allergies 60 tablet 1     losartan (COZAAR) 50 MG tablet Take 0.5 tablets (25 mg) by mouth 2 times daily 1/2 tab 90 tablet 3     simvastatin (ZOCOR) 40 MG tablet Take 1 tablet (40 mg) by mouth At Bedtime 90 tablet 3          1. Follow up instructions include:     Per PCP.      SUBJECTIVE:                                                    The patient is taking medication as prescribed and is tolerating well.   Patient is monitoring Blood Pressure at home.   Last 3 home readings 131/77    Out of the following complicating factors: Cough, Headache, Lightheadedness, Shortness of breath, Fatigue, Nausea, Sexual Dysfunction, New onset of swelling or edema, Weakness and New onset of Chest Pain, the patient reports:  None    OBJECTIVE:                                                      Today's BP completed using cuff size: regular on right side arm.      Potassium   Date Value Ref Range Status   03/01/2021 4.0 3.4 - 5.3 mmol/L Final     Creatinine   Date Value Ref Range Status   03/01/2021 0.96 0.66 - 1.25 mg/dL Final     Urea Nitrogen   Date Value Ref Range Status   03/01/2021 14 7 - 30 mg/dL Final     GFR Estimate   Date Value Ref Range Status   03/01/2021 78 >60 mL/min/[1.73_m2] Final     Comment:     Non  GFR Calc  Starting 12/18/2018, serum creatinine based estimated  GFR (eGFR) will be   calculated using the Chronic Kidney Disease Epidemiology Collaboration   (CKD-EPI) equation.           Education:  general discussion/verbal explanation  Ways to help improve BP/HTN:   Medications  Lose weight  Diet low in fat and rich in fruits, vegetables and low fat dairy products  Reduce salt in diet  Do something active for at least 30 minutes a day on most days of the week  Cut down on alcohol (if you drink more than 2 drinks per day)  Decrease stress (exercise, read, yoga, meditation, time for self, etc.)   Patient was given an opportunity to ask questions.    Patient verbalized understanding of this plan and is agreeable.    Marc Castillo RN

## 2021-03-16 LAB — HEMOCCULT STL QL IA: NEGATIVE

## 2021-03-24 ENCOUNTER — TELEPHONE (OUTPATIENT)
Dept: UROLOGY | Facility: CLINIC | Age: 76
End: 2021-03-24

## 2021-03-24 NOTE — TELEPHONE ENCOUNTER
----- Message from Cordell Anderson MD sent at 3/24/2021  8:18 AM CDT -----  Oh yes, either are fine  ----- Message -----  From: Katt Hahn  Sent: 3/24/2021   8:15 AM CDT  To: Cordell Anderson MD    Would it be ok for these patients you're sending us to see either Leidy or Megan? Or is only Leidy doing these PSA follow ups?  ----- Message -----  From: Cordell Anderson MD  Sent: 3/24/2021   7:43 AM CDT  To: [facility] - Scheduling Pool    Change his appt to being with Leidy

## 2021-04-12 ENCOUNTER — OFFICE VISIT (OUTPATIENT)
Dept: FAMILY MEDICINE | Facility: CLINIC | Age: 76
End: 2021-04-12
Payer: COMMERCIAL

## 2021-04-12 VITALS
SYSTOLIC BLOOD PRESSURE: 134 MMHG | HEIGHT: 70 IN | OXYGEN SATURATION: 96 % | WEIGHT: 168 LBS | TEMPERATURE: 97.4 F | DIASTOLIC BLOOD PRESSURE: 76 MMHG | BODY MASS INDEX: 24.05 KG/M2 | HEART RATE: 72 BPM | RESPIRATION RATE: 20 BRPM

## 2021-04-12 DIAGNOSIS — I10 HYPERTENSION GOAL BP (BLOOD PRESSURE) < 140/90: ICD-10-CM

## 2021-04-12 DIAGNOSIS — C44.320 SCC (SQUAMOUS CELL CARCINOMA), FACE: ICD-10-CM

## 2021-04-12 DIAGNOSIS — D48.5 NEOPLASM OF UNCERTAIN BEHAVIOR OF SKIN: Primary | ICD-10-CM

## 2021-04-12 PROCEDURE — 11312 SHAVE SKIN LESION 1.1-2.0 CM: CPT | Performed by: FAMILY MEDICINE

## 2021-04-12 PROCEDURE — 88305 TISSUE EXAM BY PATHOLOGIST: CPT | Performed by: FAMILY MEDICINE

## 2021-04-12 PROCEDURE — 88305 TISSUE EXAM BY PATHOLOGIST: CPT | Performed by: PATHOLOGY

## 2021-04-12 ASSESSMENT — MIFFLIN-ST. JEOR: SCORE: 1508.29

## 2021-04-12 ASSESSMENT — PAIN SCALES - GENERAL: PAINLEVEL: NO PAIN (0)

## 2021-04-12 NOTE — PROGRESS NOTES
Assessment & Plan     ICD-10-CM    1. Neoplasm of uncertain behavior of skin  D48.5 Surgical pathology exam     SHAV SKIN LESION FACE/EARS 1.1-2.0 CM   2. Hypertension goal BP (blood pressure) < 140/90  I10        Discussed treatment/modality options, including risk and benefits, he desires:    1) wound cares discussed    2) await pathology    All diagnosis above reviewed and noted above, otherwise stable.      See [facility] orders for further details.           No follow-ups on file.    No LOS data to display  -Doing chart review, history and exam, documentation and further activities as noted.           Johnathan Sampson MD, FAAFP     [facility]  [address] [place] [id number]  [email or web address]   Office: [phone]  Fax: [phone]  Pager: [phone]       Henry Tobar is a 76 year old who presents for the following health issues    Remove spot on left cheek    Left mid lateral cheek, 10 x 14 mm scaley red non healing lesion    Htn    BP Readings from Last 3 Encounters:   04/12/21 134/76   03/12/21 124/76   03/09/21 (!) 147/87     Creatinine   Date Value Ref Range Status   03/01/2021 0.96 0.66 - 1.25 mg/dL Final     GFR Estimate   Date Value Ref Range Status   03/01/2021 78 >60 mL/min/[1.73_m2] Final     Comment:     Non  GFR Calc  Starting 12/18/2018, serum creatinine based estimated GFR (eGFR) will be   calculated using the Chronic Kidney Disease Epidemiology Collaboration   (CKD-EPI) equation.       Review of Systems   CONSTITUTIONAL: NEGATIVE for fever, chills, change in weight  INTEGUMENTARY/SKIN: NEGATIVE for worrisome rashes, moles or lesions  EYES: NEGATIVE for vision changes or irritation  ENT/MOUTH: NEGATIVE for ear, mouth and throat problems  RESP: NEGATIVE for significant cough or SOB  CV: NEGATIVE for chest pain, palpitations or peripheral edema  GI: NEGATIVE for nausea, abdominal  "pain, heartburn, or change in bowel habits  : NEGATIVE for frequency, dysuria, or hematuria  MUSCULOSKELETAL: NEGATIVE for significant arthralgias or myalgia  NEURO: NEGATIVE for weakness, dizziness or paresthesias  ENDOCRINE: NEGATIVE for temperature intolerance, skin/hair changes  HEME: NEGATIVE for bleeding problems  PSYCHIATRIC: NEGATIVE for changes in mood or affect      Objective    /76   Pulse 72   Temp 97.4  F (36.3  C) (Tympanic)   Resp 20   Ht 1.778 m (5' 10\")   Wt 76.2 kg (168 lb)   SpO2 96%   BMI 24.11 kg/m    Body mass index is 24.11 kg/m .  Physical Exam   GENERAL: healthy, alert and no distress  EYES: Eyes grossly normal to inspection, PERRL and conjunctivae and sclerae normal  RESP: lungs clear to auscultation - no rales, rhonchi or wheezes  CV: regular rate and rhythm, normal S1 S2, no S3 or S4, no murmur, click or rub, no peripheral edema and peripheral pulses strong  ABDOMEN: soft, nontender, no hepatosplenomegaly, no masses and bowel sounds normal  MS: no gross musculoskeletal defects noted, no edema  SKIN:  See above  NEURO: Normal strength and tone, mentation intact and speech normal  PSYCH: mentation appears normal, affect normal/bright    Procedure note    Risks and benefits reviewed including scarring and difficulty healing.    Area cleansed with Hibiclens, 1% lidocaine with epinephrine, using a derma blade a shave of the concerning lesion was completed, attempted to control bleeding with aluminum chloride failed, hyfrecation of the area was completed, bacitracin applied, Band-Aid applied, wound cares reviewed.          "

## 2021-04-14 LAB — COPATH REPORT: NORMAL

## 2021-04-16 NOTE — RESULT ENCOUNTER NOTE
Triage: please advise of results/recommendations:     Amadou  Here are your recent results.  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  I would like have you follow up with dermatology for further evaluation/treatment to ensure full excision.  I will place the referral and you can call directly to schedule.   If you have any questions please do not hesitate to contact our office via phone ([phone]) or MyChart.    Maricruz Dunne, MS, PA-C  [facility] [place]

## 2021-04-20 ENCOUNTER — OFFICE VISIT (OUTPATIENT)
Dept: UROLOGY | Facility: CLINIC | Age: 76
End: 2021-04-20
Payer: COMMERCIAL

## 2021-04-20 VITALS
BODY MASS INDEX: 23.19 KG/M2 | SYSTOLIC BLOOD PRESSURE: 138 MMHG | DIASTOLIC BLOOD PRESSURE: 78 MMHG | HEART RATE: 70 BPM | OXYGEN SATURATION: 97 % | WEIGHT: 162 LBS | HEIGHT: 70 IN

## 2021-04-20 DIAGNOSIS — R97.20 ELEVATED PROSTATE SPECIFIC ANTIGEN (PSA): Primary | ICD-10-CM

## 2021-04-20 LAB — RESIDUAL VOLUME (RV) (EXTERNAL): 46

## 2021-04-20 PROCEDURE — 99213 OFFICE O/P EST LOW 20 MIN: CPT | Performed by: PHYSICIAN ASSISTANT

## 2021-04-20 PROCEDURE — 51798 US URINE CAPACITY MEASURE: CPT | Performed by: PHYSICIAN ASSISTANT

## 2021-04-20 ASSESSMENT — MIFFLIN-ST. JEOR: SCORE: 1481.08

## 2021-04-20 NOTE — PROGRESS NOTES
Office Visit Note  [facility]  [phone]    UROLOGIC DIAGNOSES:   Elevated PSA    CURRENT INTERVENTIONS:   monitoring    HISTORY:   Pleasant 77 y/o. Here for PSA follow up. PSA was rechecked and is down slightly at 4.04. He continues to have no urinary symptoms or complaints. Mild post void dribbling. He was initially referred for elevated PSA.       PAST MEDICAL HISTORY:   Past Medical History:   Diagnosis Date     Cardiac dysrhythmia, unspecified 3/07    few pac's, few pvc's, few runs SVT     Chronic rhinitis     resolved     ED (erectile dysfunction)      Hard of hearing     [place]     Hyperlipidemia LDL goal <130      Hypertension goal BP (blood pressure) < 140/90 2017     Pneumonia, organism unspecified(486)      Snoring     no sleep apnea - mouth guard     Unspecified hemorrhoids without mention of complication     internal       PAST SURGICAL HISTORY:   Past Surgical History:   Procedure Laterality Date     COLONOSCOPY  2012    incomplete - negative, ACBE incomplete - negative, CT colography negative     HC COLONOSCOPY THRU STOMA, DIAGNOSTIC  2006    dr tejeda - normal - internal hemorrhiods - due 10 yrs     HC REMOVE TONSILS/ADENOIDS,<13 Y/O      T & A <14y.o.     SIGMOIDOSCOPY,DIAGNOSTIC      normal     STRESS ECHO (METRO)  2007    normal few pac's, few pvc's     SURGICAL HISTORY OF -  Bilateral     fall, 2020 - bilateral IOL       FAMILY HISTORY:   Family History   Problem Relation Age of Onset     Hypertension Mother      Cancer Father         lung     Eye Disorder Father         Glaucoma     Alzheimer Disease Father      Heart Disease Father      Diabetes Father         type 2     Diabetes Brother         type 2     Coronary Artery Disease Brother      Cerebrovascular Disease Maternal Grandmother      Heart Disease Maternal Grandfather          young heartattack     Diabetes Paternal Grandfather      Cancer Brother         melanoma     Alzheimer  Disease Brother      Prostate Cancer Brother      Cerebrovascular Disease Brother        SOCIAL HISTORY:   Social History     Tobacco Use     Smoking status: Never Smoker     Smokeless tobacco: Never Used   Substance Use Topics     Alcohol use: Yes     Alcohol/week: 0.0 - 0.8 standard drinks     Comment: 0-1 per week avg       Current Outpatient Medications   Medication     ASPIRIN 81 MG OR TABS     diphenhydrAMINE (BENADRYL) 25 MG tablet     losartan (COZAAR) 50 MG tablet     simvastatin (ZOCOR) 40 MG tablet     No current facility-administered medications for this visit.          PHYSICAL EXAM:    PSYCH: NAD  EYES: EOMI  MOUTH: MMM  NECK: Supple, no notable adenopathy  RESP: Unlabored breathing  CARDIAC: No LE edema  SKIN: Warm, no rashes  ABD: soft, Nontender  NEURO: AAO x3    Imaging: None    PSA: 4.04    Post Void Residual: normal        IMPRESSION:  Elevated PSA    PLAN:  His PSA has improved from previous levels.  It is now actually in the high normal range for his age group.  Would evaluate this further with an MRI or prostate biopsy if elevates, but for now we simply continue to follow the PSA closely.  I recommended that he have another PSA checked in 12 months.  If the PSA does rise more in the future he will need to undergo further evaluation.

## 2021-04-20 NOTE — NURSING NOTE
"Chief Complaint   Patient presents with     Elevated PSA     Patient here today to see Leidy about hisa PSA result       Blood pressure 138/78, pulse 70, height 1.778 m (5' 10\"), weight 73.5 kg (162 lb), SpO2 97 %. Body mass index is 23.24 kg/m .    Patient Active Problem List   Diagnosis     Cardiac dysrhythmia     Hyperlipidemia LDL goal <130     Advanced directives, counseling/discussion     ED (erectile dysfunction)     Hypertension goal BP (blood pressure) < 140/90     Recurrent cold sores       No Known Allergies    Current Outpatient Medications   Medication Sig Dispense Refill     ASPIRIN 81 MG OR TABS 1 tab po QD (Once per day)       losartan (COZAAR) 50 MG tablet Take 0.5 tablets (25 mg) by mouth 2 times daily 1/2 tab 90 tablet 3     simvastatin (ZOCOR) 40 MG tablet Take 1 tablet (40 mg) by mouth At Bedtime 90 tablet 3     diphenhydrAMINE (BENADRYL) 25 MG tablet Take 1-2 tablets (25-50 mg) by mouth every 6 hours as needed for itching or allergies 60 tablet 1       Social History     Tobacco Use     Smoking status: Never Smoker     Smokeless tobacco: Never Used   Substance Use Topics     Alcohol use: Yes     Alcohol/week: 0.0 - 0.8 standard drinks     Comment: 0-1 per week avg     Drug use: No       PVR 46ML    Katt Ruiz, [facility]  4/20/2021         "

## 2021-04-20 NOTE — LETTER
4/20/2021       RE: Amadou Wade  [address] [address] [address]     Dear Colleague,    Thank you for referring your patient, Amadou Wade, to the [facility] at [facility]. Please see a copy of my visit note below.      Office Visit Note  [facility]  [phone]    UROLOGIC DIAGNOSES:   Elevated PSA    CURRENT INTERVENTIONS:   monitoring    HISTORY:   Pleasant 77 y/o. Here for PSA follow up. PSA was rechecked and is down slightly at 4.04. He continues to have no urinary symptoms or complaints. Mild post void dribbling. He was initially referred for elevated PSA.       PAST MEDICAL HISTORY:   Past Medical History:   Diagnosis Date     Cardiac dysrhythmia, unspecified 3/07    few pac's, few pvc's, few runs SVT     Chronic rhinitis     resolved     ED (erectile dysfunction)      Hard of hearing     [place]     Hyperlipidemia LDL goal <130 2002     Hypertension goal BP (blood pressure) < 140/90 12/2017     Pneumonia, organism unspecified(486) 1993     Snoring     no sleep apnea - mouth guard     Unspecified hemorrhoids without mention of complication 4/06    internal       PAST SURGICAL HISTORY:   Past Surgical History:   Procedure Laterality Date     COLONOSCOPY  01/03/2012    incomplete - negative, ACBE incomplete - negative, CT colography negative     HC COLONOSCOPY THRU STOMA, DIAGNOSTIC  05/2006    dr tejeda - normal - internal hemorrhiods - due 10 yrs     HC REMOVE TONSILS/ADENOIDS,<13 Y/O  1953    T & A <14y.o.     SIGMOIDOSCOPY,DIAGNOSTIC  1997    normal     STRESS ECHO ([facility]RO)  03/2007    normal few pac's, few pvc's     SURGICAL HISTORY OF -  Bilateral     fall, 2020 - bilateral IOL       FAMILY HISTORY:   Family History   Problem Relation Age of Onset     Hypertension Mother      Cancer Father         lung     Eye Disorder Father         Glaucoma     Alzheimer Disease Father      Heart Disease Father      Diabetes  Father         type 2     Diabetes Brother         type 2     Coronary Artery Disease Brother      Cerebrovascular Disease Maternal Grandmother      Heart Disease Maternal Grandfather          young heartattack     Diabetes Paternal Grandfather      Cancer Brother         melanoma     Alzheimer Disease Brother      Prostate Cancer Brother      Cerebrovascular Disease Brother        SOCIAL HISTORY:   Social History     Tobacco Use     Smoking status: Never Smoker     Smokeless tobacco: Never Used   Substance Use Topics     Alcohol use: Yes     Alcohol/week: 0.0 - 0.8 standard drinks     Comment: 0-1 per week avg       Current Outpatient Medications   Medication     ASPIRIN 81 MG OR TABS     diphenhydrAMINE (BENADRYL) 25 MG tablet     losartan (COZAAR) 50 MG tablet     simvastatin (ZOCOR) 40 MG tablet     No current facility-administered medications for this visit.          PHYSICAL EXAM:    PSYCH: NAD  EYES: EOMI  MOUTH: MMM  NECK: Supple, no notable adenopathy  RESP: Unlabored breathing  CARDIAC: No LE edema  SKIN: Warm, no rashes  ABD: soft, Nontender  NEURO: AAO x3    Imaging: None    PSA: 4.04    Post Void Residual: normal        IMPRESSION:  Elevated PSA    PLAN:  His PSA has improved from previous levels.  It is now actually in the high normal range for his age group.  Would evaluate this further with an MRI or prostate biopsy if elevates, but for now we simply continue to follow the PSA closely.  I recommended that he have another PSA checked in 12 months.  If the PSA does rise more in the future he will need to undergo further evaluation.

## 2021-05-10 NOTE — PROGRESS NOTES
[facility] - PRIMARY CARE SKIN    CC: skin cancer screening (full-body)  SUBJECTIVE:   Amadou Wade is a(n) 76 year old male who presents to clinic today for a full-body skin exam.      Issue One: Recent shave excision of lesion was read as actinic keratosis but possible squamous cell carcinoma in situ; s/p mohs surgery        Skin, left cheek     FINAL DIAGNOSIS:  04/12/2021  Skin of left cheek, lesion, excision-   - Hypertrophic actinic keratosis(Please see comment)     COMMENT:   The specimen shows an inflamed hypertrophic actinic keratosis. Focally the    dysplasia approaches full-thickness   bordering on squamous cell carcinoma in situ. Dysplasia also extends to   both lateral margins of excision.   Definitive invasive malignancy is not present in the specimen, however the    base of the lesion is not entirely   seen and therefore resection of any residual or recurring lesion is   recommended.     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    ROS: 14 point review of systems was negative except the symptoms listed above in the HPI.    OBJECTIVE:   GENERAL: healthy, alert and no distress.  HEENT: PERRL. Conjunctiva, sclera clear.  SKIN: Christopher Skin Type - II.  This patient was examined from the top of the head to the bottom of the feet  including scalp, face, neck, trunk,, both arms, , both hands, and all fingers and toes. The dermatoscope was used to help evaluate pigmented lesions.  Skin Pertinent Findings:    Mid chest- 4 mm erythematous lesion with hyperkeratotic center ? Keratoacanthoma ? Actinic keratosis ? other          Right mid cheek, tip of the nose-                Name: Liquid  nitrogen Cry-Ac cryotherapy  Indication: Pre-malignant actinic keratosis  Completed by: Maxine Abernathy MD.  Note : Discussed natural history of lesion and treatment options. Prior to treatment, we discussed inflammation, tenderness post-procedure, the healing process, and the risks of pain, infection, scarring, blistering, and hypo-/hyperpigmentation after healing. Explained that these lesions may grow back and may need additional treatment or re-evaluation. The patient understood and verbally agreed to proceed with cryotherapy.    Each actinic keratosis was treated using liquid nitrogen Cry-Ac with a two five second bursts with a pause to allow for the area to thaw.    The patient tolerated the procedure well and left in good condition. If this lesion should persist or recur, then it needs to be re-evaluated.  Total number of lesions treated: 2        Left mid cheek- healed excision site for squamous cell carcinoma in situ           Trunk, arms,:                    Raised, coarse textured, stuck appearing lesion consistent with seborrheic keratosis .          Slightly raised, red lesion(s) consistent with capillary hemangioma          Infrequent rown macules of various sizes and shapes most consistent with (benign) melanocytic nevi                ASSESSMENT:     Encounter Diagnoses   Name Primary?     SCC (squamous cell carcinoma), face      Neoplasm of uncertain behavior of skin Yes     Actinic keratosis      Capillary hemangioma      Family history of melanoma      MDM: recommended yearly skin exam because of family history of melanoma and his history of squamous cell carcinoma     PLAN:   Patient Instructions   FUTURE APPOINTMENTS  Follow up per pathology report. You will be notified, generally via letter or MyChart, in approximately 10 days. If there is anything we need to discuss or further treatment needed, I will call you to discuss it.      WOUND CARE INSTRUCTIONS  1. Wash hands before every dressing  "change.  2. Change the dressing after 24 hours and once daily, or earlier if it becomes saturated.  3. Wash the wound area with a mild soap, then rinse.  4. Gently pat dry with a sterile gauze or Q-tip.  5. Using a Q-tip, apply Vaseline or Aquaphor only over entire wound. DO NOT use Neosporin - as many people react to neomycin.  6. Finally, cover with a bandage or sterile non-stick gauze with micropore paper tape.  7. Repeat once daily until wound has healed.      Soap, water and shampoo will not hurt this area.    Do not go swimming or take baths, but showering is encouraged.    Limit use of the area where the procedure was done for a few days to allow for optimal healing.    Signs of Infection:  Infection can occur in any area where skin has been disrupted. If you notice persistent redness, swelling, colored drainage, increasing pain, fever or other signs of infection, please call us at: [phone] and ask to have me or my colleague paged. We will call you back to discuss.    If you experience bleeding:  Wash hands and hold firm pressure on the area for 10 minutes without checking to see if the bleeding has stopped. \"Checking\" pulls off the protective wound clot and restarts the bleeding all over again. Re-apply pressure for 10 minutes if necessary to stop bleeding.  Use additional sterile gauze and tape to maintain pressure once bleeding has stopped.  If bleeding continues, then call back to clinic at [phone].    PATIENT INFORMATION : WOUNDS  During the healing process you will notice a number of changes.     All wounds normally drain.  The larger the wound the more drainage there will be.  After 7-10 days, you will notice the wound beginning to shrink and new skin will begin to grow.  The wound is healed when you can see that skin has formed over the entire area.  A healed wound has a healthy, shiny look to the surface and is red to dark pink in color to normalize.  Wounds may take approximately 4-6 " weeks to heal.  Larger wounds may take 6-8 weeks. After the wound is healed you may discontinue dressing changes.    All wounds develop a small halo of redness surrounding the wound which means that healing is occurring. Severe itching with extensive redness usually indicates sensitivity to the ointment or bandage tape used to dress the wound.  You should call our office if severe itching with extensive redness develops.    Swelling  and/or discoloration around your surgical site is common, particularly when performed around the eye.  You may experience a sensation of tightness as your wound heals. This is normal and will gradually subside.  Your healed wound may be sensitive to temperature changes. This sensitivity improves with time, but if you re having a lot of discomfort, try to avoid temperature extremes.  Patients frequently experience itching after their wound appears to have healed because of the continue healing under the skin.  Plain Vaseline will help relieve the itching.    Skin exam in one year   Head and neck exam in 6 months    The patient was counseled about sunscreens and sun avoidance. The patient was counseled to check the skin regularly and report any lesion that is new, changing, itching, scabbing, bleeding or otherwise bothersome. The patient was discharged ambulatory and in stable condition.

## 2021-05-11 ENCOUNTER — OFFICE VISIT (OUTPATIENT)
Dept: FAMILY MEDICINE | Facility: CLINIC | Age: 76
End: 2021-05-11
Attending: PHYSICIAN ASSISTANT
Payer: COMMERCIAL

## 2021-05-11 VITALS — SYSTOLIC BLOOD PRESSURE: 118 MMHG | DIASTOLIC BLOOD PRESSURE: 88 MMHG

## 2021-05-11 DIAGNOSIS — D48.5 NEOPLASM OF UNCERTAIN BEHAVIOR OF SKIN: Primary | ICD-10-CM

## 2021-05-11 DIAGNOSIS — L57.0 ACTINIC KERATOSIS: ICD-10-CM

## 2021-05-11 DIAGNOSIS — C44.320 SCC (SQUAMOUS CELL CARCINOMA), FACE: ICD-10-CM

## 2021-05-11 DIAGNOSIS — I78.1 CAPILLARY HEMANGIOMA: ICD-10-CM

## 2021-05-11 DIAGNOSIS — Z80.8 FAMILY HISTORY OF MELANOMA: ICD-10-CM

## 2021-05-11 PROCEDURE — 17003 DESTRUCT PREMALG LES 2-14: CPT | Mod: 59 | Performed by: FAMILY MEDICINE

## 2021-05-11 PROCEDURE — 11300 SHAVE SKIN LESION 0.5 CM/<: CPT | Performed by: FAMILY MEDICINE

## 2021-05-11 PROCEDURE — 99213 OFFICE O/P EST LOW 20 MIN: CPT | Mod: 25 | Performed by: FAMILY MEDICINE

## 2021-05-11 PROCEDURE — 88305 TISSUE EXAM BY PATHOLOGIST: CPT | Performed by: DERMATOLOGY

## 2021-05-11 PROCEDURE — 17000 DESTRUCT PREMALG LESION: CPT | Mod: 59 | Performed by: FAMILY MEDICINE

## 2021-05-11 NOTE — PROCEDURES
Name : Shave Excision  Indication : Excision of tissue for pathology evaluation.  Location(s) :Mid chest- 4 mm erythematous lesion with hyperkeratotic center ? Keratoacanthoma ? Actinic keratosis ? other  Completed by : Maxine Abernathy MD  Photo Taken : yes.  Anesthesia : Patient was anesthetized by infiltrating the area surrounding the lesion with 1% lidocaine.   epinephrine [id number] : Yes.  Note : Discussed the risk of pain, infection, scarring, hypo- or hyperpigmentation and recurrence or need for re-treatment. The benefits of treatment and alternative treatments were also discussed.    During this procedure, the universal protocol was utilized. The patient's identity was confirmed by no less than two patient identifiers, correct procedure was verified, correct site was verified and marked as applicable and a final pause was completed.    Sterile technique was used throughout the procedure. The skin was cleaned and prepped with surgical cleanser. Once adequate anesthesia was obtained, the lesion was removed with a deep scallop shave procedure. The specimen was sent to pathology.    Direct pressure and aluminum chloride and monopolar cautery was applied for hemostasis. No bleeding was present upon the completion of the procedure. The wound was coated with antibacterial ointment. A dry sterile dressing was applied. Patient tolerated the procedure well and left in satisfactory condition.    Primary provider and referring provider will be informed regarding the tissue report when it returns.

## 2021-05-11 NOTE — PATIENT INSTRUCTIONS
"FUTURE APPOINTMENTS  Follow up per pathology report. You will be notified, generally via letter or MyChart, in approximately 10 days. If there is anything we need to discuss or further treatment needed, I will call you to discuss it.      WOUND CARE INSTRUCTIONS  1. Wash hands before every dressing change.  2. Change the dressing after 24 hours and once daily, or earlier if it becomes saturated.  3. Wash the wound area with a mild soap, then rinse.  4. Gently pat dry with a sterile gauze or Q-tip.  5. Using a Q-tip, apply Vaseline or Aquaphor only over entire wound. DO NOT use Neosporin - as many people react to neomycin.  6. Finally, cover with a bandage or sterile non-stick gauze with micropore paper tape.  7. Repeat once daily until wound has healed.      Soap, water and shampoo will not hurt this area.    Do not go swimming or take baths, but showering is encouraged.    Limit use of the area where the procedure was done for a few days to allow for optimal healing.    Signs of Infection:  Infection can occur in any area where skin has been disrupted. If you notice persistent redness, swelling, colored drainage, increasing pain, fever or other signs of infection, please call us at: [phone] and ask to have me or my colleague paged. We will call you back to discuss.    If you experience bleeding:  Wash hands and hold firm pressure on the area for 10 minutes without checking to see if the bleeding has stopped. \"Checking\" pulls off the protective wound clot and restarts the bleeding all over again. Re-apply pressure for 10 minutes if necessary to stop bleeding.  Use additional sterile gauze and tape to maintain pressure once bleeding has stopped.  If bleeding continues, then call back to clinic at [phone].    PATIENT INFORMATION : WOUNDS  During the healing process you will notice a number of changes.     All wounds normally drain.  The larger the wound the more drainage there will be.  After 7-10 days, " you will notice the wound beginning to shrink and new skin will begin to grow.  The wound is healed when you can see that skin has formed over the entire area.  A healed wound has a healthy, shiny look to the surface and is red to dark pink in color to normalize.  Wounds may take approximately 4-6 weeks to heal.  Larger wounds may take 6-8 weeks. After the wound is healed you may discontinue dressing changes.    All wounds develop a small halo of redness surrounding the wound which means that healing is occurring. Severe itching with extensive redness usually indicates sensitivity to the ointment or bandage tape used to dress the wound.  You should call our office if severe itching with extensive redness develops.    Swelling  and/or discoloration around your surgical site is common, particularly when performed around the eye.  You may experience a sensation of tightness as your wound heals. This is normal and will gradually subside.  Your healed wound may be sensitive to temperature changes. This sensitivity improves with time, but if you re having a lot of discomfort, try to avoid temperature extremes.  Patients frequently experience itching after their wound appears to have healed because of the continue healing under the skin.  Plain Vaseline will help relieve the itching.    Skin exam in one year   Head and neck exam in 6 months

## 2021-05-11 NOTE — LETTER
5/11/2021         RE: Amadou Wade  [address] [facility] [address]        Dear Colleague,    Thank you for referring your patient, Amadou Wade, to the [facility]. Please see a copy of my visit note below.    [facility] - PRIMARY CARE SKIN    CC: skin cancer screening (full-body)  SUBJECTIVE:   Amadou Wade is a(n) 76 year old male who presents to clinic today for a full-body skin exam.      Issue One: Recent shave excision of lesion was read as actinic keratosis but possible squamous cell carcinoma in situ; s/p mohs surgery        Skin, left cheek     FINAL DIAGNOSIS:  04/12/2021  Skin of left cheek, lesion, excision-   - Hypertrophic actinic keratosis(Please see comment)     COMMENT:   The specimen shows an inflamed hypertrophic actinic keratosis. Focally the    dysplasia approaches full-thickness   bordering on squamous cell carcinoma in situ. Dysplasia also extends to   both lateral margins of excision.   Definitive invasive malignancy is not present in the specimen, however the    base of the lesion is not entirely   seen and therefore resection of any residual or recurring lesion is   recommended.     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    ROS: 14 point review of systems was negative except the symptoms listed above in the HPI.    OBJECTIVE:   GENERAL: healthy, alert and no distress.  HEENT: PERRL. Conjunctiva, sclera clear.  SKIN: Christopher Skin Type - II.  This patient was examined from the top of the head to the bottom of the feet  including scalp, face, neck, trunk,, both arms, , both hands, and all fingers and toes. The dermatoscope  was used to help evaluate pigmented lesions.  Skin Pertinent Findings:    Mid chest- 4 mm erythematous lesion with hyperkeratotic center ? Keratoacanthoma ? Actinic keratosis ? other          Right mid cheek, tip of the nose-                Name: Liquid nitrogen Cry-Ac cryotherapy  Indication: Pre-malignant actinic keratosis  Completed by: Maxine Abernathy MD.  Note : Discussed natural history of lesion and treatment options. Prior to treatment, we discussed inflammation, tenderness post-procedure, the healing process, and the risks of pain, infection, scarring, blistering, and hypo-/hyperpigmentation after healing. Explained that these lesions may grow back and may need additional treatment or re-evaluation. The patient understood and verbally agreed to proceed with cryotherapy.    Each actinic keratosis was treated using liquid nitrogen Cry-Ac with a two five second bursts with a pause to allow for the area to thaw.    The patient tolerated the procedure well and left in good condition. If this lesion should persist or recur, then it needs to be re-evaluated.  Total number of lesions treated: 2        Left mid cheek- healed excision site for squamous cell carcinoma in situ           Trunk, arms,:                    Raised, coarse textured, stuck appearing lesion consistent with seborrheic keratosis .          Slightly raised, red lesion(s) consistent with capillary hemangioma          Infrequent rown macules of various sizes and shapes most consistent with (benign) melanocytic nevi                ASSESSMENT:     Encounter Diagnoses   Name Primary?     SCC (squamous cell carcinoma), face      Neoplasm of uncertain behavior of skin Yes     Actinic keratosis      Capillary hemangioma      Family history of melanoma      MDM: recommended yearly skin exam because of family history of melanoma and his history of squamous cell carcinoma     PLAN:   Patient Instructions   FUTURE APPOINTMENTS  Follow up per pathology report.  "You will be notified, generally via letter or MyChart, in approximately 10 days. If there is anything we need to discuss or further treatment needed, I will call you to discuss it.      WOUND CARE INSTRUCTIONS  1. Wash hands before every dressing change.  2. Change the dressing after 24 hours and once daily, or earlier if it becomes saturated.  3. Wash the wound area with a mild soap, then rinse.  4. Gently pat dry with a sterile gauze or Q-tip.  5. Using a Q-tip, apply Vaseline or Aquaphor only over entire wound. DO NOT use Neosporin - as many people react to neomycin.  6. Finally, cover with a bandage or sterile non-stick gauze with micropore paper tape.  7. Repeat once daily until wound has healed.      Soap, water and shampoo will not hurt this area.    Do not go swimming or take baths, but showering is encouraged.    Limit use of the area where the procedure was done for a few days to allow for optimal healing.    Signs of Infection:  Infection can occur in any area where skin has been disrupted. If you notice persistent redness, swelling, colored drainage, increasing pain, fever or other signs of infection, please call us at: [phone] and ask to have me or my colleague paged. We will call you back to discuss.    If you experience bleeding:  Wash hands and hold firm pressure on the area for 10 minutes without checking to see if the bleeding has stopped. \"Checking\" pulls off the protective wound clot and restarts the bleeding all over again. Re-apply pressure for 10 minutes if necessary to stop bleeding.  Use additional sterile gauze and tape to maintain pressure once bleeding has stopped.  If bleeding continues, then call back to clinic at [phone].    PATIENT INFORMATION : WOUNDS  During the healing process you will notice a number of changes.     All wounds normally drain.  The larger the wound the more drainage there will be.  After 7-10 days, you will notice the wound beginning to shrink and new " skin will begin to grow.  The wound is healed when you can see that skin has formed over the entire area.  A healed wound has a healthy, shiny look to the surface and is red to dark pink in color to normalize.  Wounds may take approximately 4-6 weeks to heal.  Larger wounds may take 6-8 weeks. After the wound is healed you may discontinue dressing changes.    All wounds develop a small halo of redness surrounding the wound which means that healing is occurring. Severe itching with extensive redness usually indicates sensitivity to the ointment or bandage tape used to dress the wound.  You should call our office if severe itching with extensive redness develops.    Swelling  and/or discoloration around your surgical site is common, particularly when performed around the eye.  You may experience a sensation of tightness as your wound heals. This is normal and will gradually subside.  Your healed wound may be sensitive to temperature changes. This sensitivity improves with time, but if you re having a lot of discomfort, try to avoid temperature extremes.  Patients frequently experience itching after their wound appears to have healed because of the continue healing under the skin.  Plain Vaseline will help relieve the itching.    Skin exam in one year    The patient was counseled about sunscreens and sun avoidance. The patient was counseled to check the skin regularly and report any lesion that is new, changing, itching, scabbing, bleeding or otherwise bothersome. The patient was discharged ambulatory and in stable condition.          Again, thank you for allowing me to participate in the care of your patient.        Sincerely,        Teresa Abernathy MD

## 2021-05-14 LAB — COPATH REPORT: NORMAL

## 2021-05-17 ENCOUNTER — TELEPHONE (OUTPATIENT)
Dept: DERMATOLOGY | Facility: CLINIC | Age: 76
End: 2021-05-17

## 2021-05-17 NOTE — TELEPHONE ENCOUNTER
----- Message from Teresa Abernathy MD sent at 5/17/2021 11:29 AM CDT -----  Please call,     Squamous cell carcinoma in situ on the mid chest . Discussion options for treatment : discuss options for treatment         Topical 5 fluorouracil  two times per day for 3- 4 weeks , or excision in the office . With topical treatment higher 5 year recurrence rate compared to excision. If he prefers topical 5 fluorouracil explain application and then recheck with myself in 4 weeks.     Thank you, Teresa Abernathy M.D.

## 2021-05-18 NOTE — TELEPHONE ENCOUNTER
Patient called back.    Educated patient on biopsy results- SCIS (wide excision/efudex).    Educated patient on tx options:  -excision   -topical 5 fluorouracil, two times per day for 3- 4 weeks    Informed patient with topical treatment there is a higher 5 year recurrence rate compared to excision.    Scheduled future wide excision appointment.    Patient voiced understanding.    BONITA Garcia-BSN-N  [facility]  [phone]

## 2021-05-18 NOTE — TELEPHONE ENCOUNTER
Called and LM for patient to call back in regards to biopsy results x1.    BONITA Garcia-BSN-PHN  [facility]  [phone]

## 2021-06-28 ENCOUNTER — OFFICE VISIT (OUTPATIENT)
Dept: FAMILY MEDICINE | Facility: CLINIC | Age: 76
End: 2021-06-28
Payer: COMMERCIAL

## 2021-06-28 VITALS — DIASTOLIC BLOOD PRESSURE: 68 MMHG | SYSTOLIC BLOOD PRESSURE: 132 MMHG

## 2021-06-28 DIAGNOSIS — D09.9 SQUAMOUS CELL CARCINOMA IN SITU: Primary | ICD-10-CM

## 2021-06-28 PROBLEM — D04.30 SQUAMOUS CELL CARCINOMA IN SITU OF SKIN OF FACE: Status: ACTIVE | Noted: 2021-01-01

## 2021-06-28 PROCEDURE — 99213 OFFICE O/P EST LOW 20 MIN: CPT | Performed by: FAMILY MEDICINE

## 2021-06-28 RX ORDER — CALCIPOTRIENE 50 UG/G
CREAM TOPICAL
Qty: 60 G | Refills: 0 | Status: SHIPPED | OUTPATIENT
Start: 2021-06-28 | End: 2021-12-16

## 2021-06-28 RX ORDER — FLUOROURACIL 50 MG/G
CREAM TOPICAL
Qty: 45 G | Refills: 0 | Status: SHIPPED | OUTPATIENT
Start: 2021-06-28 | End: 2021-12-16

## 2021-06-28 NOTE — LETTER
6/28/2021         RE: Amadou Wade  [address] [facility] [address]        Dear Colleague,    Thank you for referring your patient, Amadou Wade, to the [facility]. Please see a copy of my visit note below.    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 Right lower leg  Eczema Psoriasis Lupus   NO NO NO     Family Medical History  Skin cancer: oldest brother - melanoma  Eczema Psoriasis Lupus   NO NO NO     Sun Protection : SPF       Occupation: retired  Refer to electronic medical record (EMR) for past medical history and medications.    ROS: 14 point review of systems was negative except the symptoms listed above in the HPI.    OBJECTIVE:   GENERAL: healthy, alert and no distress.  HEENT: PERRL. Conjunctiva, sclera clear.  SKIN: Christopher Skin Type - II.  This patient was examined from the top of the head to the bottom of the feet  including scalp, face, neck, trunk,, both arms, , both hands, and all fingers and toes. The dermatoscope was used to help evaluate pigmented lesions.  Skin Pertinent Findings:    Mid chest- well healed 5 mm shave excision site         MDM : discussed topical treatment with 5 fluorouracil , excision. Discussed 5 year recurrence rate with both procedures. He is comfortable with topical treatment with 5 fluorouracil , which I think is a reasonable decision.             ASSESSMENT:     Encounter Diagnosis   Name Primary?     Squamous cell  carcinoma in situ Yes     MDM: recommended yearly skin exam because of family history of melanoma and his history of squamous cell carcinoma     PLAN:   Patient Instructions   TOPICAL MEDICATION  Apply fluorouracil (Efudex) 5% cream mixed with calcipotriene cream 0.005% - apply two times per day for 3 weeks . May apply aquaphor comfort.    Apply 1 inch past the borders of each site.    Use a Q tip for application    Make sure to wash your hands after application.    The medication is used to treat abnormal cells, so you will develop irritation at the affected areas. It reacts with abnormal skin cells but avoids normal skin cells.    However, stop applying the cream if the area becomes excessively irritated (i.e. painful or weepy).    If you notice excessive irritation, call us at [phone] and ask to have me or my colleague paged. We will call you back to discuss.    Consider application of Vaseline only for skin irritation when applying this medication.      The patient was counseled about sunscreens and sun avoidance. The patient was counseled to check the skin regularly and report any lesion that is new, changing, itching, scabbing, bleeding or otherwise bothersome. The patient was discharged ambulatory and in stable condition.          Again, thank you for allowing me to participate in the care of your patient.        Sincerely,        Teresa Abernathy MD

## 2021-06-28 NOTE — PATIENT INSTRUCTIONS
TOPICAL MEDICATION  Apply fluorouracil (Efudex) 5% cream mixed with calcipotriene cream 0.005% - apply two times per day for 3 weeks . May apply aquaphor comfort.    Apply 1 inch past the borders of each site.    Use a Q tip for application    Make sure to wash your hands after application.    The medication is used to treat abnormal cells, so you will develop irritation at the affected areas. It reacts with abnormal skin cells but avoids normal skin cells.    However, stop applying the cream if the area becomes excessively irritated (i.e. painful or weepy).    If you notice excessive irritation, call us at [phone] and ask to have me or my colleague paged. We will call you back to discuss.    Consider application of Vaseline only for skin irritation when applying this medication.

## 2021-06-30 ENCOUNTER — TELEPHONE (OUTPATIENT)
Dept: FAMILY MEDICINE | Facility: CLINIC | Age: 76
End: 2021-06-30

## 2021-06-30 NOTE — TELEPHONE ENCOUNTER
Called patient- states Dr. Abernathy told him that the 5 Follow-up would only affect the abnormal cells and not the healthy skin  Pharmacist told him that if would affect the healthy skin as well    Patient concerned that he is going to put the 5 follow-up one inch around the spot and that all the skin in that area will be affected    Advised patient that cancer treatment that usually only affect the abnormal cells but the skin on this are mostly likely ha sun damage.    Patient would like to discuss with Dr. Abernathy    Concerned that he is being told two different things    Jacque BARNETT RN BSN  [facility] [place]  [phone]

## 2021-06-30 NOTE — TELEPHONE ENCOUNTER
[facility] Call Center    Phone Message    May a detailed message be left on voicemail: yes     Reason for Call: Other: Pt called and has a few questions to ask Dr. Abernathy regarding the procedure he decided to do. Please call him back. Thanks      Action Taken: Message routed to:  Other: [facility]    Travel Screening: Not Applicable

## 2021-06-30 NOTE — TELEPHONE ENCOUNTER
Called and left voicemail.      5 fluorouracil affects abnormal skin - either from skin cancer or sun damage. But it does not affect skin that has not been sun damaged .     Thank you,   Teresa Abernathy M.D.

## 2021-07-20 ENCOUNTER — TELEPHONE (OUTPATIENT)
Dept: FAMILY MEDICINE | Facility: CLINIC | Age: 76
End: 2021-07-20

## 2021-07-20 DIAGNOSIS — D09.9 SQUAMOUS CELL CARCINOMA IN SITU: Primary | ICD-10-CM

## 2021-07-20 RX ORDER — CALCIPOTRIENE 50 UG/G
CREAM TOPICAL
Qty: 60 G | Refills: 0 | Status: CANCELLED | OUTPATIENT
Start: 2021-07-20

## 2021-07-20 NOTE — TELEPHONE ENCOUNTER
Patient called back.    Patient has been applying calcipotriene and efudex (1:1) to the affected area for 3 weeks w/ little to no reaction (calcipotriene was already prescribed on 6/28/21).    Informed patient lack of reaction could be due to provider removing most of the cancer (SCIS) w/ the biopsy.    Patient will wait for callback from  to recheck a/a first week of August.    Patient voiced understanding.    Jose RN-BSN-PHN  [facility]  [phone]

## 2021-07-20 NOTE — TELEPHONE ENCOUNTER
Patient asking re: 5 F U medication, there has been no change with medication. He knows there should be blistering and change to the skin. He has been using the medication on his chest for 3 weeks now, no change. Is this normal?  Please advise.  [phone] (home)   Thank you  Tami Moya

## 2021-07-20 NOTE — TELEPHONE ENCOUNTER
Called and spoke to patient.    Informed patient Dr. Abernathy can add calcipotriene cream 0.005% in addition to the application of 5 fluorouracil for the next 2 weeks.     Patient on his way out the door and asked if he could call back to further discuss.    Direct phone number to Derm RN given.    Patient voiced understanding.    BONITA Garcia-BSN-PHN  [facility]  [phone]

## 2021-07-20 NOTE — TELEPHONE ENCOUNTER
I would add calcipotriene cream 0.005% in addition to the application of 5 fluorouracil for the next 2 weeks. I will have An call him to get him in first week of August so I can recheck the site. After you talk to him please forward to An Le.    Thank you,   Teresa Abernathy M.D.

## 2021-08-01 NOTE — PROGRESS NOTES
[facility] - PRIMARY CARE SKIN    CC:  excision  SUBJECTIVE:   Amadou Wade is a(n) 76 year old male who presents to clinic today for follow up of topical treatment for squamous cell carcinoma in situ on the mid chest. Treatment : 5 fluorouracil and calcipotriene for 4 weeks . He had a minimal reaction to the treatment .      Issue One: Here for excision for treatment options for squamous cell carcinoma in situ on the right chest       Tissue report :         FINAL DIAGNOSIS:   Skin, mid chest, shave:   - Squamous cell carcinoma in situ, extending to the lateral margin - (see   description)        Issue two : he had a biopsy proven actinic keratosis on the left mid cheek and some residual roughness.  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    ROS: 14 point review of systems was negative except the symptoms listed above in the HPI.    OBJECTIVE:   GENERAL: healthy, alert and no distress.  HEENT: PERRL. Conjunctiva, sclera clear.  SKIN: Christopher Skin Type - II.  Mid chest- no suspicious changes  Left cheek- residual scar at shave site, in the center a 3 mm area of erythema and gritty texture.           ASSESSMENT:     Encounter Diagnoses   Name Primary?     Actinic keratosis Yes     History of squamous cell carcinoma in situ of skin      MDM: recommended yearly skin exam because of family history of melanoma and his history of squamous cell carcinoma     PLAN:   Patient Instructions   Left cheek- actinic keratosis      5 fluorouracil mixed with calcipotriene 0.005% apply once per day for 2 weeks     No further treatment needed for the site on the mid chest.

## 2021-08-02 ENCOUNTER — OFFICE VISIT (OUTPATIENT)
Dept: FAMILY MEDICINE | Facility: CLINIC | Age: 76
End: 2021-08-02
Payer: COMMERCIAL

## 2021-08-02 VITALS — SYSTOLIC BLOOD PRESSURE: 122 MMHG | DIASTOLIC BLOOD PRESSURE: 70 MMHG

## 2021-08-02 DIAGNOSIS — Z86.007 HISTORY OF SQUAMOUS CELL CARCINOMA IN SITU OF SKIN: ICD-10-CM

## 2021-08-02 DIAGNOSIS — L57.0 ACTINIC KERATOSIS: Primary | ICD-10-CM

## 2021-08-02 PROCEDURE — 99213 OFFICE O/P EST LOW 20 MIN: CPT | Performed by: FAMILY MEDICINE

## 2021-08-02 NOTE — PATIENT INSTRUCTIONS
Left cheek- actinic keratosis      5 fluorouracil mixed with calcipotriene 0.005% apply once per day for 2 weeks     No further treatment needed for the site on the mid chest.

## 2021-08-02 NOTE — LETTER
8/2/2021         RE: Amadou Wade  [address] [facility] [address]        Dear Colleague,    Thank you for referring your patient, Amadou Wade, to the [facility]. Please see a copy of my visit note below.    [facility] - PRIMARY CARE SKIN    CC:  excision  SUBJECTIVE:   Amadou Wade is a(n) 76 year old male who presents to clinic today for follow up of topical treatment for squamous cell carcinoma in situ on the mid chest. Treatment : 5 fluorouracil and calcipotriene for 4 weeks . He had a minimal reaction to the treatment .      Issue One: Here for excision for treatment options for squamous cell carcinoma in situ on the right chest       Tissue report :         FINAL DIAGNOSIS:   Skin, mid chest, shave:   - Squamous cell carcinoma in situ, extending to the lateral margin - (see   description)        Issue two : he had a biopsy proven actinic keratosis on the left mid cheek and some residual roughness.  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    ROS: 14 point review of systems was negative except the symptoms listed above in the HPI.    OBJECTIVE:   GENERAL: healthy, alert and no distress.  HEENT: PERRL. Conjunctiva, sclera clear.  SKIN: Christopher Skin Type - II.  Mid chest- no suspicious changes  Left cheek- residual scar at shave site, in the center a 3 mm area of erythema and gritty texture.           ASSESSMENT:     Encounter Diagnoses   Name Primary?     Actinic keratosis Yes     History of squamous cell carcinoma in situ of skin      MDM: recommended yearly skin exam because of family history of melanoma and his history of squamous cell  carcinoma     PLAN:   Patient Instructions   Left cheek- actinic keratosis      5 fluorouracil mixed with calcipotriene 0.005% apply once per day for 2 weeks     No further treatment needed for the site on the mid chest.             Again, thank you for allowing me to participate in the care of your patient.        Sincerely,        Teresa Abernathy MD

## 2021-09-05 ENCOUNTER — HEALTH MAINTENANCE LETTER (OUTPATIENT)
Age: 76
End: 2021-09-05

## 2021-12-10 ENCOUNTER — OFFICE VISIT (OUTPATIENT)
Dept: FAMILY MEDICINE | Facility: CLINIC | Age: 76
End: 2021-12-10
Payer: COMMERCIAL

## 2021-12-10 VITALS — SYSTOLIC BLOOD PRESSURE: 128 MMHG | DIASTOLIC BLOOD PRESSURE: 74 MMHG

## 2021-12-10 DIAGNOSIS — L82.1 SEBORRHEIC KERATOSIS: Primary | ICD-10-CM

## 2021-12-10 DIAGNOSIS — D22.9 MULTIPLE BENIGN NEVI: ICD-10-CM

## 2021-12-10 DIAGNOSIS — D48.5 NEOPLASM OF UNCERTAIN BEHAVIOR OF SKIN: ICD-10-CM

## 2021-12-10 DIAGNOSIS — D18.01 CHERRY ANGIOMA: ICD-10-CM

## 2021-12-10 DIAGNOSIS — L57.0 ACTINIC KERATOSIS: ICD-10-CM

## 2021-12-10 PROCEDURE — 88305 TISSUE EXAM BY PATHOLOGIST: CPT | Performed by: DERMATOLOGY

## 2021-12-10 PROCEDURE — 17000 DESTRUCT PREMALG LESION: CPT | Mod: XS | Performed by: DERMATOLOGY

## 2021-12-10 PROCEDURE — 17003 DESTRUCT PREMALG LES 2-14: CPT | Mod: XS | Performed by: DERMATOLOGY

## 2021-12-10 PROCEDURE — 88342 IMHCHEM/IMCYTCHM 1ST ANTB: CPT | Performed by: DERMATOLOGY

## 2021-12-10 PROCEDURE — 11102 TANGNTL BX SKIN SINGLE LES: CPT | Performed by: DERMATOLOGY

## 2021-12-10 PROCEDURE — 88341 IMHCHEM/IMCYTCHM EA ADD ANTB: CPT | Performed by: DERMATOLOGY

## 2021-12-10 PROCEDURE — 99213 OFFICE O/P EST LOW 20 MIN: CPT | Mod: 25 | Performed by: DERMATOLOGY

## 2021-12-10 RX ORDER — LIDOCAINE HYDROCHLORIDE AND EPINEPHRINE 10; 10 MG/ML; UG/ML
3 INJECTION, SOLUTION INFILTRATION; PERINEURAL ONCE
Status: ACTIVE | OUTPATIENT
Start: 2021-12-10

## 2021-12-10 NOTE — PATIENT INSTRUCTIONS
Wound Care Instructions     FOR SUPERFICIAL WOUNDS     [facility] or     [facility] [phone]          AFTER 24 HOURS YOU SHOULD REMOVE THE BANDAGE AND BEGIN DAILY DRESSING CHANGES AS FOLLOWS:     1) Remove Dressing.     2) Clean and dry the area with tap water using a Q-tip or sterile gauze pad.     3) Apply Vaseline, Aquaphor, Polysporin ointment or Bacitracin ointment over entire wound.  Do NOT use Neosporin ointment.     4) Cover the wound with a band-aid, or a sterile non-stick gauze pad and micropore paper tape      REPEAT THESE INSTRUCTIONS AT LEAST ONCE A DAY UNTIL THE WOUND HAS COMPLETELY HEALED.    It is an old wives tale that a wound heals better when it is exposed to air and allowed to dry out. The wound will heal faster with a better cosmetic result if it is kept moist with ointment and covered with a bandage.    **Do not let the wound dry out.**      Supplies Needed:      *Cotton tipped applicators (Q-tips)    *Polysporin Ointment or Bacitracin Ointment (NOT NEOSPORIN)    *Band-aids or non-stick gauze pads and micropore paper tape.      PATIENT INFORMATION:    During the healing process you will notice a number of changes. All wounds develop a small halo of redness surrounding the wound.  This means healing is occurring. Severe itching with extensive redness usually indicates sensitivity to the ointment or bandage tape used to dress the wound.  You should call our office if this develops.      Swelling  and/or discoloration around your surgical site is common, particularly when performed around the eye.    All wounds normally drain.  The larger the wound the more drainage there will be.  After 7-10 days, you will notice the wound beginning to shrink and new skin will begin to grow.  The wound is healed when you can see skin has formed over the entire area.  A healed wound has a healthy, shiny look to the surface and is red to dark pink in color to normalize.   Wounds may take approximately 4-6 weeks to heal.  Larger wounds may take 6-8 weeks.  After the wound is healed you may discontinue dressing changes.    You may experience a sensation of tightness as your wound heals. This is normal and will gradually subside.    Your healed wound may be sensitive to temperature changes. This sensitivity improves with time, but if you re having a lot of discomfort, try to avoid temperature extremes.    Patients frequently experience itching after their wound appears to have healed because of the continue healing under the skin.  Plain Vaseline will help relieve the itching.        POSSIBLE COMPLICATIONS    BLEEDIN. Leave the bandage in place.  2. Use tightly rolled up gauze or a cloth to apply direct pressure over the bandage for 30  minutes.  3. Reapply pressure for an additional 30 minutes if necessary  4. Use additional gauze and tape to maintain pressure once the bleeding has stopped.    WOUND CARE INSTRUCTIONS  FOR CRYOSURGERY  For questions please call [phone]        This area treated with liquid nitrogen will form a blister. You do not need to bandage the area until after the blister forms and breaks (which may be a few days).  When the blister breaks, begin daily dressing changes as follows:    1) Clean and dry the area with tap water using clean Q-tip or sterile gauze pad.    2) Apply Vaseline or Aquaphor over entire wound. Other options include Polysporin ointment or Bacitracin ointment over entire wound.  Do NOT use Neosporin ointment.    3) Cover the wound with a band-aid or sterile non-stick gauze pad and micropore paper tape.      REPEAT THESE INSTRUCTIONS AT LEAST ONCE A DAY UNTIL THE WOUND HAS COMPLETELY HEALED.        It is an old wives tale that a wound heals better when it is exposed to air and allowed to dry out. The wound will heal faster with a better cosmetic result if it is kept moist with ointment and covered with a bandage.  Do not let the wound  dry out.      Supplies Needed:     *Cotton tipped applicators (Q-tips)   *Polysporin ointment or Bacitracin ointment (NOT NEOSPORIN)   *Band-aids, or non stick gauze pads and micropore paper tape    PATIENT INFORMATION    During the healing process you will notice a number of changes. All wounds develop a small halo of redness surrounding the wound.  This means healing is occurring. Severe itching with extensive redness usually indicates sensitivity to the ointment or bandage tape used to dress the wound.  You should call our office if this develops.      Swelling and/or discoloration around your surgical site is common, particularly when performed around the eye.    All wounds normally drain.  The larger the wound the more drainage there will be.  After 7-10 days, you will notice the wound beginning to shrink and new skin will begin to grow.  The wound is healed when you can see skin has formed over the entire area.  A healed wound has a healthy, shiny look to the surface and is red to dark pink in color to normalize.  Wounds may take approximately 4-6 weeks to heal.  Larger wounds may take 6-8 weeks.  After the wound is healed you may discontinue dressing changes.    You may experience a sensation of tightness as your wound heals. This is normal and will gradually subside.    Your healed wound may be sensitive to temperature changes. This sensitivity improves with time, but if you re having a lot of discomfort, try to avoid temperature extremes.    Patients frequently experience itching after their wound appears to have healed because of the continue healing under the skin.  Plain Vaseline will help relieve the itching.

## 2021-12-10 NOTE — PROGRESS NOTES
[facility] Dermatology Clinic Note - EP    Encounter Date: Dec 10, 2021    Dermatology Problem List:  #. SCCis mid chest,  s/p Efudex for 2 weeks starting 8/2/21  #. AK  - s/p cryo 12/10/21  - left mid cheek, s/p Efudex for 2 weeks starting 8/2/21 and cryo 12/10/21  #. NUB, right shoulder, ddx BCC  - Shave biopsy today, see procedure note below    ____________________________________________    Assessment & Plan:     #. AK, post auricular neck x2, right forearm x1, and left mid cheek x1  - Cryotherapy today, see procedure note below    #. NUB, right shoulder, ddx BCC  - Shave biopsy today, see procedure note below    # Multiple benign nevi.   - No concerning lesions today  - Counseled on ABCDEs of melanoma and sun protection - recommend SPF 30 or higher with frequent application   - Return sooner if noticing changing or symptomatic lesions    # SK. Cherry angioma.   Discussed the natural history and benign nature of this lesion. Reassurance provided that no additional treatment is necessary.     Procedures Performed:   - Shave biopsy procedure note, location(s): Right shoulder. After discussion of benefits and risks including but not limited to bleeding, infection, scar, incomplete removal, recurrence, and non-diagnostic biopsy, written consent and photographs were obtained. The area was cleaned with isopropyl alcohol. 0.5mL of 1% lidocaine with epinephrine was injected to obtain adequate anesthesia of lesion(s). Shave biopsy at site(s) performed. Hemostasis was achieved with aluminium chloride. Petrolatum ointment and a sterile dressing were applied. The patient tolerated the procedure and no complications were noted. The patient was provided with verbal and written post care instructions.   - Cryotherapy procedure note, location(s): See above. After verbal consent and discussion of risks and benefits including, but not limited to, dyspigmentation/scar, blister, and pain, 4 lesion(s) was(were) treated with 1-2 mm  freeze border for 1-2 cycles with liquid nitrogen. Post cryotherapy instructions were provided.    Follow-up: PRN pending biopsy results    Scribe Disclosure:  I, Stephanie Robbins, am serving as a scribe to document services personally performed by Andre Santizo MD based on data collection and the provider's statements to me.     Provider Disclosure:  The documentation recorded by the scribe accurately reflects the services I personally performed and the decisions made by me.    Andre Santizo MD  Dermatology/Dermatopathology Staff Physician  , Department of Dermatology    ____________________________________________    CC: Skin Check      HPI:  Mr. Amadou Wade is a(n) extremely pleasant 76 year old male who presents today as a return patient for FBSE.  The patient was last seen in the skin care clinic on 8/2/21 by Dr. Abernathy at which point he was started on a two week course of Efudex for treatment of an SCCis on the mid chest and an AK on the left mid cheek.     When prompted, there is a red appearing lesion on his right shoulder that is asymptomatic. Additionally, when prompted, there is a scar on his left hand that was a previously excised ganglion cyst. The patient denies any painful, bleeding, or nonhealing lesions, or any new or changing moles.    Patient is otherwise feeling well, without additional skin concerns.     Labs Reviewed:  N/A    Physical Exam:  Vitals: /74   SKIN: Total skin excluding the undergarment areas was performed. The exam included the head/face, neck, both arms, chest, back, abdomen, both legs, digits and/or nails.   - There are erythematous macules with overyling adherent scale on the post auricular neck x2 and right forearm x1.   - On the right shoulder, there is 6 mm red domed papule with fine telangiectasias.   - Well healed biopsy site on left mid cheek. Slight peripheral roughness.   - Multiple regular brown pigmented macules and papules are  identified on the trunk and extremities.   - There are waxy stuck on tan to brown papules on the trunk and extremities.  - There are dome shaped bright red papules on the trunk and extremities.   - No other lesions of concern on areas examined.     Medications:  Current Outpatient Medications   Medication     ASPIRIN 81 MG OR TABS     diphenhydrAMINE (BENADRYL) 25 MG tablet     losartan (COZAAR) 50 MG tablet     psyllium (METAMUCIL/KONSYL) 58.6 % powder     simvastatin (ZOCOR) 40 MG tablet     calcipotriene (DOVONOX) 0.005 % external cream     fluorouracil (EFUDEX) 5 % external cream     No current facility-administered medications for this visit.      Past Medical History:   Patient Active Problem List   Diagnosis     Cardiac dysrhythmia     Hyperlipidemia LDL goal <130     Advanced directives, counseling/discussion     ED (erectile dysfunction)     Hypertension goal BP (blood pressure) < 140/90     Recurrent cold sores     Squamous 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     Past Medical History:   Diagnosis Date     Cardiac dysrhythmia, unspecified 03/2007    few pac's, few pvc's, few runs SVT     Chronic rhinitis     resolved     ED (erectile dysfunction)      [place]     [place]     Hyperlipidemia LDL goal <130 2002     Hypertension goal BP (blood pressure) < 140/90 12/2017     Pneumonia, organism unspecified(486) 1993     Snoring     no sleep apnea - mouth guard     Squamous 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 2021    Dr Abernathy - Rt Chest     Unspecified hemorrhoids without mention of complication 04/2006    internal       CC No referring provider defined for this encounter. on close of this encounter.    This note has been created using voice recognition software; while it has been reviewed, some errors may persist.

## 2021-12-10 NOTE — LETTER
12/10/2021         RE: Amadou Wade  [id number] [address] [facility] [facility] [address]        Dear Colleague,    Thank you for referring your patient, Amadou Wade, to the [facility]. Please see a copy of my visit note below.    [facility] Dermatology Clinic Note - EP    Encounter Date: Dec 10, 2021    Dermatology Problem List:  #. SCCis mid chest,  s/p Efudex for 2 weeks starting 8/2/21  #. AK  - s/p cryo 12/10/21  - left mid cheek, s/p Efudex for 2 weeks starting 8/2/21 and cryo 12/10/21  #. NUB, right shoulder, ddx BCC  - Shave biopsy today, see procedure note below    ____________________________________________    Assessment & Plan:     #. AK, post auricular neck x2, right forearm x1, and left mid cheek x1  - Cryotherapy today, see procedure note below    #. NUB, right shoulder, ddx BCC  - Shave biopsy today, see procedure note below    # Multiple benign nevi.   - No concerning lesions today  - Counseled on ABCDEs of melanoma and sun protection - recommend SPF 30 or higher with frequent application   - Return sooner if noticing changing or symptomatic lesions    # SK. Cherry angioma.   Discussed the natural history and benign nature of this lesion. Reassurance provided that no additional treatment is necessary.     Procedures Performed:   - Shave biopsy procedure note, location(s): Right shoulder. After discussion of benefits and risks including but not limited to bleeding, infection, scar, incomplete removal, recurrence, and non-diagnostic biopsy, written consent and photographs were obtained. The area was cleaned with isopropyl alcohol. 0.5mL of 1% lidocaine with epinephrine was injected to obtain adequate anesthesia of lesion(s). Shave biopsy at site(s) performed. Hemostasis was achieved with aluminium chloride. Petrolatum ointment and a sterile dressing were applied. The patient tolerated the procedure and no complications were noted. The patient was provided with verbal and written  post care instructions.   - Cryotherapy procedure note, location(s): See above. After verbal consent and discussion of risks and benefits including, but not limited to, dyspigmentation/scar, blister, and pain, 4 lesion(s) was(were) treated with 1-2 mm freeze border for 1-2 cycles with liquid nitrogen. Post cryotherapy instructions were provided.    Follow-up: PRN pending biopsy results    Scribe Disclosure:  I, Stephanie Robbins, am serving as a scribe to document services personally performed by Andre Santizo MD based on data collection and the provider's statements to me.     Provider Disclosure:  The documentation recorded by the scribe accurately reflects the services I personally performed and the decisions made by me.    Andre Santizo MD  Dermatology/Dermatopathology Staff Physician  , Department of Dermatology    ____________________________________________    CC: Skin Check      HPI:  Mr. Amadou Wade is a(n) extremely pleasant 76 year old male who presents today as a return patient for FBSE.  The patient was last seen in the skin care clinic on 8/2/21 by Dr. Abernathy at which point he was started on a two week course of Efudex for treatment of an SCCis on the mid chest and an AK on the left mid cheek.     When prompted, there is a red appearing lesion on his right shoulder that is asymptomatic. Additionally, when prompted, there is a scar on his left hand that was a previously excised ganglion cyst. The patient denies any painful, bleeding, or nonhealing lesions, or any new or changing moles.    Patient is otherwise feeling well, without additional skin concerns.     Labs Reviewed:  N/A    Physical Exam:  Vitals: /74   SKIN: Total skin excluding the undergarment areas was performed. The exam included the head/face, neck, both arms, chest, back, abdomen, both legs, digits and/or nails.   - There are erythematous macules with overyling adherent scale on the post auricular neck  x2 and right forearm x1.   - On the right shoulder, there is 6 mm red domed papule with fine telangiectasias.   - Well healed biopsy site on left mid cheek. Slight peripheral roughness.   - Multiple regular brown pigmented macules and papules are identified on the trunk and extremities.   - There are waxy stuck on tan to brown papules on the trunk and extremities.  - There are dome shaped bright red papules on the trunk and extremities.   - No other lesions of concern on areas examined.     Medications:  Current Outpatient Medications   Medication     ASPIRIN 81 MG OR TABS     diphenhydrAMINE (BENADRYL) 25 MG tablet     losartan (COZAAR) 50 MG tablet     psyllium (METAMUCIL/KONSYL) 58.6 % powder     simvastatin (ZOCOR) 40 MG tablet     calcipotriene (DOVONOX) 0.005 % external cream     fluorouracil (EFUDEX) 5 % external cream     No current facility-administered medications for this visit.      Past Medical History:   Patient Active Problem List   Diagnosis     Cardiac dysrhythmia     Hyperlipidemia LDL goal <130     Advanced directives, counseling/discussion     ED (erectile dysfunction)     Hypertension goal BP (blood pressure) < 140/90     Recurrent cold sores     Squamous 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     Past Medical History:   Diagnosis Date     Cardiac dysrhythmia, unspecified 03/2007    few pac's, few pvc's, few runs SVT     Chronic rhinitis     resolved     ED (erectile dysfunction)      [place]     [place]     Hyperlipidemia LDL goal <130 2002     Hypertension goal BP (blood pressure) < 140/90 12/2017     Pneumonia, organism unspecified(486) 1993     Snoring     no sleep apnea - mouth guard     Squamous 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 2021    Dr Abernathy - Rt Chest     Unspecified hemorrhoids without mention of complication 04/2006    internal       CC No referring provider defined for this encounter. on close of this encounter.    This note has been created using voice recognition  software; while it has been reviewed, some errors may persist.         Again, thank you for allowing me to participate in the care of your patient.        Sincerely,        Andre Santizo MD

## 2021-12-15 ENCOUNTER — NURSE TRIAGE (OUTPATIENT)
Dept: FAMILY MEDICINE | Facility: CLINIC | Age: 76
End: 2021-12-15
Payer: COMMERCIAL

## 2021-12-15 NOTE — TELEPHONE ENCOUNTER
Patient transferred to RN from central scheduling for symptoms of shortness of breath and back pain    Nurse Triage SBAR  Is this a 2nd Level Triage? NO    SITUATION:                                                    (Clearly and briefly define the situation)  Amadou Wade is a 76 year old male   Patient reports shortness of breath over the last few weeks. Also reports a 12-15 pound weight gain since Thanksgiving     BACKGROUND:                                                      Patient reports shortness of breath over the last few weeks, says it is not debilitating but does report some rapid breathing with activity  Patient says his weight was around 160lbs and this morning he was 175. He denies swelling in his feet or ankles, but does feel his abdomen has gotten more round. Patient also reports some lack of energy compared to his baseline.     NURSE ASSESSMENT:                                                      Triage protocol recommends patient be seen within 3 days. Patient already has appointment scheduled for Thursday 12/16.    RECOMMENDATION(S) and PLAN:                                                    (What do you need from this individual?)  Protocol Recommended Disposition: See in 72 hours - has appointment 12/16  Will comply with recommendation: YES    Encourage to return call clinic triage nurse if further questions/concerns that may come up or if symptoms do not improve, worsen, or new symptoms develop.    NOTES: Disposition was determined by the first positive assessment question, therefore all previous assessment questions were negative.  Guideline used: breathing difficulty   System guideline    Ann PALACION, RN     Reason for Disposition    MODERATE longstanding difficulty breathing (e.g., speaks in phrases, SOB even at rest, pulse 100-120) and SAME as normal    Additional Information    Negative: Breathing stopped and hasn't returned    Negative: Choking on something    Negative:  "SEVERE difficulty breathing (e.g., struggling for each breath, speaks in single words, pulse > 120)    Negative: Bluish (or gray) lips or face    Negative: Difficult to awaken or acting confused (e.g., disoriented, slurred speech)    Negative: Passed out (i.e., fainted, collapsed and was not responding)    Negative: Wheezing started suddenly after medicine, an allergic food, or bee sting    Negative: Stridor    Negative: Slow, shallow and weak breathing    Negative: Sounds like a life-threatening emergency to the triager    Negative: MODERATE difficulty breathing (e.g., speaks in phrases, SOB even at rest, pulse 100-120) of new onset or worse than normal    Negative: Wheezing can be heard across the room    Negative: Drooling or spitting out saliva (because can't swallow)    Negative: Any history of prior \"blood clot\" in leg or lungs    Negative: Illness requiring prolonged bedrest in past month (e.g., immobilization, long hospital stay)    Negative: Hip or leg fracture (broken bone) in past month (or had cast on leg or ankle in past month)    Negative: Major surgery in the past month    Negative: Long-distance travel in past month (e.g., car, bus, train, plane; with trip lasting 6 or more hours)    Negative: Extra heart beats OR irregular heart beating   (i.e., \"palpitations\")    Negative: Fever > 103 F (39.4 C)    Negative: Fever > 101 F (38.3 C) and over 60 years of age    Negative: Fever > 100.0 F (37.8 C) and bedridden (e.g., nursing home patient, stroke, chronic illness, recovering from surgery)    Negative: Fever > 100.0 F (37.8 C) and diabetes mellitus or weak immune system (e.g., HIV positive, cancer chemo, splenectomy, organ transplant, chronic steroids)    Negative: Periods where breathing stops and then resumes normally and bedridden (e.g., nursing home patient, CVA)    Negative: Pregnant or postpartum (from 0 to 6 weeks after delivery)    Negative: Patient sounds very sick or weak to the triager    " Negative: MILD difficulty breathing (e.g., minimal/no SOB at rest, SOB with walking, pulse < 100) of new onset or worse than normal    Negative: Longstanding difficulty breathing (e.g., CHF, COPD, emphysema) and worse than normal    Negative: Longstanding difficulty breathing and not responding to usual therapy    Negative: Continuous (nonstop) coughing    Negative: Patient wants to be seen    Protocols used: BREATHING DIFFICULTY-A-OH

## 2021-12-16 ENCOUNTER — HOSPITAL ENCOUNTER (INPATIENT)
Facility: CLINIC | Age: 76
LOS: 2 days | Discharge: HOME | DRG: 309 | End: 2021-12-18
Attending: EMERGENCY MEDICINE | Admitting: INTERNAL MEDICINE
Payer: COMMERCIAL

## 2021-12-16 ENCOUNTER — HOSPITAL ENCOUNTER (OUTPATIENT)
Dept: CT IMAGING | Facility: CLINIC | Age: 76
DRG: 309 | End: 2021-12-16
Attending: PHYSICIAN ASSISTANT
Payer: COMMERCIAL

## 2021-12-16 ENCOUNTER — OFFICE VISIT (OUTPATIENT)
Dept: PEDIATRICS | Facility: CLINIC | Age: 76
End: 2021-12-16
Payer: COMMERCIAL

## 2021-12-16 ENCOUNTER — OFFICE VISIT (OUTPATIENT)
Dept: FAMILY MEDICINE | Facility: CLINIC | Age: 76
End: 2021-12-16
Payer: COMMERCIAL

## 2021-12-16 VITALS
OXYGEN SATURATION: 97 % | BODY MASS INDEX: 26.11 KG/M2 | DIASTOLIC BLOOD PRESSURE: 68 MMHG | WEIGHT: 182 LBS | TEMPERATURE: 96.3 F | SYSTOLIC BLOOD PRESSURE: 116 MMHG | HEART RATE: 75 BPM

## 2021-12-16 VITALS
BODY MASS INDEX: 26.11 KG/M2 | WEIGHT: 182 LBS | TEMPERATURE: 97.4 F | HEART RATE: 100 BPM | OXYGEN SATURATION: 98 % | SYSTOLIC BLOOD PRESSURE: 137 MMHG | RESPIRATION RATE: 18 BRPM | DIASTOLIC BLOOD PRESSURE: 108 MMHG

## 2021-12-16 DIAGNOSIS — R06.02 SHORTNESS OF BREATH: Primary | ICD-10-CM

## 2021-12-16 DIAGNOSIS — R63.5 ABNORMAL WEIGHT GAIN: ICD-10-CM

## 2021-12-16 DIAGNOSIS — I48.91 NEW ONSET ATRIAL FIBRILLATION (H): ICD-10-CM

## 2021-12-16 DIAGNOSIS — I50.31 ACUTE DIASTOLIC CONGESTIVE HEART FAILURE (H): Primary | ICD-10-CM

## 2021-12-16 DIAGNOSIS — J90 PLEURAL EFFUSION: ICD-10-CM

## 2021-12-16 DIAGNOSIS — I48.0 PAF (PAROXYSMAL ATRIAL FIBRILLATION) (H): ICD-10-CM

## 2021-12-16 DIAGNOSIS — R19.07 GENERALIZED ABDOMINAL SWELLING: ICD-10-CM

## 2021-12-16 DIAGNOSIS — R06.02 SHORTNESS OF BREATH: ICD-10-CM

## 2021-12-16 DIAGNOSIS — I48.91 ATRIAL FIBRILLATION (H): ICD-10-CM

## 2021-12-16 DIAGNOSIS — R09.89 CHEST RALES: Primary | ICD-10-CM

## 2021-12-16 DIAGNOSIS — R09.89 CHEST RALES: ICD-10-CM

## 2021-12-16 DIAGNOSIS — R18.8 OTHER ASCITES: ICD-10-CM

## 2021-12-16 DIAGNOSIS — I48.0 PAROXYSMAL ATRIAL FIBRILLATION (H): ICD-10-CM

## 2021-12-16 DIAGNOSIS — I48.91 ATRIAL FIBRILLATION WITH RAPID VENTRICULAR RESPONSE (H): ICD-10-CM

## 2021-12-16 LAB
ALBUMIN SERPL-MCNC: 2.5 G/DL (ref 3.4–5)
ALBUMIN SERPL-MCNC: 2.8 G/DL (ref 3.4–5)
ALBUMIN UR-MCNC: NEGATIVE MG/DL
ALP SERPL-CCNC: 109 U/L (ref 40–150)
ALP SERPL-CCNC: 115 U/L (ref 40–150)
ALT SERPL W P-5'-P-CCNC: 43 U/L (ref 0–70)
ALT SERPL W P-5'-P-CCNC: 45 U/L (ref 0–70)
ANION GAP SERPL CALCULATED.3IONS-SCNC: 5 MMOL/L (ref 3–14)
ANION GAP SERPL CALCULATED.3IONS-SCNC: 6 MMOL/L (ref 3–14)
APPEARANCE UR: CLEAR
AST SERPL W P-5'-P-CCNC: 36 U/L (ref 0–45)
AST SERPL W P-5'-P-CCNC: 36 U/L (ref 0–45)
ATRIAL RATE - MUSE: 159 BPM
BASOPHILS # BLD AUTO: 0.1 10E3/UL (ref 0–0.2)
BASOPHILS NFR BLD AUTO: 2 %
BILIRUB SERPL-MCNC: 1.2 MG/DL (ref 0.2–1.3)
BILIRUB SERPL-MCNC: 1.3 MG/DL (ref 0.2–1.3)
BILIRUB UR QL STRIP: NEGATIVE
BUN SERPL-MCNC: 23 MG/DL (ref 7–30)
BUN SERPL-MCNC: 24 MG/DL (ref 7–30)
CALCIUM SERPL-MCNC: 7.8 MG/DL (ref 8.5–10.1)
CALCIUM SERPL-MCNC: 8.7 MG/DL (ref 8.5–10.1)
CHLORIDE BLD-SCNC: 111 MMOL/L (ref 94–109)
CHLORIDE BLD-SCNC: 115 MMOL/L (ref 94–109)
CO2 SERPL-SCNC: 25 MMOL/L (ref 20–32)
CO2 SERPL-SCNC: 26 MMOL/L (ref 20–32)
COLOR UR AUTO: ABNORMAL
CREAT SERPL-MCNC: 1.1 MG/DL (ref 0.66–1.25)
CREAT SERPL-MCNC: 1.18 MG/DL (ref 0.66–1.25)
D DIMER PPP FEU-MCNC: 2.9 UG/ML FEU (ref 0–0.5)
DIASTOLIC BLOOD PRESSURE - MUSE: NORMAL MMHG
EOSINOPHIL # BLD AUTO: 0.1 10E3/UL (ref 0–0.7)
EOSINOPHIL NFR BLD AUTO: 2 %
ERYTHROCYTE [DISTWIDTH] IN BLOOD BY AUTOMATED COUNT: 13.7 % (ref 10–15)
ERYTHROCYTE [DISTWIDTH] IN BLOOD BY AUTOMATED COUNT: 13.9 % (ref 10–15)
ERYTHROCYTE [SEDIMENTATION RATE] IN BLOOD BY WESTERGREN METHOD: 4 MM/HR (ref 0–20)
GFR SERPL CREATININE-BSD FRML MDRD: 60 ML/MIN/1.73M2
GFR SERPL CREATININE-BSD FRML MDRD: 66 ML/MIN/1.73M2
GLUCOSE BLD-MCNC: 76 MG/DL (ref 70–99)
GLUCOSE BLD-MCNC: 83 MG/DL (ref 70–99)
GLUCOSE UR STRIP-MCNC: NEGATIVE MG/DL
HCT VFR BLD AUTO: 49 % (ref 40–53)
HCT VFR BLD AUTO: 50 % (ref 40–53)
HGB BLD-MCNC: 15.6 G/DL (ref 13.3–17.7)
HGB BLD-MCNC: 15.7 G/DL (ref 13.3–17.7)
HGB UR QL STRIP: ABNORMAL
IMM GRANULOCYTES # BLD: 0 10E3/UL
IMM GRANULOCYTES NFR BLD: 0 %
INTERPRETATION ECG - MUSE: NORMAL
KETONES UR STRIP-MCNC: NEGATIVE MG/DL
LACTATE SERPL-SCNC: 1.5 MMOL/L (ref 0.7–2)
LEUKOCYTE ESTERASE UR QL STRIP: NEGATIVE
LYMPHOCYTES # BLD AUTO: 0.5 10E3/UL (ref 0.8–5.3)
LYMPHOCYTES NFR BLD AUTO: 10 %
MCH RBC QN AUTO: 30.4 PG (ref 26.5–33)
MCH RBC QN AUTO: 30.6 PG (ref 26.5–33)
MCHC RBC AUTO-ENTMCNC: 31.4 G/DL (ref 31.5–36.5)
MCHC RBC AUTO-ENTMCNC: 31.8 G/DL (ref 31.5–36.5)
MCV RBC AUTO: 95 FL (ref 78–100)
MCV RBC AUTO: 98 FL (ref 78–100)
MONOCYTES # BLD AUTO: 0.6 10E3/UL (ref 0–1.3)
MONOCYTES NFR BLD AUTO: 12 %
MUCOUS THREADS #/AREA URNS LPF: PRESENT /LPF
NEUTROPHILS # BLD AUTO: 3.8 10E3/UL (ref 1.6–8.3)
NEUTROPHILS NFR BLD AUTO: 74 %
NITRATE UR QL: NEGATIVE
NRBC # BLD AUTO: 0 10E3/UL
NRBC BLD AUTO-RTO: 0 /100
NT-PROBNP SERPL-MCNC: 4335 PG/ML (ref 0–125)
NT-PROBNP SERPL-MCNC: 4588 PG/ML (ref 0–125)
P AXIS - MUSE: NORMAL DEGREES
PH UR STRIP: 5 [PH] (ref 5–7)
PLATELET # BLD AUTO: 153 10E3/UL (ref 150–450)
PLATELET # BLD AUTO: 171 10E3/UL (ref 150–450)
POTASSIUM BLD-SCNC: 3.9 MMOL/L (ref 3.4–5.3)
POTASSIUM BLD-SCNC: 4.1 MMOL/L (ref 3.4–5.3)
PR INTERVAL - MUSE: NORMAL MS
PROT SERPL-MCNC: 5.7 G/DL (ref 6.8–8.8)
PROT SERPL-MCNC: 5.9 G/DL (ref 6.8–8.8)
QRS DURATION - MUSE: 106 MS
QT - MUSE: 324 MS
QTC - MUSE: 511 MS
R AXIS - MUSE: -1 DEGREES
RBC # BLD AUTO: 5.13 10E6/UL (ref 4.4–5.9)
RBC # BLD AUTO: 5.14 10E6/UL (ref 4.4–5.9)
RBC URINE: >182 /HPF
SARS-COV-2 RNA RESP QL NAA+PROBE: NEGATIVE
SODIUM SERPL-SCNC: 143 MMOL/L (ref 133–144)
SODIUM SERPL-SCNC: 145 MMOL/L (ref 133–144)
SP GR UR STRIP: 1.01 (ref 1–1.03)
SYSTOLIC BLOOD PRESSURE - MUSE: NORMAL MMHG
T AXIS - MUSE: -23 DEGREES
TROPONIN I SERPL HS-MCNC: 46 NG/L
TROPONIN I SERPL HS-MCNC: 49 NG/L
TSH SERPL DL<=0.005 MIU/L-ACNC: 1.46 MU/L (ref 0.4–4)
UFH PPP CHRO-ACNC: 1.03 IU/ML
UROBILINOGEN UR STRIP-MCNC: NORMAL MG/DL
VENTRICULAR RATE- MUSE: 150 BPM
WBC # BLD AUTO: 4.7 10E3/UL (ref 4–11)
WBC # BLD AUTO: 5.2 10E3/UL (ref 4–11)
WBC URINE: 33 /HPF

## 2021-12-16 PROCEDURE — 96366 THER/PROPH/DIAG IV INF ADDON: CPT

## 2021-12-16 PROCEDURE — 80053 COMPREHEN METABOLIC PANEL: CPT | Performed by: NURSE PRACTITIONER

## 2021-12-16 PROCEDURE — 84484 ASSAY OF TROPONIN QUANT: CPT | Performed by: EMERGENCY MEDICINE

## 2021-12-16 PROCEDURE — 74177 CT ABD & PELVIS W/CONTRAST: CPT

## 2021-12-16 PROCEDURE — 84460 ALANINE AMINO (ALT) (SGPT): CPT | Performed by: PHYSICIAN ASSISTANT

## 2021-12-16 PROCEDURE — 81001 URINALYSIS AUTO W/SCOPE: CPT | Performed by: EMERGENCY MEDICINE

## 2021-12-16 PROCEDURE — 99292 CRITICAL CARE ADDL 30 MIN: CPT

## 2021-12-16 PROCEDURE — 36415 COLL VENOUS BLD VENIPUNCTURE: CPT | Performed by: INTERNAL MEDICINE

## 2021-12-16 PROCEDURE — 250N000011 HC RX IP 250 OP 636: Performed by: PHYSICIAN ASSISTANT

## 2021-12-16 PROCEDURE — 85025 COMPLETE CBC W/AUTO DIFF WBC: CPT | Performed by: NURSE PRACTITIONER

## 2021-12-16 PROCEDURE — 250N000009 HC RX 250: Performed by: PHYSICIAN ASSISTANT

## 2021-12-16 PROCEDURE — 83880 ASSAY OF NATRIURETIC PEPTIDE: CPT | Performed by: PHYSICIAN ASSISTANT

## 2021-12-16 PROCEDURE — 87635 SARS-COV-2 COVID-19 AMP PRB: CPT | Performed by: EMERGENCY MEDICINE

## 2021-12-16 PROCEDURE — 85520 HEPARIN ASSAY: CPT | Performed by: EMERGENCY MEDICINE

## 2021-12-16 PROCEDURE — 85027 COMPLETE CBC AUTOMATED: CPT | Performed by: PHYSICIAN ASSISTANT

## 2021-12-16 PROCEDURE — U0003 INFECTIOUS AGENT DETECTION BY NUCLEIC ACID (DNA OR RNA); SEVERE ACUTE RESPIRATORY SYNDROME CORONAVIRUS 2 (SARS-COV-2) (CORONAVIRUS DISEASE [COVID-19]), AMPLIFIED PROBE TECHNIQUE, MAKING USE OF HIGH THROUGHPUT TECHNOLOGIES AS DESCRIBED BY CMS-2020-01-R: HCPCS | Performed by: PHYSICIAN ASSISTANT

## 2021-12-16 PROCEDURE — 96365 THER/PROPH/DIAG IV INF INIT: CPT | Mod: 59

## 2021-12-16 PROCEDURE — 99207 REFERRAL TO ACUTE AND DIAGNOSTIC SERVICES: CPT | Performed by: NURSE PRACTITIONER

## 2021-12-16 PROCEDURE — 99215 OFFICE O/P EST HI 40 MIN: CPT | Performed by: PHYSICIAN ASSISTANT

## 2021-12-16 PROCEDURE — 84075 ASSAY ALKALINE PHOSPHATASE: CPT | Performed by: PHYSICIAN ASSISTANT

## 2021-12-16 PROCEDURE — 85652 RBC SED RATE AUTOMATED: CPT | Performed by: PHYSICIAN ASSISTANT

## 2021-12-16 PROCEDURE — 99223 1ST HOSP IP/OBS HIGH 75: CPT | Mod: AI | Performed by: INTERNAL MEDICINE

## 2021-12-16 PROCEDURE — U0005 INFEC AGEN DETEC AMPLI PROBE: HCPCS | Performed by: PHYSICIAN ASSISTANT

## 2021-12-16 PROCEDURE — 120N000001 HC R&B MED SURG/OB

## 2021-12-16 PROCEDURE — 99417 PROLNG OP E/M EACH 15 MIN: CPT | Performed by: PHYSICIAN ASSISTANT

## 2021-12-16 PROCEDURE — 36415 COLL VENOUS BLD VENIPUNCTURE: CPT | Performed by: EMERGENCY MEDICINE

## 2021-12-16 PROCEDURE — 85379 FIBRIN DEGRADATION QUANT: CPT | Performed by: NURSE PRACTITIONER

## 2021-12-16 PROCEDURE — 93005 ELECTROCARDIOGRAM TRACING: CPT

## 2021-12-16 PROCEDURE — 96375 TX/PRO/DX INJ NEW DRUG ADDON: CPT

## 2021-12-16 PROCEDURE — 87086 URINE CULTURE/COLONY COUNT: CPT | Performed by: EMERGENCY MEDICINE

## 2021-12-16 PROCEDURE — 96376 TX/PRO/DX INJ SAME DRUG ADON: CPT

## 2021-12-16 PROCEDURE — 36415 COLL VENOUS BLD VENIPUNCTURE: CPT | Performed by: NURSE PRACTITIONER

## 2021-12-16 PROCEDURE — 82247 BILIRUBIN TOTAL: CPT | Performed by: PHYSICIAN ASSISTANT

## 2021-12-16 PROCEDURE — 84443 ASSAY THYROID STIM HORMONE: CPT | Performed by: EMERGENCY MEDICINE

## 2021-12-16 PROCEDURE — 83880 ASSAY OF NATRIURETIC PEPTIDE: CPT | Performed by: NURSE PRACTITIONER

## 2021-12-16 PROCEDURE — 83735 ASSAY OF MAGNESIUM: CPT | Performed by: INTERNAL MEDICINE

## 2021-12-16 PROCEDURE — 83605 ASSAY OF LACTIC ACID: CPT | Performed by: INTERNAL MEDICINE

## 2021-12-16 PROCEDURE — 84155 ASSAY OF PROTEIN SERUM: CPT | Performed by: PHYSICIAN ASSISTANT

## 2021-12-16 PROCEDURE — 99291 CRITICAL CARE FIRST HOUR: CPT | Mod: 25

## 2021-12-16 PROCEDURE — 250N000009 HC RX 250: Performed by: EMERGENCY MEDICINE

## 2021-12-16 PROCEDURE — 84484 ASSAY OF TROPONIN QUANT: CPT | Performed by: PHYSICIAN ASSISTANT

## 2021-12-16 PROCEDURE — 250N000011 HC RX IP 250 OP 636: Performed by: EMERGENCY MEDICINE

## 2021-12-16 PROCEDURE — 84450 TRANSFERASE (AST) (SGOT): CPT | Performed by: PHYSICIAN ASSISTANT

## 2021-12-16 PROCEDURE — 93000 ELECTROCARDIOGRAM COMPLETE: CPT | Performed by: PHYSICIAN ASSISTANT

## 2021-12-16 PROCEDURE — C9803 HOPD COVID-19 SPEC COLLECT: HCPCS

## 2021-12-16 RX ORDER — HEPARIN SODIUM 10000 [USP'U]/100ML
0-5000 INJECTION, SOLUTION INTRAVENOUS CONTINUOUS
Status: DISCONTINUED | OUTPATIENT
Start: 2021-12-16 | End: 2021-12-16 | Stop reason: ALTCHOICE

## 2021-12-16 RX ORDER — DILTIAZEM HYDROCHLORIDE 5 MG/ML
15 INJECTION INTRAVENOUS ONCE
Status: COMPLETED | OUTPATIENT
Start: 2021-12-16 | End: 2021-12-16

## 2021-12-16 RX ORDER — DILTIAZEM HCL IN NACL,ISO-OSM 125 MG/125
5-15 PLASTIC BAG, INJECTION (ML) INTRAVENOUS CONTINUOUS
Status: DISCONTINUED | OUTPATIENT
Start: 2021-12-16 | End: 2021-12-17

## 2021-12-16 RX ORDER — IOPAMIDOL 755 MG/ML
500 INJECTION, SOLUTION INTRAVASCULAR ONCE
Status: COMPLETED | OUTPATIENT
Start: 2021-12-16 | End: 2021-12-16

## 2021-12-16 RX ORDER — VALACYCLOVIR HYDROCHLORIDE 1 G/1
TABLET, FILM COATED ORAL DAILY PRN
COMMUNITY
Start: 2021-09-29

## 2021-12-16 RX ORDER — FUROSEMIDE 10 MG/ML
40 INJECTION INTRAMUSCULAR; INTRAVENOUS ONCE
Status: COMPLETED | OUTPATIENT
Start: 2021-12-16 | End: 2021-12-16

## 2021-12-16 RX ORDER — HEPARIN SODIUM 10000 [USP'U]/100ML
0-5000 INJECTION, SOLUTION INTRAVENOUS CONTINUOUS
Status: DISCONTINUED | OUTPATIENT
Start: 2021-12-16 | End: 2021-12-17

## 2021-12-16 RX ADMIN — IOPAMIDOL 75 ML: 755 INJECTION, SOLUTION INTRAVENOUS at 11:49

## 2021-12-16 RX ADMIN — SODIUM CHLORIDE 97 ML: 9 INJECTION, SOLUTION INTRAVENOUS at 11:49

## 2021-12-16 RX ADMIN — FUROSEMIDE 40 MG: 10 INJECTION, SOLUTION INTRAMUSCULAR; INTRAVENOUS at 13:53

## 2021-12-16 RX ADMIN — DILTIAZEM HYDROCHLORIDE 15 MG: 5 INJECTION, SOLUTION INTRAVENOUS at 14:14

## 2021-12-16 RX ADMIN — Medication 10 MG/HR: at 14:59

## 2021-12-16 RX ADMIN — HEPARIN SODIUM AND DEXTROSE 1000 UNITS/HR: 10000; 5 INJECTION INTRAVENOUS at 14:13

## 2021-12-16 RX ADMIN — Medication 15 MG/HR: at 23:13

## 2021-12-16 RX ADMIN — Medication 5 MG/HR: at 14:11

## 2021-12-16 ASSESSMENT — ACTIVITIES OF DAILY LIVING (ADL)
DRESSING/BATHING_DIFFICULTY: NO
WALKING_OR_CLIMBING_STAIRS_DIFFICULTY: NO
ADLS_ACUITY_SCORE: 7
FALL_HISTORY_WITHIN_LAST_SIX_MONTHS: NO
DIFFICULTY_COMMUNICATING: NO
ADLS_ACUITY_SCORE: 7
DESCRIBE_HEARING_LOSS: BILATERAL HEARING LOSS
TOILETING_ISSUES: NO
ADLS_ACUITY_SCORE: 7
WEAR_GLASSES_OR_BLIND: YES
ADLS_ACUITY_SCORE: 12
ADLS_ACUITY_SCORE: 7
VISION_MANAGEMENT: GLASSES
ADLS_ACUITY_SCORE: 7
WERE_AUXILIARY_AIDS_OFFERED?: YES
DIFFICULTY_EATING/SWALLOWING: NO
HEARING_DIFFICULTY_OR_DEAF: YES
PATIENT'S_PREFERRED_MEANS_OF_COMMUNICATION: VERBAL
ADLS_ACUITY_SCORE: 5
ADLS_ACUITY_SCORE: 12
DOING_ERRANDS_INDEPENDENTLY_DIFFICULTY: NO
ADLS_ACUITY_SCORE: 7
PATIENT_/_FAMILY_COMMUNICATION_STYLE: SPOKEN LANGUAGE (ENGLISH OR BILINGUAL)
CONCENTRATING,_REMEMBERING_OR_MAKING_DECISIONS_DIFFICULTY: NO
WHICH_OF_THE_ABOVE_FUNCTIONAL_RISKS_HAD_A_RECENT_ONSET_OR_CHANGE?: AMBULATION
ADLS_ACUITY_SCORE: 7

## 2021-12-16 ASSESSMENT — MIFFLIN-ST. JEOR: SCORE: 1520.99

## 2021-12-16 ASSESSMENT — ENCOUNTER SYMPTOMS
ABDOMINAL DISTENTION: 1
PALPITATIONS: 0
FEVER: 0
UNEXPECTED WEIGHT CHANGE: 1
SHORTNESS OF BREATH: 1
ABDOMINAL PAIN: 0

## 2021-12-16 NOTE — H&P
[facility]    Hospitalist History and Physical    Name: Amadou Wade    MRN: [id number]  YOB: 1945    Age: 76 year old  Date of Admission:  12/16/2021  Date of Service (when I saw the patient): 12/16/21    Assessment & Plan   Amadou Wade is a 76 year old male with past medical history significant for hypertension hyperlipidemia presents with 2 weeks history of progressive shortness of breath leg edema weight gain.  Patient was evaluated in primary care clinic where further investigation showed atrial fibrillation with RVR and heart failure.    New diagnosis of decompensated congestive heart failure  -Clinical presentation with orthopnea, PND, dyspnea on exertion, leg edema and weight  -Elevated BNP  -Clinical exam consistent with heart failure  -CT studies showing pleural effusion ascites  -EKG shows A. fib with RVR  -Decompensated heart failure possibly due to tachyarrhythmia and/or uncontrolled hypertension/hypertensive cardiomyopathy  -Cannot exclude ACS: Serial troponin  -We will check serial troponins  -IV Lasix 40 mg twice daily  -Continue aspirin and losartan  -We will hold off adding beta-blocker due to acute decompensation at this time  -Cardiac echo  -Cardiology consult    A. fib with RVR  -Started on diltiazem drip in the emergency room will continue  -[place] [facility] [place] is on diltiazem drip  -IV heparin started in the emergency room will continue  -Cardiology consult  -[id number]  -We will check TSH and free T3    Uncontrolled hypertension  -Patient started on diltiazem  -Continue prior to admission  -As needed hydralazine    Hyperlipidemia  -Continue statin    COVID-19 status: Vaccinated  -Preadmission test pending        DVT Prophylaxis: On IV heparin  Code Status: DNR / DNI    Disposition: Admitted as inpatient    Primary Care Physician   Johnathan Sampson    Chief Complaint   Shortness of breath dyspnea on exertion leg edema weight gain 2 to  3-week    History is obtained from the patient    History of Present Illness   Amadou Wade is a 76 year old male with past medical history significant for hypertension, hyperlipidemia was sent from clinic after diagnosis of atrial fibrillation with RVR and heart failure.    Patient went to the clinic for evaluation for shortness of breath, dyspnea on exertion, leg edema, weight gain, orthopnea PND.  Denies any history of chest pain pressure heaviness or tightness no palpitations lightheadedness or dizziness.  No cough fever or chills that he could tell however patient says his wife complains of him coughing at night.  No fever or chills.    Evaluation in the clinic showed A. fib with RVR.  Lab work showed elevated D-dimer which prompted CT chest abdomen and pelvis which showed pleural effusions and ascites.  Patient referred to the emergency room for acute heart failure and A. fib with RVR    More than 10 point review of systems was carried out was otherwise negative    Past Medical History    Past Medical History:   Diagnosis Date     Cardiac dysrhythmia, unspecified 03/2007    few pac's, few pvc's, few runs SVT     Chronic rhinitis     resolved     ED (erectile dysfunction)      Hard of hearing     [facility]     Hyperlipidemia LDL goal <130 2002     Hypertension goal BP (blood pressure) < 140/90 12/2017     Pneumonia, organism unspecified(486) 1993     Snoring     no sleep apnea - mouth guard     Squamous 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 2021    Dr Abernathy - Rt Chest     Unspecified hemorrhoids without mention of complication 04/2006    internal         Past Surgical History   Past Surgical History:   Procedure Laterality Date     COLONOSCOPY  01/03/2012    incomplete - negative, ACBE incomplete - negative, CT colography negative     COLONOSCOPY Bilateral 09/14/2021     ganglion cyst removal Left 09/02/2021     HC REMOVE TONSILS/ADENOIDS,<13 Y/O  1953    T & A <14y.o.     SIGMOIDOSCOPY,DIAGNOSTIC  1997     normal     STRESS ECHO ([facility])  2007    normal few pac's, few pvc's     SURGICAL HISTORY OF -  Bilateral     fall,  - bilateral IOL     ZZHC COLONOSCOPY THRU STOMA, DIAGNOSTIC  2006    dr tejeda - normal - internal hemorrhiods - due 10 yrs       Prior to Admission Medications   Prior to Admission Medications   Prescriptions Last Dose Informant Patient Reported? Taking?   ASPIRIN 81 MG OR TABS   No No   Si tab po QD (Once per day)   calcipotriene (DOVONOX) 0.005 % external cream   No No   Sig: Mix with 5 fluorouracil and apply to aa on the mid chest   diphenhydrAMINE (BENADRYL) 25 MG tablet   Yes No   Sig: Take 1-2 tablets (25-50 mg) by mouth every 6 hours as needed for itching or allergies   fluorouracil (EFUDEX) 5 % external cream   No No   Sig: Apply to aa on chest bid for 3 weeks   Patient not taking: Reported on 12/10/2021   losartan (COZAAR) 50 MG tablet   No No   Sig: Take 0.5 tablets (25 mg) by mouth 2 times daily 1/2 tab   psyllium (METAMUCIL/KONSYL) 58.6 % powder   Yes No   Sig: Take by mouth daily   simvastatin (ZOCOR) 40 MG tablet   No No   Sig: Take 1 tablet (40 mg) by mouth At Bedtime   valACYclovir (VALTREX) 1000 mg tablet   Yes No   Sig: TAKE TWO TABLETS BY MOUTH TWICE A DAY FOR COLD SORES      Facility-Administered Medications Last Administration Doses Remaining   lidocaine 1% with EPINEPHrine 1:100,000 injection 3 mL None recorded 1   sodium chloride (PF) 0.9% PF flush 3 mL 2021 10:57 AM         Allergies   No Known Allergies    Social History   Social History     Tobacco Use     Smoking status: Never Smoker     Smokeless tobacco: Never Used   Substance Use Topics     Alcohol use: Yes     Alcohol/week: 0.0 - 0.8 standard drinks     Comment: 0-1 per week avg     Social History     Social History Narrative     Not on file     Retired , non-smoker no use of alcohol    Family History   I have reviewed this patient's family history and updated it with pertinent information if  needed.   Family History   Problem Relation Age of Onset     Hypertension Mother      Cancer Father         lung     Eye Disorder Father         Glaucoma     Alzheimer Disease Father      Heart Disease Father      Diabetes Father         type 2     Diabetes Brother         type 2     Coronary Artery Disease Brother      Cerebrovascular Disease Maternal Grandmother      Heart Disease Maternal Grandfather          young heartattack     Diabetes Paternal Grandfather      Cancer Brother         melanoma     Alzheimer Disease Brother      Prostate Cancer Brother      Cerebrovascular Disease Brother          Review of Systems   A Comprehensive greater than 10 system review of systems was carried out.  Pertinent positives and negatives are noted above.  Otherwise negative for contributory information.    Physical Exam   Temp: (!) 96.1  F (35.6  C) Temp src: Temporal BP: (!) 136/126 Pulse: (!) 157   Resp: 20 SpO2: 100 % O2 Device: None (Room air)    Vital Signs with Ranges  Temp:  [96.1  F (35.6  C)-97.4  F (36.3  C)] 96.1  F (35.6  C)  Pulse:  [] 157  Resp:  [18-20] 20  BP: (116-137)/() 136/126  SpO2:  [97 %-100 %] 100 %  0 lbs 0 oz    GEN:  Alert, oriented x 3, appears comfortable, no overt distress  HEENT:  Normocephalic/atraumatic, no scleral icterus, no nasal discharge, mouth dry  CV: Tachycardic irregularly irregular elevated JVD, questionable systolic.  S1 + S2 noted, no S3 or S4.  LUNGS: Poor air entry bases, bibasilar crackles and rales symmetric chest rise on inhalation noted.  ABD:  Active bowel sounds, soft, non-tender/non-distended.  No rebound/guarding/rigidity.  EXT: +2 pitting edema bilaterally.  No cyanosis.  No joint synovitis noted.  SKIN:  Dry to touch, no exanthems noted in the visualized areas.  NEURO:  Symmetric muscle strength,   No new focal deficits appreciated.    Data   Data reviewed today:  I personally reviewed the EKG tracing showing Atrial Fibrillation with RVR.  Right bundle  branch block with nonspecific ST-T change    No results for input(s): PH, PHV, PO2, PO2V, SAT, PCO2, PCO2V, HCO3, HCO3V in the last 168 hours.  Recent Labs   Lab 12/16/21  1058 12/16/21  0922   WBC 5.2 4.7   HGB 15.7 15.6   HCT 50.0 49.0   MCV 98 95    153     Recent Labs   Lab 12/16/21  1058 12/16/21  0922   * 143   POTASSIUM 4.1 3.9   CHLORIDE 115* 111*   CO2 25 26   ANIONGAP 5 6   GLC 76 83   BUN 23 24   CR 1.10 1.18   GFRESTIMATED 66 60*   STEPHEN 7.8* 8.7     No results for input(s): CULT in the last 168 hours.  Recent Labs   Lab 12/16/21  1058 12/16/21  0922   NTBNP 4,335* 4,588*     Recent Labs   Lab 12/16/21  1058   SED 4     Recent Labs   Lab 12/16/21  1058 12/16/21  0922   GLC 76 83     Recent Labs   Lab 12/16/21  1058 12/16/21  0922   HGB 15.7 15.6     Recent Labs   Lab 12/16/21  1058 12/16/21  0922   AST 36 36   ALT 43 45   ALKPHOS 109 115   BILITOTAL 1.2 1.3     No results for input(s): INR in the last 168 hours.  No results for input(s): LACT in the last 168 hours.  No results for input(s): LIPASE in the last 168 hours.  Recent Labs   Lab 12/16/21  1058 12/16/21  0922   BUN 23 24   CR 1.10 1.18     No results for input(s): TSH in the last 168 hours.  No results for input(s): TROPONIN, TROPI, TROPR in the last 168 hours.    Invalid input(s): TROP, TROPONINIES, TNIH  No results for input(s): COLOR, APPEARANCE, URINEGLC, URINEBILI, URINEKETONE, SG, UBLD, URINEPH, PROTEIN, UROBILINOGEN, NITRITE, LEUKEST, RBCU, WBCU in the last 168 hours.    Recent Results (from the past 24 hour(s))   CT Chest (PE) Abdomen Pelvis w Contrast    Narrative    CT CHEST PE ABDOMEN AND PELVIS WITH CONTRAST 12/16/2021 12:08 PM    CLINICAL HISTORY: PE suspected, low/intermediate prob, negative  D-dimer; Shortness of breath; Chest rales.    TECHNIQUE: CT angiogram chest and routine CT abdomen pelvis with IV  contrast. Arterial phase through the chest and venous phase through  the abdomen and pelvis. 2D and 3D MIP  reconstructions were performed  by the CT technologist. Dose reduction techniques were used.     CONTRAST: 75mL Isovue-370    COMPARISON: None.    FINDINGS:  ANGIOGRAM CHEST: Pulmonary arteries are normal caliber and negative  for pulmonary emboli. Thoracic aorta is negative for dissection.    LUNGS AND PLEURA: Moderate to large bilateral pleural effusions, right  greater than left. Compressive atelectasis present in both lower  lobes.    MEDIASTINUM/AXILLAE: The heart is enlarged, particularly the atria.  Caliber of the thoracic aorta is within normal limits. Thyroid gland  is unremarkable. No mediastinal, hilar, or axillary lymphadenopathy.    CORONARY ARTERY CALCIFICATION: Moderate.    HEPATOBILIARY: Fatty infiltration of the liver without evidence of  focal lesion. Gallbladder is not distended.    PANCREAS: Normal.    SPLEEN: Normal.    ADRENAL GLANDS: Normal.    KIDNEYS/BLADDER: Simple renal cysts do not are specific follow-up. No  hydronephrosis or worrisome renal mass. Urinary bladder unremarkable.    BOWEL: No bowel obstruction or inflammatory change. No free  intraperitoneal air. There is a small amount of ascites.    LYMPH NODES: Normal.    PELVIC ORGANS: Normal.    OTHER: Nonaneurysmal aortic atherosclerosis. No adenopathy. Diffuse  edematous appearance of subcutaneous and intraperitoneal fat.    MUSCULOSKELETAL: No destructive bone lesions.      Impression    IMPRESSION:  Cardiomegaly, moderate to large bilateral pleural effusions, ascites,  and anasarca suggestive of CHF.    STACIE COHEN MD         SYSTEM ID:  [id number]

## 2021-12-16 NOTE — ED PROVIDER NOTES
History   Chief Complaint:  Shortness of Breath      The history is provided by the patient.      Amadou Wade is a 76 year old male with history of hypertension and hyperlipidema who presents via EMS for evaluation of shortness of breath. Approximately two weeks ago the patient started to develop new shortness of breath that is worse with exertion. Since then this shortness of breath has been progressively worsening. His shortness of breath is worse when laying down and he has had difficulty sleeping at night due to this. Due to his ongoing symptoms today he was seen in his clinic where he was noted to have new atrial fibrillation, a BNP of 4,588, and cardiomegaly and moderate to large bilateral pleural effusions, ascites, and anasarca suggestive of CHF on a chest/abdomen/pelvis CT. He has not previously been diagnosed with atrial fibrillation or CHF. Due to these findings, the patient was sent into the ED for further evaluation and management. He notes that since Thanksgiving he has gained ten pounds, he feels his abdomen is abnormally distended, and he has had some leg swelling. He denies any recent fever, chest pain, palpitations, or abdominal pain.     CT Chest PE Abdomen Pelvis w Contrast 12/16/2021:  IMPRESSION:  Cardiomegaly, moderate to large bilateral pleural effusions, ascites, and anasarca suggestive of CHF.    Labs 12/16/2021:  WBC: 4.7, HGB 15.6,   Chemistry: Chloride 111 high, GFR estimate 60 low, Albumin 2.8 low, Protein total 5.9 low, Creatinine 1.18   BNP: 4,588 high   D dimer quantitative: 2.90 high     Review of Systems   Constitutional: Positive for unexpected weight change. Negative for fever.   Respiratory: Positive for shortness of breath.    Cardiovascular: Positive for leg swelling. Negative for chest pain and palpitations.   Gastrointestinal: Positive for abdominal distention. Negative for abdominal pain.   All other systems reviewed and are negative.    Allergies:  No known  drug allergies     Medications:  Aspirin   Benadryl   Losartan  Psyllium  Zocor  Valtrex     Past Medical History:     Hypertension   Hyperlipidemia  ED  Chronic rhinitis  Squamous 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   Cardiac dysrhythmia       Past Surgical History:    Colonoscopy  Tonsillectomy and adenoidectomy  Sigmoidoscopy  Stress echo  Bilateral IOL     Family History:    Hypertension - Mother   Lung cancer - Father   Glaucoma - Father   Alzheimer disease - Father and brother   Heart disease - Father and brother   Diabetes mellitus, type II - Father and brother   Melanoma - Brother   Cerebrovascular disease - Brother     Social History:  Marital status:    The patient presents to the ED alone.     Physical Exam     Patient Vitals for the past 24 hrs:   BP Temp Temp src Pulse Resp SpO2 Weight   12/16/21 1530 -- -- -- 114 22 -- --   12/16/21 1515 -- -- -- 120 21 -- --   12/16/21 1500 -- -- -- (!) 131 12 -- --   12/16/21 1445 -- -- -- -- 30 -- --   12/16/21 1430 -- -- -- 109 21 -- --   12/16/21 1423 -- -- -- -- -- -- 81.6 kg (180 lb)   12/16/21 1419 -- -- -- (!) 146 22 -- --   12/16/21 1400 (!) 121/109 -- -- (!) 137 20 -- --   12/16/21 1310 -- (!) 96.1  F (35.6  C) Temporal -- -- -- --   12/16/21 1307 (!) 136/126 -- -- (!) 157 20 100 % --       Physical Exam  Constitutional: Vital signs reviewed as above.   Head: No external signs of trauma noted.  Eyes: Pupils are equal, round, and reactive to light.   Neck: No JVD noted  Cardiovascular: Tachycardic rate, irregular rhythm and normal heart sounds.  No murmur heard. Equal B/L peripheral pulses.  Pulmonary/Chest: Effort normal and breath sounds normal. No respiratory distress. Patient has no wheezes. Patient has no rales.   Gastrointestinal: Soft. There is no tenderness.   Musculoskeletal/Extremities: there is pitting edema noted in the B/L LE. Normal tone.  Neurological: Patient is alert and oriented to person, place, and time.   Skin: Skin is warm and  dry. There is no diaphoresis noted.   Psychiatric: The patient appears calm.      Emergency Department Course   ECG  ECG obtained at 13:16:20, ECG read at 1320  Atrial fibrillation with rapid ventricular response, Incomplete right bundle branch block, Abnormal ECG    Atrial fibrillation with RVR new as compared to prior, dated 9/22/2020.  Rate 150 bpm. TX interval * ms. QRS duration 106 ms. QT/QTc 324/511 ms. P-R-T axes * -1 -23.      Laboratory:  Asymptomatic COVID19 Virus PCR by nasopharyngeal swab: Negative   Troponin (Collected 1420): 46   TSH with free T4 reflex: 1.46      UA: Pending    Emergency Department Course:  Reviewed:  I reviewed nursing notes, vitals and past medical history    Assessments:  1322: I obtained history and examined the patient as noted above.   1722:   Rechecked. HR improved.   1900:   Rechecked. Patient notes hematuria. Will send UA.     Consults:  1338: I spoke with Dr. Thompson of the hospitalist service regarding patient's presentation, findings, and plan of care.     Interventions:  1353 Lasix 40 mg IV   1411 Diltiazem 5 mg/hr IV  1413 Heparin 1,000 units/hr IV   1414 Heparin 4,950 units IV    1414 Diltiazem 15 mg IV   1459 Diltiazem 10 mg/hr IV     Disposition:  The patient was admitted to the hospital under the care of Dr. Thompson.     Impression & Plan     CMS Diagnoses: None    Medical Decision Making:  This 76-year-old male patient presents to the ED due to atrial fibrillation with rapid ventricular response. Please see the HPI and exam for specifics. By the time the patient arrived here, his work-up in been almost completely completed. I added on a TSH and ordered an additional troponin (both of which were normal). The patient's heart rate improved with IV Cardizem though his rhythm is still atrial fibrillation. He also received IV Lasix. He was seen by the hospitalist team who will admit him to the hospital. He will continue to be monitored on Cardizem and heparin  infusions.    Critical Care Note:    Organ systems at risk for life threatening failure: Cardiovascular  Associated problems: Arrhythmia  Critical Interventions: Anti-Arrhythmics    Total Time: 35 minutes (excluding separately billed procedures)     Includes: Bedside management, Case discussion related to critical care, Documentation, Multiple re-evaluations, Record review, and Test review. Additionally, this includes Cardiac Monitor Interpretation     Excludes: EKG Interpretation    Covid-19  Amadou Wade was evaluated during a global COVID-19 pandemic, which necessitated consideration that the patient might be at risk for infection with the SARS-CoV-2 virus that causes COVID-19.   Applicable protocols for evaluation were followed during the patient's care.   COVID-19 was considered as part of the patient's evaluation. The plan for testing is:  a test was obtained during this visit.      Diagnosis:    ICD-10-CM    1. Atrial fibrillation with rapid ventricular response (H)  I48.91           Scribe Disclosure:  Sam VERGARA, am serving as a scribe at 1:15 PM on 12/16/2021 to document services personally performed by Shawn Davis DO based on my observations and the provider's statements to me.         Shawn Davis DO  12/16/21 1730       Shawn Davis DO  12/16/21 1901

## 2021-12-16 NOTE — PROGRESS NOTES
Assessment & Plan     Shortness of breath  Secondary to new onset atrial fib and pleural effusions  EKG show rate of 145  BNP elevated  - Referral to Acute and Diagnostic Services (Day of diagnostic / First order acute)  - CBC with platelets and differential  - Comprehensive metabolic panel (BMP + Alb, Alk Phos, ALT, AST, Total. Bili, TP)  - Troponin I  - EKG 12-lead complete w/read - Clinics  - BNP-N terminal pro  - Symptomatic; Unknown COVID-19 Virus (Coronavirus) by PCR Nose    Chest rales  Secondary to pleural effusions  - sodium chloride (PF) 0.9% PF flush 3 mL  - CBC with platelets and differential  - Comprehensive metabolic panel (BMP + Alb, Alk Phos, ALT, AST, Total. Bili, TP)  - BNP-N terminal pro    Generalized abdominal swelling  Ascites secondary to fluid overload  - CBC with platelets and differential  - Comprehensive metabolic panel (BMP + Alb, Alk Phos, ALT, AST, Total. Bili, TP)  - ESR: Erythrocyte sedimentation rate    Abnormal weight gain  Fluid overload due to new onset atrial fib   - ESR: Erythrocyte sedimentation rate  - BNP-N terminal pro    New onset atrial fibrillation (H)  May have started 2 weeks ago  Symptoms have just worsened since that time    Pleural effusion  Secondary to fluid overload and atrial fib    Other ascites  Secondary to heart conditions      Review of external notes as documented elsewhere in note  90 minutes spent on the date of the encounter doing chart review, review of outside records, review of test results, interpretation of tests, patient visit and documentation      Referral to the ED as we can not admit to the hospital    Return today (on 12/16/2021).    FLO Smith [facility] LONI Tobar is a 76 year old who presents for the following health issues     HPI     Shortness of Breath/Breathing Problem  Onset/Duration: X 2 weeks  Progression of Symptoms: worsening  Accompanying Signs & Symptoms:  SOB at rest: YES-  minor, although he notes it does keep him awake at night  SOB with activity: YES  Pain with inspiration: no  Cough: YES- productive cough  Pink tinged sputum: no  Sweating: no  Nausea/vomiting: no  Lightheadedness: YES  Palpitations: no  Fever/Chills: no           Heartburn: no  History:   Family history of coagulation disorders: no  Tobacco use: no  Previous similar symptoms: no   Precipitating factors:            Related to eating: no           Better with burping: YES- At times  Alleviating factors: none  Therapies tried and outcome: none.      Review of Systems   Constitutional, HEENT, cardiovascular, pulmonary, GI, , musculoskeletal, neuro, skin, endocrine and psych systems are negative, except as otherwise noted.      Objective    Wt 82.6 kg (182 lb)   BMI 26.11 kg/m    Body mass index is 26.11 kg/m .  Physical Exam   GENERAL: healthy, alert and no distress  EYES: Eyes grossly normal to inspection, PERRL and conjunctivae and sclerae normal  HENT: ear canals and TM's normal, nose and mouth without ulcers or lesions  NECK: no adenopathy, no asymmetry, masses, or scars and thyroid normal to palpation  RESP: Positive for rales and decreased breath sounds  CV: regular rate and rhythm, normal S1 S2, no S3 or S4, no murmur, click or rub, no peripheral edema and peripheral pulses strong  ABDOMEN: Positive for abdominal distention, likely ascites  MS: Positive for lower extremity edema 1 + up to mid calves  SKIN: Positive for swelling, edema of lower extremities  NEURO: Normal strength and tone, mentation intact and speech normal    Results for orders placed or performed during the hospital encounter of 12/16/21   CT Chest (PE) Abdomen Pelvis w Contrast     Status: None    Narrative    CT CHEST PE ABDOMEN AND PELVIS WITH CONTRAST 12/16/2021 12:08 PM    CLINICAL HISTORY: PE suspected, low/intermediate prob, negative  D-dimer; Shortness of breath; Chest rales.    TECHNIQUE: CT angiogram chest and routine CT abdomen  pelvis with IV  contrast. Arterial phase through the chest and venous phase through  the abdomen and pelvis. 2D and 3D MIP reconstructions were performed  by the CT technologist. Dose reduction techniques were used.     CONTRAST: 75mL [id number]-[id number]    COMPARISON: None.    FINDINGS:  ANGIOGRAM CHEST: Pulmonary arteries are normal caliber and negative  for pulmonary emboli. Thoracic aorta is negative for dissection.    LUNGS AND PLEURA: Moderate to large bilateral pleural effusions, right  greater than left. Compressive atelectasis present in both lower  lobes.    MEDIASTINUM/AXILLAE: The heart is enlarged, particularly the atria.  Caliber of the thoracic aorta is within normal limits. Thyroid gland  is unremarkable. No mediastinal, hilar, or axillary lymphadenopathy.    CORONARY ARTERY CALCIFICATION: Moderate.    HEPATOBILIARY: Fatty infiltration of the liver without evidence of  focal lesion. Gallbladder is not distended.    PANCREAS: Normal.    SPLEEN: Normal.    ADRENAL GLANDS: Normal.    KIDNEYS/BLADDER: Simple renal cysts do not are specific follow-up. No  hydronephrosis or worrisome renal mass. Urinary bladder unremarkable.    BOWEL: No bowel obstruction or inflammatory change. No free  intraperitoneal air. There is a small amount of ascites.    LYMPH NODES: Normal.    PELVIC ORGANS: Normal.    OTHER: Nonaneurysmal aortic atherosclerosis. No adenopathy. Diffuse  edematous appearance of subcutaneous and intraperitoneal fat.    MUSCULOSKELETAL: No destructive bone lesions.      Impression    IMPRESSION:  Cardiomegaly, moderate to large bilateral pleural effusions, ascites,  and anasarca suggestive of CHF.    STACIE COHEN MD         SYSTEM ID:  [id number]   Results for orders placed or performed in visit on 12/16/21   Comprehensive metabolic panel (BMP + Alb, Alk Phos, ALT, AST, Total. Bili, TP)     Status: Abnormal   Result Value Ref Range    Sodium 145 (H) 133 - 144 mmol/L    Potassium 4.1 3.4 - 5.3 mmol/L     Chloride 115 (H) 94 - 109 mmol/L    Carbon Dioxide (CO2) 25 20 - 32 mmol/L    Anion Gap 5 3 - 14 mmol/L    Urea Nitrogen 23 7 - 30 mg/dL    Creatinine 1.10 0.66 - 1.25 mg/dL    Calcium 7.8 (L) 8.5 - 10.1 mg/dL    Glucose 76 70 - 99 mg/dL    Alkaline Phosphatase 109 40 - 150 U/L    AST 36 0 - 45 U/L    ALT 43 0 - 70 U/L    Protein Total 5.7 (L) 6.8 - 8.8 g/dL    Albumin 2.5 (L) 3.4 - 5.0 g/dL    Bilirubin Total 1.2 0.2 - 1.3 mg/dL    GFR Estimate 66 >60 mL/min/1.73m2   Troponin I     Status: Normal   Result Value Ref Range    Troponin I High Sensitivity 49 <79 ng/L   BNP-N terminal pro     Status: Abnormal   Result Value Ref Range    N Terminal Pro BNP Outpatient 4,335 (H) 0 - 125 pg/mL   CBC with platelets and differential     Status: Abnormal   Result Value Ref Range    WBC Count 5.2 4.0 - 11.0 10e3/uL    RBC Count 5.13 4.40 - 5.90 10e6/uL    Hemoglobin 15.7 13.3 - 17.7 g/dL    Hematocrit 50.0 40.0 - 53.0 %    MCV 98 78 - 100 fL    MCH 30.6 26.5 - 33.0 pg    MCHC 31.4 (L) 31.5 - 36.5 g/dL    RDW 13.9 10.0 - 15.0 %    Platelet Count 171 150 - 450 10e3/uL    % Neutrophils 74 %    % Lymphocytes 10 %    % Monocytes 12 %    % Eosinophils 2 %    % Basophils 2 %    % Immature Granulocytes 0 %    NRBCs per 100 WBC 0 <1 /100    Absolute Neutrophils 3.8 1.6 - 8.3 10e3/uL    Absolute Lymphocytes 0.5 (L) 0.8 - 5.3 10e3/uL    Absolute Monocytes 0.6 0.0 - 1.3 10e3/uL    Absolute Eosinophils 0.1 0.0 - 0.7 10e3/uL    Absolute Basophils 0.1 0.0 - 0.2 10e3/uL    Absolute Immature Granulocytes 0.0 <=0.4 10e3/uL    Absolute NRBCs 0.0 10e3/uL   CBC with platelets and differential     Status: Abnormal    Narrative    The following orders were created for panel order CBC with platelets and differential.  Procedure                               Abnormality         Status                     ---------                               -----------         ------                     CBC with platelets and d...[[id number]]  Abnormal             Final result                 Please view results for these tests on the individual orders.   Results for orders placed or performed in visit on 12/16/21   Comprehensive metabolic panel (BMP + Alb, Alk Phos, ALT, AST, Total. Bili, TP)     Status: Abnormal   Result Value Ref Range    Sodium 143 133 - 144 mmol/L    Potassium 3.9 3.4 - 5.3 mmol/L    Chloride 111 (H) 94 - 109 mmol/L    Carbon Dioxide (CO2) 26 20 - 32 mmol/L    Anion Gap 6 3 - 14 mmol/L    Urea Nitrogen 24 7 - 30 mg/dL    Creatinine 1.18 0.66 - 1.25 mg/dL    Calcium 8.7 8.5 - 10.1 mg/dL    Glucose 83 70 - 99 mg/dL    Alkaline Phosphatase 115 40 - 150 U/L    AST 36 0 - 45 U/L    ALT 45 0 - 70 U/L    Protein Total 5.9 (L) 6.8 - 8.8 g/dL    Albumin 2.8 (L) 3.4 - 5.0 g/dL    Bilirubin Total 1.3 0.2 - 1.3 mg/dL    GFR Estimate 60 (L) >60 mL/min/1.73m2   D dimer, quantitative     Status: Abnormal   Result Value Ref Range    D-Dimer Quantitative 2.90 (H) 0.00 - 0.50 ug/mL FEU    Narrative    This D-dimer assay is intended for use in conjunction with a clinical pretest probability assessment model to exclude pulmonary embolism (PE) and deep venous thrombosis (DVT) in outpatients suspected of PE or DVT. The cut-off value is 0.50 ug/mL FEU.   CBC with platelets     Status: Normal   Result Value Ref Range    WBC Count 4.7 4.0 - 11.0 10e3/uL    RBC Count 5.14 4.40 - 5.90 10e6/uL    Hemoglobin 15.6 13.3 - 17.7 g/dL    Hematocrit 49.0 40.0 - 53.0 %    MCV 95 78 - 100 fL    MCH 30.4 26.5 - 33.0 pg    MCHC 31.8 31.5 - 36.5 g/dL    RDW 13.7 10.0 - 15.0 %    Platelet Count 153 150 - 450 10e3/uL

## 2021-12-16 NOTE — ED TRIAGE NOTES
Pt presents today with shortness of breath after going to the clinic and new onset of afib being discovered. Pt reports feeling short of breath x2 weeks. Pt also states he was diagnosed with CHF this morning.

## 2021-12-16 NOTE — ED NOTES
[facility]  ED Nurse Handoff Report    Amadou Wade is a 76 year old male   ED Chief complaint: Shortness of Breath  . ED Diagnosis:   Final diagnoses:   Atrial fibrillation with rapid ventricular response (H)     Allergies: No Known Allergies    Code Status: Full Code  Activity level - Baseline/Home:  Stand by Assist. Activity Level - Current:   Stand by Assist. Lift room needed: No. Bariatric: No   Needed: No   Isolation: No. Infection: Not Applicable.     Vital Signs:   Vitals:    12/16/21 1445 12/16/21 1500 12/16/21 1515 12/16/21 1530   BP:       Pulse:  (!) 131 120 114   Resp: 30 12 21 22   Temp:       TempSrc:       SpO2:       Weight:           Cardiac Rhythm:  ,      Pain level:    Patient confused: No. Patient Falls Risk: Yes.   Elimination Status: Has voided   Patient Report - Initial Complaint: SOB, found to have new onset A-fib. Focused Assessment: Patient found to have new onset A-Fib, after episodes of SOB   Tests Performed: Labs, Imaging. Abnormal Results:   Labs Ordered and Resulted from Time of ED Arrival to Time of ED Departure   TROPONIN I - Normal       Result Value    Troponin I High Sensitivity 46     COVID-19 VIRUS (CORONAVIRUS) BY PCR - Normal    SARS CoV2 PCR Negative     TSH WITH FREE T4 REFLEX - Normal    TSH 1.46       No orders to display   .   Treatments provided:   Labs Ordered and Resulted from Time of ED Arrival to Time of ED Departure   TROPONIN I - Normal       Result Value    Troponin I High Sensitivity 46     COVID-19 VIRUS (CORONAVIRUS) BY PCR - Normal    SARS CoV2 PCR Negative     TSH WITH FREE T4 REFLEX - Normal    TSH 1.46       No orders to display     Family Comments: Family updated  OBS brochure/video discussed/provided to patient:  N/A  ED Medications:   Medications   diltiazem (CARDIZEM) 125 mg in sodium chloride 0.7 % 125 mL infusion (10 mg/hr Intravenous New Bag 12/16/21 3258)   heparin infusion 25,000 units in D5W 250 mL ANTICOAGULANT (1,000  Units/hr Intravenous New Bag 12/16/21 1413)   diltiazem (CARDIZEM) injection 15 mg (15 mg Intravenous Given 12/16/21 1414)   furosemide (LASIX) injection 40 mg (40 mg Intravenous Given 12/16/21 1353)   heparin loading dose for LOW INTENSITY TREATMENT * Give BEFORE starting heparin infusion (4,950 Units Intravenous Given 12/16/21 1414)     Drips infusing:  No  For the majority of the shift, the patient's behavior Green. Interventions performed were None.    Sepsis treatment initiated: No     Patient tested for COVID 19 prior to admission: YES    ED Nurse Name/Phone Number: Rebecca Hammer RN,   3:55 PM    RECEIVING UNIT ED HANDOFF REVIEW    Above ED Nurse Handoff Report was reviewed: Yes  Reviewed by: Venus Martinez RN on December 16, 2021 at 9:07 PM

## 2021-12-16 NOTE — PHARMACY-ADMISSION MEDICATION HISTORY
Admission medication history interview status for this patient is complete. See [facility] admission navigator for allergy information, prior to admission medications and immunization status.     Medication history interview done, indicate source(s): Patient @ [phone]  Medication history resources (including written lists, pill bottles, clinic record):None      Changes made to PTA medication list:  Added: none  Changed: benadryl and zocor  Removed: dovonox cream, efudex cream and metamucil powder    Actions taken by pharmacist (provider contacted, etc):None     Additional medication history information:None    Medication reconciliation/reorder completed by provider prior to medication history?  n   (Y/N)     For patients on insulin therapy:   Do you use sliding scale insulin based on blood sugars?   What is your pre-meal insulin coverage?    Do you typically eat three meals a day?   How many times do you check your blood glucose per day?   How many episodes of hypoglycemia do you typically have per month?   Do you have a Continuous Glucose Monitor (CGM)?      Prior to Admission medications    Medication Sig Last Dose Taking? Auth Provider   ASPIRIN 81 MG OR TABS 1 tab po QD (Once per day) 12/16/2021 at am Yes Johnathan Sampson MD   diphenhydrAMINE (BENADRYL) 25 MG tablet Take 50 mg by mouth At Bedtime  12/15/2021 at Unknown time Yes Johnathan Sampson MD   losartan (COZAAR) 50 MG tablet Take 0.5 tablets (25 mg) by mouth 2 times daily 1/2 tab 12/16/2021 at am Yes Johnathan Sampson MD   simvastatin (ZOCOR) 40 MG tablet Take 1 tablet (40 mg) by mouth At Bedtime  Patient taking differently: Take 20 mg by mouth At Bedtime  12/15/2021 at Unknown time Yes Johnathan Sampson MD   valACYclovir (VALTREX) 1000 mg tablet TAKE TWO TABLETS BY MOUTH TWICE A DAY FOR COLD SORES More than a month at Unknown time Yes Reported, Patient

## 2021-12-16 NOTE — PROGRESS NOTES
Assessment & Plan     Amadou was seen today for back pain and shortness of breath.    Diagnoses and all orders for this visit:    Shortness of breath  Associated chest rales, lower extremity edema, weight gain and upper back pain.    Cardiac versus pulmonary etiology.   Referral to [facility]S for further evaluation and imaging.   -     XR Chest 2 Views; Future  -     Comprehensive metabolic panel (BMP + Alb, Alk Phos, ALT, AST, Total. Bili, TP)  -     BNP-N terminal pro  -     D dimer, quantitative  -     CBC with platelets  -     Referral to Acute and Diagnostic Services (Day of diagnostic / First order acute); Future        45 minutes spent on the date of the encounter doing chart review, history and exam, documentation and further activities per the note    Return in about 1 day (around 12/17/2021) for ADS evaluation.    ROSENDO Link CNP  [facility] [place]        Referral to Acute and Diagnostic Services    The [facility] Acute and Diagnostics Services Clinic has been contacted at [phone] ([place]) to confirm patient acceptance. The transition to Acute & Diagnostic Services Clinic has been discussed with patient, and he agrees with next level of care.  Patient understands that evaluation/treatment at [facility] typically takes significantly longer than in clinic/urgent care (>2 hours).    Special issues:  None        Referral placed: Yes  Patient has transportation arranged and will travel to the [facility] without delay: Yes      ROSENDO Link CNP          Henry Tobar is a 76 year old who presents for the following health issues  accompanied by his self.    HPI     Pain History:  When did you first notice your pain? - 1 to 6 weeks   Have you seen any provider previously for this issue? 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 Back Pain - middle upper, between shoulder  darrion  [id number]}    Concern - SOB     Patient reports onset of shortness of breath 2 weeks ago.  He reports not saying anything to her wife, but then she noticed the harder breathing.    Upper back ache - intermittent, more noticeable with riding in car or sitting upright.    Shortness of breath at rest and with activity.    No chest pain.    No recent URI symptoms, denies cough.   Low energy.    Difficulty with sleeping due to shortness of breath, this is keeping him awake.      Gain of 12 lbs since Thanksgiving.      [facility]  MRI completed prior to left wrist surgery.    Left wrist surgery 9/2/21 - cyst removed.     Onset: x 2 weeks  Description: Shortness of Breath   Intensity: moderate  Progression of Symptoms:  worsening and constant  Accompanying Signs & Symptoms: Lightheaded at times  Previous history of similar problem: none  Precipitating factors:        Worsened by: walking up stairs, tying shoes, sleep is hard (not sleeping well)  Alleviating factors:        Improved by: No  Therapies tried and outcome: Rest    Weight Gain Concerns - 1 month       Review of Systems   Constitutional, HEENT, cardiovascular, pulmonary, gi and gu systems are negative, except as otherwise noted.      Objective    /68   Pulse 75   Temp (!) 96.3  F (35.7  C) (Tympanic)   Wt 82.6 kg (182 lb)   SpO2 97%   BMI 26.11 kg/m    Body mass index is 26.11 kg/m .  Physical Exam     GENERAL: healthy, alert and no distress  EYES: Eyes grossly normal to inspection, PERRL and conjunctivae and sclerae normal  RESP: good air entry throughout - no wheezes and rales bilateral lower lobes  CV: regular rate and rhythm, normal S1 S2, no S3 or S4, no murmur, click or rub, +1 pitting LE edema  ABDOMEN: soft, nontender, slightly distended  NEURO: Normal strength and tone, mentation intact and speech normal  PSYCH: mentation appears normal, affect normal/bright

## 2021-12-17 ENCOUNTER — APPOINTMENT (OUTPATIENT)
Dept: CARDIOLOGY | Facility: CLINIC | Age: 76
DRG: 309 | End: 2021-12-17
Attending: INTERNAL MEDICINE
Payer: COMMERCIAL

## 2021-12-17 ENCOUNTER — LAB (OUTPATIENT)
Dept: LAB | Facility: CLINIC | Age: 76
End: 2021-12-17
Payer: COMMERCIAL

## 2021-12-17 ENCOUNTER — APPOINTMENT (OUTPATIENT)
Dept: OCCUPATIONAL THERAPY | Facility: CLINIC | Age: 76
DRG: 309 | End: 2021-12-17
Attending: INTERNAL MEDICINE
Payer: COMMERCIAL

## 2021-12-17 DIAGNOSIS — I48.91 ATRIAL FIBRILLATION WITH RAPID VENTRICULAR RESPONSE (H): Primary | ICD-10-CM

## 2021-12-17 DIAGNOSIS — I50.9 CHF (CONGESTIVE HEART FAILURE) (H): Primary | ICD-10-CM

## 2021-12-17 DIAGNOSIS — L98.9 SKIN LESION: Primary | ICD-10-CM

## 2021-12-17 LAB
ANION GAP SERPL CALCULATED.3IONS-SCNC: 9 MMOL/L (ref 3–14)
BUN SERPL-MCNC: 21 MG/DL (ref 7–30)
CALCIUM SERPL-MCNC: 8.5 MG/DL (ref 8.5–10.1)
CHLORIDE BLD-SCNC: 107 MMOL/L (ref 94–109)
CO2 SERPL-SCNC: 26 MMOL/L (ref 20–32)
CREAT SERPL-MCNC: 1.1 MG/DL (ref 0.66–1.25)
ERYTHROCYTE [DISTWIDTH] IN BLOOD BY AUTOMATED COUNT: 13.7 % (ref 10–15)
ERYTHROCYTE [DISTWIDTH] IN BLOOD BY AUTOMATED COUNT: 13.7 % (ref 10–15)
GFR SERPL CREATININE-BSD FRML MDRD: 66 ML/MIN/1.73M2
GLUCOSE BLD-MCNC: 75 MG/DL (ref 70–99)
GLUCOSE BLDC GLUCOMTR-MCNC: 82 MG/DL (ref 70–99)
HCT VFR BLD AUTO: 45.6 % (ref 40–53)
HCT VFR BLD AUTO: 48.8 % (ref 40–53)
HGB BLD-MCNC: 14.4 G/DL (ref 13.3–17.7)
HGB BLD-MCNC: 15.4 G/DL (ref 13.3–17.7)
LVEF ECHO: NORMAL
MAGNESIUM SERPL-MCNC: 2.1 MG/DL (ref 1.6–2.3)
MAGNESIUM SERPL-MCNC: 2.1 MG/DL (ref 1.6–2.3)
MAGNESIUM SERPL-MCNC: 2.3 MG/DL (ref 1.6–2.3)
MCH RBC QN AUTO: 30.2 PG (ref 26.5–33)
MCH RBC QN AUTO: 30.5 PG (ref 26.5–33)
MCHC RBC AUTO-ENTMCNC: 31.6 G/DL (ref 31.5–36.5)
MCHC RBC AUTO-ENTMCNC: 31.6 G/DL (ref 31.5–36.5)
MCV RBC AUTO: 96 FL (ref 78–100)
MCV RBC AUTO: 97 FL (ref 78–100)
PLATELET # BLD AUTO: 162 10E3/UL (ref 150–450)
PLATELET # BLD AUTO: 171 10E3/UL (ref 150–450)
POTASSIUM BLD-SCNC: 3.4 MMOL/L (ref 3.4–5.3)
POTASSIUM BLD-SCNC: 3.7 MMOL/L (ref 3.4–5.3)
POTASSIUM BLD-SCNC: 4 MMOL/L (ref 3.4–5.3)
RBC # BLD AUTO: 4.72 10E6/UL (ref 4.4–5.9)
RBC # BLD AUTO: 5.1 10E6/UL (ref 4.4–5.9)
SARS-COV-2 RNA RESP QL NAA+PROBE: NEGATIVE
SODIUM SERPL-SCNC: 142 MMOL/L (ref 133–144)
TROPONIN I SERPL HS-MCNC: 42 NG/L
TROPONIN I SERPL HS-MCNC: 44 NG/L
UFH PPP CHRO-ACNC: 0.25 IU/ML
UFH PPP CHRO-ACNC: 0.76 IU/ML
WBC # BLD AUTO: 5.1 10E3/UL (ref 4–11)
WBC # BLD AUTO: 5.5 10E3/UL (ref 4–11)

## 2021-12-17 PROCEDURE — 120N000001 HC R&B MED SURG/OB

## 2021-12-17 PROCEDURE — 36415 COLL VENOUS BLD VENIPUNCTURE: CPT | Performed by: INTERNAL MEDICINE

## 2021-12-17 PROCEDURE — 85520 HEPARIN ASSAY: CPT | Performed by: INTERNAL MEDICINE

## 2021-12-17 PROCEDURE — 99232 SBSQ HOSP IP/OBS MODERATE 35: CPT | Performed by: STUDENT IN AN ORGANIZED HEALTH CARE EDUCATION/TRAINING PROGRAM

## 2021-12-17 PROCEDURE — 84132 ASSAY OF SERUM POTASSIUM: CPT | Performed by: STUDENT IN AN ORGANIZED HEALTH CARE EDUCATION/TRAINING PROGRAM

## 2021-12-17 PROCEDURE — 99222 1ST HOSP IP/OBS MODERATE 55: CPT | Performed by: INTERNAL MEDICINE

## 2021-12-17 PROCEDURE — 85520 HEPARIN ASSAY: CPT | Performed by: STUDENT IN AN ORGANIZED HEALTH CARE EDUCATION/TRAINING PROGRAM

## 2021-12-17 PROCEDURE — 250N000013 HC RX MED GY IP 250 OP 250 PS 637: Performed by: STUDENT IN AN ORGANIZED HEALTH CARE EDUCATION/TRAINING PROGRAM

## 2021-12-17 PROCEDURE — 84132 ASSAY OF SERUM POTASSIUM: CPT | Performed by: INTERNAL MEDICINE

## 2021-12-17 PROCEDURE — 250N000013 HC RX MED GY IP 250 OP 250 PS 637: Performed by: INTERNAL MEDICINE

## 2021-12-17 PROCEDURE — 85027 COMPLETE CBC AUTOMATED: CPT | Performed by: INTERNAL MEDICINE

## 2021-12-17 PROCEDURE — 83735 ASSAY OF MAGNESIUM: CPT | Performed by: STUDENT IN AN ORGANIZED HEALTH CARE EDUCATION/TRAINING PROGRAM

## 2021-12-17 PROCEDURE — 97535 SELF CARE MNGMENT TRAINING: CPT | Mod: GO

## 2021-12-17 PROCEDURE — 93306 TTE W/DOPPLER COMPLETE: CPT | Mod: 26 | Performed by: INTERNAL MEDICINE

## 2021-12-17 PROCEDURE — 36415 COLL VENOUS BLD VENIPUNCTURE: CPT | Performed by: STUDENT IN AN ORGANIZED HEALTH CARE EDUCATION/TRAINING PROGRAM

## 2021-12-17 PROCEDURE — 250N000011 HC RX IP 250 OP 636: Performed by: INTERNAL MEDICINE

## 2021-12-17 PROCEDURE — 83735 ASSAY OF MAGNESIUM: CPT | Performed by: INTERNAL MEDICINE

## 2021-12-17 PROCEDURE — 97110 THERAPEUTIC EXERCISES: CPT | Mod: GO

## 2021-12-17 PROCEDURE — 999N000208 ECHOCARDIOGRAM COMPLETE

## 2021-12-17 PROCEDURE — 97165 OT EVAL LOW COMPLEX 30 MIN: CPT | Mod: GO

## 2021-12-17 PROCEDURE — 84484 ASSAY OF TROPONIN QUANT: CPT | Performed by: INTERNAL MEDICINE

## 2021-12-17 PROCEDURE — 255N000002 HC RX 255 OP 636: Performed by: INTERNAL MEDICINE

## 2021-12-17 RX ORDER — DILTIAZEM HCL IN NACL,ISO-OSM 125 MG/125
5-15 PLASTIC BAG, INJECTION (ML) INTRAVENOUS CONTINUOUS
Status: DISCONTINUED | OUTPATIENT
Start: 2021-12-17 | End: 2021-12-17

## 2021-12-17 RX ORDER — LIDOCAINE 40 MG/G
CREAM TOPICAL
Status: DISCONTINUED | OUTPATIENT
Start: 2021-12-17 | End: 2021-12-18 | Stop reason: HOSPADM

## 2021-12-17 RX ORDER — HYDRALAZINE HYDROCHLORIDE 20 MG/ML
10 INJECTION INTRAMUSCULAR; INTRAVENOUS EVERY 4 HOURS PRN
Status: DISCONTINUED | OUTPATIENT
Start: 2021-12-17 | End: 2021-12-18 | Stop reason: HOSPADM

## 2021-12-17 RX ORDER — POTASSIUM CHLORIDE 1500 MG/1
40 TABLET, EXTENDED RELEASE ORAL ONCE
Status: COMPLETED | OUTPATIENT
Start: 2021-12-17 | End: 2021-12-17

## 2021-12-17 RX ORDER — FUROSEMIDE 10 MG/ML
40 INJECTION INTRAMUSCULAR; INTRAVENOUS EVERY 12 HOURS
Status: DISCONTINUED | OUTPATIENT
Start: 2021-12-17 | End: 2021-12-18 | Stop reason: HOSPADM

## 2021-12-17 RX ORDER — POTASSIUM CHLORIDE 1500 MG/1
40 TABLET, EXTENDED RELEASE ORAL
Status: CANCELLED | OUTPATIENT
Start: 2021-12-17

## 2021-12-17 RX ORDER — ASPIRIN 81 MG/1
81 TABLET ORAL DAILY
Status: DISCONTINUED | OUTPATIENT
Start: 2021-12-17 | End: 2021-12-17

## 2021-12-17 RX ORDER — POTASSIUM CHLORIDE 1500 MG/1
20 TABLET, EXTENDED RELEASE ORAL
Status: CANCELLED | OUTPATIENT
Start: 2021-12-17

## 2021-12-17 RX ORDER — NITROGLYCERIN 0.4 MG/1
0.4 TABLET SUBLINGUAL EVERY 5 MIN PRN
Status: DISCONTINUED | OUTPATIENT
Start: 2021-12-17 | End: 2021-12-18 | Stop reason: HOSPADM

## 2021-12-17 RX ORDER — MAGNESIUM SULFATE HEPTAHYDRATE 40 MG/ML
2 INJECTION, SOLUTION INTRAVENOUS
Status: CANCELLED | OUTPATIENT
Start: 2021-12-17

## 2021-12-17 RX ORDER — SIMVASTATIN 20 MG
20 TABLET ORAL AT BEDTIME
Status: DISCONTINUED | OUTPATIENT
Start: 2021-12-17 | End: 2021-12-17

## 2021-12-17 RX ORDER — METOPROLOL TARTRATE 50 MG
50 TABLET ORAL 2 TIMES DAILY
Status: DISCONTINUED | OUTPATIENT
Start: 2021-12-17 | End: 2021-12-18 | Stop reason: HOSPADM

## 2021-12-17 RX ORDER — ROSUVASTATIN CALCIUM 5 MG/1
10 TABLET, COATED ORAL DAILY
Status: DISCONTINUED | OUTPATIENT
Start: 2021-12-17 | End: 2021-12-18 | Stop reason: HOSPADM

## 2021-12-17 RX ORDER — LOSARTAN POTASSIUM 25 MG/1
25 TABLET ORAL 2 TIMES DAILY
Status: DISCONTINUED | OUTPATIENT
Start: 2021-12-17 | End: 2021-12-17

## 2021-12-17 RX ORDER — HEPARIN SODIUM 10000 [USP'U]/100ML
0-5000 INJECTION, SOLUTION INTRAVENOUS CONTINUOUS
Status: DISCONTINUED | OUTPATIENT
Start: 2021-12-17 | End: 2021-12-17

## 2021-12-17 RX ADMIN — POTASSIUM CHLORIDE 40 MEQ: 1500 TABLET, EXTENDED RELEASE ORAL at 11:37

## 2021-12-17 RX ADMIN — METOPROLOL TARTRATE 50 MG: 50 TABLET, FILM COATED ORAL at 21:02

## 2021-12-17 RX ADMIN — LOSARTAN POTASSIUM 25 MG: 25 TABLET, FILM COATED ORAL at 08:40

## 2021-12-17 RX ADMIN — APIXABAN 5 MG: 5 TABLET, FILM COATED ORAL at 21:02

## 2021-12-17 RX ADMIN — FUROSEMIDE 40 MG: 10 INJECTION, SOLUTION INTRAMUSCULAR; INTRAVENOUS at 02:15

## 2021-12-17 RX ADMIN — HUMAN ALBUMIN MICROSPHERES AND PERFLUTREN 3 ML: 10; .22 INJECTION, SOLUTION INTRAVENOUS at 12:00

## 2021-12-17 RX ADMIN — HEPARIN SODIUM AND DEXTROSE 700 UNITS/HR: 10000; 5 INJECTION INTRAVENOUS at 08:43

## 2021-12-17 RX ADMIN — FUROSEMIDE 40 MG: 10 INJECTION, SOLUTION INTRAMUSCULAR; INTRAVENOUS at 15:00

## 2021-12-17 RX ADMIN — ASPIRIN 81 MG: 81 TABLET, COATED ORAL at 08:40

## 2021-12-17 RX ADMIN — APIXABAN 5 MG: 5 TABLET, FILM COATED ORAL at 11:37

## 2021-12-17 RX ADMIN — ROSUVASTATIN CALCIUM 10 MG: 5 TABLET, FILM COATED ORAL at 11:37

## 2021-12-17 RX ADMIN — METOPROLOL TARTRATE 50 MG: 50 TABLET, FILM COATED ORAL at 11:37

## 2021-12-17 ASSESSMENT — ACTIVITIES OF DAILY LIVING (ADL)
ADLS_ACUITY_SCORE: 9
PREVIOUS_RESPONSIBILITIES: MEAL PREP;HOUSEKEEPING;LAUNDRY;SHOPPING;MEDICATION MANAGEMENT;FINANCES;DRIVING;YARDWORK
ADLS_ACUITY_SCORE: 9
ADLS_ACUITY_SCORE: 7
ADLS_ACUITY_SCORE: 9
ADLS_ACUITY_SCORE: 7
ADLS_ACUITY_SCORE: 9
ADLS_ACUITY_SCORE: 7
ADLS_ACUITY_SCORE: 9
ADLS_ACUITY_SCORE: 7
ADLS_ACUITY_SCORE: 9

## 2021-12-17 NOTE — CONSULTS
[facility]         Received CORE Clinic Consult from Dr. Thompson .    Reviewed Amadou's chart and note he is admitted for a new onset of  A-Fib with RVR and Diastolic Heart Failure. He presented with a new SOB that started developing 2-weeks ago. Worse when laying flat. Echocardiogram shows LVEF is 50-55% on 12/17/21. This is his first admission in the past year for concerns of heart failure.    Given the above information, Amadou is not a candidate for enrolling in the [facility].O.R[facility] Clinic, but can see General Cardiology in follow-up post hospitalization. Spoke with Amadou about establishing care with [facility] and he is amenable to it. Reviewed the below appointments with him.     I have arranged the following appointments:            Request beside nursing to complete Heart Failure education and provide Heart Failure booklet.    Please call with questions.      Josephine Hancock RN  Care Coordinator  [facility]   C.O.R.E. Nurse Line: [phone]  / Personal Line: [phone]  12/17/21 3:19 PM

## 2021-12-17 NOTE — PROGRESS NOTES
[facility]    Medicine Progress Note - Hospitalist Service  Date of Admission: 12/16/2021     Assessment & Plan         Amadou Wade is a 76 year old male with past medical history significant for hypertension, hyperlipidemia who presented to [facility] on 12/16/2021 with 2-week history of dyspnea on exertion/edema/weight gain and was found to have atrial fibrillation with RVR and new congestive heart failure.    HFrEF  Atrial Fibrillation with RVR  2-week history of orthopnea, PND, dyspnea on exertion, bilateral lower extremity edema, and weight gain.  Found to have elevated BNP on presentation.  ECG showing atrial fibrillation with RVR.  Clinical diagnosis of new onset congestive heart failure and initiated on Lasix 40 mg IV twice daily, possibly rate related.  TTE with low normal systolic function after receiving 2 doses of diuresis, but with concentric LVH indicating uncontrolled hypertension likely playing a role in presentation.  Cardiology consulted, recommending rate control with metoprolol and anticoagulation with planned BRUCE and cardioversion outpatient in 1 month.  -Metoprolol 50 mg twice daily  -Lasix 40 mg IV twice daily  -Apixaban 5 mg twice daily    Bilateral Pleural Effusions  Bilateral pleural effusions noted on CT chest 12/16/2021.  Suspect related to new diagnosis heart failure.  Diuresing as above.    Uncontrolled Hypertension: On Losartan 50 mg twice daily at home; holding in setting of up titration of Metoprolol  Hyperlipidemia: Crestor 10 mg daily     Diet: Orders Placed This Encounter      Combination Diet 2 gm NA Diet; No Caffeine Diet, Low Saturated Fat Diet  DVT Prophylaxis: Therapeutic oral anticoagulation  Nava Catheter:  Not present  Code Status: No CPR- Do NOT Intubate    Expected discharge: Likely tomorrow pending stable rate control.    The patient's care was discussed with the Bedside Nurse and Patient.    Jay Soto MD, MHS  Hospitalist  "[facility]    Securely message with the [employer] Web Console (learn more here)  Text page via [facility] Paging/Directory    ______________________________________________________________________    Interval History   Nursing notes reviewed; no acute events overnight.  Patient states that he is feeling somewhat better this morning with decreased bloating in his abdomen.  He stated that he has noted swelling of his abdomen and bilateral lower extremities over the past 2 weeks, which he feels have improved since initiation of Lasix.  He is not feeling any palpitations nor chest pain.  His breathing difficulties have somewhat improved overnight, as well.    A full 10+ point review of systems was performed and found to be negative with the exception of those items noted here.    Physical Exam   Vital signs:  Temp: 98  F (36.7  C) Temp src: Oral BP: 109/71 Pulse: 60   Resp: 18 SpO2: 97 % O2 Device: None (Room air)   Height: 177.8 cm (5' 10\") Weight: 77.5 kg (170 lb 12.8 oz)  Estimated body mass index is 24.51 kg/m  as calculated from the following:    Height as of this encounter: 1.778 m (5' 10\").    Weight as of this encounter: 77.5 kg (170 lb 12.8 oz).    General: Very pleasant male resting comfortably in hospital bed.  Awake, alert, interactive.  HEENT: Normocephalic, atraumatic.  PERRL, EOMI.  Conjunctiva clear, sclerae anicteric.  Mucous membranes moist.  Cardiac: Regular rate and rhythm without murmur, gallop, or rub.  1+ peripheral edema.  JVD to angle of mandible with hepatojugular reflex.  Respiratory: Normal work of breathing.  Clear to auscultation bilaterally without wheezing, rales, or rhonchi.  Decreased breath sounds of right base.  GI: Normal, active bowel sounds.  Abdomen soft, nontender, nondistended.  : Deferred.  Musculoskeletal: Moving all extremities appropriately.  Skin: No rashes or abrasions on exposed skin.  Neurologic: Alert and oriented x4.  Cranial nerves II through " XII grossly intact.  Psychologic: Appropriate mood and affect.    Data   All laboratory results and other diagnostic data from the past 24 hours is available in Epic and has been personally reviewed.    Recent Labs   Lab 21  0759 21  0225 21  0136 21  1058 21  0922   WBC 5.5  --  5.1 5.2 4.7   HGB 15.4  --  14.4 15.7 15.6   MCV 96  --  97 98 95     --  162 171 153     --   --  145* 143   POTASSIUM 3.4  --  3.7 4.1 3.9   CHLORIDE 107  --   --  115* 111*   CO2 26  --   --  25 26   BUN 21  --   --  23 24   CR 1.10  --   --  1.10 1.18   ANIONGAP 9  --   --  5 6   STEPHEN 8.5  --   --  7.8* 8.7   GLC 75 82  --  76 83   ALBUMIN  --   --   --  2.5* 2.8*   PROTTOTAL  --   --   --  5.7* 5.9*   BILITOTAL  --   --   --  1.2 1.3   ALKPHOS  --   --   --  109 115   ALT  --   --   --  43 45   AST  --   --   --  36 36     Recent Results (from the past 24 hour(s))   Echocardiogram Complete   Result Value    LVEF  50-55%    Narrative    [id number]  [id number]  [id number]  [id number]^ESME^LAUREN^MATTHIAS     [facility]  Echocardiography Laboratory  [address] [place] [address]     Name: ANALILIA ZAVALA  MRN: [id number]  : 1945  Study Date: 2021 11:09 AM  Age: 76 yrs  Gender: Male  Patient Location: [facility]  Reason For Study: Atrial Fibrillation  Ordering Physician: LAUREN VICTORIA  Referring Physician: Johnathan Sampson  Performed By: Angy Huddleston     BSA: 1.9 m2  Height: 70 in  Weight: 170 lb  HR: 75  BP: 118/77 mmHg  ______________________________________________________________________________  Procedure  Complete Portable Echo Adult. Optison (NDC #[id number]) given intravenously.  ______________________________________________________________________________  Interpretation Summary     The rhythm was atrial fibrillation.  Left ventricular systolic function is low normal.  The visual ejection fraction is 50-55%.  The left ventricle is normal in size.  There is  mild concentric left ventricular hypertrophy.  The right ventricle is mildly dilated.  Mildly decreased right ventricular systolic function  There is mod-severe biatrial enlargement.  There is trace to mild mitral regurgitation.  There is trace aortic regurgitation.  Moderate left pleural effusion  ______________________________________________________________________________  Left Ventricle  The left ventricle is normal in size. There is mild concentric left  ventricular hypertrophy. Left ventricular systolic function is low normal. The  visual ejection fraction is 50-55%. Left ventricular diastolic function is  indeterminate. No regional wall motion abnormalities noted. There is no  thrombus seen in the left ventricle.     Right Ventricle  The right ventricle is mildly dilated. Mildly decreased right ventricular  systolic function.     Atria  There is mod-severe biatrial enlargement. There is no atrial shunt seen. The  left atrial appendage is not well visualized.     Mitral Valve  The mitral valve leaflets appear normal. There is no evidence of stenosis,  fluttering, or prolapse. There is trace to mild mitral regurgitation. There is  no mitral valve stenosis.     Tricuspid Valve  Normal tricuspid valve. Right ventricle systolic pressure estimate normal. The  right ventricular systolic pressure is approximated at 19.7 mmHg plus the  right atrial pressure. There is mild (1+) tricuspid regurgitation. There is no  tricuspid stenosis.     Aortic Valve  There is mild trileaflet aortic sclerosis. There is trace aortic  regurgitation. No aortic stenosis is present.     Pulmonic Valve  The pulmonic valve is not well visualized.     Vessels  The aortic root is normal size. Normal size ascending aorta. Dilation of the  inferior vena cava is present with normal respiratory variation in diameter.  The pulmonary artery is normal size.     Pericardium  The pericardium appears normal. Moderate left pleural effusion.      Rhythm  The rhythm was atrial fibrillation.  ______________________________________________________________________________  MMode/2D Measurements & Calculations  IVSd: 1.2 cm     LVIDd: 4.7 cm  LVIDs: 3.8 cm  LVPWd: 0.84 cm  FS: 17.4 %  LV mass(C)d: 163.2 grams  LV mass(C)dI: 83.8 grams/m2  Ao root diam: 3.6 cm  LA dimension: 3.5 cm  asc Aorta Diam: 3.6 cm  LA/Ao: 0.97  LVOT diam: 2.3 cm  LVOT area: 4.2 cm2     EF(MOD-bp): 35.7 %  LA Volume (BP): 36.1 ml  LA Volume Index (BP): 18.5 ml/m2  RWT: 0.36     Doppler Measurements & Calculations  MV E max cassandra: 78.7 cm/sec  MV dec time: 0.17 sec  LV V1 max P.9 mmHg  LV V1 max: 109.9 cm/sec  LV V1 VTI: 19.0 cm  SV(LVOT): 78.8 ml  SI(LVOT): 40.5 ml/m2  PA acc time: 0.10 sec  TR max cassandra: 221.7 cm/sec  TR max P.7 mmHg  E/E' avg: 15.4  Lateral E/e': 12.6  Medial E/e': 18.3     ______________________________________________________________________________  Report approved by: Dr. Mario Gold 2021 12:24 PM

## 2021-12-17 NOTE — PROGRESS NOTES
12/17/21 1354   Quick Adds   Type of Visit Initial Occupational Therapy Evaluation   Living Environment   People in home spouse   Current Living Arrangements house   Home Accessibility stairs to enter home;stairs within home   Transportation Anticipated car, drives self;family or friend will provide   Living Environment Comments Pt lives with spouse in house, stairs to enter/within, walk in shower, standard height toilet.    Self-Care   Usual Activity Tolerance good   Current Activity Tolerance moderate   Regular Exercise Yes   Activity/Exercise Type walking   Equipment Currently Used at Home none   Instrumental Activities of Daily Living (IADL)   Previous Responsibilities meal prep;housekeeping;laundry;shopping;medication management;finances;driving;yardwork   IADL Comments Shared household tasks with spouse   Disability/Function   Fall history within last six months no   Change in Functional Status Since Onset of Current Illness/Injury yes   General Information   Onset of Illness/Injury or Date of Surgery 12/16/21   Referring Physician Bridget Thompson MD   Patient/Family Therapy Goal Statement (OT) Pt's goal is to d/c home   Additional Occupational Profile Info/Pertinent History of Current Problem Per chart: Pt is a 76 year old female admitted with progressive shortness of breath leg edema weight gain.    Performance Patterns (Routines, Roles, Habits) Pt reports indep in all ADLs, IADLs and mobility tasks with no AD at baseline.    Existing Precautions/Restrictions cardiac;fall   Visual Perception   Visual Impairment/Limitations corrective lenses full-time   Pain Assessment   Patient Currently in Pain Yes, see Vital Sign flowsheet  (back pain )   Range of Motion Comprehensive   Comment, General Range of Motion BUEs WFL   Strength Comprehensive (MMT)   Comment, General Manual Muscle Testing (MMT) Assessment BU WF   Bed Mobility   Bed Mobility supine-sit;sit-supine   Supine-Sit [place] (Bed  Mobility) independent   Sit-Supine [place] (Bed Mobility) independent   Transfers   Transfers No deficits identified   Balance   Balance Assessment no deficits were identified   Activities of Daily Living   BADL Assessment toileting   Toileting   [place] Level (Toileting) independent   Clinical Impression   Criteria for Skilled Therapeutic Interventions Met (OT) yes;meets criteria;skilled treatment is necessary   OT Diagnosis Impaired IADLs, tolerance to activity    OT Problem List-Impairments impacting ADL problems related to;activity tolerance impaired   Assessment of Occupational Performance 1-3 Performance Deficits   Identified Performance Deficits IADLs, tolerance to activity    Planned Therapy Interventions (OT) ADL retraining;IADL retraining;strengthening;transfer training;home program guidelines;progressive activity/exercise;risk factor education   Clinical Decision Making Complexity (OT) low complexity   Therapy Frequency (OT) Daily   Predicted Duration of Therapy 3 days   Risk & Benefits of therapy have been explained evaluation/treatment results reviewed;care plan/treatment goals reviewed;risks/benefits reviewed;participants voiced agreement with care plan;current/potential barriers reviewed;participants included;patient   OT Discharge Planning    OT Discharge Recommendation (DC Rec) Home with assist   OT Rationale for DC Rec Anticipate pt will meet goals for safe return home. Able to have support from spouse for IADLs as needed in home environment.    Total Evaluation Time (Minutes)   Total Evaluation Time (Minutes) 8

## 2021-12-17 NOTE — CONSULTS
CLINICAL NUTRITION SERVICES - EDUCATION NOTE    Received diet education consult for Heart Failure - Dietitian to instruct patient on 2 gram sodium diet     NUTRITION HISTORY:  Information obtained from patient and chart:    Diet:  Low saturated fat, Na < 2400mg, No Caffeine  ?  Living situation:   Lives with wife  ?  Grocery shopping:  Self and wife  ?  Meal preparation:  Mostly wife, occasionally pt will cook for himself  ?  Breakfast:  Homemade Bran Muffin or English muffin. Rarely both.  ?  Lunch:  Open faced sandwich w/ 1 slice whole grain bread, swiss cheese or leftovers and red or green grapes, 1/2 apple, 1/2 pear or cookie.  ?  Dinner:   Chicken or sometimes fish, baked or sweet potato. Occasional trip to [employer] for a Happy Meal.     Snacks/Supplements:  Fruit, nuts, rarely cookies    Fluids:  Coffee in morning, bottled water for lunch and supper  ?  Previous diet instructions:  None. Patient has h/o following the [place] Diet for weight loss. Typically Stated since Dom has been eating off of the diet and believes this caused him to come in.     ?  NUTRITION DIAGNOSIS:  Food- and nutrition-related knowledge deficit related to CHF as evidenced by following a regular diet with high sodium containing foods      INTERVENTIONS:  Provided instruction on 2 gm Na diet    Provided  the following handouts: Heart Failure Nutrition Therapy    Goals:  Patient verbalizes understanding of diet by adhering to diet recommendations     Follow Up:  Patient to ask any further nutrition-related questions before discharge. In addition, pt may request outpatient RD appointment.     Please place additional consult if further nutrition concerns arise

## 2021-12-17 NOTE — PROGRESS NOTES
[facility]    Orders         The following orders were placed for Return appointment with Aleksandra Mclain PA-C on 1/6/21. Patient seen in hospital by Dr. Cordova.    Cardiology Return  BMP  Umang Hancock RN  Care Coordinator  [facility]   C.O.R.E[facility] Nurse Line: [phone]  / Personal Line: [phone]  12/17/21 3:35 PM

## 2021-12-17 NOTE — CONSULTS
"Cardiology Consultation      Amadou Wade MRN# [id number]   YOB: 1945 Age: 76 year old   Date of Admission: 2021     Reason for consult:  Atrial fibrillation           Assessment and Plan:     1. Atrial fibrillation with rapid ventricular response and symptoms of diastolic congestive heart failure, currently improved    Discontinue diltiazem, add metoprolol.  I am hopeful that he will have good heart rate control with just moderate dose of single agent as he responded well to just the diltiazem.     Eliquis 5 mg twice daily    Ideally wanted to do BRUCE and cardioversion today but patient was not n.p.o.    If patient feeling better with good rate control, discharge home tomorrow with outpatient cardioversion in 3 to 4 weeks.    Please call with questions.               Chief Complaint:   Shortness of Breath           History of Present Illness:   This patient is a 76 year old male with no previous cardiac history who comes in with a few weeks of symptoms of dyspnea on exertion and lower extremity edema found to be in atrial fibrillation with rapid ventricular response with no elevation of troponins.    Heart rate has been well controlled on a small amount of diltiazem.  Patient denies any chest discomfort.    We attempted to do BRUCE and cardioversion today, but patient was not n.p.o. and ate breakfast.         Physical Exam:   Vitals were reviewed  Blood pressure 118/77, pulse 82, temperature 97.7  F (36.5  C), temperature source Oral, resp. rate 18, height 1.778 m (5' 10\"), weight 77.5 kg (170 lb 12.8 oz), SpO2 94 %.  Temperatures:  Current - Temp: 97.7  F (36.5  C); Max - Temp  Av  F (36.7  C)  Min: 96.1  F (35.6  C)  Max: 98.7  F (37.1  C)  Respiration range: Resp  Av.9  Min: 12  Max: 42  Pulse range: Pulse  Av.2  Min: 65  Max: 157  Blood pressure range: Systolic (24hrs), Av , Min:106 , Max:137   ; Diastolic (24hrs), Av, Min:68, Max:126    Pulse oximetry range: SpO2  " Av.1 %  Min: 91 %  Max: 100 %    Intake/Output Summary (Last 24 hours) at 2021 1020  Last data filed at 2021 1016  Gross per 24 hour   Intake 571 ml   Output 2175 ml   Net -1604 ml     Constitutional:   awake, alert, cooperative, no apparent distress, and appears stated age     Eyes:   Lids and lashes normal, pupils equal, round and reactive to light, extra ocular muscles intact, sclera clear, conjunctiva normal     Neck:   supple, symmetrical, trachea midline,      Back:   symmetric     Lungs:   Clear     Cardiovascular:   Irregularly irregular     Abdomen:   Benign     Musculoskeletal:   motor strength is 5 out of 5 all extremities bilaterally     Neurologic:   Grossly nonfocal     Skin:   normal skin color, texture, turgor     Additional findings:                  Past Medical History:   I have reviewed this patient's past medical history  Past Medical History:   Diagnosis Date     Atrial fibrillation (H) 2021     Cardiac dysrhythmia, unspecified 2007    few pac's, few pvc's, few runs SVT     CHF (congestive heart failure) (H) 2021     Chronic rhinitis     resolved     ED (erectile dysfunction)      Hard of hearing     [place]     Hyperlipidemia LDL goal <130      Hypertension goal BP (blood pressure) < 140/90 2017     Pneumonia, organism unspecified(486)      Snoring     no sleep apnea - mouth guard     Squamous 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     Dr Abernathy - Rt Chest     Unspecified hemorrhoids without mention of complication 2006    internal             Past Surgical History:   I have reviewed this patient's past surgical history  Past Surgical History:   Procedure Laterality Date     COLONOSCOPY  2012    incomplete - negative, ACBE incomplete - negative, CT colography negative     COLONOSCOPY Bilateral 2021     ganglion cyst removal Left 2021     HC REMOVE TONSILS/ADENOIDS,<11 Y/O      T & A <14y.o.     SIGMOIDOSCOPY,DIAGNOSTIC       normal     STRESS ECHO (METRO)  2007    normal few pac's, few pvc's     SURGICAL HISTORY OF -  Bilateral     fall,  - bilateral IOL     ZZHC COLONOSCOPY THRU STOMA, DIAGNOSTIC  2006    dr tejeda - normal - internal hemorrhiods - due 10 yrs               Social History:   I have reviewed this patient's social history  Social History     Tobacco Use     Smoking status: Never Smoker     Smokeless tobacco: Never Used   Substance Use Topics     Alcohol use: Yes     Alcohol/week: 0.0 - 0.8 standard drinks     Comment: 0-1 per week avg             Family History:   I have reviewed this patient's family history  Family History   Problem Relation Age of Onset     Hypertension Mother      Cancer Father         lung     Eye Disorder Father         Glaucoma     Alzheimer Disease Father      Heart Disease Father      Diabetes Father         type 2     Diabetes Brother         type 2     Coronary Artery Disease Brother      Cerebrovascular Disease Maternal Grandmother      Heart Disease Maternal Grandfather          young heartattack     Diabetes Paternal Grandfather      Cancer Brother         melanoma     Alzheimer Disease Brother      Prostate Cancer Brother      Cerebrovascular Disease Brother              Allergies:   No Known Allergies          Medications:   I have reviewed this patient's current medications  Facility-Administered Medications Prior to Admission   Medication Dose Route Frequency Provider Last Rate Last Admin     lidocaine 1% with EPINEPHrine 1:100,000 injection 3 mL  3 mL Intradermal Once Andre Santizo MD         [] sodium chloride (PF) 0.9% PF flush 3 mL  3 mL Intravenous q1 min prn Jan Copeland, FLO   3 mL at 21 1057     Medications Prior to Admission   Medication Sig Dispense Refill Last Dose     ASPIRIN 81 MG OR TABS 1 tab po QD (Once per day)   2021 at am     diphenhydrAMINE (BENADRYL) 25 MG tablet Take 50 mg by mouth At Bedtime  60 tablet 1 12/15/2021 at  Unknown time     losartan (COZAAR) 50 MG tablet Take 0.5 tablets (25 mg) by mouth 2 times daily 1/2 tab 90 tablet 3 12/16/2021 at am     simvastatin (ZOCOR) 40 MG tablet Take 1 tablet (40 mg) by mouth At Bedtime (Patient taking differently: Take 20 mg by mouth At Bedtime ) 90 tablet 3 12/15/2021 at Unknown time     valACYclovir (VALTREX) 1000 mg tablet TAKE TWO TABLETS BY MOUTH TWICE A DAY FOR COLD SORES   More than a month at Unknown time     Current Facility-Administered Medications Ordered in Epic   Medication Dose Route Frequency Last Rate Last Admin     apixaban ANTICOAGULANT (ELIQUIS) tablet 5 mg  5 mg Oral BID         Continuing ACE inhibitor/ARB/ARNI from home medication list OR ACE inhibitor/ARB/ARNI order already placed during this visit   Does not apply DOES NOT GO TO MAR         furosemide (LASIX) injection 40 mg  40 mg Intravenous Q12H   40 mg at 12/17/21 0215     HOLD: nitroGLYcerin IF   Does not apply HOLD         hydrALAZINE (APRESOLINE) injection 10 mg  10 mg Intravenous Q4H PRN         lidocaine (LMX4) cream   Topical Q1H PRN         lidocaine (LMX4) cream   Topical Q1H PRN         lidocaine 1 % 0.1-1 mL  0.1-1 mL Other Q1H PRN         lidocaine 1 % 0.1-1 mL  0.1-1 mL Other Q1H PRN         melatonin tablet 1 mg  1 mg Oral At Bedtime PRN         metoprolol tartrate (LOPRESSOR) tablet 50 mg  50 mg Oral BID         nitroGLYcerin (NITROSTAT) sublingual tablet 0.4 mg  0.4 mg Sublingual Q5 Min PRN         No anticoagulants IF patient has had acute trauma/surgery or recent intracranial, GI or urinary tract bleeding.    Other DOES NOT GO TO MAR         Patient is already receiving anticoagulation with heparin, enoxaparin (LOVENOX), warfarin (COUMADIN)  or other anticoagulant medication   Does not apply Continuous PRN         potassium chloride ER (KLOR-CON M) CR tablet 40 mEq  40 mEq Oral Once         rosuvastatin (CRESTOR) tablet 10 mg  10 mg Oral Daily         sodium chloride (PF) 0.9% PF flush 3 mL  3  mL Intracatheter Q8H   3 mL at 12/17/21 0236     sodium chloride (PF) 0.9% PF flush 3 mL  3 mL Intracatheter q1 min prn         sodium chloride (PF) 0.9% PF flush 3 mL  3 mL Intracatheter Q8H         sodium chloride (PF) 0.9% PF flush 3 mL  3 mL Intracatheter q1 min prn         No current Epic-ordered outpatient medications on file.             Review of Systems:   The 10 point Review of Systems is negative other than noted in the HPI            Data:   All laboratory data reviewed  Results for orders placed or performed during the hospital encounter of 12/16/21 (from the past 24 hour(s))   EKG 12-lead, tracing only   Result Value Ref Range    Systolic Blood Pressure  mmHg    Diastolic Blood Pressure  mmHg    Ventricular Rate 150 BPM    Atrial Rate 159 BPM    CA Interval  ms    QRS Duration 106 ms     ms    QTc 511 ms    P Axis  degrees    R AXIS -1 degrees    T Axis -23 degrees    Interpretation ECG       Atrial fibrillation with rapid ventricular response  Incomplete right bundle branch block  Abnormal ECG  When compared with ECG of 27-MAR-2008 13:20,  Atrial fibrillation has replaced Sinus rhythm  Vent. rate has increased BY  87 BPM  ST no longer elevated in Anterior leads  T wave amplitude has decreased in Anterolateral leads     Troponin I   Result Value Ref Range    Troponin I High Sensitivity 46 <79 ng/L   Asymptomatic COVID-19 Virus (Coronavirus) by PCR Nasopharyngeal    Specimen: Nasopharyngeal; Swab   Result Value Ref Range    SARS CoV2 PCR Negative Negative    Narrative    Testing was performed using the ena  SARS-CoV-2 & Influenza A/B Assay on the ena  [employer]  System.  This test should be ordered for the detection of SARS-COV-2 in individuals who meet SARS-CoV-2 clinical and/or epidemiological criteria. Test performance is unknown in asymptomatic patients.  This test is for in vitro diagnostic use under the FDA EUA for laboratories certified under CLIA to perform moderate and/or high complexity  testing. This test has not been FDA cleared or approved.  A negative test does not rule out the presence of PCR inhibitors in the specimen or target RNA in concentration below the limit of detection for the assay. The possibility of a false negative should be considered if the patient's recent exposure or clinical presentation suggests COVID-19.  [facility] [employer] are certified under the Clinical Laboratory Improvement Amendments of 1988 (CLIA-88) as qualified to perform moderate and/or high complexity laboratory testing.   TSH with free T4 reflex   Result Value Ref Range    TSH 1.46 0.40 - 4.00 mU/L   Magnesium   Result Value Ref Range    Magnesium 2.1 1.6 - 2.3 mg/dL   Heparin Unfractionated Anti Xa Level   Result Value Ref Range    Anti Xa Unfractionated Heparin 1.03 For Reference Range, See Comment IU/mL    Narrative    Therapeutic Range: UFH: 0.25-0.50 IU/mL for low intensity dosing,  0.30-0.70 IU/mL for high intensity dosing DVT and PE.  This test is not validated for other direct factor X inhibitors (e.g. rivaroxaban, apixaban, edoxaban, betrixaban, fondaparinux) and should not be used for monitoring of other medications.   UA with Microscopic reflex to Culture    Specimen: Urine, Midstream   Result Value Ref Range    Color Urine Straw Colorless, Straw, Light Yellow, Yellow    Appearance Urine Clear Clear    Glucose Urine Negative Negative mg/dL    Bilirubin Urine Negative Negative    Ketones Urine Negative Negative mg/dL    Specific Gravity Urine 1.009 1.003 - 1.035    Blood Urine Large (A) Negative    pH Urine 5.0 5.0 - 7.0    Protein Albumin Urine Negative Negative mg/dL    Urobilinogen Urine Normal Normal, 2.0 mg/dL    Nitrite Urine Negative Negative    Leukocyte Esterase Urine Negative Negative    Mucus Urine Present (A) None Seen /LPF    RBC Urine >182 (H) <=2 /HPF    WBC Urine 33 (H) <=5 /HPF    Narrative    Urine Culture ordered based on laboratory criteria   Lactic Acid STAT   Result  Value Ref Range    Lactic Acid 1.5 0.7 - 2.0 mmol/L   Magnesium   Result Value Ref Range    Magnesium 2.3 1.6 - 2.3 mg/dL   Potassium   Result Value Ref Range    Potassium 3.7 3.4 - 5.3 mmol/L   Troponin I   Result Value Ref Range    Troponin I High Sensitivity 42 <79 ng/L   CBC with platelets   Result Value Ref Range    WBC Count 5.1 4.0 - 11.0 10e3/uL    RBC Count 4.72 4.40 - 5.90 10e6/uL    Hemoglobin 14.4 13.3 - 17.7 g/dL    Hematocrit 45.6 40.0 - 53.0 %    MCV 97 78 - 100 fL    MCH 30.5 26.5 - 33.0 pg    MCHC 31.6 31.5 - 36.5 g/dL    RDW 13.7 10.0 - 15.0 %    Platelet Count 162 150 - 450 10e3/uL   Heparin Unfractionated Anti Xa Level   Result Value Ref Range    Anti Xa Unfractionated Heparin 0.76 For Reference Range, See Comment IU/mL    Narrative    Therapeutic Range: UFH: 0.25-0.50 IU/mL for low intensity dosing,  0.30-0.70 IU/mL for high intensity dosing DVT and PE.  This test is not validated for other direct factor X inhibitors (e.g. rivaroxaban, apixaban, edoxaban, betrixaban, fondaparinux) and should not be used for monitoring of other medications.   Glucose by meter   Result Value Ref Range    GLUCOSE BY METER POCT 82 70 - 99 mg/dL   Basic metabolic panel   Result Value Ref Range    Sodium 142 133 - 144 mmol/L    Potassium 3.4 3.4 - 5.3 mmol/L    Chloride 107 94 - 109 mmol/L    Carbon Dioxide (CO2) 26 20 - 32 mmol/L    Anion Gap 9 3 - 14 mmol/L    Urea Nitrogen 21 7 - 30 mg/dL    Creatinine 1.10 0.66 - 1.25 mg/dL    Calcium 8.5 8.5 - 10.1 mg/dL    Glucose 75 70 - 99 mg/dL    GFR Estimate 66 >60 mL/min/1.73m2   CBC with platelets   Result Value Ref Range    WBC Count 5.5 4.0 - 11.0 10e3/uL    RBC Count 5.10 4.40 - 5.90 10e6/uL    Hemoglobin 15.4 13.3 - 17.7 g/dL    Hematocrit 48.8 40.0 - 53.0 %    MCV 96 78 - 100 fL    MCH 30.2 26.5 - 33.0 pg    MCHC 31.6 31.5 - 36.5 g/dL    RDW 13.7 10.0 - 15.0 %    Platelet Count 171 150 - 450 10e3/uL   Troponin I   Result Value Ref Range    Troponin I High Sensitivity  44 <79 ng/L   Magnesium   Result Value Ref Range    Magnesium 2.1 1.6 - 2.3 mg/dL       Lab Results   Component Value Date    CHOL 172 03/01/2021     Lab Results   Component Value Date    HDL 58 03/01/2021     Lab Results   Component Value Date     03/01/2021     Lab Results   Component Value Date    TRIG 60 03/01/2021     Lab Results   Component Value Date    CHOLHDLRATIO 2.9 06/16/2015     TSH   Date Value Ref Range Status   12/16/2021 1.46 0.40 - 4.00 mU/L Final   03/01/2021 1.34 0.40 - 4.00 mU/L Final

## 2021-12-17 NOTE — PLAN OF CARE
"/82 (BP Location: Right arm)   Pulse 61   Temp 97.8  F (36.6  C) (Oral)   Resp 18   Ht 1.778 m (5' 10\")   Wt 77.5 kg (170 lb 12.8 oz)   SpO2 97%   BMI 24.51 kg/m       4:56 PM MD updated: Pt off dilt gtt and HR stable. Can pt come off of IMC status? MD response: Yes.    Pt A/Ox4. Ind in room. Cardiac diet. Dilt infusing 10 mg/hr. Stopped, started on PO metoprolol. Tele: Afib CVR with inverted T's HR 86. Hep gtt infusing at 700 units/hr. Stopped, started on PO eliquis BID. LS CTA. K:3.4- replaced with 40 mEq, redraw in 4 hours, recheck 4.0. AM recheck scheuduled and ma.1 with AM recheck per protocol. IV lasix. Scabs to face and arm due to skin biopsies. Plan to discharge home tomorrow with cardioversion outpatient.   "

## 2021-12-17 NOTE — PROGRESS NOTES
CM consulted for CHF education, attempted to see x 2 today but pt was occupied with other team members and a phone call. He requested CM to come back at a later time, will continue to attempt as time allows.     Marcy Acosta RN BSN   Inpatient Care Coordination  [facility]   Phone [phone]

## 2021-12-18 ENCOUNTER — APPOINTMENT (OUTPATIENT)
Dept: OCCUPATIONAL THERAPY | Facility: CLINIC | Age: 76
DRG: 309 | End: 2021-12-18
Payer: COMMERCIAL

## 2021-12-18 VITALS
OXYGEN SATURATION: 95 % | HEART RATE: 77 BPM | WEIGHT: 159.3 LBS | BODY MASS INDEX: 22.81 KG/M2 | TEMPERATURE: 98.5 F | RESPIRATION RATE: 16 BRPM | DIASTOLIC BLOOD PRESSURE: 67 MMHG | SYSTOLIC BLOOD PRESSURE: 128 MMHG | HEIGHT: 70 IN

## 2021-12-18 LAB
ANION GAP SERPL CALCULATED.3IONS-SCNC: 9 MMOL/L (ref 3–14)
BACTERIA UR CULT: NO GROWTH
BUN SERPL-MCNC: 25 MG/DL (ref 7–30)
CALCIUM SERPL-MCNC: 9.3 MG/DL (ref 8.5–10.1)
CHLORIDE BLD-SCNC: 104 MMOL/L (ref 94–109)
CO2 SERPL-SCNC: 28 MMOL/L (ref 20–32)
CREAT SERPL-MCNC: 1.08 MG/DL (ref 0.66–1.25)
GFR SERPL CREATININE-BSD FRML MDRD: 67 ML/MIN/1.73M2
GLUCOSE BLD-MCNC: 107 MG/DL (ref 70–99)
MAGNESIUM SERPL-MCNC: 2.2 MG/DL (ref 1.6–2.3)
POTASSIUM BLD-SCNC: 3.7 MMOL/L (ref 3.4–5.3)
POTASSIUM BLD-SCNC: 3.8 MMOL/L (ref 3.4–5.3)
SODIUM SERPL-SCNC: 141 MMOL/L (ref 133–144)

## 2021-12-18 PROCEDURE — 250N000013 HC RX MED GY IP 250 OP 250 PS 637: Performed by: INTERNAL MEDICINE

## 2021-12-18 PROCEDURE — 83735 ASSAY OF MAGNESIUM: CPT | Performed by: STUDENT IN AN ORGANIZED HEALTH CARE EDUCATION/TRAINING PROGRAM

## 2021-12-18 PROCEDURE — 97530 THERAPEUTIC ACTIVITIES: CPT | Mod: GO

## 2021-12-18 PROCEDURE — 84132 ASSAY OF SERUM POTASSIUM: CPT | Performed by: STUDENT IN AN ORGANIZED HEALTH CARE EDUCATION/TRAINING PROGRAM

## 2021-12-18 PROCEDURE — 250N000011 HC RX IP 250 OP 636: Performed by: INTERNAL MEDICINE

## 2021-12-18 PROCEDURE — 36415 COLL VENOUS BLD VENIPUNCTURE: CPT | Performed by: STUDENT IN AN ORGANIZED HEALTH CARE EDUCATION/TRAINING PROGRAM

## 2021-12-18 PROCEDURE — 99238 HOSP IP/OBS DSCHRG MGMT 30/<: CPT | Performed by: STUDENT IN AN ORGANIZED HEALTH CARE EDUCATION/TRAINING PROGRAM

## 2021-12-18 RX ORDER — ROSUVASTATIN CALCIUM 10 MG/1
10 TABLET, COATED ORAL AT BEDTIME
Qty: 30 TABLET | Refills: 0 | Status: SHIPPED | OUTPATIENT
Start: 2021-12-18 | End: 2022-01-14

## 2021-12-18 RX ORDER — METOPROLOL TARTRATE 50 MG
50 TABLET ORAL 2 TIMES DAILY
Qty: 60 TABLET | Refills: 0 | Status: SHIPPED | OUTPATIENT
Start: 2021-12-18 | End: 2022-01-14

## 2021-12-18 RX ORDER — FUROSEMIDE 20 MG
20 TABLET ORAL 2 TIMES DAILY
Qty: 30 TABLET | Refills: 0 | Status: SHIPPED | OUTPATIENT
Start: 2021-12-18 | End: 2022-01-04

## 2021-12-18 RX ADMIN — FUROSEMIDE 40 MG: 10 INJECTION, SOLUTION INTRAMUSCULAR; INTRAVENOUS at 01:25

## 2021-12-18 RX ADMIN — METOPROLOL TARTRATE 50 MG: 50 TABLET, FILM COATED ORAL at 08:40

## 2021-12-18 RX ADMIN — ROSUVASTATIN CALCIUM 10 MG: 5 TABLET, FILM COATED ORAL at 08:40

## 2021-12-18 RX ADMIN — APIXABAN 5 MG: 5 TABLET, FILM COATED ORAL at 08:40

## 2021-12-18 ASSESSMENT — ACTIVITIES OF DAILY LIVING (ADL)
ADLS_ACUITY_SCORE: 7
ADLS_ACUITY_SCORE: 9
ADLS_ACUITY_SCORE: 7
ADLS_ACUITY_SCORE: 7
ADLS_ACUITY_SCORE: 9
ADLS_ACUITY_SCORE: 7
ADLS_ACUITY_SCORE: 9

## 2021-12-18 ASSESSMENT — MIFFLIN-ST. JEOR: SCORE: 1468.83

## 2021-12-18 NOTE — PLAN OF CARE
"BP (!) 134/97 (BP Location: Left arm)   Pulse 75   Temp 98.3  F (36.8  C) (Oral)   Resp 16   Ht 1.778 m (5' 10\")   Wt 77.5 kg (170 lb 12.8 oz)   SpO2 92%   BMI 24.51 kg/m       Aox4. Denies CP, SOB, N/V, and pain. Pt sating well on RA. BLE: trace edema noted in ankles. Pt states last BM 12/14; PRN bowel meds offered, but pt politely declined and said \"I might need them in the morning.\" Tele: A fib CVR w/ HR in 90s, BBB, and inverted T waves. PIV L upper arm and L lower arm both SL. Plan for likely discharge home today pending rate control, per MD note. Continue w/ POC.     "

## 2021-12-18 NOTE — PLAN OF CARE
"/67 (BP Location: Left arm)   Pulse 77   Temp 98.5  F (36.9  C) (Oral)   Resp 16   Ht 1.778 m (5' 10\")   Wt 72.3 kg (159 lb 4.8 oz)   SpO2 95%   BMI 22.86 kg/m       10:20 AM MD updated: are you planning to D/C pt this shift? I was told by cards that was the plan. Thank you!    1:48 PM MD updated: Discharge orders show tomorrow as the date. Could this be changed to today or would you like discharge to be tomorrow? MD response: Changing it now.    Pt A/Ox4. Ind. Cardiac diet. Tele: Afib RVR with inverted T's . PO eliquis BID. LS CTA. K:3.8- AM recheck scheuduled and mag:3.2-AM recheck per protocol. IV lasix. Scabs to face and arm due to skin biopsies. Plan to discharge home today with cardioversion outpatient. AVS reviewed with patient. Patient is in stable condition, VSS, no co pain/cp/sob. All discharge education reviewed with pt in regards to: diet, activity, safety, s/s to report, medications and rx, follow up appointments/care.  All questions answered. Pt denies any further questions or concerns. PIV removed. No complications. Telemetry monitor removed. All belongings returned. Pt escorted to front door by [facility] staff.  "

## 2021-12-18 NOTE — PLAN OF CARE
Occupational Therapy Discharge Summary    Reason for therapy discharge:    All goals and outcomes met, no further needs identified.    Progress towards therapy goal(s). See goals on Care Plan in [facility] electronic health record for goal details.  Goals met    Therapy recommendation(s):    Continue home exercise program. Pt discharging this date per primary and pt

## 2021-12-18 NOTE — CONSULTS
Care Management Discharge Note    Discharge Date: 12/19/2021       Discharge Disposition: Home    Discharge Services: None    Discharge DME: None    Discharge Transportation: car, drives self,family or friend will provide    Private pay costs discussed: Not applicable    Education Provided on the Discharge Plan:  Yes  Persons Notified of Discharge Plans: Patient  Patient/Family in Agreement with the Plan: yes    Handoff Referral Completed: Yes    Additional Information:  CM following for plan of care and discharge needs. Noted to have new diagnosis of heart failure.   Met with patient at bedside. Patient verified had received CHF action plan. Stated prior to admission was monitoring weight and blood pressure. Patient noticed increase in weight which prompted him to seek medical attention. Patient stated understanding of continuing to monitor and record weight and monitor for symptoms of heart failure. Patient stated understanding of following lower sodium diet.   Patient has scheduled appointments with [facility] on 1/6/22 and cardioversion on 1/11/22.  Patient will follow up with PCP and cardiology as recommended.    No further concerns or education needed at this time. No further care coordination needs.        Katt Olmstead, RN      Katt Olmstead RN Case Manager  Inpatient Care Coordination  [facility]   [phone]

## 2021-12-18 NOTE — PLAN OF CARE
Assumed care from [id number]: Pt A&Ox4. VSS. HR controlled. Tele Afib. LS clear. No SOB or CP reported. Trace edema to bilat ankles. Pt up ind in room. No c/o pain. Cards following. Possible discharge in AM.

## 2021-12-20 ENCOUNTER — PATIENT OUTREACH (OUTPATIENT)
Dept: CARE COORDINATION | Facility: CLINIC | Age: 76
End: 2021-12-20
Payer: COMMERCIAL

## 2021-12-20 NOTE — PROGRESS NOTES
Clinic Care Coordination Contact  [facility]/Voicemail       Clinical Data: Care Coordinator Outreach  Outreach attempted x 1.  Left message on patient's voicemail with call back information and requested return call.  Plan:RN Care Coordinator will try to reach patient again in 1-2 business days.    Kierra Alejandro RN Care Coordinator  [facility]  Email: [email or web address]  Phone: [phone]

## 2021-12-21 ENCOUNTER — TELEPHONE (OUTPATIENT)
Dept: CARDIOLOGY | Facility: CLINIC | Age: 76
End: 2021-12-21
Payer: COMMERCIAL

## 2021-12-21 NOTE — TELEPHONE ENCOUNTER
Patient was evaluated by cardiology while inpatient for SOB, MARINO, BLE edema, new onset A. Fib with RVR with symptoms of decompensated HF. PMH: HLD, uncontrolled HTN. 12/17/21: EF 50% with mild concentric LVH, mild RV dilation, mod-severe biatrial enlargement, trace to mild MR, trace AR with mod pleural effusion. IV Lasix diuresed. HR's controlled on IV Diltiazem-transitioned to po Metoprolol. No BRUCE or DCCV this admission as pt was not NPO. Plan for OP DCCV in 3-4 weeks. Pt was started on Lasix, Eliquis, Metoprolol and Crestor at time of discharge. PTA ASA, Benadryl and Zocor were discontinued. Writer attempted to call patient to discuss any post hospital d/c questions he may have, review medication changes, and confirm f/u appts, but no answer. VM left to return my phone call. RN will confirm with patient that he has labs scheduled on 1/6/21 at 0730 followed with and OV at 0800 with TASIA Aleksandra Mclain at our [place] Office. Scheduled for an OP DCCV on 1/11/21 at 1130 in [place].  LUTHER Holt RN.

## 2021-12-21 NOTE — PROGRESS NOTES
Clinic Care Coordination Contact  [facility]/Voicemail       Clinical Data: Care Coordinator Outreach  Outreach attempted x 2.  Left message on patient's voicemail with call back information and requested return call.  Plan: Care Coordinator will send care coordination introduction letter with care coordinator contact information and explanation of care coordination services via mail. Care Coordinator will do no further outreaches at this time.    Kierra Alejandro RN Care Coordinator  [facility]  Email: [email or web address]  Phone: [phone]

## 2021-12-22 ENCOUNTER — HOSPITAL ENCOUNTER (OUTPATIENT)
Facility: CLINIC | Age: 76
End: 2021-12-22
Attending: INTERNAL MEDICINE | Admitting: INTERNAL MEDICINE
Payer: COMMERCIAL

## 2021-12-23 ENCOUNTER — OFFICE VISIT (OUTPATIENT)
Dept: FAMILY MEDICINE | Facility: CLINIC | Age: 76
End: 2021-12-23
Payer: COMMERCIAL

## 2021-12-23 VITALS
OXYGEN SATURATION: 97 % | TEMPERATURE: 96.1 F | HEIGHT: 70 IN | WEIGHT: 161.2 LBS | BODY MASS INDEX: 23.08 KG/M2 | HEART RATE: 86 BPM | SYSTOLIC BLOOD PRESSURE: 101 MMHG | DIASTOLIC BLOOD PRESSURE: 77 MMHG

## 2021-12-23 DIAGNOSIS — Z09 HOSPITAL DISCHARGE FOLLOW-UP: Primary | ICD-10-CM

## 2021-12-23 DIAGNOSIS — I48.91 ATRIAL FIBRILLATION, UNSPECIFIED TYPE (H): ICD-10-CM

## 2021-12-23 DIAGNOSIS — I50.20 HFREF (HEART FAILURE WITH REDUCED EJECTION FRACTION) (H): ICD-10-CM

## 2021-12-23 LAB
ANION GAP SERPL CALCULATED.3IONS-SCNC: <1 MMOL/L (ref 3–14)
BUN SERPL-MCNC: 26 MG/DL (ref 7–30)
CALCIUM SERPL-MCNC: 9 MG/DL (ref 8.5–10.1)
CHLORIDE BLD-SCNC: 105 MMOL/L (ref 94–109)
CO2 SERPL-SCNC: 37 MMOL/L (ref 20–32)
CREAT SERPL-MCNC: 1.13 MG/DL (ref 0.66–1.25)
GFR SERPL CREATININE-BSD FRML MDRD: 68 ML/MIN/1.73M2
GLUCOSE BLD-MCNC: 64 MG/DL (ref 70–99)
POTASSIUM BLD-SCNC: 3.6 MMOL/L (ref 3.4–5.3)
SODIUM SERPL-SCNC: 141 MMOL/L (ref 133–144)

## 2021-12-23 PROCEDURE — 80048 BASIC METABOLIC PNL TOTAL CA: CPT | Performed by: NURSE PRACTITIONER

## 2021-12-23 PROCEDURE — 36415 COLL VENOUS BLD VENIPUNCTURE: CPT | Performed by: NURSE PRACTITIONER

## 2021-12-23 PROCEDURE — 99495 TRANSJ CARE MGMT MOD F2F 14D: CPT | Performed by: NURSE PRACTITIONER

## 2021-12-23 ASSESSMENT — ENCOUNTER SYMPTOMS: SHORTNESS OF BREATH: 1

## 2021-12-23 ASSESSMENT — MIFFLIN-ST. JEOR: SCORE: 1477.45

## 2021-12-23 NOTE — LETTER
December 27, 2021      Amadou Wade  [id number] [address] [facility] [id number]        Dear ,    We are writing to inform you of your test results.    They showed:     Labs are overall quite good,     Need to watch carbon dioxide     We advise:     Continue current cares with close follow up     For additional lab test information, labtestsonline.org is an excellent reference.       Resulted Orders   Basic metabolic panel  (Ca, Cl, CO2, Creat, Gluc, K, Na, BUN)   Result Value Ref Range    Sodium 141 133 - 144 mmol/L    Potassium 3.6 3.4 - 5.3 mmol/L    Chloride 105 94 - 109 mmol/L    Carbon Dioxide (CO2) 37 (H) 20 - 32 mmol/L    Anion Gap <1 (L) 3 - 14 mmol/L    Urea Nitrogen 26 7 - 30 mg/dL    Creatinine 1.13 0.66 - 1.25 mg/dL    Calcium 9.0 8.5 - 10.1 mg/dL    Glucose 64 (L) 70 - 99 mg/dL    GFR Estimate 68 >60 mL/min/1.73m2      Comment:      Effective December 21, 2021 eGFRcr in adults is calculated using the 2021 CKD-EPI creatinine equation which includes age and gender (Seymour et al., NEJM, DOI: 10.1056/YERRfe9082734)       If you have any questions or concerns, please call the clinic at the number listed above.       Sincerely,      ROSENDO Banuelos CNP

## 2021-12-23 NOTE — PROGRESS NOTES
Assessment & Plan     Amadou was seen today for shortness of breath.    Diagnoses and all orders for this visit:    Hospital discharge follow-up  HFrEF (heart failure with reduced ejection fraction) (H)  Atrial Fibrillation  Patient is s/p hospitalization from 12/16/21 to 12/18/21 for new diagnosis of Heart Failure, Atrial Fibrillation with RVR, Bilateral pleural effusions, HTN.  Presented with two week history of dyspnea on exertion with weight gain, edema and was found to have Atrial Fibrillation with RVR and new congestive heart failure.  TTE completed, showed low to normal systolic function and LVH.  Discharged with rate control medications, diuretics and anticoagulation.    Planning BRUCE cardioversion in 1 month.    Patient has held Losartan since hospitalization.    Will await cardiology evaluation to determine continuation due to symptomatic hypotension.      -     Basic metabolic panel  (Ca, Cl, CO2, Creat, Gluc, K, Na, BUN); Future      36 minutes spent on the date of the encounter doing chart review, history and exam, documentation and further activities per the note      Return in about 2 weeks (around 1/6/2022) for Cardiology consultation.    ROSENDO Link CNP  [facility]   Amadou is a 76 year old who presents for the following health issues:      Shortness of Breath           Hospital Follow-up Visit:    Intermittent unsteadiness. Had an episode yesterday where he almost fell over.    Shortness of breath has improved, still slight dyspnea.    Denies chest pain.      Hospital/Nursing Home/IP Rehab Facility: [facility]  Date of Admission: 12/16/21  Date of Discharge: 12/18/21  Reason(s) for Admission: HFrEF  Atrial fibrillation with RVR  Bilateral pleural effusions  Uncontrolled hypertension  Hyperlipidemia      Was your hospitalization related to COVID-19? No   Problems taking medications regularly:  None  Medication changes  "since discharge: Eliquis, Lasix, Lopressor, Rosuvasatin   Problems adhering to non-medication therapy:  None    Patient is s/p hospitalization from 12/16/21 to 12/18/21 for new diagnosis of Heart Failure, Atrial Fibrillation with RVR, Bilateral pleural effusions, HTN.  Presented with two week history of dyspnea on exertion with weight gain, edema and was found to have Atrial Fibrillation with RVR and new congestive heart failure.  TTE completed, showed low to normal systolic function and LVH.  Discharged with rate control medications, diuretics and anticoagulation.    Planning BRUCE cardioversion in 1 month.        Summary of hospitalization:  [facility] discharge summary reviewed  Diagnostic Tests/Treatments reviewed.  Follow up needed: BMP  Other Healthcare Providers Involved in Patient s Care:         None  Update since discharge: improved.   Post Discharge Medication Reconciliation: discharge medications reconciled and changed, per note/orders.  Plan of care communicated with patient            Review of Systems   Respiratory: Positive for shortness of breath.       Constitutional, HEENT, cardiovascular, pulmonary, gi and gu systems are negative, except as otherwise noted.      Objective    /77 (BP Location: Right arm, Patient Position: Sitting)   Pulse 86   Temp (!) 96.1  F (35.6  C) (Tympanic)   Ht 1.778 m (5' 10\")   Wt 73.1 kg (161 lb 3.2 oz)   SpO2 97%   BMI 23.13 kg/m    Body mass index is 23.13 kg/m .     Repeat Blood Pressure:  BP Pulse Site Cuff Size Time Date   101/77 86 Right arm ---  9:03 AM 12/23/2021     Orthostatic Vitals  BP Pulse Position Site Cuff Size Time Date   90/68 --- Supine Right arm ---  9:39 AM 12/23/2021 104/77 --- --- --- ---  9:42 AM 12/23/2021 104/77 --- Standing Right arm ---  9:49 AM 12/23/2021   No peak flow data filed.  No pain information filed.      Physical Exam   GENERAL: healthy, alert and no distress  EYES: Eyes grossly normal to " inspection  RESP: lungs clear to auscultation - no rales, rhonchi or wheezes  CV: regular rate and irregular rhythm, normal S1 S2, no S3 or S4, no murmur, click or rub, no peripheral edema   MS: no gross musculoskeletal defects noted, no edema  NEURO: Normal strength and tone, mentation intact and speech normal  PSYCH: mentation appears normal, affect normal/bright

## 2021-12-23 NOTE — DISCHARGE SUMMARY
[facility]  Hospitalist Discharge Summary      Date of Admission:  12/16/2021  Date of Discharge:  12/18/2021  1:50 PM  Discharging Provider: Jay Soto MD      Discharge Diagnoses   HFrEF  Atrial fibrillation with RVR  Bilateral pleural effusions  Uncontrolled hypertension  Hyperlipidemia    Follow-ups Needed After Discharge   Follow-up Appointments     Follow-up and recommended labs and tests       Follow up with primary care provider, Johnathan Sampson, within 7 days to   evaluate medication change and for hospital follow- up.  The following   labs/tests are recommended: creatinine and blood pressure check.           Discharge Disposition   Discharged to home  Condition at discharge: Stable    Hospital Course   Amadou Wade is a 76 year old male with past medical history significant for hypertension, hyperlipidemia who presented to [facility] on 12/16/2021 with 2-week history of dyspnea on exertion/edema/weight gain and was found to have atrial fibrillation with RVR and new congestive heart failure.  TTE revealing low-normal systolic function after receiving 2 doses of diuresis, and concentric LVH.  Cardiology consulted recommending continuing rate control and anticoagulation with plan for follow-up with BRUCE cardioversion outpatient in 1 month.  Patient was discharged home in stable condition.    Consultations This Hospital Stay   PHARMACY IP CONSULT  PHARMACY IP CONSULT  CORE CLINIC EVALUATION IP CONSULT  OCCUPATIONAL THERAPY ADULT IP CONSULT  NUTRITION SERVICES ADULT IP CONSULT  CARE MANAGEMENT / SOCIAL WORK IP CONSULT  CARDIOLOGY IP CONSULT  PHARMACY IP CONSULT  PHARMACY IP CONSULT    Code Status   Prior    Time Spent on this Encounter   IJay MD, personally saw the patient today and spent less than or equal to 30 minutes discharging this patient.       Jay Soto MD  [facility] MEDICAL SURGICAL  [address] [place] [address]  Phone:  [phone]  Fax: [phone]  ______________________________________________________________________    Physical Exam   /67 (BP Location: Left arm)   Pulse 77   Temp 98.5  F (36.9  C) (Oral)   Resp 16   Ht 1.778 m (5' 10\")   Wt 72.3 kg (159 lb 4.8 oz)   SpO2 95%   BMI 22.86 kg/m        General: Very pleasant male resting comfortably in hospital bed.  Awake, alert, interactive.  HEENT: Normocephalic, atraumatic.  PERRL, EOMI.  Conjunctiva clear, sclerae anicteric.  Mucous membranes moist.  Cardiac: Regular rate and irregular rhythm without murmur, gallop, or rub.  1+ peripheral edema.    Respiratory: Normal work of breathing.  Clear to auscultation bilaterally without wheezing, rales, or rhonchi.  Decreased breath sounds of right base.  GI: Normal, active bowel sounds.  Abdomen soft, nontender, nondistended.  : Deferred.  Musculoskeletal: Moving all extremities appropriately.  Skin: No rashes or abrasions on exposed skin.  Neurologic: Alert and oriented x4.  Cranial nerves II through XII grossly intact.  Psychologic: Appropriate mood and affect.       Primary Care Physician   Johnathan Sampson    Discharge Orders      Follow-Up with Cardiac Advanced Practice Provider      Care Coordination Referral      Reason for your hospital stay    Atrial fibrillation and new heart failure     Follow-up and recommended labs and tests     Follow up with primary care provider, Johnathan Sampson, within 7 days to evaluate medication change and for hospital follow- up.  The following labs/tests are recommended: creatinine and blood pressure check.     Activity    Your activity upon discharge: activity as tolerated     Cardioversion    Can do on a day when Dr. Cordova is doing them      Patient Instructions:   NPO for 6 hours prior to the procedure except for sips of water with medications.   Continue daily Warfarin (Coumadin) if on the medication   Hold insulin or oral diabetic medications morning of procedure. May take   dose long " acting insulin. Follow further instructions of primary physician.    required     Diet    Follow this diet upon discharge: Orders Placed This Encounter      Combination Diet 2 gm NA Diet; No Caffeine Diet, Low Saturated Fat Diet       Significant Results and Procedures   Most Recent 3 CBC's:Recent Labs   Lab Test 21  0759 21  0136 21  1058   WBC 5.5 5.1 5.2   HGB 15.4 14.4 15.7   MCV 96 97 98    162 171     Most Recent 3 BMP's:Recent Labs   Lab Test 21  1127 21  0652 21  1549 21  0759 21  0225 21  0136 21  1058     --   --  142  --   --  145*   POTASSIUM 3.7 3.8 4.0 3.4  --    < > 4.1   CHLORIDE 104  --   --  107  --   --  115*   CO2 28  --   --  26  --   --  25   BUN 25  --   --  21  --   --  23   CR 1.08  --   --  1.10  --   --  1.10   ANIONGAP 9  --   --  9  --   --  5   STEPHEN 9.3  --   --  8.5  --   --  7.8*   *  --   --  75 82  --  76    < > = values in this interval not displayed.     Most Recent 3 Troponin's:No lab results found.  Most Recent 3 BNP's:Recent Labs   Lab Test 21  1058 21  0922   NTBNP 4,335* 4,588*   ,   Results for orders placed or performed during the hospital encounter of 21   Echocardiogram Complete     Value    LVEF  50-55%    Narrative    [id number]  [id number]  [id number]  [id number]^ESME^LAUREN^MATTHIAS     [facility]  Echocardiography Laboratory  [address] [place] [address]     Name: ANALILIA ZAVALA  MRN: [id number]  : 1945  Study Date: 2021 11:09 AM  Age: 76 yrs  Gender: Male  Patient Location: [facility]  Reason For Study: Atrial Fibrillation  Ordering Physician: LAUREN VICTORIA  Referring Physician: Johnathan Sampson  Performed By: Angy Huddleston     BSA: 1.9 m2  Height: 70 in  Weight: 170 lb  HR: 75  BP: 118/77 mmHg  ______________________________________________________________________________  Procedure  Complete Portable Echo Adult. Optison (NDC  #[id number]) given intravenously.  ______________________________________________________________________________  Interpretation Summary     The rhythm was atrial fibrillation.  Left ventricular systolic function is low normal.  The visual ejection fraction is 50-55%.  The left ventricle is normal in size.  There is mild concentric left ventricular hypertrophy.  The right ventricle is mildly dilated.  Mildly decreased right ventricular systolic function  There is mod-severe biatrial enlargement.  There is trace to mild mitral regurgitation.  There is trace aortic regurgitation.  Moderate left pleural effusion  ______________________________________________________________________________  Left Ventricle  The left ventricle is normal in size. There is mild concentric left  ventricular hypertrophy. Left ventricular systolic function is low normal. The  visual ejection fraction is 50-55%. Left ventricular diastolic function is  indeterminate. No regional wall motion abnormalities noted. There is no  thrombus seen in the left ventricle.     Right Ventricle  The right ventricle is mildly dilated. Mildly decreased right ventricular  systolic function.     Atria  There is mod-severe biatrial enlargement. There is no atrial shunt seen. The  left atrial appendage is not well visualized.     Mitral Valve  The mitral valve leaflets appear normal. There is no evidence of stenosis,  fluttering, or prolapse. There is trace to mild mitral regurgitation. There is  no mitral valve stenosis.     Tricuspid Valve  Normal tricuspid valve. Right ventricle systolic pressure estimate normal. The  right ventricular systolic pressure is approximated at 19.7 mmHg plus the  right atrial pressure. There is mild (1+) tricuspid regurgitation. There is no  tricuspid stenosis.     Aortic Valve  There is mild trileaflet aortic sclerosis. There is trace aortic  regurgitation. No aortic stenosis is present.     Pulmonic Valve  The pulmonic valve is  not well visualized.     Vessels  The aortic root is normal size. Normal size ascending aorta. Dilation of the  inferior vena cava is present with normal respiratory variation in diameter.  The pulmonary artery is normal size.     Pericardium  The pericardium appears normal. Moderate left pleural effusion.     Rhythm  The rhythm was atrial fibrillation.  ______________________________________________________________________________  MMode/2D Measurements & Calculations  IVSd: 1.2 cm     LVIDd: 4.7 cm  LVIDs: 3.8 cm  LVPWd: 0.84 cm  FS: 17.4 %  LV mass(C)d: 163.2 grams  LV mass(C)dI: 83.8 grams/m2  Ao root diam: 3.6 cm  LA dimension: 3.5 cm  asc Aorta Diam: 3.6 cm  LA/Ao: 0.97  LVOT diam: 2.3 cm  LVOT area: 4.2 cm2     EF(MOD-bp): 35.7 %  LA Volume (BP): 36.1 ml  LA Volume Index (BP): 18.5 ml/m2  RWT: 0.36     Doppler Measurements & Calculations  MV E max cassandra: 78.7 cm/sec  MV dec time: 0.17 sec  LV V1 max P.9 mmHg  LV V1 max: 109.9 cm/sec  LV V1 VTI: 19.0 cm  SV(LVOT): 78.8 ml  SI(LVOT): 40.5 ml/m2  PA acc time: 0.10 sec  TR max cassandra: 221.7 cm/sec  TR max P.7 mmHg  E/E' avg: 15.4  Lateral E/e': 12.6  Medial E/e': 18.3     ______________________________________________________________________________  Report approved by: Dr. Mario Gold 2021 12:24 PM               Discharge Medications   Discharge Medication List as of 2021 12:06 PM      START taking these medications    Details   apixaban ANTICOAGULANT (ELIQUIS) 5 MG tablet Take 1 tablet (5 mg) by mouth 2 times daily, Disp-60 tablet, R-0, E-Prescribe      furosemide (LASIX) 20 MG tablet Take 1 tablet (20 mg) by mouth 2 times daily, Disp-30 tablet, R-0, E-Prescribe      metoprolol tartrate (LOPRESSOR) 50 MG tablet Take 1 tablet (50 mg) by mouth 2 times daily, Disp-60 tablet, R-0, E-Prescribe      rosuvastatin (CRESTOR) 10 MG tablet Take 1 tablet (10 mg) by mouth At Bedtime, Disp-30 tablet, R-0, E-Prescribe         CONTINUE these medications  which have NOT CHANGED    Details   losartan (COZAAR) 50 MG tablet Take 0.5 tablets (25 mg) by mouth 2 times daily 1/2 tab, Disp-90 tablet, R-3, Local Print      valACYclovir (VALTREX) 1000 mg tablet TAKE TWO TABLETS BY MOUTH TWICE A DAY FOR COLD SORES, Historical         STOP taking these medications       ASPIRIN 81 MG OR TABS Comments:   Reason for Stopping:         diphenhydrAMINE (BENADRYL) 25 MG tablet Comments:   Reason for Stopping:         simvastatin (ZOCOR) 40 MG tablet Comments:   Reason for Stopping:             Allergies   No Known Allergies

## 2021-12-23 NOTE — PATIENT INSTRUCTIONS
Repeat Blood Pressure:  BP Pulse Site Cuff Size Time Date   101/77 86 Right arm ---  9:03 AM 12/23/2021     Orthostatic Vitals  BP Pulse Position Site Cuff Size Time Date   90/68 --- Supine Right arm ---  9:39 AM 12/23/2021 104/77 --- --- --- ---  9:42 AM 12/23/2021 104/77 --- Standing Right arm ---  9:49 AM 12/23/2021   No peak flow data filed.  No pain information filed.

## 2021-12-24 NOTE — TELEPHONE ENCOUNTER
Second attempt at trying to contact pt, but again, no answer and pt has not returned my prior message. VM left to return my phone call. LUTHER Holt RN.

## 2021-12-27 ENCOUNTER — TELEPHONE (OUTPATIENT)
Dept: FAMILY MEDICINE | Facility: CLINIC | Age: 76
End: 2021-12-27
Payer: COMMERCIAL

## 2021-12-27 LAB
BKR LAB AP ADD'L TEST STATUS: NORMAL
BKR PATH ADDL TEST FINAL COMMENTS: NORMAL

## 2021-12-27 NOTE — TELEPHONE ENCOUNTER
Steffi, team member from [facility] Prior Authorization team called.     Is requesting a peer to peer discussion with another physician to discuss denials of the CT scan. There seemed to be a lot of miscommunications and mix ups between the scans being completed/not completed in ER.     Please contact [facility] to set up peer to peer discussion with a physician. Their phone number is [phone].    For more clarification, Steffi may be reached at [phone].     Steffi also requests to be notified when this is all sorted out.

## 2021-12-27 NOTE — TELEPHONE ENCOUNTER
Pt returned call - I called him back but no answer.  Left voice message regarding follow up Jan 6th in [place] [facility] with Shonda WORKMAN.  Call back with other questions

## 2022-01-04 ENCOUNTER — TELEPHONE (OUTPATIENT)
Dept: CARDIOLOGY | Facility: CLINIC | Age: 77
End: 2022-01-04
Payer: COMMERCIAL

## 2022-01-04 DIAGNOSIS — I48.91 ATRIAL FIBRILLATION WITH RAPID VENTRICULAR RESPONSE (H): ICD-10-CM

## 2022-01-04 DIAGNOSIS — I50.31 ACUTE DIASTOLIC CONGESTIVE HEART FAILURE (H): ICD-10-CM

## 2022-01-04 RX ORDER — FUROSEMIDE 20 MG
20 TABLET ORAL 2 TIMES DAILY
Qty: 60 TABLET | Refills: 0 | Status: SHIPPED | OUTPATIENT
Start: 2022-01-04 | End: 2022-01-28

## 2022-01-04 NOTE — TELEPHONE ENCOUNTER
Call from patient stating that he needs a refill of his furosemide, as he only got 15 days worth on discharge instead of 30 days. Rx filled to patient's preferred pharmacy. Patient states that [facility] but he is aware he will need to pick them up at [facility] in the meantime. Patient is also wondering about his losartan. Patient states that he has been taking that medication, but isn't sure if he is supposed to. Reviewed chart. Losartan was held by PCP on 12/23 due to low BP. Patient is requesting cardiology input about whether he should take the losartan or hold off. Will route to Dr. Cordova for review.

## 2022-01-04 NOTE — TELEPHONE ENCOUNTER
Tasha, MD Jed Collins Ashley, RN 6 minutes ago (4:20 PM)     CK    Hold off on the losartan for now thanks     Tried to call patient to review Dr. Cordova's recommendation to hold Losartan. Pt also have visit with [facility] scheduled 1/6/22. Left voicemail for patient to call team 4 back with direct number.

## 2022-01-06 ENCOUNTER — OFFICE VISIT (OUTPATIENT)
Dept: CARDIOLOGY | Facility: CLINIC | Age: 77
End: 2022-01-06
Payer: COMMERCIAL

## 2022-01-06 ENCOUNTER — LAB (OUTPATIENT)
Dept: LAB | Facility: CLINIC | Age: 77
End: 2022-01-06
Payer: COMMERCIAL

## 2022-01-06 VITALS
SYSTOLIC BLOOD PRESSURE: 111 MMHG | BODY MASS INDEX: 23.05 KG/M2 | HEART RATE: 82 BPM | OXYGEN SATURATION: 98 % | HEIGHT: 70 IN | WEIGHT: 161 LBS | DIASTOLIC BLOOD PRESSURE: 81 MMHG

## 2022-01-06 DIAGNOSIS — I50.31 ACUTE DIASTOLIC CONGESTIVE HEART FAILURE (H): ICD-10-CM

## 2022-01-06 DIAGNOSIS — I50.9 CHF (CONGESTIVE HEART FAILURE) (H): ICD-10-CM

## 2022-01-06 DIAGNOSIS — I48.91 ATRIAL FIBRILLATION WITH RAPID VENTRICULAR RESPONSE (H): Primary | ICD-10-CM

## 2022-01-06 LAB
ANION GAP SERPL CALCULATED.3IONS-SCNC: 3 MMOL/L (ref 3–14)
BUN SERPL-MCNC: 24 MG/DL (ref 7–30)
CALCIUM SERPL-MCNC: 8.7 MG/DL (ref 8.5–10.1)
CHLORIDE BLD-SCNC: 108 MMOL/L (ref 94–109)
CO2 SERPL-SCNC: 32 MMOL/L (ref 20–32)
CREAT SERPL-MCNC: 1.18 MG/DL (ref 0.66–1.25)
GFR SERPL CREATININE-BSD FRML MDRD: 65 ML/MIN/1.73M2
GLUCOSE BLD-MCNC: 85 MG/DL (ref 70–99)
NT-PROBNP SERPL-MCNC: 4970 PG/ML (ref 0–125)
POTASSIUM BLD-SCNC: 4.1 MMOL/L (ref 3.4–5.3)
SODIUM SERPL-SCNC: 143 MMOL/L (ref 133–144)

## 2022-01-06 PROCEDURE — 80048 BASIC METABOLIC PNL TOTAL CA: CPT | Performed by: INTERNAL MEDICINE

## 2022-01-06 PROCEDURE — 99214 OFFICE O/P EST MOD 30 MIN: CPT | Performed by: PHYSICIAN ASSISTANT

## 2022-01-06 PROCEDURE — 83880 ASSAY OF NATRIURETIC PEPTIDE: CPT | Performed by: INTERNAL MEDICINE

## 2022-01-06 PROCEDURE — 93000 ELECTROCARDIOGRAM COMPLETE: CPT | Performed by: PHYSICIAN ASSISTANT

## 2022-01-06 PROCEDURE — 36415 COLL VENOUS BLD VENIPUNCTURE: CPT | Performed by: INTERNAL MEDICINE

## 2022-01-06 ASSESSMENT — MIFFLIN-ST. JEOR: SCORE: 1476.54

## 2022-01-06 NOTE — PROGRESS NOTES
CARDIOLOGY CLINIC PROGRESS NOTE    DOS: 2022      Amadou Wade  : 1945, 76 year old  MRN: [id number]      History:  I am meeting Amadou Wade today for follow up of hospital admission, and H and P for upcoming cardioversion.  He was seen by Dr. Cordova during his admission.     Amadou Wade is a 76 year old man with history of dyslipidemia, HTN, newly diagnosed afib with RVR.     21 sent from clinic to ED for new onset afib with RVR.   He had SOB and weight gain x 2 weeks.    21: EF 50-55% with mild concentric LVH, mild RV dilation, mod-severe biatrial enlargement, trace to mild MR, trace AR with mod pleural effusion.    He was given IV Lasix and diuresed.   HRs controlled on IV Diltiazem, then transitioned to metoprolol.  Plan was to [facility] DCCV but he had eaten, so decided for outpatient DCCV alone after about 3 weeks of being on Eliquis.   He was started on Lasix, Eliquis, metoprolol and Crestor at time of discharge. PTA ASA, Benadryl and Zocor were discontinued. PTA losartan was continued.     He was seen in clinic and noted losartan had been held since discharge. BPs were controlled, and this was discontinued.     He presents today for follow up.   He keeps track of his daily weights, BP, HR.   Weights have been steady at 155 lbs.   HR in the 80-90s.   BPs run 100-110s.   He is on Eliquis without any missed doses.   He is feeling ok.   They are following low sodium.     22 DCCV scheduled with Dr. Cordova       ROS:  Skin:  Negative     Eyes:  Positive for glasses  ENT:  Negative    Respiratory:  Negative    Cardiovascular:    Positive for;lightheadedness;fatigue  Gastroenterology: Positive for diarrhea  Genitourinary:  not assessed    Musculoskeletal:  Positive for back pain  Neurologic:  Negative    Psychiatric:  Positive for sleep disturbances  Heme/Lymph/Imm:  Negative    Endocrine:  Negative      PAST MEDICAL HISTORY:  Past Medical History:   Diagnosis Date     Atrial  fibrillation (H) 12/2021     Cardiac dysrhythmia, unspecified 03/2007    few pac's, few pvc's, few runs SVT     CHF (congestive heart failure) (H) 12/2021     Chronic rhinitis     resolved     ED (erectile dysfunction)      [facility]     HFrEF (heart failure with reduced ejection fraction) (H) 12/2021     Hyperlipidemia LDL goal <130 2002     Hypertension goal BP (blood pressure) < 140/90 12/2017     Pneumonia, organism unspecified(486) 1993     Snoring     no sleep apnea - mouth guard     Squamous 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 2021    Dr Abernathy - Rt Chest     Unspecified hemorrhoids without mention of complication 04/2006    internal       PAST SURGICAL HISTORY:  Past Surgical History:   Procedure Laterality Date     COLONOSCOPY  01/03/2012    incomplete - negative, ACBE incomplete - negative, CT colography negative     COLONOSCOPY Bilateral 09/14/2021     ganglion cyst removal Left 09/02/2021     HC REMOVE TONSILS/ADENOIDS,<11 Y/O  1953    T & A <14y.o.     SIGMOIDOSCOPY,DIAGNOSTIC  1997    normal     STRESS ECHO ([facility]RO)  03/2007    normal few pac's, few pvc's     SURGICAL HISTORY OF -  Bilateral     fall, 2020 - bilateral IOL     ZZHC COLONOSCOPY THRU STOMA, DIAGNOSTIC  05/2006    dr tejeda - normal - internal hemorrhiods - due 10 yrs       SOCIAL HISTORY:  Social History     Socioeconomic History     Marital status:      Spouse name: Dania     Number of children: 3     Years of education: 19     Highest education level: None   Occupational History     Occupation:       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   Tobacco Use     Smoking status: Never Smoker     Smokeless tobacco: Never Used   Vaping Use     Vaping Use: Never used   Substance and Sexual Activity     Alcohol use: Yes     Alcohol/week: 0.0 - 0.8 standard drinks     Comment: 0-1 per week avg     Drug use: No     Sexual activity: Yes     Partners: Female   Other Topics Concern      Service Not Asked     Blood  Transfusions Not Asked     Caffeine Concern Yes     Comment: occas     Occupational Exposure Not Asked     Hobby Hazards Not Asked     Sleep Concern Not Asked     Stress Concern Not Asked     Weight Concern Not Asked     Special Diet Not Asked     Back Care Not Asked     Exercise Yes     Comment: active     Bike Helmet Not Asked     Seat Belt Yes     Self-Exams Not Asked     Parent/sibling w/ CABG, MI or angioplasty before 65F 55M? No   Social History Narrative     None     Social Determinants of Health     Financial Resource Strain: Not on file   Food Insecurity: Not on file   Transportation Needs: Not on file   Physical Activity: Not on file   Stress: Not on file   Social Connections: Not on file   Intimate Partner Violence: Not on file   Housing Stability: Not on file       FAMILY HISTORY:  Family History   Problem Relation Age of Onset     Hypertension Mother      Cancer Father         lung     Eye Disorder Father         Glaucoma     Alzheimer Disease Father      Heart Disease Father      Diabetes Father         type 2     Diabetes Brother         type 2     Coronary Artery Disease Brother      Cerebrovascular Disease Maternal Grandmother      Heart Disease Maternal Grandfather          young heartattack     Diabetes Paternal Grandfather      Cancer Brother         melanoma     Alzheimer Disease Brother      Prostate Cancer Brother      Cerebrovascular Disease Brother        MEDS: apixaban ANTICOAGULANT (ELIQUIS) 5 MG tablet, Take 1 tablet (5 mg) by mouth 2 times daily  furosemide (LASIX) 20 MG tablet, Take 1 tablet (20 mg) by mouth 2 times daily  metoprolol tartrate (LOPRESSOR) 50 MG tablet, Take 1 tablet (50 mg) by mouth 2 times daily  rosuvastatin (CRESTOR) 10 MG tablet, Take 1 tablet (10 mg) by mouth At Bedtime  valACYclovir (VALTREX) 1000 mg tablet, TAKE TWO TABLETS BY MOUTH TWICE A DAY FOR COLD SORES    lidocaine 1% with EPINEPHrine 1:100,000 injection 3 mL        ALLERGIES: No Known  "Allergies    PHYSICAL EXAM:  Vitals: /81   Pulse 82   Ht 1.778 m (5' 10\")   Wt 73 kg (161 lb)   SpO2 98%   BMI 23.10 kg/m    Constitutional:  cooperative, alert and oriented, well developed, well nourished, in no acute distress        Skin:  warm and dry to the touch, no apparent skin lesions or masses noted        Head:  normocephalic, no masses or lesions        Eyes:  pupils equal and round;conjunctivae and lids unremarkable;sclera white        ENT:  no pallor or cyanosis        Neck:  JVP normal        Respiratory:  clear to auscultation        Cardiac:   irregularly irregular rhythm                GI:  abdomen soft;BS normoactive        Vascular: pulses full and equal                                      Extremities and Musculoskeletal:  no deformities, clubbing, cyanosis, erythema observed;no edema        Neurological:  no gross motor deficits;affect appropriate            LABS/DATA:  I reviewed the following:  EKG 1/6/2022:  Afib, incomplete RBBB, VR 89    Component      Latest Ref Rng & Units 1/6/2022   Sodium      133 - 144 mmol/L 143   Potassium      3.4 - 5.3 mmol/L 4.1   Chloride      94 - 109 mmol/L 108   Carbon Dioxide      20 - 32 mmol/L 32   Anion Gap      3 - 14 mmol/L 3   Urea Nitrogen      7 - 30 mg/dL 24   Creatinine      0.66 - 1.25 mg/dL 1.18   Calcium      8.5 - 10.1 mg/dL 8.7   Glucose      70 - 99 mg/dL 85   GFR Estimate      >60 mL/min/1.73m2 65   N-Terminal Pro Bnp      0 - 125 pg/mL 4,970 (H)       ASSESSMENT/PLAN:  New onset afib with RVR  HFpEF  - Echo shows LVEF 50-55%, mild LVH, no sig valve disease  - Rate controlled on metoprolol  - [id number] score is 2 for HTN, age.  Will be 5 next month.  He is on Eliquis 5 mg BID since his admission (since at least 12/18/21) without bleeding concerns.  [facility].  If they do not receive by next week, he will need another month filled at local pharmacy  - Plan is for DCCV on 1/11/22   * R and B discussed today with " patient and wife  - He is currently on Lasix 20 mg BID with stable weights in the 155 lbs range. BMP is stable.  NT pro BNP is elevated, and he will continue Lasix for now without change.   He will likely not need Lasix if he converts to sinus rhythm.  He weighs daily and would be a good candidate for Lasix 20 mg PRN weight gain in the future      Follow up:  ANDREINA 1/11/22  Seeing Jojo in [place] 1/17/22 as they leave for vacation on 1/19/22 for a week         Aleksandra Mclain PA-C

## 2022-01-06 NOTE — LETTER
2022    Johnathan Sampson MD  [address] [facility] [address] [address] [id number]    RE: Amadou Wade       Dear Colleague,     I had the pleasure of seeing Amadou Wade in the [facility].      CARDIOLOGY CLINIC PROGRESS NOTE    DOS: 2022      Amadou Wade  : 1945, 76 year old  MRN: [id number]      History:  I am meeting Amadou Wade today for follow up of hospital admission, and H and P for upcoming cardioversion.  He was seen by Dr. Cordova during his admission.     Amadou Wade is a 76 year old man with history of dyslipidemia, HTN, newly diagnosed afib with RVR.     21 sent from clinic to ED for new onset afib with RVR.   He had SOB and weight gain x 2 weeks.    21: EF 50-55% with mild concentric LVH, mild RV dilation, mod-severe biatrial enlargement, trace to mild MR, trace AR with mod pleural effusion.    He was given IV Lasix and diuresed.   HRs controlled on IV Diltiazem, then transitioned to metoprolol.  Plan was to [facility] DCCV but he had eaten, so decided for outpatient DCCV alone after about 3 weeks of being on Eliquis.   He was started on Lasix, Eliquis, metoprolol and Crestor at time of discharge. PTA ASA, Benadryl and Zocor were discontinued. PTA losartan was continued.     He was seen in clinic and noted losartan had been held since discharge. BPs were controlled, and this was discontinued.     He presents today for follow up.   He keeps track of his daily weights, BP, HR.   Weights have been steady at 155 lbs.   HR in the 80-90s.   BPs run 100-110s.   He is on Eliquis without any missed doses.   He is feeling ok.   They are following low sodium.     22 DCCV scheduled with Dr. Cordova       ROS:  Skin:  Negative     Eyes:  Positive for glasses  ENT:  Negative    Respiratory:  Negative    Cardiovascular:    Positive for;lightheadedness;fatigue  Gastroenterology: Positive for diarrhea  Genitourinary:  not assessed    Musculoskeletal:  Positive for back  pain  Neurologic:  Negative    Psychiatric:  Positive for sleep disturbances  Heme/Lymph/Imm:  Negative    Endocrine:  Negative      PAST MEDICAL HISTORY:  Past Medical History:   Diagnosis Date     Atrial fibrillation (H) 12/2021     Cardiac dysrhythmia, unspecified 03/2007    few pac's, few pvc's, few runs SVT     CHF (congestive heart failure) (H) 12/2021     Chronic rhinitis     resolved     ED (erectile dysfunction)      [facility]     HFrEF (heart failure with reduced ejection fraction) (H) 12/2021     Hyperlipidemia LDL goal <130 2002     Hypertension goal BP (blood pressure) < 140/90 12/2017     Pneumonia, organism unspecified(486) 1993     Snoring     no sleep apnea - mouth guard     Squamous 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 2021    Dr Abernathy - Rt Chest     Unspecified hemorrhoids without mention of complication 04/2006    internal       PAST SURGICAL HISTORY:  Past Surgical History:   Procedure Laterality Date     COLONOSCOPY  01/03/2012    incomplete - negative, ACBE incomplete - negative, CT colography negative     COLONOSCOPY Bilateral 09/14/2021     ganglion cyst removal Left 09/02/2021     HC REMOVE TONSILS/ADENOIDS,<11 Y/O  1953    T & A <14y.o.     SIGMOIDOSCOPY,DIAGNOSTIC  1997    normal     STRESS ECHO ([facility]RO)  03/2007    normal few pac's, few pvc's     SURGICAL HISTORY OF -  Bilateral     fall, 2020 - bilateral IOL     ZZHC COLONOSCOPY THRU STOMA, DIAGNOSTIC  05/2006    dr tejeda - normal - internal hemorrhiods - due 10 yrs       SOCIAL HISTORY:  Social History     Socioeconomic History     Marital status:      Spouse name: Dania     Number of children: 3     Years of education: 19     Highest education level: None   Occupational History     Occupation:       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   Tobacco Use     Smoking status: Never Smoker     Smokeless tobacco: Never Used   Vaping Use     Vaping Use: Never used   Substance and Sexual Activity     Alcohol use: Yes      Alcohol/week: 0.0 - 0.8 standard drinks     Comment: 0-1 per week avg     Drug use: No     Sexual activity: Yes     Partners: Female   Other Topics Concern      Service Not Asked     Blood Transfusions Not Asked     Caffeine Concern Yes     Comment: occas     Occupational Exposure Not Asked     Hobby Hazards Not Asked     Sleep Concern Not Asked     Stress Concern Not Asked     Weight Concern Not Asked     Special Diet Not Asked     Back Care Not Asked     Exercise Yes     Comment: active     Bike Helmet Not Asked     Seat Belt Yes     Self-Exams Not Asked     Parent/sibling w/ CABG, MI or angioplasty before 65F 55M? No   Social History Narrative     None     Social Determinants of Health     Financial Resource Strain: Not on file   Food Insecurity: Not on file   Transportation Needs: Not on file   Physical Activity: Not on file   Stress: Not on file   Social Connections: Not on file   Intimate Partner Violence: Not on file   Housing Stability: Not on file       FAMILY HISTORY:  Family History   Problem Relation Age of Onset     Hypertension Mother      Cancer Father         lung     Eye Disorder Father         Glaucoma     Alzheimer Disease Father      Heart Disease Father      Diabetes Father         type 2     Diabetes Brother         type 2     Coronary Artery Disease Brother      Cerebrovascular Disease Maternal Grandmother      Heart Disease Maternal Grandfather          young heartattack     Diabetes Paternal Grandfather      Cancer Brother         melanoma     Alzheimer Disease Brother      Prostate Cancer Brother      Cerebrovascular Disease Brother        MEDS: apixaban ANTICOAGULANT (ELIQUIS) 5 MG tablet, Take 1 tablet (5 mg) by mouth 2 times daily  furosemide (LASIX) 20 MG tablet, Take 1 tablet (20 mg) by mouth 2 times daily  metoprolol tartrate (LOPRESSOR) 50 MG tablet, Take 1 tablet (50 mg) by mouth 2 times daily  rosuvastatin (CRESTOR) 10 MG tablet, Take 1 tablet (10 mg) by mouth At  "Bedtime  valACYclovir (VALTREX) 1000 mg tablet, TAKE TWO TABLETS BY MOUTH TWICE A DAY FOR COLD SORES    lidocaine 1% with EPINEPHrine 1:100,000 injection 3 mL        ALLERGIES: No Known Allergies    PHYSICAL EXAM:  Vitals: /81   Pulse 82   Ht 1.778 m (5' 10\")   Wt 73 kg (161 lb)   SpO2 98%   BMI 23.10 kg/m    Constitutional:  cooperative, alert and oriented, well developed, well nourished, in no acute distress        Skin:  warm and dry to the touch, no apparent skin lesions or masses noted        Head:  normocephalic, no masses or lesions        Eyes:  pupils equal and round;conjunctivae and lids unremarkable;sclera white        ENT:  no pallor or cyanosis        Neck:  JVP normal        Respiratory:  clear to auscultation        Cardiac:   irregularly irregular rhythm                GI:  abdomen soft;BS normoactive        Vascular: pulses full and equal                                      Extremities and Musculoskeletal:  no deformities, clubbing, cyanosis, erythema observed;no edema        Neurological:  no gross motor deficits;affect appropriate            LABS/DATA:  I reviewed the following:  EKG 1/6/2022:  Afib, incomplete RBBB, VR 89    Component      Latest Ref Rng & Units 1/6/2022   Sodium      133 - 144 mmol/L 143   Potassium      3.4 - 5.3 mmol/L 4.1   Chloride      94 - 109 mmol/L 108   Carbon Dioxide      20 - 32 mmol/L 32   Anion Gap      3 - 14 mmol/L 3   Urea Nitrogen      7 - 30 mg/dL 24   Creatinine      0.66 - 1.25 mg/dL 1.18   Calcium      8.5 - 10.1 mg/dL 8.7   Glucose      70 - 99 mg/dL 85   GFR Estimate      >60 mL/min/1.73m2 65   N-Terminal Pro Bnp      0 - 125 pg/mL 4,970 (H)       ASSESSMENT/PLAN:  New onset afib with RVR  HFpEF  - Echo shows LVEF 50-55%, mild LVH, no sig valve disease  - Rate controlled on metoprolol  - [id number] score is 2 for HTN, age.  Will be 5 next month.  He is on Eliquis 5 mg BID since his admission (since at least 12/18/21) without bleeding concerns.  " [facility].  If they do not receive by next week, he will need another month filled at local pharmacy  - Plan is for [facility] on 1/11/22   * R and B discussed today with patient and wife  - He is currently on Lasix 20 mg BID with stable weights in the 155 lbs range. BMP is stable.  NT pro BNP is elevated, and he will continue Lasix for now without change.   He will likely not need Lasix if he converts to sinus rhythm.  He weighs daily and would be a good candidate for Lasix 20 mg PRN weight gain in the future    Follow up:  ANDREINA 1/11/22  Seeing Jojo in [place] 1/17/22 as they leave for vacation on 1/19/22 for a week     Aleksandra Mclain PA-C    Thank you for allowing me to participate in the care of your patient.Sincerely,     Aleksandra Mclain PA-C   [facility]

## 2022-01-08 ENCOUNTER — LAB (OUTPATIENT)
Dept: URGENT CARE | Facility: URGENT CARE | Age: 77
End: 2022-01-08
Payer: COMMERCIAL

## 2022-01-08 DIAGNOSIS — I48.91 ATRIAL FIBRILLATION WITH RAPID VENTRICULAR RESPONSE (H): ICD-10-CM

## 2022-01-08 PROCEDURE — U0005 INFEC AGEN DETEC AMPLI PROBE: HCPCS

## 2022-01-08 PROCEDURE — U0003 INFECTIOUS AGENT DETECTION BY NUCLEIC ACID (DNA OR RNA); SEVERE ACUTE RESPIRATORY SYNDROME CORONAVIRUS 2 (SARS-COV-2) (CORONAVIRUS DISEASE [COVID-19]), AMPLIFIED PROBE TECHNIQUE, MAKING USE OF HIGH THROUGHPUT TECHNOLOGIES AS DESCRIBED BY CMS-2020-01-R: HCPCS

## 2022-01-09 LAB — SARS-COV-2 RNA RESP QL NAA+PROBE: NEGATIVE

## 2022-01-10 ENCOUNTER — TELEPHONE (OUTPATIENT)
Dept: CARDIOLOGY | Facility: CLINIC | Age: 77
End: 2022-01-10
Payer: COMMERCIAL

## 2022-01-10 NOTE — TELEPHONE ENCOUNTER
Called patient to review instructions for upcoming DCCV:     List therapeutic INR >2.0 x 4 weeks: no  Insulin/Metformin/Glipazide: No/Yes:no  Diuretic?: yes, will hold lasix  Digoxin/Lanoxin:no* hold day of procedure   NPO x 6 hours except for medications   Transportation: yes  H&P consent, risks & benefits reviewed within 30 days: yes on 1/6/22  Lab work to be ordered day of procedure only     Time of arrival and location reviewed.           ----- Message from Alber Vargas RN sent at 12/21/2021  8:34 AM CST -----  Regarding: FW: DCCV in  1/11/22    ----- Message -----  From: Susy Rosen  Sent: 12/21/2021   7:18 AM CST  To: Amee Cannon Michael, #  Subject: DCCV in [facility] 1/11/22                               DCCV    Location: [place]    Procedure: Cardioversion    Diagnosis: Acute diastolic congestive heart failure. Paroxysmal atrial fibrillation.    Procedure Date: 1/11/22    Procedure Time: 11:30 a.m.    Patient Arrival Time: 9:30 a.m.    Ordering Cardiologist: Dr. Cordova    Performing Cardiologist: Dr. Cordova    Cardiac Assessment Completed: Yes  Date: 1/6/22  Provider: Aleksandra Mclain    Patient on Coumadin/Warfarin:  No  Patient on Eliquis:  Yes    COVID Test Scheduled: 1/8/22    Appointment was scheduled: Over the phone    If pt is having a Cardioverion:  Does the pt have a device: No

## 2022-01-11 ENCOUNTER — TELEPHONE (OUTPATIENT)
Dept: FAMILY MEDICINE | Facility: CLINIC | Age: 77
End: 2022-01-11

## 2022-01-11 ENCOUNTER — HOSPITAL ENCOUNTER (OUTPATIENT)
Facility: CLINIC | Age: 77
Discharge: HOME | End: 2022-01-11
Attending: INTERNAL MEDICINE | Admitting: INTERNAL MEDICINE
Payer: COMMERCIAL

## 2022-01-11 ENCOUNTER — ANESTHESIA EVENT (OUTPATIENT)
Dept: SURGERY | Facility: CLINIC | Age: 77
End: 2022-01-11
Payer: COMMERCIAL

## 2022-01-11 ENCOUNTER — ANESTHESIA (OUTPATIENT)
Dept: SURGERY | Facility: CLINIC | Age: 77
End: 2022-01-11
Payer: COMMERCIAL

## 2022-01-11 ENCOUNTER — HOSPITAL ENCOUNTER (OUTPATIENT)
Dept: CARDIOLOGY | Facility: CLINIC | Age: 77
End: 2022-01-11
Attending: INTERNAL MEDICINE | Admitting: INTERNAL MEDICINE
Payer: COMMERCIAL

## 2022-01-11 VITALS
SYSTOLIC BLOOD PRESSURE: 100 MMHG | RESPIRATION RATE: 13 BRPM | OXYGEN SATURATION: 100 % | DIASTOLIC BLOOD PRESSURE: 82 MMHG | HEART RATE: 55 BPM

## 2022-01-11 DIAGNOSIS — I50.31 ACUTE DIASTOLIC CONGESTIVE HEART FAILURE (H): ICD-10-CM

## 2022-01-11 DIAGNOSIS — I48.0 PAROXYSMAL ATRIAL FIBRILLATION (H): ICD-10-CM

## 2022-01-11 LAB
ANION GAP SERPL CALCULATED.3IONS-SCNC: 4 MMOL/L (ref 3–14)
BUN SERPL-MCNC: 20 MG/DL (ref 7–30)
CALCIUM SERPL-MCNC: 9.1 MG/DL (ref 8.5–10.1)
CHLORIDE BLD-SCNC: 106 MMOL/L (ref 94–109)
CO2 SERPL-SCNC: 31 MMOL/L (ref 20–32)
CREAT SERPL-MCNC: 1.09 MG/DL (ref 0.66–1.25)
GFR SERPL CREATININE-BSD FRML MDRD: 71 ML/MIN/1.73M2
GLUCOSE BLD-MCNC: 82 MG/DL (ref 70–99)
MAGNESIUM SERPL-MCNC: 2.6 MG/DL (ref 1.6–2.3)
POTASSIUM BLD-SCNC: 4.4 MMOL/L (ref 3.4–5.3)
SODIUM SERPL-SCNC: 141 MMOL/L (ref 133–144)

## 2022-01-11 PROCEDURE — 92960 CARDIOVERSION ELECTRIC EXT: CPT | Performed by: INTERNAL MEDICINE

## 2022-01-11 PROCEDURE — 80048 BASIC METABOLIC PNL TOTAL CA: CPT | Performed by: INTERNAL MEDICINE

## 2022-01-11 PROCEDURE — 250N000011 HC RX IP 250 OP 636: Performed by: NURSE ANESTHETIST, CERTIFIED REGISTERED

## 2022-01-11 PROCEDURE — 83735 ASSAY OF MAGNESIUM: CPT | Performed by: INTERNAL MEDICINE

## 2022-01-11 PROCEDURE — 370N000017 HC ANESTHESIA TECHNICAL FEE, PER MIN

## 2022-01-11 PROCEDURE — 36415 COLL VENOUS BLD VENIPUNCTURE: CPT | Performed by: INTERNAL MEDICINE

## 2022-01-11 PROCEDURE — 92960 CARDIOVERSION ELECTRIC EXT: CPT

## 2022-01-11 RX ORDER — MAGNESIUM SULFATE HEPTAHYDRATE 40 MG/ML
2 INJECTION, SOLUTION INTRAVENOUS
Status: DISCONTINUED | OUTPATIENT
Start: 2022-01-11 | End: 2022-01-12 | Stop reason: HOSPADM

## 2022-01-11 RX ORDER — POTASSIUM CHLORIDE 1500 MG/1
40 TABLET, EXTENDED RELEASE ORAL
Status: DISCONTINUED | OUTPATIENT
Start: 2022-01-11 | End: 2022-01-12 | Stop reason: HOSPADM

## 2022-01-11 RX ORDER — LIDOCAINE 40 MG/G
CREAM TOPICAL
Status: DISCONTINUED | OUTPATIENT
Start: 2022-01-11 | End: 2022-01-12 | Stop reason: HOSPADM

## 2022-01-11 RX ORDER — POTASSIUM CHLORIDE 1500 MG/1
20 TABLET, EXTENDED RELEASE ORAL
Status: ACTIVE | OUTPATIENT
Start: 2022-01-11 | End: 2022-01-11

## 2022-01-11 RX ORDER — POTASSIUM CHLORIDE 1500 MG/1
20 TABLET, EXTENDED RELEASE ORAL
Status: DISCONTINUED | OUTPATIENT
Start: 2022-01-11 | End: 2022-01-12 | Stop reason: HOSPADM

## 2022-01-11 RX ORDER — PROPOFOL 10 MG/ML
INJECTION, EMULSION INTRAVENOUS PRN
Status: DISCONTINUED | OUTPATIENT
Start: 2022-01-11 | End: 2022-01-11

## 2022-01-11 RX ADMIN — PROPOFOL 20 MG: 10 INJECTION, EMULSION INTRAVENOUS at 11:48

## 2022-01-11 RX ADMIN — PROPOFOL 50 MG: 10 INJECTION, EMULSION INTRAVENOUS at 11:46

## 2022-01-11 ASSESSMENT — ENCOUNTER SYMPTOMS: DYSRHYTHMIAS: 1

## 2022-01-11 NOTE — OR NURSING
1510 Dr Lees updated with patient status  /86 , order taken to given Metoprolol 2.5mg IVP X2  Ok to transfer patient when HR is 90 or below . MF

## 2022-01-11 NOTE — PROCEDURES
[facility]    Procedure: Cardioversion    Date/Time: 1/11/2022 12:00 PM  Performed by: Sam Cordova MD  Authorized by: Sam Cordova MD       UNIVERSAL PROTOCOL   Site Marked: Yes  Prior Images Obtained and Reviewed:  Yes  Required items: Required blood products, implants, devices and special equipment available    Patient identity confirmed:  Verbally with patient  Patient was reevaluated immediately before administering moderate or deep sedation or anesthesia  Confirmation Checklist:  Patient's identity using two indicators  Time out: Immediately prior to the procedure a time out was called    Universal Protocol: the Joint Commission Universal Protocol was followed    Preparation: Patient was prepped and draped in usual sterile fashion       ANESTHESIA  Anesthesia was administered and monitored by anesthesiology.  See anesthesia documentation for details.    SEDATION  Patient Sedated: Yes    Vital signs: Vital signs monitored during sedation      PROCEDURE DETAILS  Cardioversion basis: elective  Indications: failure of anti-arrhythmic medications  Pre-procedure rhythm: atrial fibrillation  Patient position: patient was placed in a supine position  Chest area: chest area exposed  Electrodes: pads  Electrodes placed: anterior-posterior  Number of attempts: 1    Details of Attempts:  DC Cardioversion Procedure Note:    Informed consent obtained.  Pads placed in AP position.  Anesthesia used, please see their documentation.    Synchronized, biphasic 120J shock x 1 delivered and successful in converting atrial fibrillation to normal sinus rhythm without bradycardia or pauses.    No apparent complications.  Post-procedure rhythm: normal sinus rhythm      PROCEDURE    Patient Tolerance:  Patient tolerated the procedure well with no immediate complications

## 2022-01-11 NOTE — ANESTHESIA PREPROCEDURE EVALUATION
Anesthesia Pre-Procedure Evaluation    Patient: Amadou Wade   MRN: [id number] : 1945        Preoperative Diagnosis: A-fib (H) [I48.91]    Procedure : Procedure(s):  ANESTHESIA, FOR CARDIOVERSION          Past Medical History:   Diagnosis Date     Atrial fibrillation (H) 2021     Cardiac dysrhythmia, unspecified 2007    few pac's, few pvc's, few runs SVT     CHF (congestive heart failure) (H) 2021     Chronic rhinitis     resolved     ED (erectile dysfunction)      Hard of hearing     [place]     HFrEF (heart failure with reduced ejection fraction) (H) 2021     Hyperlipidemia LDL goal <130      Hypertension goal BP (blood pressure) < 140/90 2017     Pneumonia, organism unspecified(486)      Snoring     no sleep apnea - mouth guard     Squamous 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     Dr Abernathy - Rt Chest     Unspecified hemorrhoids without mention of complication 2006    internal      Past Surgical History:   Procedure Laterality Date     COLONOSCOPY  2012    incomplete - negative, ACBE incomplete - negative, CT colography negative     COLONOSCOPY Bilateral 2021     ganglion cyst removal Left 2021     HC REMOVE TONSILS/ADENOIDS,<15 Y/O      T & A <14y.o.     SIGMOIDOSCOPY,DIAGNOSTIC      normal     STRESS ECHO ([facility])  2007    normal few pac's, few pvc's     SURGICAL HISTORY OF -  Bilateral     fall,  - bilateral IOL     ZZHC COLONOSCOPY THRU STOMA, DIAGNOSTIC  2006    dr tejeda - normal - internal hemorrhiods - due 10 yrs      No Known Allergies   Social History     Tobacco Use     Smoking status: Never Smoker     Smokeless tobacco: Never Used   Substance Use Topics     Alcohol use: Yes     Alcohol/week: 0.0 - 0.8 standard drinks     Comment: 0-1 per week avg      Wt Readings from Last 1 Encounters:   22 73 kg (161 lb)        Anesthesia Evaluation            ROS/MED HX  ENT/Pulmonary:  - neg pulmonary ROS     Neurologic:  - neg  neurologic ROS     Cardiovascular:     (+) hypertension-----CHF dysrhythmias, a-fib,     METS/Exercise Tolerance:     Hematologic:       Musculoskeletal:       GI/Hepatic:  - neg GI/hepatic ROS     Renal/Genitourinary:  - neg Renal ROS     Endo:  - neg endo ROS     Psychiatric/Substance Use:  - neg psychiatric ROS     Infectious Disease:       Malignancy:       Other:  - neg other ROS          Physical Exam    Airway  airway exam normal      Mallampati: II   TM distance: > 3 FB   Neck ROM: full   Mouth opening: > 3 cm    Respiratory Devices and Support         Dental  no notable dental history         Cardiovascular   cardiovascular exam normal       Rhythm and rate: irregular and normal     Pulmonary   pulmonary exam normal        breath sounds clear to auscultation           OUTSIDE LABS:  CBC:   Lab Results   Component Value Date    WBC 5.5 12/17/2021    WBC 5.1 12/17/2021    HGB 15.4 12/17/2021    HGB 14.4 12/17/2021    HCT 48.8 12/17/2021    HCT 45.6 12/17/2021     12/17/2021     12/17/2021     BMP:   Lab Results   Component Value Date     01/11/2022     01/06/2022    POTASSIUM 4.4 01/11/2022    POTASSIUM 4.1 01/06/2022    CHLORIDE 106 01/11/2022    CHLORIDE 108 01/06/2022    CO2 31 01/11/2022    CO2 32 01/06/2022    BUN 20 01/11/2022    BUN 24 01/06/2022    CR 1.09 01/11/2022    CR 1.18 01/06/2022    GLC 82 01/11/2022    GLC 85 01/06/2022     COAGS: No results found for: PTT, INR, FIBR  POC: No results found for: BGM, HCG, HCGS  HEPATIC:   Lab Results   Component Value Date    ALBUMIN 2.5 (L) 12/16/2021    PROTTOTAL 5.7 (L) 12/16/2021    ALT 43 12/16/2021    AST 36 12/16/2021    ALKPHOS 109 12/16/2021    BILITOTAL 1.2 12/16/2021     OTHER:   Lab Results   Component Value Date    LACT 1.5 12/16/2021    A1C 5.3 06/16/2015    STEPHEN 9.1 01/11/2022    MAG 2.6 (H) 01/11/2022    TSH 1.46 12/16/2021    T4 0.83 10/29/2009    CRP <2.9 10/26/2018    SED 4 12/16/2021       Anesthesia Plan    ASA  Status:  2   NPO Status:  NPO Appropriate    Anesthesia Type: MAC.     - Reason for MAC: straight local not clinically adequate              Consents    Anesthesia Plan(s) and associated risks, benefits, and realistic alternatives discussed. Questions answered and patient/representative(s) expressed understanding.     - Discussed: Risks, Benefits and Alternatives for BOTH SEDATION and the PROCEDURE were discussed     - Discussed with:  Patient      - Extended Intubation/Ventilatory Support Discussed: No.      - Patient is DNR/DNI Status: No    Use of blood products discussed: No .     Postoperative Care            Comments:                ROSENDO Pacheco CRNA

## 2022-01-11 NOTE — ANESTHESIA POSTPROCEDURE EVALUATION
Patient: Amadou Wade    Procedure: Procedure(s):  ANESTHESIA, FOR CARDIOVERSION       Diagnosis:A-fib (H) [I48.91]  Diagnosis Additional Information: No value filed.    Anesthesia Type:  No value filed.    Note:  Disposition: Outpatient   Postop Pain Control: Uneventful            Sign Out: Well controlled pain   PONV: No   Neuro/Psych: Uneventful            Sign Out: Acceptable/Baseline neuro status   Airway/Respiratory: Uneventful            Sign Out: Acceptable/Baseline resp. status   CV/Hemodynamics: Uneventful            Sign Out: Acceptable CV status; No obvious hypovolemia; No obvious fluid overload   Other NRE: NONE   DID A NON-ROUTINE EVENT OCCUR? No           Last vitals:  Vitals Value Taken Time   BP     Temp     Pulse     Resp     SpO2         Electronically Signed By: ROSENDO Pacheco CRNA  January 11, 2022  12:02 PM

## 2022-01-11 NOTE — TELEPHONE ENCOUNTER
Left message on answering machine for patient to call back.    [phone] back line no voice mail    Jacque HOLTRN BSN  [facility]  (please send message to  skin nurse if no answer)  [phone]

## 2022-01-11 NOTE — TELEPHONE ENCOUNTER
----- Message from Andre Santizo MD sent at 1/10/2022  9:38 PM CST -----  Needs re-excision - please schedule at next available.      Waqas Wade,     The biopsy showed a somewhat challenging to diagnose mole. 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 There is a malignant counterpart, called desmoplastic melanoma, which exists, but we do not currently think this spot represents that. However, we did find a mitotic figure, meaning that the cell was dividing, which can be a sign of melanoma. We did extra testing to help confirm that it was not melanoma, called FISH testing, which was negative. In order to exercise the greatest amount of caution, we recommend a re-excision at this site, a local anesthesia skin surgery to take the scar and a safety margin, to ensure every cell is removed. My staff will be in touch to schedule.     As always, please feel free to contact me with any questions. I hope that the site is healing appropriately and that this message finds you well.      Dr. BOATENG   Written by Andre Santizo MD on 1/10/2022  9:38 PM CST        Hi Amadou - the spot on the right shoulder was a microscopically concerning mole that we are still working up but wanted to get you some preliminary results about.      I think that this is a non-cancerous mole that just has some odd features, but we really want to be certain of that because if we are wrong, it could spread to other parts of the body. For that reason, no matter what, we're going to recommend a re-excision at that site to make sure every last of cell is removed from that site. How big of a procedure it will be will depend on the final diagnosis - if harmless as I suspect, we can do it in our clinic under local anesthesia. If it comes back as melanoma, we may need to send you to the operating room-based surgeons.      We will update you ASAP!  Dr. BOATENG   Written by Andre Santizo MD on  12/16/2021  1:41 PM CST  Seen by patient Amadou Wade on 12/18/2021  9:10 PM

## 2022-01-11 NOTE — ANESTHESIA CARE TRANSFER NOTE
Patient: Amadou Wade    Procedure: Procedure(s):  ANESTHESIA, FOR CARDIOVERSION       Diagnosis: A-fib (H) [I48.91]  Diagnosis Additional Information: No value filed.    Anesthesia Type:   No value filed.     Note:    Oropharynx: oropharynx clear of all foreign objects  Level of Consciousness: awake  Oxygen Supplementation: nasal cannula  Level of Supplemental Oxygen (L/min / FiO2): 2  Independent Airway: airway patency satisfactory and stable  Dentition: dentition unchanged  Vital Signs Stable: post-procedure vital signs reviewed and stable  Report to RN Given: handoff report given  Patient transferred to: Phase II    Handoff Report: Identifed the Patient, Identified the Reponsible Provider, Reviewed the pertinent medical history, Discussed the surgical course, Reviewed Intra-OP anesthesia mangement and issues during anesthesia, Set expectations for post-procedure period and Allowed opportunity for questions and acknowledgement of understanding      Vitals:  Vitals Value Taken Time   BP     Temp     Pulse     Resp     SpO2         Electronically Signed By: ROSENDO Pacheco CRNA  January 11, 2022  11:59 AM

## 2022-01-11 NOTE — DISCHARGE INSTRUCTIONS
Discharge Instructions for Cardioversion  Your healthcare provider did a procedure called cardioversion. He or she used a controlled electric shock or a medicine to briefly stop all electrical activity in your heart. This helped restore your heart s normal rhythm. Here are some instructions to follow while you recover.  Home care    Before cardioversion, you will typically be given sedation. So, you won't be able to drive home. You will need a ride. Wait at least 24 hours before driving a car or operating heavy machinery after getting sedating medicines.    The skin on your chest may be irritated or feel like it's sunburned. Your healthcare provider may prescribe a soothing lotion to ease this discomfort. These minor symptoms will go away in a few days.    Ask your healthcare provider about medicines to keep your heart rhythm steady.    If you were prescribed medicine, take it as instructed by your healthcare provider. Don t skip doses or take double doses. Cardioversion requires blood thinners for at least 4 weeks after the procedure to prevent a delayed risk of stroke when treating atrial fibrillation or atrial flutter. Be sure you discuss which medicine you are taking to prevent stroke. Ask when you need to have your medicine levels checked. Also ask whether you may be able to stop taking it in the future or whether you need to take it for life. Some of these blood-thinning medicines such as warfarin will have the dose adjusted, and interact with other medicines or foods. Your healthcare team will give you full instructions on what to watch out for. Report bleeding or symptoms of stroke immediately to your healthcare team and seek emergency medical attention.    Learn to take your own pulse. Keep a record of your results. Ask your healthcare provider when you should seek emergency medical attention. He or she will tell you which pulse rate reading is dangerous.     Cardioversion is usually short term  (temporary). You may need it repeated if the abnormal heart rhythm returns. After the procedure, your healthcare provider will tell you if the treatment worked or if you will need further treatments or medicine.    Follow-up care  Make a follow-up appointment, or as advised.  When to call your healthcare provider  Call 911 right away if you have:    Chest pain    Shortness of breath    Loss of vision, speech, or strength or coordination in any body part  Call your healthcare provider right away if you:    Feel faint, dizzy, or lightheaded    Have chest pain with increased activity    Have irregular heartbeat or fast pulse    Have bleeding issues from blood-thinning medicines  [employer] last reviewed this educational content on 5/1/2019 2000-2021 [employer]. All rights reserved. This information is not intended as a substitute for professional medical care. Always follow your healthcare professional's instructions.

## 2022-01-11 NOTE — SEDATION DOCUMENTATION
S/p cardioversion. See anesthesia record for sedation and assessment.   VSS post procedure. Discharge instructions provided. Pt discharged home with all belongings.

## 2022-01-12 LAB
ATRIAL RATE - MUSE: 192 BPM
ATRIAL RATE - MUSE: 54 BPM
DIASTOLIC BLOOD PRESSURE - MUSE: NORMAL MMHG
DIASTOLIC BLOOD PRESSURE - MUSE: NORMAL MMHG
INTERPRETATION ECG - MUSE: NORMAL
INTERPRETATION ECG - MUSE: NORMAL
P AXIS - MUSE: 53 DEGREES
P AXIS - MUSE: NORMAL DEGREES
PR INTERVAL - MUSE: 178 MS
PR INTERVAL - MUSE: NORMAL MS
QRS DURATION - MUSE: 108 MS
QRS DURATION - MUSE: 114 MS
QT - MUSE: 382 MS
QT - MUSE: 468 MS
QTC - MUSE: 443 MS
QTC - MUSE: 462 MS
R AXIS - MUSE: -15 DEGREES
R AXIS - MUSE: -18 DEGREES
SYSTOLIC BLOOD PRESSURE - MUSE: NORMAL MMHG
SYSTOLIC BLOOD PRESSURE - MUSE: NORMAL MMHG
T AXIS - MUSE: -46 DEGREES
T AXIS - MUSE: -55 DEGREES
VENTRICULAR RATE- MUSE: 54 BPM
VENTRICULAR RATE- MUSE: 88 BPM

## 2022-01-14 ENCOUNTER — TELEPHONE (OUTPATIENT)
Dept: CARDIOLOGY | Facility: CLINIC | Age: 77
End: 2022-01-14
Payer: COMMERCIAL

## 2022-01-14 DIAGNOSIS — I48.91 ATRIAL FIBRILLATION WITH RAPID VENTRICULAR RESPONSE (H): ICD-10-CM

## 2022-01-14 DIAGNOSIS — I50.31 ACUTE DIASTOLIC CONGESTIVE HEART FAILURE (H): ICD-10-CM

## 2022-01-14 RX ORDER — ROSUVASTATIN CALCIUM 10 MG/1
10 TABLET, COATED ORAL AT BEDTIME
Qty: 90 TABLET | Refills: 0 | Status: SHIPPED | OUTPATIENT
Start: 2022-01-14

## 2022-01-14 RX ORDER — METOPROLOL TARTRATE 50 MG
50 TABLET ORAL 2 TIMES DAILY
Qty: 180 TABLET | Refills: 0 | Status: SHIPPED | OUTPATIENT
Start: 2022-01-14 | End: 2022-02-09

## 2022-01-14 NOTE — TELEPHONE ENCOUNTER
Patient called RN to report that he slipped today on the ice and had a minor fall. Patient reports hitting his back and shoulder, but denies hitting his head. Patient called to inquire if ok to take ibuprofen to help with some of the discomfort. RN informed patient we typically do not recommend prolonged use of ibuprofen in cardiac patient's as it can increase risk of further cardiac events. RN advised patient if he needs to take more than a few doses he should call his PMD for further recommendations for pain management that are not NSAIDS. RN also advised patient that if he ever falls and hits his head he should be evaluated right away d/t being on Eliquis. Patient verbalized understanding and is in agreement with plan.

## 2022-01-14 NOTE — TELEPHONE ENCOUNTER
Patient notified of test results and providers message, patient has no further questions.    Jacque BARNETTRN BSN  [facility]  [phone]

## 2022-01-17 ENCOUNTER — OFFICE VISIT (OUTPATIENT)
Dept: CARDIOLOGY | Facility: CLINIC | Age: 77
End: 2022-01-17
Payer: COMMERCIAL

## 2022-01-17 VITALS
WEIGHT: 161 LBS | DIASTOLIC BLOOD PRESSURE: 83 MMHG | HEART RATE: 53 BPM | BODY MASS INDEX: 23.05 KG/M2 | SYSTOLIC BLOOD PRESSURE: 116 MMHG | HEIGHT: 70 IN | OXYGEN SATURATION: 98 %

## 2022-01-17 DIAGNOSIS — I48.91 ATRIAL FIBRILLATION WITH RAPID VENTRICULAR RESPONSE (H): ICD-10-CM

## 2022-01-17 PROCEDURE — 93000 ELECTROCARDIOGRAM COMPLETE: CPT | Performed by: NURSE PRACTITIONER

## 2022-01-17 PROCEDURE — 99214 OFFICE O/P EST MOD 30 MIN: CPT | Performed by: NURSE PRACTITIONER

## 2022-01-17 ASSESSMENT — MIFFLIN-ST. JEOR: SCORE: 1476.54

## 2022-01-17 NOTE — PATIENT INSTRUCTIONS
Today's Recommendations    1. Continue Eliquis, Lasix and Metoprolol for your heart.   2. Continue all other medications without changes.  3. Continue daily weights and low sodium diet  4. Please follow up with Aleksandra in  in 2-4 weeks.    Please send a [employer] message or call [phone] with questions or concerns.     Scheduling number [phone].

## 2022-01-17 NOTE — PROGRESS NOTES
Cardiology Clinic Progress Note  Amadou aWde MRN# [id number]   YOB: 1945 Age: 76 year old     Primary cardiologist: Dr. Cordova ()    Reason for visit: Post DCCV    History of presenting illness:    Amadou Wade, a pleasant 76 year old patient who has a past medical history significant for dyslipidemia, hypertension and new A. fib with RVR.    He presented to the emergency department on 12/16/2021 with new onset atrial fibrillation with RVR after he had a history of shortness of breath and weight gain x2 weeks.  His echocardiogram revealed LVEF of 50 to 55% with mild concentric LVH and mild RV dilatation and moderate to severe biatrial enlargement with trace to mild MR and trace AR with moderate pleural effusion.  Diuresis was completed with IV Lasix and heart rates were controlled on IV diltiazem that were then transition to metoprolol with plan for cardioversion.    Originally a BRUCE cardioversion was going to be completed as an inpatient but the patient had eaten breakfast and therefore it was decided to undergo a cardioversion after 3 weeks of anticoagulation as an outpatient.  The cardioversion was on 1/11/2022 with Dr. Cordova and conversion to sinus rhythm was successful after 1 shock.    Today present for a follow-up as they are leaving for [place] on Wednesday.  His EKG showed atrial fibrillation at 84 bpm.  He has not had recurrent symptoms and his weight has been very stable between 153 and 155 pounds.  His home heart rates have been in the 60s since cardioversion and blood pressure has been soft at times (asymptomatic) ranging from 99//81.  He did recently fall on the ice and hurt his back.  Thankfully he did not injure his head.         Assessment and Plan:     ASSESSMENT:    1. Atrial fibrillation with RVR, new    [id number] score of 2 for age and hypertension    Anticoagulation with Eliquis 5 mg twice daily and metoprolol for rate control    Status post DCCV on 1/11/2022 that  was successful after 1 shock to convert to sinus rhythm    EKG today shows return of atrial fibrillation with controlled ventricular response at a heart rate of 94 bpm    2. HFpEF    Etiology: Atrial fibrillation with RVR    LVEF was 50 to 55%    Patient presented with shortness of breath and weight gain that improved with IV diuresis    Discharged on Lasix 20 mg twice daily    Weight stable between 100 5355 pounds and patient is asymptomatic.    3. Dyslipidemia, (LDL goal less than 130)    Rosuvastatin 10 mg daily    Most recent LDL was 102 from March 2021    4. Hypertension    Previously was on losartan but was discontinued during his hospitalization and his blood pressure is well controlled on metoprolol with intermittently soft and asymptomatic blood pressure.    PLAN:     1. Continue statin, metoprolol, Lasix and Eliquis  2. Continue daily weights although they say they will likely be unable to weigh themselves during their vacation  3. Low-sodium diet  4. Return to clinic in 2 to 4 weeks to follow-up post vacation to assess her heart failure symptoms and rate control in A. fib.       Orders this Visit:  Orders Placed This Encounter   Procedures     Follow-Up with Cardiac Advanced Practice Provider     EKG 12-lead complete w/read - Clinics (performed today)     No orders of the defined types were placed in this encounter.    There are no discontinued medications.    Today's clinic visit entailed:  Review of the result(s) of each unique test - Cardioversion, EKG, echocardiogram  Assessment requiring an independent historian(s) - significant other - Spouse  Prescription drug management  30 minutes spent on the date of the encounter doing chart review, history and exam, documentation and further activities per the note  Provider  Link to [facility] Help Grid     The level of medical decision making during this visit was of moderate complexity.           Review of Systems:     Review of Systems:  Skin:  Negative    "  Eyes:  Positive for glasses  ENT:  Negative    Respiratory:  Negative    Cardiovascular:    lightheadedness;Positive for  Gastroenterology: Negative    Genitourinary:  Negative    Musculoskeletal:  Positive for back pain  Neurologic:  Negative    Psychiatric:  Positive for sleep disturbances  Heme/Lymph/Imm:  Negative    Endocrine:  Negative              Physical Exam:   Vitals: /83   Pulse 53   Ht 1.778 m (5' 10\")   Wt 73 kg (161 lb)   SpO2 98%   BMI 23.10 kg/m    Constitutional:  cooperative, alert and oriented, well developed, well nourished, in no acute distress        Skin:  warm and dry to the touch, no apparent skin lesions or masses noted        Head:  normocephalic, no masses or lesions        Eyes:  pupils equal and round;conjunctivae and lids unremarkable;sclera white        ENT:  no pallor or cyanosis        Neck:  JVP normal        Chest:  clear to auscultation        Cardiac:   irregularly irregular rhythm                Abdomen:  abdomen soft;BS normoactive        Vascular: pulses full and equal                                      Extremities and Back:  no deformities, clubbing, cyanosis, erythema observed;no edema        Neurological:  no gross motor deficits;affect appropriate             Medications:     Current Outpatient Medications   Medication Sig Dispense Refill     apixaban ANTICOAGULANT (ELIQUIS) 5 MG tablet Take 1 tablet (5 mg) by mouth 2 times daily 180 tablet 0     furosemide (LASIX) 20 MG tablet Take 1 tablet (20 mg) by mouth 2 times daily 60 tablet 0     metoprolol tartrate (LOPRESSOR) 50 MG tablet Take 1 tablet (50 mg) by mouth 2 times daily 180 tablet 0     rosuvastatin (CRESTOR) 10 MG tablet Take 1 tablet (10 mg) by mouth At Bedtime 90 tablet 0     valACYclovir (VALTREX) 1000 mg tablet daily as needed          Family History   Problem Relation Age of Onset     Hypertension Mother      Cancer Father         lung     Eye Disorder Father         Glaucoma     Alzheimer " Disease Father      Heart Disease Father      Diabetes Father         type 2     Diabetes Brother         type 2     Coronary Artery Disease Brother      Cerebrovascular Disease Maternal Grandmother      Heart Disease Maternal Grandfather          young heartattack     Diabetes Paternal Grandfather      Cancer Brother         melanoma     Alzheimer Disease Brother      Prostate Cancer Brother      Cerebrovascular Disease Brother        Social History     Socioeconomic History     Marital status:      Spouse name: Dania     Number of children: 3     Years of education: 19     Highest education level: Not on file   Occupational History     Occupation:       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   Tobacco Use     Smoking status: Never Smoker     Smokeless tobacco: Never Used   Vaping Use     Vaping Use: Never used   Substance and Sexual Activity     Alcohol use: Yes     Alcohol/week: 0.0 - 0.8 standard drinks     Comment: 0-1 per week avg     Drug use: No     Sexual activity: Yes     Partners: Female   Other Topics Concern      Service Not Asked     Blood Transfusions Not Asked     Caffeine Concern Yes     Comment: occas     Occupational Exposure Not Asked     Hobby Hazards Not Asked     Sleep Concern Not Asked     Stress Concern Not Asked     Weight Concern Not Asked     Special Diet Not Asked     Back Care Not Asked     Exercise Yes     Comment: active     Bike Helmet Not Asked     Seat Belt Yes     Self-Exams Not Asked     Parent/sibling w/ CABG, MI or angioplasty before 65F 55M? No   Social History Narrative     Not on file     Social Determinants of Health     Financial Resource Strain: Not on file   Food Insecurity: Not on file   Transportation Needs: Not on file   Physical Activity: Not on file   Stress: Not on file   Social Connections: Not on file   Intimate Partner Violence: Not on file   Housing Stability: Not on file            Past Medical History:     Past Medical History:   Diagnosis  Date     Atrial fibrillation (H) 12/2021     Cardiac dysrhythmia, unspecified 03/2007    few pac's, few pvc's, few runs SVT     CHF (congestive heart failure) (H) 12/2021     Chronic rhinitis     resolved     ED (erectile dysfunction)      Hard of hearing     [facility]     HFrEF (heart failure with reduced ejection fraction) (H) 12/2021     Hyperlipidemia LDL goal <130 2002     Hypertension goal BP (blood pressure) < 140/90 12/2017     Pneumonia, organism unspecified(486) 1993     Snoring     no sleep apnea - mouth guard     Squamous 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 2021    Dr Abernathy - Rt Chest     Unspecified hemorrhoids without mention of complication 04/2006    internal              Past Surgical History:     Past Surgical History:   Procedure Laterality Date     ANESTHESIA CARDIOVERSION N/A 1/11/2022    Procedure: ANESTHESIA, FOR CARDIOVERSION;  Surgeon: GENERIC ANESTHESIA PROVIDER;  Location: [facility] OR     COLONOSCOPY  01/03/2012    incomplete - negative, ACBE incomplete - negative, CT colography negative     COLONOSCOPY Bilateral 09/14/2021     ganglion cyst removal Left 09/02/2021     HC REMOVE TONSILS/ADENOIDS,<13 Y/O  1953    T & A <14y.o.     SIGMOIDOSCOPY,DIAGNOSTIC  1997    normal     STRESS ECHO (METRO)  03/2007    normal few pac's, few pvc's     SURGICAL HISTORY OF -  Bilateral     fall, 2020 - bilateral IOL     ZZHC COLONOSCOPY THRU STOMA, DIAGNOSTIC  05/2006    dr tejeda - normal - internal hemorrhiods - due 10 yrs              Allergies:   Patient has no known allergies.       Data:   All laboratory data reviewed:    Recent Labs   Lab Test 01/06/22  0727 12/16/21  1420 12/16/21  1058 12/16/21  0922 03/01/21  0950 02/24/20  0803 01/07/19  0816   LDL  --   --   --   --  102* 100* 116*   HDL  --   --   --   --  58 51 51   NHDL  --   --   --   --  114 114 133*   CHOL  --   --   --   --  172 165 184   TRIG  --   --   --   --  60 69 86   TSH  --  1.46  --   --  1.34 1.23 1.53   NTBNP 4,970*  --  4,335* 4,588*   --   --   --        Lab Results   Component Value Date    WBC 5.5 12/17/2021    WBC 4.5 03/01/2021    RBC 5.10 12/17/2021    RBC 5.24 03/01/2021    HGB 15.4 12/17/2021    HGB 16.3 03/01/2021    HCT 48.8 12/17/2021    HCT 50.5 03/01/2021    MCV 96 12/17/2021    MCV 96 03/01/2021    MCH 30.2 12/17/2021    MCH 31.1 03/01/2021    MCHC 31.6 12/17/2021    MCHC 32.3 03/01/2021    RDW 13.7 12/17/2021    RDW 13.0 03/01/2021     12/17/2021     03/01/2021       Lab Results   Component Value Date     01/11/2022     03/01/2021    POTASSIUM 4.4 01/11/2022    POTASSIUM 4.0 03/01/2021    CHLORIDE 106 01/11/2022    CHLORIDE 108 03/01/2021    CO2 31 01/11/2022    CO2 30 03/01/2021    ANIONGAP 4 01/11/2022    ANIONGAP 2 (L) 03/01/2021    GLC 82 01/11/2022    GLC 72 03/01/2021    BUN 20 01/11/2022    BUN 14 03/01/2021    CR 1.09 01/11/2022    CR 0.96 03/01/2021    GFRESTIMATED 71 01/11/2022    GFRESTIMATED 78 03/01/2021    GFRESTBLACK >90 03/01/2021    STEPHEN 9.1 01/11/2022    STEPHEN 9.0 03/01/2021      Lab Results   Component Value Date    AST 36 12/16/2021    AST 18 03/01/2021    ALT 43 12/16/2021    ALT 18 03/01/2021       Lab Results   Component Value Date    A1C 5.3 06/16/2015       No results found for: ROSENDO MARIE CNP  [facility]  Pager: [phone]  RN phone: [phone]

## 2022-01-17 NOTE — LETTER
1/17/2022    Johnathan Sampson MD  [address] [facility] [address]    RE: Amadou Wade       Dear Colleague,     I had the pleasure of seeing Amadou Wade in the [facility].    Cardiology Clinic Progress Note  Amadou Wade MRN# [id number]   YOB: 1945 Age: 76 year old     Primary cardiologist: Dr. Cordova ()    Reason for visit: Post [facility]    History of presenting illness:    Amadou Wade, a pleasant 76 year old patient who has a past medical history significant for dyslipidemia, hypertension and new A. fib with RVR.    He presented to the emergency department on 12/16/2021 with new onset atrial fibrillation with RVR after he had a history of shortness of breath and weight gain x2 weeks.  His echocardiogram revealed LVEF of 50 to 55% with mild concentric LVH and mild RV dilatation and moderate to severe biatrial enlargement with trace to mild MR and trace AR with moderate pleural effusion.  Diuresis was completed with IV Lasix and heart rates were controlled on IV diltiazem that were then transition to metoprolol with plan for cardioversion.    Originally a BRUCE cardioversion was going to be completed as an inpatient but the patient had eaten breakfast and therefore it was decided to undergo a cardioversion after 3 weeks of anticoagulation as an outpatient.  The cardioversion was on 1/11/2022 with Dr. Cordova and conversion to sinus rhythm was successful after 1 shock.    Today present for a follow-up as they are leaving for [place] on Wednesday.  His EKG showed atrial fibrillation at 84 bpm.  He has not had recurrent symptoms and his weight has been very stable between 153 and 155 pounds.  His home heart rates have been in the 60s since cardioversion and blood pressure has been soft at times (asymptomatic) ranging from 99//81.  He did recently fall on the ice and hurt his back.  Thankfully he did not injure his head.         Assessment and Plan:      ASSESSMENT:    1. Atrial fibrillation with RVR, new    [id number] score of 2 for age and hypertension    Anticoagulation with Eliquis 5 mg twice daily and metoprolol for rate control    Status post DCCV on 1/11/2022 that was successful after 1 shock to convert to sinus rhythm    EKG today shows return of atrial fibrillation with controlled ventricular response at a heart rate of 94 bpm    2. HFpEF    Etiology: Atrial fibrillation with RVR    LVEF was 50 to 55%    Patient presented with shortness of breath and weight gain that improved with IV diuresis    Discharged on Lasix 20 mg twice daily    Weight stable between 100 5355 pounds and patient is asymptomatic.    3. Dyslipidemia, (LDL goal less than 130)    Rosuvastatin 10 mg daily    Most recent LDL was 102 from March 2021    4. Hypertension    Previously was on losartan but was discontinued during his hospitalization and his blood pressure is well controlled on metoprolol with intermittently soft and asymptomatic blood pressure.    PLAN:     1. Continue statin, metoprolol, Lasix and Eliquis  2. Continue daily weights although they say they will likely be unable to weigh themselves during their vacation  3. Low-sodium diet  4. Return to clinic in 2 to 4 weeks to follow-up post vacation to assess her heart failure symptoms and rate control in A. fib.       Orders this Visit:  Orders Placed This Encounter   Procedures     Follow-Up with Cardiac Advanced Practice Provider     EKG 12-lead complete w/read - Clinics (performed today)     No orders of the defined types were placed in this encounter.    There are no discontinued medications.    Today's clinic visit entailed:  Review of the result(s) of each unique test - Cardioversion, EKG, echocardiogram  Assessment requiring an independent historian(s) - significant other - Spouse  Prescription drug management  30 minutes spent on the date of the encounter doing chart review, history and exam, documentation and  "further activities per the note  Provider  Link to MDM Help Grid     The level of medical decision making during this visit was of moderate complexity.           Review of Systems:     Review of Systems:  Skin:  Negative     Eyes:  Positive for glasses  ENT:  Negative    Respiratory:  Negative    Cardiovascular:    lightheadedness;Positive for  Gastroenterology: Negative    Genitourinary:  Negative    Musculoskeletal:  Positive for back pain  Neurologic:  Negative    Psychiatric:  Positive for sleep disturbances  Heme/Lymph/Imm:  Negative    Endocrine:  Negative              Physical Exam:   Vitals: /83   Pulse 53   Ht 1.778 m (5' 10\")   Wt 73 kg (161 lb)   SpO2 98%   BMI 23.10 kg/m    Constitutional:  cooperative, alert and oriented, well developed, well nourished, in no acute distress        Skin:  warm and dry to the touch, no apparent skin lesions or masses noted        Head:  normocephalic, no masses or lesions        Eyes:  pupils equal and round;conjunctivae and lids unremarkable;sclera white        ENT:  no pallor or cyanosis        Neck:  JVP normal        Chest:  clear to auscultation        Cardiac:   irregularly irregular rhythm                Abdomen:  abdomen soft;BS normoactive        Vascular: pulses full and equal                                      Extremities and Back:  no deformities, clubbing, cyanosis, erythema observed;no edema        Neurological:  no gross motor deficits;affect appropriate             Medications:     Current Outpatient Medications   Medication Sig Dispense Refill     apixaban ANTICOAGULANT (ELIQUIS) 5 MG tablet Take 1 tablet (5 mg) by mouth 2 times daily 180 tablet 0     furosemide (LASIX) 20 MG tablet Take 1 tablet (20 mg) by mouth 2 times daily 60 tablet 0     metoprolol tartrate (LOPRESSOR) 50 MG tablet Take 1 tablet (50 mg) by mouth 2 times daily 180 tablet 0     rosuvastatin (CRESTOR) 10 MG tablet Take 1 tablet (10 mg) by mouth At Bedtime 90 tablet 0     " valACYclovir (VALTREX) 1000 mg tablet daily as needed          Family History   Problem Relation Age of Onset     Hypertension Mother      Cancer Father         lung     Eye Disorder Father         Glaucoma     Alzheimer Disease Father      Heart Disease Father      Diabetes Father         type 2     Diabetes Brother         type 2     Coronary Artery Disease Brother      Cerebrovascular Disease Maternal Grandmother      Heart Disease Maternal Grandfather          young heartattack     Diabetes Paternal Grandfather      Cancer Brother         melanoma     Alzheimer Disease Brother      Prostate Cancer Brother      Cerebrovascular Disease Brother        Social History     Socioeconomic History     Marital status:      Spouse name: Dania     Number of children: 3     Years of education: 19     Highest education level: Not on file   Occupational History     Occupation:       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   Tobacco Use     Smoking status: Never Smoker     Smokeless tobacco: Never Used   Vaping Use     Vaping Use: Never used   Substance and Sexual Activity     Alcohol use: Yes     Alcohol/week: 0.0 - 0.8 standard drinks     Comment: 0-1 per week avg     Drug use: No     Sexual activity: Yes     Partners: Female   Other Topics Concern      Service Not Asked     Blood Transfusions Not Asked     Caffeine Concern Yes     Comment: occas     Occupational Exposure Not Asked     Hobby Hazards Not Asked     Sleep Concern Not Asked     Stress Concern Not Asked     Weight Concern Not Asked     Special Diet Not Asked     Back Care Not Asked     Exercise Yes     Comment: active     Bike Helmet Not Asked     Seat Belt Yes     Self-Exams Not Asked     Parent/sibling w/ CABG, MI or angioplasty before 65F 55M? No   Social History Narrative     Not on file     Social Determinants of Health     Financial Resource Strain: Not on file   Food Insecurity: Not on file   Transportation Needs: Not on file   Physical  Activity: Not on file   Stress: Not on file   Social Connections: Not on file   Intimate Partner Violence: Not on file   Housing Stability: Not on file            Past Medical History:     Past Medical History:   Diagnosis Date     Atrial fibrillation (H) 12/2021     Cardiac dysrhythmia, unspecified 03/2007    few pac's, few pvc's, few runs SVT     CHF (congestive heart failure) (H) 12/2021     Chronic rhinitis     resolved     ED (erectile dysfunction)      Hard of hearing     [facility]     HFrEF (heart failure with reduced ejection fraction) (H) 12/2021     Hyperlipidemia LDL goal <130 2002     Hypertension goal BP (blood pressure) < 140/90 12/2017     Pneumonia, organism unspecified(486) 1993     Snoring     no sleep apnea - mouth guard     Squamous 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 2021    Dr Abernathy - Rt Chest     Unspecified hemorrhoids without mention of complication 04/2006    internal              Past Surgical History:     Past Surgical History:   Procedure Laterality Date     ANESTHESIA CARDIOVERSION N/A 1/11/2022    Procedure: ANESTHESIA, FOR CARDIOVERSION;  Surgeon: GENERIC ANESTHESIA PROVIDER;  Location: [facility] OR     COLONOSCOPY  01/03/2012    incomplete - negative, ACBE incomplete - negative, CT colography negative     COLONOSCOPY Bilateral 09/14/2021     ganglion cyst removal Left 09/02/2021     HC REMOVE TONSILS/ADENOIDS,<13 Y/O  1953    T & A <14y.o.     SIGMOIDOSCOPY,DIAGNOSTIC  1997    normal     STRESS ECHO ([facility]RO)  03/2007    normal few pac's, few pvc's     SURGICAL HISTORY OF -  Bilateral     fall, 2020 - bilateral IOL     ZZHC COLONOSCOPY THRU STOMA, DIAGNOSTIC  05/2006    dr tejeda - normal - internal hemorrhiods - due 10 yrs              Allergies:   Patient has no known allergies.       Data:   All laboratory data reviewed:    Recent Labs   Lab Test 01/06/22  0727 12/16/21  1420 12/16/21  1058 12/16/21  0922 03/01/21  0950 02/24/20  0803 01/07/19  0816   LDL  --   --   --   --  102* 100* 116*    HDL  --   --   --   --  58 51 51   NHDL  --   --   --   --  114 114 133*   CHOL  --   --   --   --  172 165 184   TRIG  --   --   --   --  60 69 86   TSH  --  1.46  --   --  1.34 1.23 1.53   NTBNP 4,970*  --  4,335* 4,588*  --   --   --        Lab Results   Component Value Date    WBC 5.5 12/17/2021    WBC 4.5 03/01/2021    RBC 5.10 12/17/2021    RBC 5.24 03/01/2021    HGB 15.4 12/17/2021    HGB 16.3 03/01/2021    HCT 48.8 12/17/2021    HCT 50.5 03/01/2021    MCV 96 12/17/2021    MCV 96 03/01/2021    MCH 30.2 12/17/2021    MCH 31.1 03/01/2021    MCHC 31.6 12/17/2021    MCHC 32.3 03/01/2021    RDW 13.7 12/17/2021    RDW 13.0 03/01/2021     12/17/2021     03/01/2021       Lab Results   Component Value Date     01/11/2022     03/01/2021    POTASSIUM 4.4 01/11/2022    POTASSIUM 4.0 03/01/2021    CHLORIDE 106 01/11/2022    CHLORIDE 108 03/01/2021    CO2 31 01/11/2022    CO2 30 03/01/2021    ANIONGAP 4 01/11/2022    ANIONGAP 2 (L) 03/01/2021    GLC 82 01/11/2022    GLC 72 03/01/2021    BUN 20 01/11/2022    BUN 14 03/01/2021    CR 1.09 01/11/2022    CR 0.96 03/01/2021    GFRESTIMATED 71 01/11/2022    GFRESTIMATED 78 03/01/2021    GFRESTBLACK >90 03/01/2021    STEPHEN 9.1 01/11/2022    STEPHEN 9.0 03/01/2021      Lab Results   Component Value Date    AST 36 12/16/2021    AST 18 03/01/2021    ALT 43 12/16/2021    ALT 18 03/01/2021       Lab Results   Component Value Date    A1C 5.3 06/16/2015       No results found for: INR    ROSENDO HARRIS CNP  [facility]  Pager: [phone]  RN phone: [phone]      Thank you for allowing me to participate in the care of your patient.      Sincerely,     ROSENDO HARRIS CNP     [facility]  cc:   Aleksandra Mclain PA-C  [address] [place] [address]

## 2022-01-28 ENCOUNTER — OFFICE VISIT (OUTPATIENT)
Dept: FAMILY MEDICINE | Facility: CLINIC | Age: 77
End: 2022-01-28
Payer: COMMERCIAL

## 2022-01-28 VITALS — DIASTOLIC BLOOD PRESSURE: 64 MMHG | SYSTOLIC BLOOD PRESSURE: 118 MMHG

## 2022-01-28 DIAGNOSIS — D49.2 NEOPLASM OF UNSPECIFIED BEHAVIOR OF BONE, SOFT TISSUE, AND SKIN: ICD-10-CM

## 2022-01-28 DIAGNOSIS — I50.31 ACUTE DIASTOLIC CONGESTIVE HEART FAILURE (H): ICD-10-CM

## 2022-01-28 DIAGNOSIS — I48.91 ATRIAL FIBRILLATION WITH RAPID VENTRICULAR RESPONSE (H): ICD-10-CM

## 2022-01-28 DIAGNOSIS — D22.9 ATYPICAL NEVUS: Primary | ICD-10-CM

## 2022-01-28 PROCEDURE — 11401 EXC TR-EXT B9+MARG 0.6-1 CM: CPT | Performed by: DERMATOLOGY

## 2022-01-28 PROCEDURE — 12032 INTMD RPR S/A/T/EXT 2.6-7.5: CPT | Performed by: DERMATOLOGY

## 2022-01-28 PROCEDURE — 88305 TISSUE EXAM BY PATHOLOGIST: CPT | Mod: TC | Performed by: PATHOLOGY

## 2022-01-28 RX ORDER — FUROSEMIDE 20 MG
20 TABLET ORAL 2 TIMES DAILY
Qty: 60 TABLET | Refills: 1 | Status: SHIPPED | OUTPATIENT
Start: 2022-01-28 | End: 2022-02-01

## 2022-01-28 NOTE — LETTER
1/28/2022         RE: Amadou Wade  [address] [facility] [place]         Dear Colleague,    Thank you for referring your patient, Amadou Wade, to the [facility]. Please see a copy of my visit note below.    DERMATOLOGIC EXCISION SURGERY PROCEDURE NOTE   Dermatology Problem List:  #. SCCis mid chest,  s/p Efudex for 2 weeks starting 8/2/21  #. AK  - s/p cryo 12/10/21  - left mid cheek, s/p Efudex for 2 weeks starting 8/2/21 and cryo 12/10/21  #. 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 Excision was indicated.   We discussed the principles of treatment and most likely complications including bleeding, infection, scarring, alteration in skin color and sensation, wound dehiscence,muscle weakness in the area, or recurrence of the lesion or disease. We reviewed that on occasion, after healing, a secondary procedure or revision may be recommended in order to obtain the best cosmetic or functional result.   PROCEDURE: The patient was taken to the operative suite. Time-out was performed. The treatment area was anesthetized. The area was washed with Hibiclens, rinsed with saline and draped with sterile towels. The lesion was delineated and excised down to deep subcutaneous fat in a fusiform manner. Hemostasis was obtained by electrocoagulation.     REPAIR: A complex layered linear  closure was selected as the procedure which would maximally preserve both function and cosmesis and for the following reasons: 1) the defect was widely undermined; 2) multiple deep plication and layered sutures placed; 3) wound size, depth, tension, and location.   After the excision of the tumor, the area was extensively and carefully undermined using blunt Metzenbaum scissors. Hemostasis was obtained with spot electrocautery and ligation of vessels where necessary. An initial deep plication sutures of 3.0 Vicryl sutures  were placed in the deep, subcutaneous and fascial planes to appose the lateral margins.  The subcutaneous and dermal layers were then closed with additional 3.0 Vicryl sutures. The epidermis was then carefully approximated along the length of the wound using 4.0 Prolene running sutures.    Estimated blood loss was less than 10 ml for all surgical sites. A sterile pressure dressing was applied and wound care instructions, with a written handout, were given. The [facility] alert and ambulatory.   The patient elected for pathology results to automatically release and understands that the clinical staff will contact them as soon as possible to notify them of the results.   FU for suture removal in 1-2 weeks.     Dr. Santizo performed the procedure.     Staff Involved:  Scribe/Staff    Scribe Disclosure:  I, Stephanie Robbins, am serving as a scribe to document services personally performed by Andre Santizo MD based on data collection and the provider's statements to me.     Provider Disclosure:  The documentation recorded by the scribe accurately reflects the services I personally performed and the decisions made by me.    Andre Santizo MD  Dermatology/Dermatopathology Staff Physician  , Department of Dermatology      Again, thank you for allowing me to participate in the care of your patient.        Sincerely,        Andre MCCALLUM  MD Darlyn

## 2022-01-28 NOTE — PROGRESS NOTES
DERMATOLOGIC EXCISION SURGERY PROCEDURE NOTE   Dermatology Problem List:  #. SCCis mid chest,  s/p Efudex for 2 weeks starting 8/2/21  #. AK  - s/p cryo 12/10/21  - left mid cheek, s/p Efudex for 2 weeks starting 8/2/21 and cryo 12/10/21  #. 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 Excision was indicated.   We discussed the principles of treatment and most likely complications including bleeding, infection, scarring, alteration in skin color and sensation, wound dehiscence,muscle weakness in the area, or recurrence of the lesion or disease. We reviewed that on occasion, after healing, a secondary procedure or revision may be recommended in order to obtain the best cosmetic or functional result.   PROCEDURE: The patient was taken to the operative suite. Time-out was performed. The treatment area was anesthetized. The area was washed with Hibiclens, rinsed with saline and draped with sterile towels. The lesion was delineated and excised down to deep subcutaneous fat in a fusiform manner. Hemostasis was obtained by electrocoagulation.     REPAIR: A complex layered linear closure was selected as the procedure which would maximally preserve both function and cosmesis and for the following reasons: 1) the defect was widely undermined; 2) multiple deep plication and layered sutures placed; 3) wound size, depth, tension, and  location.   After the excision of the tumor, the area was extensively and carefully undermined using blunt Metzenbaum scissors. Hemostasis was obtained with spot electrocautery and ligation of vessels where necessary. An initial deep plication sutures of 3.0 Vicryl sutures  were placed in the deep, subcutaneous and fascial planes to appose the lateral margins.  The subcutaneous and dermal layers were then closed with additional 3.0 Vicryl sutures. The epidermis was then carefully approximated along the length of the wound using 4.0 Prolene running sutures.    Estimated blood loss was less than 10 ml for all surgical sites. A sterile pressure dressing was applied and wound care instructions, with a written handout, were given. The patient was [facility] alert and ambulatory.   The patient elected for pathology results to automatically release and understands that the clinical staff will contact them as soon as possible to notify them of the results.   FU for suture removal in 1-2 weeks.     Dr. Santizo performed the procedure.     Staff Involved:  Scribe/Staff    Scribe Disclosure:  I, Stephanie Robbins, am serving as a scribe to document services personally performed by Andre Santizo MD based on data collection and the provider's statements to me.     Provider Disclosure:  The documentation recorded by the scribe accurately reflects the services I personally performed and the decisions made by me.    Andre Santizo MD  Dermatology/Dermatopathology Staff Physician  , Department of Dermatology

## 2022-01-28 NOTE — PATIENT INSTRUCTIONS
Excision/Mohs Wound Care Instructions  I will experience scar, altered skin color, bleeding, swelling, pain, crusting and redness. I may experience altered sensation. Risks are excessive bleeding, infection, muscle weakness, thick (hypertrophic or keloidal) scar, and recurrence. A second procedure may be recommended to obtain the best cosmetic or functional result.  Possible complications of any surgical procedure are bleeding, infection, scarring, alteration in skin color and sensation, muscle weakness in the area, wound dehiscence or seperation, or recurrence of the lesion or disease. On occasion, after healing, a secondary procedure or revision may be recommended in order to obtain the best cosmetic or functional result.   After your surgery, a pressure bandage will be placed over the area that has sutures. This will help prevent bleeding. Please follow these instructions until you come back to clinic for suture removal in 14 days, as they will help you to prevent complications as your wound heals.  For the First 48 hours After Surgery:  1. Leave the pressure bandage on and keep it dry. If it should come loose, you may retape it, but do not take it off.  2. Relax and take it easy. Do not do any vigorous exercise, heavy lifting, or bending forward. This could cause the wound to bleed.  3. Post-operative pain is usually mild. You may take plain or extra strength Tylenol every 4 hours as needed (do not take more than 4,000mg in one day). Do not take any medicine that contains aspirin, ibuprofen or motrin unless you have been recommended these by a doctor.  Avoid alcohol and vitamin E as these may increase your tendency to bleed.  4. You may put an ice pack around the bandaged area for 20 minutes every 2-3 hours. This may help reduce swelling, bruising, and pain. Make sure the ice pack is waterproof so that the pressure bandage does not get wet.   5. You may see a small amount of drainage or blood on your pressure  bandage. This is normal. However, if drainage or bleeding continues or saturates the bandage, you will need to apply firm pressure over the bandage with a washcloth for 15 minutes. If bleeding continues after applying pressure for 15 minutes then go to the nearest emergency room.  48 Hours After Surgery  Carefully remove the bandage and start daily wound care and dressing changes. You may also now shower and get the wound wet.  Daily Wound Care:  1. Wash wound with a mild soap and water.  Use caution when washing the wound, be gentle and do not let the forceful shower stream hit the wound directly.  2. Pat dry.  3. Apply Vaseline (from a new container or tube) over the suture line with a Q-tip. It is very important to keep the wound continuously moist, as wounds heal best in a moist environment.  4. Keep the site covered until sutures are removed, you can cover it with a Telfa (non-stick) dressing and tape or a band-aid.    5. If you are unable to keep wound covered, you must apply Vaseline every 2-3 hours (while awake) to ensure it is being kept moist for optimal healing. A dressing overnight is recommended to keep the area moist.  Call Us If:  1. You have pain that is not controlled with Tylenol.  2. You have signs or symptoms of an infection, such as: fever over 100 degrees F, redness, warmth, or foul-smelling or yellow/creamy drainage from the wound.  Who should I call with questions?    [facility]: [phone]     [facility]: [phone]    For urgent needs outside of business hours call the [facility] at [phone] and ask to speak with the dermatology resident on call

## 2022-01-31 ENCOUNTER — OFFICE VISIT (OUTPATIENT)
Dept: FAMILY MEDICINE | Facility: CLINIC | Age: 77
End: 2022-01-31
Payer: COMMERCIAL

## 2022-01-31 VITALS
TEMPERATURE: 97.4 F | SYSTOLIC BLOOD PRESSURE: 112 MMHG | HEIGHT: 70 IN | DIASTOLIC BLOOD PRESSURE: 68 MMHG | RESPIRATION RATE: 16 BRPM | BODY MASS INDEX: 22.9 KG/M2 | HEART RATE: 66 BPM | OXYGEN SATURATION: 99 % | WEIGHT: 160 LBS

## 2022-01-31 DIAGNOSIS — M25.512 ACUTE PAIN OF LEFT SHOULDER: Primary | ICD-10-CM
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
## 2022-01-31 DIAGNOSIS — E78.5 HYPERLIPIDEMIA LDL GOAL <130: ICD-10-CM

## 2022-01-31 DIAGNOSIS — I10 HYPERTENSION GOAL BP (BLOOD PRESSURE) < 140/90: ICD-10-CM

## 2022-01-31 DIAGNOSIS — Z51.81 MEDICATION MONITORING ENCOUNTER: ICD-10-CM

## 2022-01-31 DIAGNOSIS — I50.20 HFREF (HEART FAILURE WITH REDUCED EJECTION FRACTION) (H): ICD-10-CM

## 2022-01-31 DIAGNOSIS — Z91.81 HISTORY OF RECENT FALL: ICD-10-CM

## 2022-01-31 DIAGNOSIS — I48.0 PAROXYSMAL ATRIAL FIBRILLATION (H): ICD-10-CM

## 2022-01-31 PROCEDURE — 99214 OFFICE O/P EST MOD 30 MIN: CPT | Performed by: FAMILY MEDICINE

## 2022-01-31 ASSESSMENT — MIFFLIN-ST. JEOR: SCORE: 1472.01

## 2022-01-31 NOTE — PROGRESS NOTES
Assessment & Plan     No diagnosis found.    Discussed treatment/modality options, including risk and benefits, he desires:    1) ***    2) ***    3) ***    4) ***    5) ***    All diagnosis above reviewed and noted above, otherwise stable.      See [facility] orders for further details.           No follow-ups on file.      No LOS data to display  -Doing chart review, history and exam, documentation and further activities as noted.    Mat Donovan MS3, has participated in the care of this patient.     Provider Disclosure:  I agree with above History, Review of Systems, Physical exam and Plan.  I have reviewed the content of the documentation and have edited it as needed. I have personally performed the services documented here and the documentation accurately represents those services and the decisions I have made.               Johnathan Sampson MD, FAAFP     [facility]  [address] [place] [id number]  [email or web address]   Office: [phone]  Fax: [phone]  Pager: [phone]

## 2022-01-31 NOTE — PROGRESS NOTES
Assessment & Plan   This acute onset of shoulder and rib pain after a mechanical slip and fall onto ice is unlikely to need further workup at this time. There are no indications of acute fractures, and he is feeling like it has improved enough that he declines XR at this time.      ICD-10-CM    1. Acute pain of left shoulder  M25.512    2. Fall due to slipping on ice or snow, initial encounter  W00.9XXA    3. History of recent fall  Z91.81    4. HFrEF (heart failure with reduced ejection fraction) (H)  I50.20    5. Paroxysmal atrial fibrillation (H)  I48.0    6. Hypertension goal BP (blood pressure) < 140/90  I10    7. Hyperlipidemia LDL goal <130  E78.5    8. Medication monitoring encounter  Z51.81        Discussed treatment/modality options, including risk and benefits, he desires:    1) Left shoulder - continue taking Tylenol up to 1000 mg taken 3 times per day. Try heat and ice along with home exercises in the given handout, declines xrays    2) A fib - f/u with [facility] tomorrow, on Eliquis, no NSAID's    3) Return to clinic if symptoms do not improve or get worse over the next 2-3 weeks    All diagnosis above reviewed and noted above, otherwise stable.      See [facility] orders for further details.      Return in about 1 week (around 2/7/2022) for Follow Up Acute, Medication Recheck Visit.    No LOS data to display    Doing chart review, history and exam, documentation and further activities as noted.    Mat Donovan MS3, has participated in the care of this patient.     Provider Disclosure:  I agree with above History, Review of Systems, Physical exam and Plan.  I have reviewed the content of the documentation and have edited it as needed. I have personally performed the services documented here and the documentation accurately represents those services and the decisions I have made.             Johnathan Sampson MD, FAAFP     [facility]  [address] [place] [address]  [email or web address]   Office: [phone]  Fax: [phone]  Pager: [phone]       Henry Tobar is a 76 year old who presents for the following health issues    Fall         Concern - Fall  2 weeks ago , back pain middle and left shoulder pain, arm pain   Onset: fall x 2 weeks ago , having pain since fall   Description: back pain, shoulder and  Arm pain left side   Intensity: 3/10  Progression of Symptoms:  improving  Accompanying Signs & Symptoms: pain down left arm   Previous history of similar problem: no  Precipitating factors:        Worsened by: rolling over in bed, use of left arm   Alleviating factors:        Improved by: tylenol and ibuprofen, stopped the ibuprofen   Therapies tried and outcome: tylenol helps     He had fallen and slipped on his icy driveway on 1/14/22, when he fell on his L upper back and L shoulder. There was no loss of consciousness, and he did not hit his head. He was recently diagnosed with Afib and started on blood thinners, but he did not feel lightheaded or dizzy, he simply slipped on the ice. Initially, the pain in his L back and L shoulder affected his sleep, though his sleep has improved as his pain has lessened in the weeks since the fall. Initially he tried ibuprofen for pain relief but was told to stop that due to anticoagulation, so he started using Tylenol yesterday. Since using the Tylenol, his shoulder pain seems to have severely lessened into today. At its worst since the fall, the pain reached a level of 8-9 out of 10; it is currently at a 2 of 10. The pain is noted to get worse into the evening hours and seems to travel to his elbow in the evening as well.     Review of Systems   CONSTITUTIONAL: NEGATIVE for fever, chills, change in weight  INTEGUMENTARY/SKIN: seen at dermatology, had lesion excised from R shoulder recently  EYES: NEGATIVE for vision changes or irritation  ENT/MOUTH: NEGATIVE for ear,  "mouth and throat problems  RESP: NEGATIVE for significant cough or SOB  CV: recent diagnosis of Afib in December, has never noticed any arrhythmias or when he has gone into Afib  GI: NEGATIVE for nausea, abdominal pain, heartburn, or change in bowel habits  : NEGATIVE for frequency, dysuria, or hematuria  MUSCULOSKELETAL: pain in L upper back and L shoulder, does travel down to L elbow in the evening hours  NEURO: NEGATIVE for weakness, dizziness or paresthesias  ENDOCRINE: NEGATIVE for temperature intolerance, skin/hair changes  PSYCHIATRIC: NEGATIVE for changes in mood or affect      Objective    /68   Pulse 66   Temp 97.4  F (36.3  C) (Tympanic)   Resp 16   Ht 1.778 m (5' 10\")   Wt 72.6 kg (160 lb)   SpO2 99%   BMI 22.96 kg/m    Body mass index is 22.96 kg/m .  Physical Exam   GENERAL: healthy, alert and no distress  EYES: Eyes grossly normal to inspection, PERRL and conjunctivae and sclerae normal  HENT: ear canals and TM's normal, nose and mouth without ulcers or lesions  NECK: no adenopathy, no asymmetry, masses, or scars and thyroid normal to palpation  RESP: lungs clear to auscultation - no rales, rhonchi or wheezes  CV: regular rate and rhythm, normal S1 S2, no S3 or S4, no murmur, click or rub, no peripheral edema and peripheral pulses strong  ABDOMEN: soft, nontender, no hepatosplenomegaly, no masses and bowel sounds normal  MS: LUE exam shows slightly diminished strength of external rotation, grade 4+/5 compared to 5/5 on RUE. LUE exam shows normal and equal to RUE shoulder, hand, wrist, and elbow ROM. LUE exam shows no bruising, point tenderness, or joint effusion. No point tenderness along L side of ribs nor L elbow where the pain is localized to.   BACK: no CVA tenderness, no paralumbar tenderness        "

## 2022-02-01 ENCOUNTER — HOSPITAL ENCOUNTER (OUTPATIENT)
Dept: CARDIOLOGY | Facility: CLINIC | Age: 77
Discharge: HOME | End: 2022-02-01
Attending: PHYSICIAN ASSISTANT | Admitting: PHYSICIAN ASSISTANT
Payer: COMMERCIAL

## 2022-02-01 ENCOUNTER — OFFICE VISIT (OUTPATIENT)
Dept: CARDIOLOGY | Facility: CLINIC | Age: 77
End: 2022-02-01
Attending: NURSE PRACTITIONER
Payer: COMMERCIAL

## 2022-02-01 VITALS
WEIGHT: 162.8 LBS | OXYGEN SATURATION: 99 % | HEIGHT: 70 IN | BODY MASS INDEX: 23.31 KG/M2 | DIASTOLIC BLOOD PRESSURE: 72 MMHG | SYSTOLIC BLOOD PRESSURE: 120 MMHG | HEART RATE: 80 BPM

## 2022-02-01 DIAGNOSIS — I50.31 ACUTE DIASTOLIC CONGESTIVE HEART FAILURE (H): ICD-10-CM

## 2022-02-01 DIAGNOSIS — I48.91 ATRIAL FIBRILLATION WITH RAPID VENTRICULAR RESPONSE (H): ICD-10-CM

## 2022-02-01 LAB
PATH REPORT.COMMENTS IMP SPEC: NORMAL
PATH REPORT.COMMENTS IMP SPEC: NORMAL
PATH REPORT.FINAL DX SPEC: NORMAL
PATH REPORT.GROSS SPEC: NORMAL
PATH REPORT.MICROSCOPIC SPEC OTHER STN: NORMAL
PATH REPORT.RELEVANT HX SPEC: NORMAL

## 2022-02-01 PROCEDURE — 93227 XTRNL ECG REC<48 HR R&I: CPT | Performed by: INTERNAL MEDICINE

## 2022-02-01 PROCEDURE — 99214 OFFICE O/P EST MOD 30 MIN: CPT | Mod: 25 | Performed by: PHYSICIAN ASSISTANT

## 2022-02-01 PROCEDURE — 93225 XTRNL ECG REC<48 HRS REC: CPT

## 2022-02-01 RX ORDER — FUROSEMIDE 20 MG
20 TABLET ORAL DAILY
Qty: 60 TABLET | Refills: 1 | Status: SHIPPED | OUTPATIENT
Start: 2022-02-01 | End: 2022-02-16

## 2022-02-01 ASSESSMENT — MIFFLIN-ST. JEOR: SCORE: 1484.71

## 2022-02-01 NOTE — LETTER
2022    Johnathan Sampson MD  [address] [facility] [address]    RE: Amadou Wade       Dear Colleague,     I had the pleasure of seeing Amadou Wade in the [facility].      CARDIOLOGY CLINIC PROGRESS NOTE    DOS: 2022      Amadou Wade  : 1945, 76 year old  MRN: [id number]      History:  I am meeting Amadou Wade today for follow up of hospital admission, and H and P for upcoming cardioversion.  He was seen by Dr. Cordova during his admission.     Amadou Wade is a 76 year old man with history of dyslipidemia, HTN, newly diagnosed afib with RVR.     21 sent from clinic to ED for new onset afib with RVR.   He had SOB and weight gain x 2 weeks.    21: EF 50-55% with mild concentric LVH, mild RV dilation, mod-severe biatrial enlargement, trace to mild MR, trace AR with mod pleural effusion.    He was given IV Lasix and diuresed.   HRs controlled on IV Diltiazem, then transitioned to metoprolol.  Plan was to [facility] DCCV but he had eaten, so decided for outpatient DCCV alone after about 3 weeks of being on Eliquis.   He was started on Lasix, Eliquis, metoprolol and Crestor at time of discharge. PTA ASA, Benadryl and Zocor were discontinued. PTA losartan was continued.       22 s/p successful DCCV with 1 shock with Dr. Cordova.      22 EKG showed return of afib, VR 94.     He presents today for follow up.   On exam, he remains in afib.  His rate is controlled today at 80.   He does not have palpitations.   His HR has been mostly controlled at home.  Only once has it been around 140.   No bleeding.   Weight is stable at 154 lbs.   He is watching sodium.       ROS:  Skin:  Negative     Eyes:  Positive for glasses  ENT:  Negative    Respiratory:  Negative    Cardiovascular:  Negative    Gastroenterology: Negative    Genitourinary:  Negative    Musculoskeletal:  Positive for back pain;joint pain  Neurologic:  Negative    Psychiatric:  Positive for sleep  disturbances  Heme/Lymph/Imm:  Negative    Endocrine:  Negative      PAST MEDICAL HISTORY:  Past Medical History:   Diagnosis Date     Atrial fibrillation (H) 12/2021     Cardiac dysrhythmia, unspecified 03/2007    few pac's, few pvc's, few runs SVT     CHF (congestive heart failure) (H) 12/2021     Chronic rhinitis     resolved     ED (erectile dysfunction)      Hard of hearing     [facility]     HFrEF (heart failure with reduced ejection fraction) (H) 12/2021     Hyperlipidemia LDL goal <130 2002     Hypertension goal BP (blood pressure) < 140/90 12/2017     Pneumonia, organism unspecified(486) 1993     Snoring     no sleep apnea - mouth guard     Squamous 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 2021    Dr Abernathy - Rt Chest     Unspecified hemorrhoids without mention of complication 04/2006    internal       PAST SURGICAL HISTORY:  Past Surgical History:   Procedure Laterality Date     ANESTHESIA CARDIOVERSION N/A 1/11/2022    Procedure: ANESTHESIA, FOR CARDIOVERSION;  Surgeon: GENERIC ANESTHESIA PROVIDER;  Location: [facility] OR     COLONOSCOPY  01/03/2012    incomplete - negative, ACBE incomplete - negative, CT colography negative     COLONOSCOPY Bilateral 09/14/2021     ganglion cyst removal Left 09/02/2021     HC REMOVE TONSILS/ADENOIDS,<13 Y/O  1953    T & A <14y.o.     SIGMOIDOSCOPY,DIAGNOSTIC  1997    normal     STRESS ECHO (METRO)  03/2007    normal few pac's, few pvc's     SURGICAL HISTORY OF -  Bilateral     fall, 2020 - bilateral IOL     ZZHC COLONOSCOPY THRU STOMA, DIAGNOSTIC  05/2006    dr tejeda - normal - internal hemorrhiods - due 10 yrs       SOCIAL HISTORY:  Social History     Socioeconomic History     Marital status:      Spouse name: Dania     Number of children: 3     Years of education: 19     Highest education level: None   Occupational History     Occupation:       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   Tobacco Use     Smoking status: Never Smoker     Smokeless tobacco: Never Used   Vaping Use      Vaping Use: Never used   Substance and Sexual Activity     Alcohol use: Yes     Alcohol/week: 0.0 - 0.8 standard drinks     Comment: 0-1 per week avg     Drug use: No     Sexual activity: Yes     Partners: Female   Other Topics Concern      Service Not Asked     Blood Transfusions Not Asked     Caffeine Concern Yes     Comment: occas     Occupational Exposure Not Asked     Hobby Hazards Not Asked     Sleep Concern Not Asked     Stress Concern Not Asked     Weight Concern Not Asked     Special Diet Not Asked     Back Care Not Asked     Exercise Yes     Comment: active     Bike Helmet Not Asked     Seat Belt Yes     Self-Exams Not Asked     Parent/sibling w/ CABG, MI or angioplasty before 65F 55M? No   Social History Narrative     None     Social Determinants of Health     Financial Resource Strain: Not on file   Food Insecurity: Not on file   Transportation Needs: Not on file   Physical Activity: Not on file   Stress: Not on file   Social Connections: Not on file   Intimate Partner Violence: Not on file   Housing Stability: Not on file       FAMILY HISTORY:  Family History   Problem Relation Age of Onset     Hypertension Mother      Cancer Father         lung     Eye Disorder Father         Glaucoma     Alzheimer Disease Father      Heart Disease Father      Diabetes Father         type 2     Diabetes Brother         type 2     Coronary Artery Disease Brother      Cerebrovascular Disease Maternal Grandmother      Heart Disease Maternal Grandfather          young heartattack     Diabetes Paternal Grandfather      Cancer Brother         melanoma     Alzheimer Disease Brother      Prostate Cancer Brother      Cerebrovascular Disease Brother        MEDS: apixaban ANTICOAGULANT (ELIQUIS) 5 MG tablet, Take 1 tablet (5 mg) by mouth 2 times daily  metoprolol tartrate (LOPRESSOR) 50 MG tablet, Take 1 tablet (50 mg) by mouth 2 times daily  rosuvastatin (CRESTOR) 10 MG tablet, Take 1 tablet (10 mg) by mouth At  "Bedtime  valACYclovir (VALTREX) 1000 mg tablet, daily as needed     lidocaine 1% with EPINEPHrine 1:100,000 injection 3 mL        ALLERGIES: No Known Allergies    PHYSICAL EXAM:  Vitals: /72 (BP Location: Right arm, Patient Position: Sitting, Cuff Size: Adult Regular)   Pulse 80   Ht 1.778 m (5' 10\")   Wt 73.8 kg (162 lb 12.8 oz)   SpO2 99%   BMI 23.36 kg/m    Constitutional:  cooperative, alert and oriented, well developed, well nourished, in no acute distress        Skin:  warm and dry to the touch, no apparent skin lesions or masses noted        Head:  normocephalic, no masses or lesions        Eyes:  pupils equal and round;conjunctivae and lids unremarkable;sclera white        ENT:  no pallor or cyanosis        Neck:  JVP normal        Respiratory:  clear to auscultation        Cardiac:   irregularly irregular rhythm                GI:  abdomen soft;BS normoactive        Vascular: pulses full and equal                                      Extremities and Musculoskeletal:  no deformities, clubbing, cyanosis, erythema observed;no edema        Neurological:  no gross motor deficits;affect appropriate            LABS/DATA:  I reviewed the following:  EKG 1/6/2022:  Afib, incomplete RBBB, VR 89          ASSESSMENT/PLAN:  New onset afib with RVR  HFpEF  - Echo shows LVEF 50-55%, mild LVH, no sig valve disease  - s/p successful DCCV 1/11/22   - Back into afib 1/17/22 as noted on EKG.  Rate controlled on metoprolol  - Rates seem controlled, but he did note some rates in the 140s at rest at home.  We will get a Holter  - He is mostly asx when in afib, however, he did have some CHF when rates were uncontrolled  - He would like to meet with EP to discuss options: continuing in afib with rate control+ anticoagulation vs AA medications vs repeating DCCV on AA medications vs ablation.    - [id number] score is 2 for HTN, age.  Will be 3 later this month.  He is on Eliquis 5 mg BID.    - He is currently on Lasix 20 " mg BID with stable weights in the 145-155 lbs range.  We will lower to 20 mg once daily.   He will continue to weigh daily, and if weight gain 2-3 lbs in a day, he will increase back to BID.       Dyslipidemia  - LDL goal <130  - He is on Crestor 10 mg  - Most recent LDL 3/2021 was 102      HTN  - He was previously on losartan, but this was discontinued with the start of metoprolol for rate control. Also Lasix for HFpEF  - BP controlled          Follow up:  Holter to check rate control  See EP NEW re afib options.  He is open to [place] or [place]       Aleksandra Mclain PA-C    Thank you for allowing me to participate in the care of your patient.      Sincerely,     Aleksandra Mclain PA-C   [facility]  cc:   Jojo Bangura, APRN CNP  [address]  [place] [address]

## 2022-02-01 NOTE — PATIENT INSTRUCTIONS
Continue your current medications, except lower your Lasix (furosemide) to 20 mg once daily.    Continue to weigh yourself daily.  If you have weight gain of 2-3 lbs in a day, you can increase back to twice daily.   Your heart rate is controlled today in clinic, but we will make sure it is controlled with a monitor.   We will get you in to see our EP colleagues to discuss management options of afib (antiarrythmic medications, repeating a cardioversion or ablation).

## 2022-02-01 NOTE — PROGRESS NOTES
CARDIOLOGY CLINIC PROGRESS NOTE    DOS: 2022      Amadou Wade  : 1945, 76 year old  MRN: [id number]      History:  I am meeting Amadou Wade today for follow up of hospital admission, and H and P for upcoming cardioversion.  He was seen by Dr. Cordova during his admission.     Amadou Wade is a 76 year old man with history of dyslipidemia, HTN, newly diagnosed afib with RVR.     21 sent from clinic to ED for new onset afib with RVR.   He had SOB and weight gain x 2 weeks.    21: EF 50-55% with mild concentric LVH, mild RV dilation, mod-severe biatrial enlargement, trace to mild MR, trace AR with mod pleural effusion.    He was given IV Lasix and diuresed.   HRs controlled on IV Diltiazem, then transitioned to metoprolol.  Plan was to BRUCE DCCV but he had eaten, so decided for outpatient DCCV alone after about 3 weeks of being on Eliquis.   He was started on Lasix, Eliquis, metoprolol and Crestor at time of discharge. PTA ASA, Benadryl and Zocor were discontinued. PTA losartan was continued.       22 s/p successful DCCV with 1 shock with Dr. Cordova.      22 EKG showed return of afib, VR 94.     He presents today for follow up.   On exam, he remains in afib.  His rate is controlled today at 80.   He does not have palpitations.   His HR has been mostly controlled at home.  Only once has it been around 140.   No bleeding.   Weight is stable at 154 lbs.   He is watching sodium.       ROS:  Skin:  Negative     Eyes:  Positive for glasses  ENT:  Negative    Respiratory:  Negative    Cardiovascular:  Negative    Gastroenterology: Negative    Genitourinary:  Negative    Musculoskeletal:  Positive for back pain;joint pain  Neurologic:  Negative    Psychiatric:  Positive for sleep disturbances  Heme/Lymph/Imm:  Negative    Endocrine:  Negative      PAST MEDICAL HISTORY:  Past Medical History:   Diagnosis Date     Atrial fibrillation (H) 2021     Cardiac dysrhythmia, unspecified 2007     few pac's, few pvc's, few runs SVT     CHF (congestive heart failure) (H) 12/2021     Chronic rhinitis     resolved     ED (erectile dysfunction)      Hard of hearing     [facility]     HFrEF (heart failure with reduced ejection fraction) (H) 12/2021     Hyperlipidemia LDL goal <130 2002     Hypertension goal BP (blood pressure) < 140/90 12/2017     Pneumonia, organism unspecified(486) 1993     Snoring     no sleep apnea - mouth guard     Squamous 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 2021    Dr Abernathy - Rt Chest     Unspecified hemorrhoids without mention of complication 04/2006    internal       PAST SURGICAL HISTORY:  Past Surgical History:   Procedure Laterality Date     ANESTHESIA CARDIOVERSION N/A 1/11/2022    Procedure: ANESTHESIA, FOR CARDIOVERSION;  Surgeon: GENERIC ANESTHESIA PROVIDER;  Location: [facility] OR     COLONOSCOPY  01/03/2012    incomplete - negative, ACBE incomplete - negative, CT colography negative     COLONOSCOPY Bilateral 09/14/2021     ganglion cyst removal Left 09/02/2021     HC REMOVE TONSILS/ADENOIDS,<13 Y/O  1953    T & A <14y.o.     SIGMOIDOSCOPY,DIAGNOSTIC  1997    normal     STRESS ECHO (METRO)  03/2007    normal few pac's, few pvc's     SURGICAL HISTORY OF -  Bilateral     fall, 2020 - bilateral IOL     ZZHC COLONOSCOPY THRU STOMA, DIAGNOSTIC  05/2006    dr tejeda - normal - internal hemorrhiods - due 10 yrs       SOCIAL HISTORY:  Social History     Socioeconomic History     Marital status:      Spouse name: Dania     Number of children: 3     Years of education: 19     Highest education level: None   Occupational History     Occupation:       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   Tobacco Use     Smoking status: Never Smoker     Smokeless tobacco: Never Used   Vaping Use     Vaping Use: Never used   Substance and Sexual Activity     Alcohol use: Yes     Alcohol/week: 0.0 - 0.8 standard drinks     Comment: 0-1 per week avg     Drug use: No     Sexual activity: Yes     Partners:  Female   Other Topics Concern      Service Not Asked     Blood Transfusions Not Asked     Caffeine Concern Yes     Comment: occas     Occupational Exposure Not Asked     Hobby Hazards Not Asked     Sleep Concern Not Asked     Stress Concern Not Asked     Weight Concern Not Asked     Special Diet Not Asked     Back Care Not Asked     Exercise Yes     Comment: active     Bike Helmet Not Asked     Seat Belt Yes     Self-Exams Not Asked     Parent/sibling w/ CABG, MI or angioplasty before 65F 55M? No   Social History Narrative     None     Social Determinants of Health     Financial Resource Strain: Not on file   Food Insecurity: Not on file   Transportation Needs: Not on file   Physical Activity: Not on file   Stress: Not on file   Social Connections: Not on file   Intimate Partner Violence: Not on file   Housing Stability: Not on file       FAMILY HISTORY:  Family History   Problem Relation Age of Onset     Hypertension Mother      Cancer Father         lung     Eye Disorder Father         Glaucoma     Alzheimer Disease Father      Heart Disease Father      Diabetes Father         type 2     Diabetes Brother         type 2     Coronary Artery Disease Brother      Cerebrovascular Disease Maternal Grandmother      Heart Disease Maternal Grandfather          young heartattack     Diabetes Paternal Grandfather      Cancer Brother         melanoma     Alzheimer Disease Brother      Prostate Cancer Brother      Cerebrovascular Disease Brother        MEDS: apixaban ANTICOAGULANT (ELIQUIS) 5 MG tablet, Take 1 tablet (5 mg) by mouth 2 times daily  metoprolol tartrate (LOPRESSOR) 50 MG tablet, Take 1 tablet (50 mg) by mouth 2 times daily  rosuvastatin (CRESTOR) 10 MG tablet, Take 1 tablet (10 mg) by mouth At Bedtime  valACYclovir (VALTREX) 1000 mg tablet, daily as needed     lidocaine 1% with EPINEPHrine 1:100,000 injection 3 mL        ALLERGIES: No Known Allergies    PHYSICAL EXAM:  Vitals: /72 (BP Location:  "Right arm, Patient Position: Sitting, Cuff Size: Adult Regular)   Pulse 80   Ht 1.778 m (5' 10\")   Wt 73.8 kg (162 lb 12.8 oz)   SpO2 99%   BMI 23.36 kg/m    Constitutional:  cooperative, alert and oriented, well developed, well nourished, in no acute distress        Skin:  warm and dry to the touch, no apparent skin lesions or masses noted        Head:  normocephalic, no masses or lesions        Eyes:  pupils equal and round;conjunctivae and lids unremarkable;sclera white        ENT:  no pallor or cyanosis        Neck:  JVP normal        Respiratory:  clear to auscultation        Cardiac:   irregularly irregular rhythm                GI:  abdomen soft;BS normoactive        Vascular: pulses full and equal                                      Extremities and Musculoskeletal:  no deformities, clubbing, cyanosis, erythema observed;no edema        Neurological:  no gross motor deficits;affect appropriate            LABS/DATA:  I reviewed the following:  EKG 1/6/2022:  Afib, incomplete RBBB, VR 89          ASSESSMENT/PLAN:  New onset afib with RVR  HFpEF  - Echo shows LVEF 50-55%, mild LVH, no sig valve disease  - s/p successful DCCV 1/11/22   - Back into afib 1/17/22 as noted on EKG.  Rate controlled on metoprolol  - Rates seem controlled, but he did note some rates in the 140s at rest at home.  We will get a Holter  - He is mostly asx when in afib, however, he did have some CHF when rates were uncontrolled  - He would like to meet with EP to discuss options: continuing in afib with rate control+ anticoagulation vs AA medications vs repeating DCCV on AA medications vs ablation.    - [id number] score is 2 for HTN, age.  Will be 3 later this month.  He is on Eliquis 5 mg BID.    - He is currently on Lasix 20 mg BID with stable weights in the 145-155 lbs range.  We will lower to 20 mg once daily.   He will continue to weigh daily, and if weight gain 2-3 lbs in a day, he will increase back to BID.       Dyslipidemia  - " LDL goal <130  - He is on Crestor 10 mg  - Most recent LDL 3/2021 was 102      HTN  - He was previously on losartan, but this was discontinued with the start of metoprolol for rate control. Also Lasix for HFpEF  - BP controlled          Follow up:  Holter to check rate control  See EP NEW re afib options.  He is open to [place] or [place]         KUMAR SterlingC

## 2022-02-09 ENCOUNTER — TELEPHONE (OUTPATIENT)
Dept: CARDIOLOGY | Facility: CLINIC | Age: 77
End: 2022-02-09
Payer: COMMERCIAL

## 2022-02-09 DIAGNOSIS — I50.31 ACUTE DIASTOLIC CONGESTIVE HEART FAILURE (H): ICD-10-CM

## 2022-02-09 DIAGNOSIS — I48.91 ATRIAL FIBRILLATION WITH RAPID VENTRICULAR RESPONSE (H): ICD-10-CM

## 2022-02-09 RX ORDER — METOPROLOL TARTRATE 50 MG
75 TABLET ORAL 2 TIMES DAILY
Qty: 270 TABLET | Refills: 0 | Status: SHIPPED | OUTPATIENT
Start: 2022-02-09 | End: 2022-02-16

## 2022-02-09 NOTE — TELEPHONE ENCOUNTER
Hx: HFrEF, New onset A-fib RVR, Cardiomegaly, HTN, HLD, Ascites      LOV with Aleksandra SOTELO 2/1/22: New onset Afib with RVR. Rates seem controlled, but he did note some rates in the 140s at rest at home. Plan for Holter.     Plan for follow up with EP.      Adela LOPEZ RN      ----- Message from Aleksandra Mclain PA-C sent at 2/9/2022  3:39 PM CST -----  Results reviewed.   Average is 97 and frequently above 110 based on graph.   Please have him increase metoprolol to 75 mg BID.   Also I think I placed the order for EP too far out. I would like him seen within the month. He is open to [place] or [place].   Thanks,   Aleksandra Mclain PA-C 2/9/2022 3:39 PM      Spoke with pt via telephone 2/9/22. Reviewed the results of Holter Monitor. RN reported recommendations of Aleksandra Mclain PA-C. Advised pt increase metoprolol from 50mg BID to 75mg BID. Pt confirmed current dosing of 50mg tablets. Pt agreeable to increasing metoprolol to 75mg and verbalized understanding of increasing to 1.5 tablets of his current supply, twice a day. Pt reports having plenty of tablets currently. Pt also made aware that JI Pathak would like to have him seen by EP within the month. Pt agreeable, plan for scheduling to contact pt.    Metoprolol dose updated in Epic. Message sent to scheduling to set up EP visit within the month.    Adela LOPEZ RN

## 2022-02-11 ENCOUNTER — ALLIED HEALTH/NURSE VISIT (OUTPATIENT)
Dept: NURSING | Facility: CLINIC | Age: 77
End: 2022-02-11
Payer: COMMERCIAL

## 2022-02-11 DIAGNOSIS — Z48.02 ENCOUNTER FOR REMOVAL OF SUTURES: Primary | ICD-10-CM

## 2022-02-11 PROCEDURE — 99207 PR NO CHARGE NURSE ONLY: CPT

## 2022-02-11 NOTE — NURSING NOTE
Amadou Wade presents to the clinic today for removal of sutures.  The patient has had the sutures in place for 14 days.  There has been no history of infection or drainage.  1 running sutures and suture are seen located on the right shoulder.  The wound is healing well with no signs of infection.  Tetanus status is up to date.   All sutures were easily removed today.  Routine wound care discussed.  The patient will follow up as needed.    Jacque BARNETTRN BSN  [facility]  [phone]

## 2022-02-16 ENCOUNTER — OFFICE VISIT (OUTPATIENT)
Dept: CARDIOLOGY | Facility: CLINIC | Age: 77
End: 2022-02-16
Attending: PHYSICIAN ASSISTANT
Payer: COMMERCIAL

## 2022-02-16 ENCOUNTER — HOSPITAL ENCOUNTER (OUTPATIENT)
Dept: GENERAL RADIOLOGY | Facility: CLINIC | Age: 77
Discharge: HOME | End: 2022-02-16
Attending: INTERNAL MEDICINE | Admitting: INTERNAL MEDICINE
Payer: COMMERCIAL

## 2022-02-16 VITALS
HEIGHT: 70 IN | DIASTOLIC BLOOD PRESSURE: 78 MMHG | SYSTOLIC BLOOD PRESSURE: 112 MMHG | WEIGHT: 157 LBS | HEART RATE: 65 BPM | BODY MASS INDEX: 22.48 KG/M2 | OXYGEN SATURATION: 97 %

## 2022-02-16 DIAGNOSIS — I48.91 ATRIAL FIBRILLATION WITH RAPID VENTRICULAR RESPONSE (H): ICD-10-CM

## 2022-02-16 DIAGNOSIS — I50.31 ACUTE DIASTOLIC CONGESTIVE HEART FAILURE (H): ICD-10-CM

## 2022-02-16 PROCEDURE — 99214 OFFICE O/P EST MOD 30 MIN: CPT | Mod: 25 | Performed by: INTERNAL MEDICINE

## 2022-02-16 PROCEDURE — 71046 X-RAY EXAM CHEST 2 VIEWS: CPT

## 2022-02-16 PROCEDURE — 93000 ELECTROCARDIOGRAM COMPLETE: CPT | Performed by: INTERNAL MEDICINE

## 2022-02-16 RX ORDER — FUROSEMIDE 20 MG
20 TABLET ORAL DAILY
Qty: 60 TABLET | Refills: 5 | Status: SHIPPED | OUTPATIENT
Start: 2022-02-16 | End: 2023-03-27

## 2022-02-16 RX ORDER — METOPROLOL TARTRATE 50 MG
75 TABLET ORAL 2 TIMES DAILY
Qty: 270 TABLET | Refills: 3 | Status: SHIPPED | OUTPATIENT
Start: 2022-02-16

## 2022-02-16 RX ORDER — DILTIAZEM HYDROCHLORIDE 120 MG/1
120 CAPSULE, COATED, EXTENDED RELEASE ORAL DAILY
Qty: 90 CAPSULE | Refills: 3 | Status: SHIPPED | OUTPATIENT
Start: 2022-02-16 | End: 2022-02-28

## 2022-02-16 NOTE — LETTER
2/16/2022    Johnathan Sampson MD  [address] [facility] [address]    RE: Amadou Wade       Dear Colleague,     I had the pleasure of seeing Amadou Wade in the [facility].  HPI and Plan:   See dictation        Orders Placed This Encounter   Procedures     X-ray Chest 2 vws*     Follow-Up with Cardiology EP     EKG 12-lead complete w/read - Clinics (performed today)     EKG 12-lead complete w/read (Future)- to be scheduled     Holter Monitor 24 hour Adult Pediatric       Orders Placed This Encounter   Medications     apixaban ANTICOAGULANT (ELIQUIS) 5 MG tablet     Sig: Take 1 tablet (5 mg) by mouth 2 times daily     Dispense:  180 tablet     Refill:  3     furosemide (LASIX) 20 MG tablet     Sig: Take 1 tablet (20 mg) by mouth daily Additional 20 mg as needed for weight gain     Dispense:  60 tablet     Refill:  5     metoprolol tartrate (LOPRESSOR) 50 MG tablet     Sig: Take 1.5 tablets (75 mg) by mouth 2 times daily     Dispense:  270 tablet     Refill:  3     diltiazem ER COATED BEADS (CARDIZEM CD) 120 MG 24 hr capsule     Sig: Take 1 capsule (120 mg) by mouth daily     Dispense:  90 capsule     Refill:  3       Medications Discontinued During This Encounter   Medication Reason     apixaban ANTICOAGULANT (ELIQUIS) 5 MG tablet Reorder     furosemide (LASIX) 20 MG tablet Reorder     metoprolol tartrate (LOPRESSOR) 50 MG tablet Reorder         Encounter Diagnoses   Name Primary?     Atrial fibrillation with rapid ventricular response (H)      Acute diastolic congestive heart failure (H)        CURRENT MEDICATIONS:  Current Outpatient Medications   Medication Sig Dispense Refill     apixaban ANTICOAGULANT (ELIQUIS) 5 MG tablet Take 1 tablet (5 mg) by mouth 2 times daily 180 tablet 3     diltiazem ER COATED BEADS (CARDIZEM CD) 120 MG 24 hr capsule Take 1 capsule (120 mg) by mouth daily 90 capsule 3     furosemide (LASIX) 20 MG tablet Take 1 tablet (20 mg) by mouth daily Additional 20 mg  as needed for weight gain 60 tablet 5     metoprolol tartrate (LOPRESSOR) 50 MG tablet Take 1.5 tablets (75 mg) by mouth 2 times daily 270 tablet 3     rosuvastatin (CRESTOR) 10 MG tablet Take 1 tablet (10 mg) by mouth At Bedtime 90 tablet 0     valACYclovir (VALTREX) 1000 mg tablet daily as needed          ALLERGIES   No Known Allergies    PAST MEDICAL HISTORY:  Past Medical History:   Diagnosis Date     CHF (congestive heart failure) (H) 12/2021    HFpEF     Chronic rhinitis     resolved     [facility]     HFrEF (heart failure with reduced ejection fraction) (H) 12/2021     Hypertension goal BP (blood pressure) < 140/90 12/2017     Persistent atrial fibrillation (H) 12/2021    in/out, rate controlled, at times     Pneumonia, organism unspecified(486) 1993     Snoring     no sleep apnea - mouth guard     Squamous 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 2021    Dr Abernathy - Rt Chest     Unspecified hemorrhoids without mention of complication 04/2006    internal       PAST SURGICAL HISTORY:  Past Surgical History:   Procedure Laterality Date     ANESTHESIA CARDIOVERSION N/A 1/11/2022    Procedure: ANESTHESIA, FOR CARDIOVERSION;  Surgeon: GENERIC ANESTHESIA PROVIDER;  Location: [facility] OR     COLONOSCOPY  01/03/2012    incomplete - negative, ACBE incomplete - negative, CT colography negative     COLONOSCOPY Bilateral 09/14/2021     ganglion cyst removal Left 09/02/2021     HC REMOVE TONSILS/ADENOIDS,<11 Y/O  1953    T & A <14y.o.     SIGMOIDOSCOPY,DIAGNOSTIC  1997    normal     STRESS ECHO (METRO)  03/2007    normal few pac's, few pvc's     SURGICAL HISTORY OF -  Bilateral     fall, 2020 - bilateral IOL     ZZHC COLONOSCOPY THRU STOMA, DIAGNOSTIC  05/2006    dr tejeda - normal - internal hemorrhiods - due 10 yrs       FAMILY HISTORY:  Family History   Problem Relation Age of Onset     Hypertension Mother      Cancer Father         lung     Eye Disorder Father         Glaucoma     Alzheimer Disease Father       Heart Disease Father      Diabetes Father         type 2     Diabetes Brother         type 2     Coronary Artery Disease Brother      Cerebrovascular Disease Maternal Grandmother      Heart Disease Maternal Grandfather          young heartattack     Diabetes Paternal Grandfather      Cancer Brother         melanoma     Alzheimer Disease Brother      Prostate Cancer Brother      Cerebrovascular Disease Brother        SOCIAL HISTORY:  Social History     Socioeconomic History     Marital status:      Spouse name: Dania     Number of children: 3     Years of education: 19     Highest education level: None   Occupational History     Occupation:       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   Tobacco Use     Smoking status: Never Smoker     Smokeless tobacco: Never Used   Vaping Use     Vaping Use: Never used   Substance and Sexual Activity     Alcohol use: Yes     Alcohol/week: 0.0 - 0.8 standard drinks     Comment: 0-1 per week avg     Drug use: No     Sexual activity: Yes     Partners: Female   Other Topics Concern      Service Not Asked     Blood Transfusions Not Asked     Caffeine Concern Yes     Comment: occas     Occupational Exposure Not Asked     Hobby Hazards Not Asked     Sleep Concern Not Asked     Stress Concern Not Asked     Weight Concern Not Asked     Special Diet Not Asked     Back Care Not Asked     Exercise Yes     Comment: active     Bike Helmet Not Asked     Seat Belt Yes     Self-Exams Not Asked     Parent/sibling w/ CABG, MI or angioplasty before 65F 55M? No   Social History Narrative     None     Social Determinants of Health     Financial Resource Strain: Not on file   Food Insecurity: Not on file   Transportation Needs: Not on file   Physical Activity: Not on file   Stress: Not on file   Social Connections: Not on file   Intimate Partner Violence: Not on file   Housing Stability: Not on file       Review of Systems:  Skin:  Negative       Eyes:  Positive for glasses    ENT:   "Negative      Respiratory:  Negative       Cardiovascular:  Negative Positive for;fatigue chest pressure occ  Gastroenterology: Negative diarrhea    Genitourinary:  Negative      Musculoskeletal:  Positive for back pain;joint pain Left should and back pain from fall a couple weeks ago  Neurologic:  Negative      Psychiatric:  Positive for sleep disturbances    Heme/Lymph/Imm:  Negative      Endocrine:  Negative        Physical Exam:  Vitals: /78   Pulse 65   Ht 1.778 m (5' 10\")   Wt 71.2 kg (157 lb)   SpO2 97%   BMI 22.53 kg/m      Constitutional:  cooperative, alert and oriented, well developed, well nourished, in no acute distress        Skin:  warm and dry to the touch, no apparent skin lesions or masses noted          Head:  normocephalic, no masses or lesions        Eyes:  pupils equal and round;conjunctivae and lids unremarkable;sclera white        Lymph:      ENT:  no pallor or cyanosis        Neck:  JVP normal        Respiratory:  clear to auscultation         Cardiac:   irregularly irregular rhythm              pulses full and equal                                        GI:  abdomen soft;BS normoactive        Extremities and Muscular Skeletal:  no deformities, clubbing, cyanosis, erythema observed;no edema              Neurological:  no gross motor deficits;affect appropriate        Psych:  Alert and Oriented x 3        CC  Aleksandra Mclain PA-C  [address] [place] [address]    Thank you for allowing me to participate in the care of your patient.      Sincerely,     Torin Montenegro MD     [facility]  "

## 2022-02-16 NOTE — PROGRESS NOTES
HPI and Plan:   See dictation        Orders Placed This Encounter   Procedures     X-ray Chest 2 vws*     Follow-Up with Cardiology EP     EKG 12-lead complete w/read - Clinics (performed today)     EKG 12-lead complete w/read (Future)- to be scheduled     Holter Monitor 24 hour Adult Pediatric       Orders Placed This Encounter   Medications     apixaban ANTICOAGULANT (ELIQUIS) 5 MG tablet     Sig: Take 1 tablet (5 mg) by mouth 2 times daily     Dispense:  180 tablet     Refill:  3     furosemide (LASIX) 20 MG tablet     Sig: Take 1 tablet (20 mg) by mouth daily Additional 20 mg as needed for weight gain     Dispense:  60 tablet     Refill:  5     metoprolol tartrate (LOPRESSOR) 50 MG tablet     Sig: Take 1.5 tablets (75 mg) by mouth 2 times daily     Dispense:  270 tablet     Refill:  3     diltiazem ER COATED BEADS (CARDIZEM CD) 120 MG 24 hr capsule     Sig: Take 1 capsule (120 mg) by mouth daily     Dispense:  90 capsule     Refill:  3       Medications Discontinued During This Encounter   Medication Reason     apixaban ANTICOAGULANT (ELIQUIS) 5 MG tablet Reorder     furosemide (LASIX) 20 MG tablet Reorder     metoprolol tartrate (LOPRESSOR) 50 MG tablet Reorder         Encounter Diagnoses   Name Primary?     Atrial fibrillation with rapid ventricular response (H)      Acute diastolic congestive heart failure (H)        CURRENT MEDICATIONS:  Current Outpatient Medications   Medication Sig Dispense Refill     apixaban ANTICOAGULANT (ELIQUIS) 5 MG tablet Take 1 tablet (5 mg) by mouth 2 times daily 180 tablet 3     diltiazem ER COATED BEADS (CARDIZEM CD) 120 MG 24 hr capsule Take 1 capsule (120 mg) by mouth daily 90 capsule 3     furosemide (LASIX) 20 MG tablet Take 1 tablet (20 mg) by mouth daily Additional 20 mg as needed for weight gain 60 tablet 5     metoprolol tartrate (LOPRESSOR) 50 MG tablet Take 1.5 tablets (75 mg) by mouth 2 times daily 270 tablet 3     rosuvastatin (CRESTOR) 10 MG tablet Take 1 tablet  (10 mg) by mouth At Bedtime 90 tablet 0     valACYclovir (VALTREX) 1000 mg tablet daily as needed          ALLERGIES   No Known Allergies    PAST MEDICAL HISTORY:  Past Medical History:   Diagnosis Date     CHF (congestive heart failure) (H) 12/2021    HFpEF     Chronic rhinitis     resolved     [facility]     HFrEF (heart failure with reduced ejection fraction) (H) 12/2021     Hypertension goal BP (blood pressure) < 140/90 12/2017     Persistent atrial fibrillation (H) 12/2021    in/out, rate controlled, at times     Pneumonia, organism unspecified(486) 1993     Snoring     no sleep apnea - mouth guard     Squamous 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 2021    Dr Abernathy - Rt Chest     Unspecified hemorrhoids without mention of complication 04/2006    internal       PAST SURGICAL HISTORY:  Past Surgical History:   Procedure Laterality Date     ANESTHESIA CARDIOVERSION N/A 1/11/2022    Procedure: ANESTHESIA, FOR CARDIOVERSION;  Surgeon: GENERIC ANESTHESIA PROVIDER;  Location: [facility] OR     COLONOSCOPY  01/03/2012    incomplete - negative, ACBE incomplete - negative, CT colography negative     COLONOSCOPY Bilateral 09/14/2021     ganglion cyst removal Left 09/02/2021     HC REMOVE TONSILS/ADENOIDS,<11 Y/O  1953    T & A <14y.o.     SIGMOIDOSCOPY,DIAGNOSTIC  1997    normal     STRESS ECHO (METRO)  03/2007    normal few pac's, few pvc's     SURGICAL HISTORY OF -  Bilateral     fall, 2020 - bilateral IOL     ZZHC COLONOSCOPY THRU STOMA, DIAGNOSTIC  05/2006    dr tejeda - normal - internal hemorrhiods - due 10 yrs       FAMILY HISTORY:  Family History   Problem Relation Age of Onset     Hypertension Mother      Cancer Father         lung     Eye Disorder Father         Glaucoma     Alzheimer Disease Father      Heart Disease Father      Diabetes Father         type 2     Diabetes Brother         type 2     Coronary Artery Disease Brother      Cerebrovascular Disease Maternal Grandmother      Heart Disease  Maternal Grandfather          young heartattack     Diabetes Paternal Grandfather      Cancer Brother         melanoma     Alzheimer Disease Brother      Prostate Cancer Brother      Cerebrovascular Disease Brother        SOCIAL HISTORY:  Social History     Socioeconomic History     Marital status:      Spouse name: Dania     Number of children: 3     Years of education: 19     Highest education level: None   Occupational History     Occupation:       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   Tobacco Use     Smoking status: Never Smoker     Smokeless tobacco: Never Used   Vaping Use     Vaping Use: Never used   Substance and Sexual Activity     Alcohol use: Yes     Alcohol/week: 0.0 - 0.8 standard drinks     Comment: 0-1 per week avg     Drug use: No     Sexual activity: Yes     Partners: Female   Other Topics Concern      Service Not Asked     Blood Transfusions Not Asked     Caffeine Concern Yes     Comment: occas     Occupational Exposure Not Asked     Hobby Hazards Not Asked     Sleep Concern Not Asked     Stress Concern Not Asked     Weight Concern Not Asked     Special Diet Not Asked     Back Care Not Asked     Exercise Yes     Comment: active     Bike Helmet Not Asked     Seat Belt Yes     Self-Exams Not Asked     Parent/sibling w/ CABG, MI or angioplasty before 65F 55M? No   Social History Narrative     None     Social Determinants of Health     Financial Resource Strain: Not on file   Food Insecurity: Not on file   Transportation Needs: Not on file   Physical Activity: Not on file   Stress: Not on file   Social Connections: Not on file   Intimate Partner Violence: Not on file   Housing Stability: Not on file       Review of Systems:  Skin:  Negative       Eyes:  Positive for glasses    ENT:  Negative      Respiratory:  Negative       Cardiovascular:  Negative Positive for;fatigue chest pressure occ  Gastroenterology: Negative diarrhea    Genitourinary:  Negative      Musculoskeletal:   "Positive for back pain;joint pain Left should and back pain from fall a couple weeks ago  Neurologic:  Negative      Psychiatric:  Positive for sleep disturbances    Heme/Lymph/Imm:  Negative      Endocrine:  Negative        Physical Exam:  Vitals: /78   Pulse 65   Ht 1.778 m (5' 10\")   Wt 71.2 kg (157 lb)   SpO2 97%   BMI 22.53 kg/m      Constitutional:  cooperative, alert and oriented, well developed, well nourished, in no acute distress        Skin:  warm and dry to the touch, no apparent skin lesions or masses noted          Head:  normocephalic, no masses or lesions        Eyes:  pupils equal and round;conjunctivae and lids unremarkable;sclera white        Lymph:      ENT:  no pallor or cyanosis        Neck:  JVP normal        Respiratory:  clear to auscultation         Cardiac:   irregularly irregular rhythm              pulses full and equal                                        GI:  abdomen soft;BS normoactive        Extremities and Muscular Skeletal:  no deformities, clubbing, cyanosis, erythema observed;no edema              Neurological:  no gross motor deficits;affect appropriate        Psych:  Alert and Oriented x 3        CC  Aleksandra Mclain PA-C  [address] [place] [address]              "

## 2022-02-16 NOTE — PROGRESS NOTES
Service Date: 02/16/2022    CLINIC NOTE    HISTORY OF PRESENT ILLNESS:  I saw Mr. Wade for evaluation of recurrent atrial fibrillation.  He is a 76-year-old white male who was hospitalized for new-onset atrial fibrillation in 12/2021.  He did not feel palpitation but noticed progressive weight gain with shortness of breath before the admission.  The patient was treated for congestive heart failure.  He had a cardioversion on 01/11 but reverted back to atrial fibrillation afterwards.  The Holter showed slightly fast ventricular rate.  He is currently taking Lasix 20 mg once a day and metoprolol for rate control.  The patient has been on Eliquis since the initial diagnosis of atrial fibrillation.  At the present time, he has no apparent shortness of breath, fatigue or palpitation.    PAST MEDICAL HISTORY:  Remarkable for hard of hearing, hypertension.    His echocardiography reported a large left atrium with normal LV ejection fraction.    PHYSICAL EXAMINATION:    VITAL SIGNS:  Blood pressure was 112/78, heart rate 65 beats per minute, body weight 157 pounds.  HEENT:  The eyes and ENT were unremarkable.  LUNGS:  No crackles or wheezing.  CARDIOVASCULAR:  Rhythm was irregularly irregular.  Heart sounds were normal without murmur.  ABDOMINAL EXAMINATION:  Showed no hepatomegaly.  EXTREMITIES:  There was no pedal edema.    EKG today showed atrial fibrillation with heart rate 87 beats per minute and incomplete right bundle branch block with a QRS duration of 120 milliseconds.    ASSESSMENT AND RECOMMENDATIONS:  Mr. Wade is a 76-year-old white male with a history of hypertension.  He has recurrent persistent atrial fibrillation.  When he initially presented, he had congestive heart failure.  After addition of metoprolol, he seems to be doing reasonably well at the present time.  Because he has incomplete right bundle branch block, flecainide may not be an appropriate choice.  Based on that, I do not recommend repeat  cardioversion.  Tentatively, I have planned for rate control and anticoagulation.  I added Cardizem 120 mg p.o. daily for better rate control.  He will have a repeat 24-hour Holter monitor in 1 week.  I will see him for followup in about 1 month.  The patient may not need long-term diuretics if he is able to maintain heart rate control without fluid retention.    The echocardiography recently reported left-sided pleural effusion.  I therefore ordered a chest x-ray to reevaluate his pleural effusion.    Torin Montenegro MD     cc:  [place] [address]    Torin Montenegro MD        D: 2022   T: 2022   MT: orlando    Name:     ANALILIA ZAVALA  MRN:      [id number]        Account:      [id number]   :      1945           Service Date: 2022       Document: [id number]

## 2022-02-28 ENCOUNTER — TELEPHONE (OUTPATIENT)
Dept: CARDIOLOGY | Facility: CLINIC | Age: 77
End: 2022-02-28
Payer: COMMERCIAL

## 2022-02-28 DIAGNOSIS — I48.91 ATRIAL FIBRILLATION WITH RAPID VENTRICULAR RESPONSE (H): ICD-10-CM

## 2022-02-28 RX ORDER — DILTIAZEM HYDROCHLORIDE 120 MG/1
120 CAPSULE, COATED, EXTENDED RELEASE ORAL DAILY
Qty: 90 CAPSULE | Refills: 3 | Status: SHIPPED | OUTPATIENT
Start: 2022-02-28

## 2022-02-28 RX ORDER — DILTIAZEM HYDROCHLORIDE 120 MG/1
120 CAPSULE, COATED, EXTENDED RELEASE ORAL DAILY
Qty: 30 CAPSULE | Refills: 0 | Status: SHIPPED | OUTPATIENT
Start: 2022-02-28 | End: 2022-02-28

## 2022-02-28 NOTE — TELEPHONE ENCOUNTER
Spoke to pt who needs the Diltiazem filled, so that he can start the medication.  [facility] Discussed that because it is new, they may need that OV that explains why pt is to start and that most recent [facility]. In the meantime escript has been sent to [facility] to get started.  Mio

## 2022-02-28 NOTE — TELEPHONE ENCOUNTER
Health Call Center    Phone Message    May a detailed message be left on voicemail: yes     Reason for Call: Medication Question or concern regarding medication   Prescription Clarification  Name of Medication: diltiazem ER COATED BEADS (CARDIZEM CD) 120 MG 24 hr capsule  Prescribing Provider: Dr. Montenegro   Pharmacy: [id number] [place], [place] - [address]    What on the order needs clarification? Amadou calling to request that the medication prescription be sent to [facility] as he has not gotten it from them as of yet.  Please call him back to discuss          Action Taken: Message routed to:  Other: Cardiology    Travel Screening: Not Applicable

## 2022-03-01 ENCOUNTER — DOCUMENTATION ONLY (OUTPATIENT)
Dept: LAB | Facility: CLINIC | Age: 77
End: 2022-03-01
Payer: COMMERCIAL

## 2022-03-01 DIAGNOSIS — Z00.00 ROUTINE GENERAL MEDICAL EXAMINATION AT A HEALTH CARE FACILITY: Primary | ICD-10-CM

## 2022-03-01 DIAGNOSIS — I48.0 PAROXYSMAL ATRIAL FIBRILLATION (H): ICD-10-CM

## 2022-03-01 DIAGNOSIS — I10 HYPERTENSION GOAL BP (BLOOD PRESSURE) < 140/90: ICD-10-CM

## 2022-03-01 DIAGNOSIS — Z12.5 SCREENING FOR PROSTATE CANCER: ICD-10-CM

## 2022-03-01 DIAGNOSIS — I50.20 HFREF (HEART FAILURE WITH REDUCED EJECTION FRACTION) (H): ICD-10-CM

## 2022-03-01 DIAGNOSIS — Z51.81 MEDICATION MONITORING ENCOUNTER: ICD-10-CM

## 2022-03-01 DIAGNOSIS — E78.5 HYPERLIPIDEMIA LDL GOAL <130: ICD-10-CM

## 2022-03-01 DIAGNOSIS — Z12.11 SCREEN FOR COLON CANCER: ICD-10-CM

## 2022-03-01 NOTE — PROGRESS NOTES
Amadou Wade has an upcoming lab appointment:    Future Appointments   Date Time Provider Department Center   3/4/2022  9:00 AM [facility] HOLTER PROC RM [facility]   3/7/2022  8:15 AM RV LAB RVLABR RV   3/7/2022  8:30 AM Johnathan Sampson MD RVFP RV   3/17/2022  1:15 PM Torin Montenegro MD [facility] UMP PSA CLIN   4/19/2022  1:00 PM Leidy Montes De Oca PA-C [facility]     Patient is scheduled for the following lab(s): PT HAS LAB VISIT FOR FASTING LABS ON 03/07, NEED ORDERS    There is no order available. Please review and place either future orders or HMPO (Review of Health Maintenance Protocol Orders), as appropriate.    Health Maintenance Due   Topic     LIPID      MICROALBUMIN      PSA      ANNUAL REVIEW OF HM ORDERS      Domitila Fischer

## 2022-03-01 NOTE — TELEPHONE ENCOUNTER
03/01/22 Recd VM  from [facility] ( ph [phone]) . She stated she di receive rx for Diltiazem, Eliquis, Lasix and Metoprolol that was sent. They are very behind in processing these but she wanted to let us know they were received.  Called pt to let him know. He stated he was able to  Diltiazem rx that was sent to [facility] yesterday and started it t last kait.  He will await other refills from ALINE Ramirez 310 pm

## 2022-03-04 ENCOUNTER — HOSPITAL ENCOUNTER (OUTPATIENT)
Dept: CARDIOLOGY | Facility: CLINIC | Age: 77
Discharge: HOME | End: 2022-03-04
Attending: INTERNAL MEDICINE | Admitting: INTERNAL MEDICINE
Payer: COMMERCIAL

## 2022-03-04 DIAGNOSIS — I48.91 ATRIAL FIBRILLATION WITH RAPID VENTRICULAR RESPONSE (H): ICD-10-CM

## 2022-03-04 PROCEDURE — 93227 XTRNL ECG REC<48 HR R&I: CPT | Performed by: INTERNAL MEDICINE

## 2022-03-04 PROCEDURE — 93226 XTRNL ECG REC<48 HR SCAN A/R: CPT

## 2022-03-07 ENCOUNTER — OFFICE VISIT (OUTPATIENT)
Dept: FAMILY MEDICINE | Facility: CLINIC | Age: 77
End: 2022-03-07
Payer: COMMERCIAL

## 2022-03-07 VITALS
RESPIRATION RATE: 16 BRPM | HEIGHT: 70 IN | TEMPERATURE: 97.4 F | HEART RATE: 62 BPM | WEIGHT: 158 LBS | SYSTOLIC BLOOD PRESSURE: 112 MMHG | DIASTOLIC BLOOD PRESSURE: 68 MMHG | BODY MASS INDEX: 22.62 KG/M2 | OXYGEN SATURATION: 98 %

## 2022-03-07 DIAGNOSIS — Z00.00 ROUTINE GENERAL MEDICAL EXAMINATION AT A HEALTH CARE FACILITY: Primary | ICD-10-CM

## 2022-03-07 DIAGNOSIS — I48.0 PAROXYSMAL ATRIAL FIBRILLATION (H): ICD-10-CM

## 2022-03-07 DIAGNOSIS — E78.5 HYPERLIPIDEMIA LDL GOAL <130: ICD-10-CM

## 2022-03-07 DIAGNOSIS — Z85.828 HISTORY OF SKIN CANCER: ICD-10-CM

## 2022-03-07 DIAGNOSIS — I50.20 HFREF (HEART FAILURE WITH REDUCED EJECTION FRACTION) (H): ICD-10-CM

## 2022-03-07 DIAGNOSIS — Z12.5 SCREENING FOR PROSTATE CANCER: ICD-10-CM

## 2022-03-07 DIAGNOSIS — Z51.81 MEDICATION MONITORING ENCOUNTER: ICD-10-CM

## 2022-03-07 DIAGNOSIS — I10 HYPERTENSION GOAL BP (BLOOD PRESSURE) < 140/90: ICD-10-CM

## 2022-03-07 DIAGNOSIS — Z12.11 SCREEN FOR COLON CANCER: ICD-10-CM

## 2022-03-07 LAB
ALBUMIN SERPL-MCNC: 3.2 G/DL (ref 3.4–5)
ALBUMIN UR-MCNC: NEGATIVE MG/DL
ALP SERPL-CCNC: 111 U/L (ref 40–150)
ALT SERPL W P-5'-P-CCNC: 33 U/L (ref 0–70)
ANION GAP SERPL CALCULATED.3IONS-SCNC: 7 MMOL/L (ref 3–14)
APPEARANCE UR: CLEAR
AST SERPL W P-5'-P-CCNC: 22 U/L (ref 0–45)
BILIRUB SERPL-MCNC: 1.3 MG/DL (ref 0.2–1.3)
BILIRUB UR QL STRIP: NEGATIVE
BUN SERPL-MCNC: 16 MG/DL (ref 7–30)
CALCIUM SERPL-MCNC: 8.9 MG/DL (ref 8.5–10.1)
CHLORIDE BLD-SCNC: 107 MMOL/L (ref 94–109)
CHOLEST SERPL-MCNC: 157 MG/DL
CK SERPL-CCNC: 56 U/L (ref 30–300)
CO2 SERPL-SCNC: 28 MMOL/L (ref 20–32)
COLOR UR AUTO: YELLOW
CREAT SERPL-MCNC: 1.02 MG/DL (ref 0.66–1.25)
CREAT UR-MCNC: 62 MG/DL
ERYTHROCYTE [DISTWIDTH] IN BLOOD BY AUTOMATED COUNT: 15 % (ref 10–15)
FASTING STATUS PATIENT QL REPORTED: YES
GFR SERPL CREATININE-BSD FRML MDRD: 77 ML/MIN/1.73M2
GLUCOSE BLD-MCNC: 87 MG/DL (ref 70–99)
GLUCOSE UR STRIP-MCNC: NEGATIVE MG/DL
HCT VFR BLD AUTO: 52 % (ref 40–53)
HDLC SERPL-MCNC: 51 MG/DL
HGB BLD-MCNC: 16.8 G/DL (ref 13.3–17.7)
HGB UR QL STRIP: ABNORMAL
KETONES UR STRIP-MCNC: NEGATIVE MG/DL
LDLC SERPL CALC-MCNC: 89 MG/DL
LEUKOCYTE ESTERASE UR QL STRIP: NEGATIVE
MCH RBC QN AUTO: 30.1 PG (ref 26.5–33)
MCHC RBC AUTO-ENTMCNC: 32.3 G/DL (ref 31.5–36.5)
MCV RBC AUTO: 93 FL (ref 78–100)
MICROALBUMIN UR-MCNC: 29 MG/L
MICROALBUMIN/CREAT UR: 46.77 MG/G CR (ref 0–17)
NITRATE UR QL: NEGATIVE
NONHDLC SERPL-MCNC: 106 MG/DL
NT-PROBNP SERPL-MCNC: 2025 PG/ML (ref 0–450)
PH UR STRIP: 5 [PH] (ref 5–7)
PLATELET # BLD AUTO: 139 10E3/UL (ref 150–450)
POTASSIUM BLD-SCNC: 4.2 MMOL/L (ref 3.4–5.3)
PROT SERPL-MCNC: 6.9 G/DL (ref 6.8–8.8)
PSA SERPL-MCNC: 3.18 UG/L (ref 0–4)
RBC # BLD AUTO: 5.58 10E6/UL (ref 4.4–5.9)
SODIUM SERPL-SCNC: 142 MMOL/L (ref 133–144)
SP GR UR STRIP: 1.02 (ref 1–1.03)
TRIGL SERPL-MCNC: 85 MG/DL
TSH SERPL DL<=0.005 MIU/L-ACNC: 1.94 MU/L (ref 0.4–4)
UROBILINOGEN UR STRIP-ACNC: 0.2 E.U./DL
WBC # BLD AUTO: 4.4 10E3/UL (ref 4–11)

## 2022-03-07 PROCEDURE — 82043 UR ALBUMIN QUANTITATIVE: CPT | Performed by: FAMILY MEDICINE

## 2022-03-07 PROCEDURE — 85027 COMPLETE CBC AUTOMATED: CPT | Performed by: FAMILY MEDICINE

## 2022-03-07 PROCEDURE — 84443 ASSAY THYROID STIM HORMONE: CPT | Performed by: FAMILY MEDICINE

## 2022-03-07 PROCEDURE — 99397 PER PM REEVAL EST PAT 65+ YR: CPT | Performed by: FAMILY MEDICINE

## 2022-03-07 PROCEDURE — 36415 COLL VENOUS BLD VENIPUNCTURE: CPT | Performed by: FAMILY MEDICINE

## 2022-03-07 PROCEDURE — G0103 PSA SCREENING: HCPCS | Performed by: FAMILY MEDICINE

## 2022-03-07 PROCEDURE — 82550 ASSAY OF CK (CPK): CPT | Performed by: FAMILY MEDICINE

## 2022-03-07 PROCEDURE — 83880 ASSAY OF NATRIURETIC PEPTIDE: CPT | Performed by: FAMILY MEDICINE

## 2022-03-07 PROCEDURE — 80053 COMPREHEN METABOLIC PANEL: CPT | Performed by: FAMILY MEDICINE

## 2022-03-07 PROCEDURE — 80061 LIPID PANEL: CPT | Performed by: FAMILY MEDICINE

## 2022-03-07 PROCEDURE — 81003 URINALYSIS AUTO W/O SCOPE: CPT | Performed by: FAMILY MEDICINE

## 2022-03-07 ASSESSMENT — ENCOUNTER SYMPTOMS
ARTHRALGIAS: 0
DIARRHEA: 0
PALPITATIONS: 0
HEADACHES: 0
COUGH: 0
SORE THROAT: 0
HEMATURIA: 0
EYE PAIN: 0
WEAKNESS: 0
SHORTNESS OF BREATH: 0
FREQUENCY: 0
FEVER: 0
JOINT SWELLING: 0
NERVOUS/ANXIOUS: 0
HEARTBURN: 0
MYALGIAS: 0
DIZZINESS: 0
PARESTHESIAS: 0
CONSTIPATION: 0
NAUSEA: 0
DYSURIA: 0
ABDOMINAL PAIN: 0
HEMATOCHEZIA: 0
CHILLS: 0

## 2022-03-07 ASSESSMENT — ACTIVITIES OF DAILY LIVING (ADL): CURRENT_FUNCTION: NO ASSISTANCE NEEDED

## 2022-03-07 NOTE — PROGRESS NOTES
"[facility]    Amadou Wade is a 77 year old male who presents for Preventive Visit.    Patient has been advised of split billing requirements and indicates understanding: Yes     Are you in the first 12 months of your Medicare coverage?  No    Healthy Habits:     In general, how would you rate your overall health?  Good    Frequency of exercise:  None    Do you usually eat at least 4 servings of fruit and vegetables a day, include whole grains    & fiber and avoid regularly eating high fat or \"junk\" foods?  Yes    Taking medications regularly:  Yes    Medication side effects:  None    Ability to successfully perform activities of daily living:  No assistance needed    Home Safety:  No safety concerns identified    Hearing Impairment:  Need to ask people to speak up or repeat themselves and difficulty understanding soft or whispered speech    In the past 6 months, have you been bothered by leaking of urine? Yes    In general, how would you rate your overall mental or emotional health?  Good      PHQ-2 Total Score: 0    Additional concerns today:  No    Do you feel safe in your environment? Yes    Have you ever done Advance Care Planning? (For example, a Health Directive, POLST, or a discussion with a medical provider or your loved ones about your wishes): Yes, patient states has an Advance Care Planning document and will bring a copy to the clinic.     Fall risk  Fallen 2 or more times in the past year?: No  Any fall with injury in the past year?: No    Cognitive Screening   1) Repeat 3 items (Leader, Season, Table)    2) Clock draw: NORMAL  3) 3 item recall: Recalls 3 objects  Results: 3 items recalled: COGNITIVE IMPAIRMENT LESS LIKELY    Mini-CogTM Copyright JOSE Rucker. Licensed by the author for use in [facility]; reprinted with permission (mike@.[facility]). All rights reserved.      Do you have sleep apnea, excessive snoring or daytime drowsiness?: yes    Reviewed and " updated as needed this visit by clinical staff   Tobacco  Allergies  Meds  Problems  Med Hx  Surg Hx  Fam Hx          Reviewed and updated as needed this visit by Provider                 Social History     Tobacco Use     Smoking status: Never Smoker     Smokeless tobacco: Never Used   Substance Use Topics     Alcohol use: Yes     Alcohol/week: 0.0 - 0.8 standard drinks     Comment: 0-1 per week avg     If you drink alcohol do you typically have >3 drinks per day or >7 drinks per week? No    Alcohol Use 3/7/2022   Prescreen: >3 drinks/day or >7 drinks/week? No   Prescreen: >3 drinks/day or >7 drinks/week? -   No flowsheet data found.      Current providers sharing in care for this patient include:   Patient Care Team:  Johnathan Sampson MD as PCP - General  Johnathan Sampson MD as Assigned PCP  Leidy Montes De Oca PA-C as Assigned OBGYN Provider  Darlyn, Andre Partida MD as Assigned Surgical Provider  Aleksandra Mclain PA-C as Assigned Heart and Vascular Provider    The following health maintenance items are reviewed in Epic and correct as of today:  Health Maintenance Due   Topic Date Due     HF ACTION PLAN  Never done     LIPID  03/01/2022     MICROALBUMIN  03/01/2022     FALL RISK ASSESSMENT  03/01/2022     PSA  03/01/2022     COLORECTAL CANCER SCREENING  03/10/2022     CHF - HFrEF - Dr Cordova/Dr Montenegro - 11/2021    A fib - chronic ? - doesn't know if/when in/out - cardioversion - Dr Montenegro - awaiting further consult, later this week    Htn    BP Readings from Last 3 Encounters:   03/07/22 112/68   02/16/22 112/78   02/01/22 120/72     Lipids    Recent Labs   Lab Test 03/01/21  0950 02/24/20  0803 09/15/16  0827 06/16/15  0919 04/16/14  0907   CHOL 172 165   < > 145 147   HDL 58 51   < > 50 43   * 100*   < > 84 90   TRIG 60 69   < > 53 74   CHOLHDLRATIO  --   --   --  2.9 3.4    < > = values in this interval not displayed.     Hx of skin cancer - Dr Santizo    Review of Systems   Constitutional: Negative for chills and fever.    HENT: Negative for congestion, ear pain, hearing loss and sore throat.    Eyes: Negative for pain and visual disturbance.   Respiratory: Negative for cough and shortness of breath.    Cardiovascular: Negative for chest pain, palpitations and peripheral edema.   Gastrointestinal: Negative for abdominal pain, constipation, diarrhea, heartburn, hematochezia and nausea.   Genitourinary: Positive for impotence, penile discharge and urgency. Negative for dysuria, frequency, genital sores and hematuria.   Musculoskeletal: Negative for arthralgias, joint swelling and myalgias.   Skin: Negative for rash.   Neurological: Negative for dizziness, weakness, headaches and paresthesias.   Psychiatric/Behavioral: Negative for mood changes. The patient is not nervous/anxious.      Health Maintenance     Colonoscopy:  Last 9/2021   FIT:  given              PSA:  pending   DEXA:  NA    Health Maintenance Due   Topic Date Due     HF ACTION PLAN  Never done     LIPID  03/01/2022     MICROALBUMIN  03/01/2022     FALL RISK ASSESSMENT  03/01/2022     PSA  03/01/2022     COLORECTAL CANCER SCREENING  03/10/2022       Current Problem List    Patient Active Problem List   Diagnosis     Cardiac dysrhythmia     Hyperlipidemia LDL goal <130     Advanced directives, counseling/discussion     ED (erectile dysfunction)     Hypertension goal BP (blood pressure) < 140/90     Recurrent cold sores     Squamous 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     Atrial fibrillation with rapid ventricular response (H)     Atrial fibrillation (H)     CHF (congestive heart failure) (H)     HFrEF (heart failure with reduced ejection fraction) (H)       Past Medical History    Past Medical History:   Diagnosis Date     CHF (congestive heart failure) (H) 12/2021    HFrEF - Dr Cordova     Chronic rhinitis     resolved     [facility]     HFrEF (heart failure with reduced ejection fraction) (H) 12/2021    Dr Cordova     Hypertension goal BP (blood pressure) < 140/90  12/2017     Persistent atrial fibrillation (H) 12/2021    in/out, rate controlled, at times     Pneumonia, organism unspecified(486) 1993     Snoring     no sleep apnea - mouth guard     Squamous 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 2021    Dr Abernathy/Darlyn - Rt Chest     Unspecified hemorrhoids without mention of complication 04/2006    internal       Past Surgical History    Past Surgical History:   Procedure Laterality Date     ANESTHESIA CARDIOVERSION N/A 1/11/2022    Procedure: ANESTHESIA, FOR CARDIOVERSION;  Surgeon: GENERIC ANESTHESIA PROVIDER;  Location: [facility] OR     COLONOSCOPY  01/03/2012    incomplete - negative, ACBE incomplete - negative, CT colography negative     COLONOSCOPY Bilateral 09/14/2021     ganglion cyst removal Left 09/02/2021     HC REMOVE TONSILS/ADENOIDS,<11 Y/O  1953    T & A <14y.o.     SIGMOIDOSCOPY,DIAGNOSTIC  1997    normal     STRESS ECHO (METRO)  03/2007    normal few pac's, few pvc's     SURGICAL HISTORY OF -  Bilateral     fall, 2020 - bilateral IOL     ZZHC COLONOSCOPY THRU STOMA, DIAGNOSTIC  05/2006    dr tejeda - normal - internal hemorrhiods - due 10 yrs       Current Medications    Current Outpatient Medications   Medication Sig Dispense Refill     ACE/ARB/ARNI NOT PRESCRIBED (INTENTIONAL) Please choose reason not prescribed from choices below.       apixaban ANTICOAGULANT (ELIQUIS) 5 MG tablet Take 1 tablet (5 mg) by mouth 2 times daily 180 tablet 3     diltiazem ER COATED BEADS (CARDIZEM CD) 120 MG 24 hr capsule Take 1 capsule (120 mg) by mouth daily 90 capsule 3     furosemide (LASIX) 20 MG tablet Take 1 tablet (20 mg) by mouth daily Additional 20 mg as needed for weight gain 60 tablet 5     metoprolol tartrate (LOPRESSOR) 50 MG tablet Take 1.5 tablets (75 mg) by mouth 2 times daily 270 tablet 3     rosuvastatin (CRESTOR) 10 MG tablet Take 1 tablet (10 mg) by mouth At Bedtime 90 tablet 0     valACYclovir (VALTREX) 1000 mg tablet daily as needed          Allergies    No  Known Allergies    Immunizations    Immunization History   Administered Date(s) Administered     COVID-19,PF,Pfizer (12+ Yrs) 2021, 2021, 2021, 2021     FLU 6-35 months 2018, 10/09/2019     Flu, Unspecified 2007, 10/28/2008, 10/02/2009, 10/20/2010, 10/18/2011, 10/25/2012, 10/24/2013, 10/15/2014, 10/09/2019, 2020     Influenza (High Dose) 3 valent vaccine 10/29/2015, 10/27/2016, 10/26/2017, 2021     Influenza (IIV3) PF 2004, 10/20/2011, 10/30/2012, 10/15/2015     Influenza Vaccine IM > 6 months Valent IIV4 (Alfuria,Fluzone) 10/15/2017, 2018, 2021     Influenza, Quad, High Dose, Pf, 65yr+ (Fluzone HD) 2020     MMR 10/15/2004     Pneumo Conj 13-V (2010&after) 2014, 2015     Pneumococcal 23 valent 2006, 2012     Pneumococcal, Unspecified 2012     Poliovirus, inactivated (IPV) 10/15/2004     TD (ADULT, 7+) 2001     TDAP Vaccine (Adacel) 2014, 2019     TDAP Vaccine (Boostrix) 10/29/2009     Twinrix A/B 10/15/2004, 2004, 2006     Typhoid IM 10/15/2004, 10/14/2016     Typhoid Oral 10/29/2009     Zoster vaccine recombinant adjuvanted (SHINGRIX) 2020, 2020     Zoster vaccine, live 2011       Family History    Family History   Problem Relation Age of Onset     Hypertension Mother      Cancer Father         lung     Eye Disorder Father         Glaucoma     Alzheimer Disease Father      Heart Disease Father      Diabetes Father         type 2     Diabetes Brother         type 2     Coronary Artery Disease Brother      Cerebrovascular Disease Maternal Grandmother      Heart Disease Maternal Grandfather          young heartattack     Diabetes Paternal Grandfather      Cancer Brother         melanoma     Alzheimer Disease Brother      Prostate Cancer Brother      Cerebrovascular Disease Brother        Social History    Social History     Socioeconomic History     Marital status:       Spouse name: Dania     Number of children: 3     Years of education: 19     Highest education level: Not on file   Occupational History     Occupation:       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   Tobacco Use     Smoking status: Never Smoker     Smokeless tobacco: Never Used   Vaping Use     Vaping Use: Never used   Substance and Sexual Activity     Alcohol use: Yes     Alcohol/week: 0.0 - 0.8 standard drinks     Comment: 0-1 per week avg     Drug use: No     Sexual activity: Yes     Partners: Female   Other Topics Concern      Service Not Asked     Blood Transfusions Not Asked     Caffeine Concern Yes     Comment: occas     Occupational Exposure Not Asked     Hobby Hazards Not Asked     Sleep Concern Not Asked     Stress Concern Not Asked     Weight Concern Not Asked     Special Diet Not Asked     Back Care Not Asked     Exercise Yes     Comment: active     Bike Helmet Not Asked     Seat Belt Yes     Self-Exams Not Asked     Parent/sibling w/ CABG, MI or angioplasty before 65F 55M? No   Social History Narrative     Not on file     Social Determinants of Health     Financial Resource Strain: Not on file   Food Insecurity: Not on file   Transportation Needs: Not on file   Physical Activity: Not on file   Stress: Not on file   Social Connections: Not on file   Intimate Partner Violence: Not on file   Housing Stability: Not on file       ROS    CONSTITUTIONAL: NEGATIVE for fever, chills, change in weight  INTEGUMENTARY/SKIN: NEGATIVE for worrisome rashes, moles or lesions  EYES: NEGATIVE for vision changes or irritation  ENT/MOUTH: NEGATIVE for ear, mouth and throat problems  RESP: NEGATIVE for significant cough or SOB  CV: NEGATIVE for chest pain, palpitations or peripheral edema  GI: NEGATIVE for nausea, abdominal pain, heartburn, or change in bowel habits  : NEGATIVE for frequency, dysuria, or hematuria  MUSCULOSKELETAL: NEGATIVE for significant arthralgias or myalgia  NEURO: NEGATIVE for  "weakness, dizziness or paresthesias  ENDOCRINE: NEGATIVE for temperature intolerance, skin/hair changes  HEME: NEGATIVE for bleeding problems  PSYCHIATRIC: NEGATIVE for changes in mood or affect    OBJECTIVE    /68   Pulse 62   Temp 97.4  F (36.3  C) (Tympanic)   Resp 16   Ht 1.778 m (5' 10\")   Wt 71.7 kg (158 lb)   SpO2 98%   BMI 22.67 kg/m      EXAM:    GENERAL: healthy, alert and no distress  EYES: Eyes grossly normal to inspection, PERRL and conjunctivae and sclerae normal  HENT: ear canals and TM's normal, nose and mouth without ulcers or lesions  NECK: no adenopathy, no asymmetry, masses, or scars and thyroid normal to palpation  RESP: lungs clear to auscultation - no rales, rhonchi or wheezes  CV: irregularly irregular rate and rhythm, normal S1 S2, no S3 or S4, no murmur, click or rub, no peripheral edema and peripheral pulses strong  ABDOMEN: soft, nontender, no hepatosplenomegaly, no masses and bowel sounds normal   (male): testicles normal without atrophy or masses, no hernias, penis normal without urethral discharge and rt epididymal cyst - Dr Anderson  RECTAL: normal sphincter tone, no rectal masses, prostate smooth, nontender without masses/nodules and prostate 1+ enlarged, nontender  MS: no gross musculoskeletal defects noted, no edema  SKIN: no suspicious lesions or rashes  NEURO: Normal strength and tone, mentation intact and speech normal  PSYCH: mentation appears normal, affect normal/bright  LYMPH: no cervical, supraclavicular, axillary, or inguinal adenopathy    DIAGNOSTICS/PROCEDURES    Pending    ASSESSMENT      ICD-10-CM    1. Routine general medical examination at a health care facility  Z00.00 BNP-N terminal pro     Fecal colorectal cancer screen FIT     Prostate Specific Antigen Screen     TSH with free T4 reflex     Albumin Random Urine Quantitative with Creat Ratio     UA reflex to Microscopic and Culture     CK total     CBC with platelets     Lipid panel reflex to direct " LDL Fasting     Comprehensive metabolic panel     CANCELED: Urine Microscopic   2. HFrEF (heart failure with reduced ejection fraction) (H)  I50.20 BNP-N terminal pro     Albumin Random Urine Quantitative with Creat Ratio     UA reflex to Microscopic and Culture     Lipid panel reflex to direct LDL Fasting     Comprehensive metabolic panel     ACE/ARB/ARNI NOT PRESCRIBED (INTENTIONAL)     CANCELED: Urine Microscopic   3. Paroxysmal atrial fibrillation (H)  I48.0 TSH with free T4 reflex     Albumin Random Urine Quantitative with Creat Ratio     UA reflex to Microscopic and Culture     CBC with platelets     Comprehensive metabolic panel     ACE/ARB/ARNI NOT PRESCRIBED (INTENTIONAL)     CANCELED: Urine Microscopic   4. Hypertension goal BP (blood pressure) < 140/90  I10 Albumin Random Urine Quantitative with Creat Ratio     UA reflex to Microscopic and Culture     Comprehensive metabolic panel     ACE/ARB/ARNI NOT PRESCRIBED (INTENTIONAL)     CANCELED: Urine Microscopic   5. Hyperlipidemia LDL goal <130  E78.5 CK total     Lipid panel reflex to direct LDL Fasting     Comprehensive metabolic panel   6. History of skin cancer  Z85.828    7. Screen for colon cancer  Z12.11 Fecal colorectal cancer screen FIT   8. Screening for prostate cancer  Z12.5 Prostate Specific Antigen Screen   9. Medication monitoring encounter  Z51.81 BNP-N terminal pro     TSH with free T4 reflex     Albumin Random Urine Quantitative with Creat Ratio     UA reflex to Microscopic and Culture     CK total     CBC with platelets     Lipid panel reflex to direct LDL Fasting     Comprehensive metabolic panel     CANCELED: Urine Microscopic       PLAN    Discussed treatment/modality options, including risk and benefits, he desires:    advised alcohol consumption 1oz per day or less, advised 1 multivitamin per day, advised calcium 6821-7299 mg/d and Vitamin D 800-1200 IU/d, advised dentist every 6 months, advised diet and exercise, advised  "opthalmologist every 1-2 years, advised self testicular exam q month, further health care maintenance, further lab(s), immunization(s) reviewed and [facility]    Discussed controversies surrounding PSA. Specifically reviewed 2017 USPSTF findings recommending discussion of PSA testing for men ages 55-69.  Reviewed findings of the  Randomized Study of Screening for Prostate Cancer which showed a 30% reduction in advanced stage prostate cancer and a 20% reduction in death rate from prostate cancer in this age group. PSA-based screening may prevent up to 2 deaths and up to 3 cases of metastatic disease per 1,000 men screened over 13 years.    We've elected to do PSA this year after discussing these controversies.    All diagnosis above reviewed and noted above, otherwise stable.      See Twitter orders for further details.      1) [facility]    2) labs pending    3) immunizations reviewed    4) Urology - Dr Anderson    5) Cardiology - Dr Cordova/Moni    6) Dermatology - Dr Santizo    Return in about 6 months (around 9/7/2022) for Medication Recheck Visit, Follow Up Chronic.    Health Maintenance Due   Topic Date Due     HF ACTION PLAN  Never done     LIPID  03/01/2022     MICROALBUMIN  03/01/2022     FALL RISK ASSESSMENT  03/01/2022     PSA  03/01/2022     COLORECTAL CANCER SCREENING  03/10/2022       COUNSELING    Reviewed preventive health counseling, as reflected in patient instructions    BP Readings from Last 1 Encounters:   03/07/22 112/68     Estimated body mass index is 22.67 kg/m  as calculated from the following:    Height as of this encounter: 1.778 m (5' 10\").    Weight as of this encounter: 71.7 kg (158 lb).           reports that he has never smoked. He has never used smokeless tobacco.       Appropriate preventive services were discussed with this patient, including applicable screening as appropriate for cardiovascular disease, diabetes, osteopenia/osteoporosis, and glaucoma.  " As appropriate for age/gender, discussed screening for colorectal cancer, prostate cancer, breast cancer, and cervical cancer. Checklist reviewing preventive services available has been given to the patient.    Reviewed patients plan of care and provided an AVS. The Complex Care Plan (for patients with higher acuity and needing more deliberate coordination of services) for Amadou meets the Care Plan requirement. This Care Plan has been established and reviewed with the Patient.    Counseling Resources:    ATP IV Guidelines  Pooled Cohorts Equation Calculator  FRAX Risk Assessment  ICSI Preventive Guidelines  Dietary Guidelines for Americans, 2010  USDA's MyPlate  ASA Prophylaxis  Lung CA Screening           Johnathan Sampson MD, FAAFP     [facility]  [address] [place] [id number]  [email or web address]   Office: [phone]  Fax: [phone]  Pager: [phone]       Identified Health Risks:

## 2022-03-07 NOTE — LETTER
March 9, 2022      Amadou Wade  [address] [address] [facility] [address]        Dear ,    We are writing to inform you of your test results.    They showed:     Labs are overall quite good     Less signs of heart failure   Mild protein in your urine   Low platelets     We advise:   Continue current cares.   Balanced low cholesterol diet.   Regular exercise.   Close follow up   For additional lab test information, [email or web address] is an excellent reference.     Let us know if you have any questions or concerns.       Resulted Orders   BNP-N terminal pro   Result Value Ref Range    N Terminal Pro BNP Outpatient 2,025 (H) 0 - 450 pg/mL      Comment:      Reference range shown and results flagged as abnormal are for the outpatient, non acute settings. Establishing a baseline value for each individual patient is useful for follow-up.    Suggested inpatient cut points for confirming diagnosis of CHF in an acute setting are:  >450 pg/mL (age 18 to less than 50)  >900 pg/mL (age 50 to less than 75)  >1800 pg/mL (75 yrs and older)    An inpatient or emergency department NT-proPBNP <300 pg/mL effectively rules out acute CHF, with 99% negative predictive value.       Prostate Specific Antigen Screen   Result Value Ref Range    Prostate Specific Antigen Screen 3.18 0.00 - 4.00 ug/L   TSH with free T4 reflex   Result Value Ref Range    TSH 1.94 0.40 - 4.00 mU/L   Albumin Random Urine Quantitative with Creat Ratio   Result Value Ref Range    Creatinine Urine mg/dL 62 mg/dL    Albumin Urine mg/L 29 mg/L    Albumin Urine mg/g Cr 46.77 (H) 0.00 - 17.00 mg/g Cr   UA reflex to Microscopic and Culture   Result Value Ref Range    Color Urine Yellow Colorless, Straw, Light Yellow, Yellow    Appearance Urine Clear Clear    Glucose Urine Negative Negative mg/dL    Bilirubin Urine Negative Negative    Ketones Urine Negative Negative mg/dL    Specific Gravity Urine 1.020 1.003 - 1.035    Blood Urine Trace (A) Negative    pH Urine  5.0 5.0 - 7.0    Protein Albumin Urine Negative Negative mg/dL    Urobilinogen Urine 0.2 0.2, 1.0 E.U./dL    Nitrite Urine Negative Negative    Leukocyte Esterase Urine Negative Negative   CK total   Result Value Ref Range    CK 56 30 - 300 U/L   CBC with platelets   Result Value Ref Range    WBC Count 4.4 4.0 - 11.0 10e3/uL    RBC Count 5.58 4.40 - 5.90 10e6/uL    Hemoglobin 16.8 13.3 - 17.7 g/dL    Hematocrit 52.0 40.0 - 53.0 %    MCV 93 78 - 100 fL    MCH 30.1 26.5 - 33.0 pg    MCHC 32.3 31.5 - 36.5 g/dL    RDW 15.0 10.0 - 15.0 %    Platelet Count 139 (L) 150 - 450 10e3/uL   Lipid panel reflex to direct LDL Fasting   Result Value Ref Range    Cholesterol 157 <200 mg/dL    Triglycerides 85 <150 mg/dL    Direct Measure HDL 51 >=40 mg/dL    LDL Cholesterol Calculated 89 <=100 mg/dL    Non HDL Cholesterol 106 <130 mg/dL    Patient Fasting > 8hrs? Yes     Narrative    Cholesterol  Desirable:  <200 mg/dL    Triglycerides  Normal:  Less than 150 mg/dL  Borderline High:  150-199 mg/dL  High:  200-499 mg/dL  Very High:  Greater than or equal to 500 mg/dL    Direct Measure HDL  Female:  Greater than or equal to 50 mg/dL   Male:  Greater than or equal to 40 mg/dL    LDL Cholesterol  Desirable:  <100mg/dL  Above Desirable:  100-129 mg/dL   Borderline High:  130-159 mg/dL   High:  160-189 mg/dL   Very High:  >= 190 mg/dL    Non HDL Cholesterol  Desirable:  130 mg/dL  Above Desirable:  130-159 mg/dL  Borderline High:  160-189 mg/dL  High:  190-219 mg/dL  Very High:  Greater than or equal to 220 mg/dL   Comprehensive metabolic panel   Result Value Ref Range    Sodium 142 133 - 144 mmol/L    Potassium 4.2 3.4 - 5.3 mmol/L    Chloride 107 94 - 109 mmol/L    Carbon Dioxide (CO2) 28 20 - 32 mmol/L    Anion Gap 7 3 - 14 mmol/L    Urea Nitrogen 16 7 - 30 mg/dL    Creatinine 1.02 0.66 - 1.25 mg/dL    Calcium 8.9 8.5 - 10.1 mg/dL    Glucose 87 70 - 99 mg/dL    Alkaline Phosphatase 111 40 - 150 U/L    AST 22 0 - 45 U/L    ALT 33 0 - 70  U/L    Protein Total 6.9 6.8 - 8.8 g/dL    Albumin 3.2 (L) 3.4 - 5.0 g/dL    Bilirubin Total 1.3 0.2 - 1.3 mg/dL    GFR Estimate 77 >60 mL/min/1.73m2      Comment:      Effective December 21, 2021 eGFRcr in adults is calculated using the 2021 CKD-EPI creatinine equation which includes age and gender (Seymour et al., NEJM, DOI: 10.1056/CLHTyf9253901)   If you have any questions or concerns, please call the clinic at the number listed above.     Sincerely,    Johnathan Sampson MD

## 2022-03-17 ENCOUNTER — TELEPHONE (OUTPATIENT)
Dept: CARDIOLOGY | Facility: CLINIC | Age: 77
End: 2022-03-17

## 2022-03-17 ENCOUNTER — OFFICE VISIT (OUTPATIENT)
Dept: CARDIOLOGY | Facility: CLINIC | Age: 77
End: 2022-03-17
Payer: COMMERCIAL

## 2022-03-17 VITALS
BODY MASS INDEX: 24.4 KG/M2 | HEART RATE: 76 BPM | SYSTOLIC BLOOD PRESSURE: 117 MMHG | WEIGHT: 161 LBS | HEIGHT: 68 IN | DIASTOLIC BLOOD PRESSURE: 84 MMHG

## 2022-03-17 DIAGNOSIS — I48.19 PERSISTENT ATRIAL FIBRILLATION (H): Primary | ICD-10-CM

## 2022-03-17 DIAGNOSIS — I48.91 ATRIAL FIBRILLATION WITH RAPID VENTRICULAR RESPONSE (H): ICD-10-CM

## 2022-03-17 PROCEDURE — 99214 OFFICE O/P EST MOD 30 MIN: CPT | Performed by: INTERNAL MEDICINE

## 2022-03-17 PROCEDURE — 93000 ELECTROCARDIOGRAM COMPLETE: CPT | Performed by: INTERNAL MEDICINE

## 2022-03-17 NOTE — TELEPHONE ENCOUNTER
Patient left message on EP nurse line in follow up to OV today.  He indicated he forgot to ask Dr Montenegro if it was ok to have caffeine.  He was told by Dr Cordova not to drink caffeinated beverages.  Patient currently has persistent AF with controlled rates.  Asked patient to call back to discuss further.  awaiting return call.  BONITA Alvarez

## 2022-03-17 NOTE — LETTER
3/17/2022    Johnathan Sampson MD  [address] [facility] [address]    RE: Amadou Wade       Dear Colleague,     I had the pleasure of seeing Amadou Wade in the [facility].  HPI and Plan:   See dictation        Orders Placed This Encounter   Procedures     Follow-Up with Cardiology- EP     EKG 12-lead complete w/read - Clinics (performed today)     EKG 12-lead complete w/read (Future)- to be scheduled       No orders of the defined types were placed in this encounter.      There are no discontinued medications.      Encounter Diagnoses   Name Primary?     Atrial fibrillation with rapid ventricular response (H)      Persistent atrial fibrillation (H) Yes       CURRENT MEDICATIONS:  Current Outpatient Medications   Medication Sig Dispense Refill     ACE/ARB/ARNI NOT PRESCRIBED (INTENTIONAL) Please choose reason not prescribed from choices below.       apixaban ANTICOAGULANT (ELIQUIS) 5 MG tablet Take 1 tablet (5 mg) by mouth 2 times daily 180 tablet 3     diltiazem ER COATED BEADS (CARDIZEM CD) 120 MG 24 hr capsule Take 1 capsule (120 mg) by mouth daily 90 capsule 3     furosemide (LASIX) 20 MG tablet Take 1 tablet (20 mg) by mouth daily Additional 20 mg as needed for weight gain 60 tablet 5     metoprolol tartrate (LOPRESSOR) 50 MG tablet Take 1.5 tablets (75 mg) by mouth 2 times daily 270 tablet 3     rosuvastatin (CRESTOR) 10 MG tablet Take 1 tablet (10 mg) by mouth At Bedtime 90 tablet 0     valACYclovir (VALTREX) 1000 mg tablet daily as needed          ALLERGIES   No Known Allergies    PAST MEDICAL HISTORY:  Past Medical History:   Diagnosis Date     CHF (congestive heart failure) (H) 12/2021    HFrEF - Dr Cordova     Chronic rhinitis     resolved     [facility]     [facility]FrEF (heart failure with reduced ejection fraction) (H) 12/2021    Dr Cordova     Hypertension goal BP (blood pressure) < 140/90 12/2017     Persistent atrial fibrillation (H) 12/2021    in/out, rate controlled, at  times     Pneumonia, organism unspecified(486)      Snoring     no sleep apnea - mouth guard     Squamous 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     Dr Abernathy/Darlyn - Rt Chest     Unspecified hemorrhoids without mention of complication 2006    internal       PAST SURGICAL HISTORY:  Past Surgical History:   Procedure Laterality Date     ANESTHESIA CARDIOVERSION N/A 2022    Procedure: ANESTHESIA, FOR CARDIOVERSION;  Surgeon: GENERIC ANESTHESIA PROVIDER;  Location: [facility] OR     COLONOSCOPY  2012    incomplete - negative, ACBE incomplete - negative, CT colography negative     COLONOSCOPY Bilateral 2021     ganglion cyst removal Left 2021     HC REMOVE TONSILS/ADENOIDS,<13 Y/O      T & A <14y.o.     SIGMOIDOSCOPY,DIAGNOSTIC      normal     STRESS ECHO (METRO)  2007    normal few pac's, few pvc's     SURGICAL HISTORY OF -  Bilateral     fall,  - bilateral IOL     ZZHC COLONOSCOPY THRU STOMA, DIAGNOSTIC  2006    dr tejeda - normal - internal hemorrhiods - due 10 yrs       FAMILY HISTORY:  Family History   Problem Relation Age of Onset     Hypertension Mother      Cancer Father         lung     Eye Disorder Father         Glaucoma     Alzheimer Disease Father      Heart Disease Father      Diabetes Father         type 2     Diabetes Brother         type 2     Coronary Artery Disease Brother      Cerebrovascular Disease Maternal Grandmother      Heart Disease Maternal Grandfather          young heartattack     Diabetes Paternal Grandfather      Cancer Brother         melanoma     Alzheimer Disease Brother      Prostate Cancer Brother      Cerebrovascular Disease Brother        SOCIAL HISTORY:  Social History     Socioeconomic History     Marital status:      Spouse name: Dania     Number of children: 3     Years of education: 19     Highest education level: None   Occupational History     Occupation:       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   Tobacco Use      "Smoking status: Never Smoker     Smokeless tobacco: Never Used   Vaping Use     Vaping Use: Never used   Substance and Sexual Activity     Alcohol use: Yes     Alcohol/week: 0.0 - 0.8 standard drinks     Comment: 0-1 per week avg     Drug use: No     Sexual activity: Yes     Partners: Female   Other Topics Concern      Service Not Asked     Blood Transfusions Not Asked     Caffeine Concern Yes     Comment: occas     Occupational Exposure Not Asked     Hobby Hazards Not Asked     Sleep Concern Not Asked     Stress Concern Not Asked     Weight Concern Not Asked     Special Diet Not Asked     Back Care Not Asked     Exercise Yes     Comment: active     Bike Helmet Not Asked     Seat Belt Yes     Self-Exams Not Asked     Parent/sibling w/ CABG, MI or angioplasty before 65F 55M? No   Social History Narrative     None     Social Determinants of Health     Financial Resource Strain: Not on file   Food Insecurity: Not on file   Transportation Needs: Not on file   Physical Activity: Not on file   Stress: Not on file   Social Connections: Not on file   Intimate Partner Violence: Not on file   Housing Stability: Not on file       Review of Systems:  Skin:  Negative       Eyes:  Negative      ENT:  Negative      Respiratory:  Negative       Cardiovascular:  Negative      Gastroenterology: Negative      Genitourinary:  Negative      Musculoskeletal:  Negative      Neurologic:  Negative      Psychiatric:  Negative      Heme/Lymph/Imm:  Negative      Endocrine:  Negative        Physical Exam:  Vitals: /84   Pulse 76   Ht 1.727 m (5' 8\")   Wt 73 kg (161 lb)   BMI 24.48 kg/m      Constitutional:  cooperative, alert and oriented, well developed, well nourished, in no acute distress        Skin:  warm and dry to the touch, no apparent skin lesions or masses noted          Head:  normocephalic, no masses or lesions        Eyes:  pupils equal and round;conjunctivae and lids unremarkable;sclera white        Lymph:  "     ENT:  no pallor or cyanosis        Neck:  JVP normal        Respiratory:  clear to auscultation         Cardiac:   irregularly irregular rhythm              pulses full and equal                                        GI:  abdomen soft;BS normoactive        Extremities and Muscular Skeletal:  no deformities, clubbing, cyanosis, erythema observed;no edema              Neurological:  no gross motor deficits;affect appropriate        Psych:  Alert and Oriented x 3        CC  Torin Montenegro MD  [address]  [id number]  [place] [id number]                  Thank you for allowing me to participate in the care of your patient.      Sincerely,     Torin Montenegro MD     [facility]  cc:   Torin Montenegro MD  [address]  [id number]  [place] [phone]

## 2022-03-17 NOTE — LETTER
3/17/2022       RE: Amadou Wade  [address] [facility] [address]     Dear Colleague,    Thank you for referring your patient, Amadou Wade, to the [facility] at [facility]. Please see a copy of my visit note below.    HPI and Plan:   See dictation        Orders Placed This Encounter   Procedures     Follow-Up with Cardiology- EP     EKG 12-lead complete w/read - Clinics (performed today)     EKG 12-lead complete w/read (Future)- to be scheduled       No orders of the defined types were placed in this encounter.      There are no discontinued medications.      Encounter Diagnoses   Name Primary?     Atrial fibrillation with rapid ventricular response (H)      Persistent atrial fibrillation (H) Yes       CURRENT MEDICATIONS:  Current Outpatient Medications   Medication Sig Dispense Refill     ACE/ARB/ARNI NOT PRESCRIBED (INTENTIONAL) Please choose reason not prescribed from choices below.       apixaban ANTICOAGULANT (ELIQUIS) 5 MG tablet Take 1 tablet (5 mg) by mouth 2 times daily 180 tablet 3     diltiazem ER COATED BEADS (CARDIZEM CD) 120 MG 24 hr capsule Take 1 capsule (120 mg) by mouth daily 90 capsule 3     furosemide (LASIX) 20 MG tablet Take 1 tablet (20 mg) by mouth daily Additional 20 mg as needed for weight gain 60 tablet 5     metoprolol tartrate (LOPRESSOR) 50 MG tablet Take 1.5 tablets (75 mg) by mouth 2 times daily 270 tablet 3     rosuvastatin (CRESTOR) 10 MG tablet Take 1 tablet (10 mg) by mouth At Bedtime 90 tablet 0     valACYclovir (VALTREX) 1000 mg tablet daily as needed          ALLERGIES   No Known Allergies    PAST MEDICAL HISTORY:  Past Medical History:   Diagnosis Date     CHF (congestive heart failure) (H) 12/2021    HFrEF - Dr Cordova     Chronic rhinitis     resolved     [facility]     [facility]FrEF (heart failure with reduced ejection fraction) (H) 12/2021    Dr Cordova     Hypertension goal BP (blood  pressure) < 140/90 2017     Persistent atrial fibrillation (H) 2021    in/out, rate controlled, at times     Pneumonia, organism unspecified(486)      Snoring     no sleep apnea - mouth guard     Squamous 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     Dr Abernathy/Darlyn - Rt Chest     Unspecified hemorrhoids without mention of complication 2006    internal       PAST SURGICAL HISTORY:  Past Surgical History:   Procedure Laterality Date     ANESTHESIA CARDIOVERSION N/A 2022    Procedure: ANESTHESIA, FOR CARDIOVERSION;  Surgeon: GENERIC ANESTHESIA PROVIDER;  Location: [facility] OR     COLONOSCOPY  2012    incomplete - negative, ACBE incomplete - negative, CT colography negative     COLONOSCOPY Bilateral 2021     ganglion cyst removal Left 2021     HC REMOVE TONSILS/ADENOIDS,<11 Y/O      T & A <14y.o.     SIGMOIDOSCOPY,DIAGNOSTIC      normal     STRESS ECHO (METRO)  2007    normal few pac's, few pvc's     SURGICAL HISTORY OF -  Bilateral     fall,  - bilateral IOL     ZZHC COLONOSCOPY THRU STOMA, DIAGNOSTIC  2006    dr tejeda - normal - internal hemorrhiods - due 10 yrs       FAMILY HISTORY:  Family History   Problem Relation Age of Onset     Hypertension Mother      Cancer Father         lung     Eye Disorder Father         Glaucoma     Alzheimer Disease Father      Heart Disease Father      Diabetes Father         type 2     Diabetes Brother         type 2     Coronary Artery Disease Brother      Cerebrovascular Disease Maternal Grandmother      Heart Disease Maternal Grandfather          young heartattack     Diabetes Paternal Grandfather      Cancer Brother         melanoma     Alzheimer Disease Brother      Prostate Cancer Brother      Cerebrovascular Disease Brother        SOCIAL HISTORY:  Social History     Socioeconomic History     Marital status:      Spouse name: Dania     Number of children: 3     Years of education: 19     Highest education level:  "None   Occupational History     Occupation:       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   Tobacco Use     Smoking status: Never Smoker     Smokeless tobacco: Never Used   Vaping Use     Vaping Use: Never used   Substance and Sexual Activity     Alcohol use: Yes     Alcohol/week: 0.0 - 0.8 standard drinks     Comment: 0-1 per week avg     Drug use: No     Sexual activity: Yes     Partners: Female   Other Topics Concern      Service Not Asked     Blood Transfusions Not Asked     Caffeine Concern Yes     Comment: occas     Occupational Exposure Not Asked     Hobby Hazards Not Asked     Sleep Concern Not Asked     Stress Concern Not Asked     Weight Concern Not Asked     Special Diet Not Asked     Back Care Not Asked     Exercise Yes     Comment: active     Bike Helmet Not Asked     Seat Belt Yes     Self-Exams Not Asked     Parent/sibling w/ CABG, MI or angioplasty before 65F 55M? No   Social History Narrative     None     Social Determinants of Health     Financial Resource Strain: Not on file   Food Insecurity: Not on file   Transportation Needs: Not on file   Physical Activity: Not on file   Stress: Not on file   Social Connections: Not on file   Intimate Partner Violence: Not on file   Housing Stability: Not on file       Review of Systems:  Skin:  Negative       Eyes:  Negative      ENT:  Negative      Respiratory:  Negative       Cardiovascular:  Negative      Gastroenterology: Negative      Genitourinary:  Negative      Musculoskeletal:  Negative      Neurologic:  Negative      Psychiatric:  Negative      Heme/Lymph/Imm:  Negative      Endocrine:  Negative        Physical Exam:  Vitals: /84   Pulse 76   Ht 1.727 m (5' 8\")   Wt 73 kg (161 lb)   BMI 24.48 kg/m      Constitutional:  cooperative, alert and oriented, well developed, well nourished, in no acute distress        Skin:  warm and dry to the touch, no apparent skin lesions or masses noted          Head:  normocephalic, no masses or lesions "        Eyes:  pupils equal and round;conjunctivae and lids unremarkable;sclera white        Lymph:      ENT:  no pallor or cyanosis        Neck:  JVP normal        Respiratory:  clear to auscultation         Cardiac:   irregularly irregular rhythm              pulses full and equal                                        GI:  abdomen soft;BS normoactive        Extremities and Muscular Skeletal:  no deformities, clubbing, cyanosis, erythema observed;no edema              Neurological:  no gross motor deficits;affect appropriate        Psych:  Alert and Oriented x 3        Service Date: 03/17/2022    CLINIC NOTE    HISTORY OF PRESENT ILLNESS:  I saw Mr. Wade for followup of atrial fibrillation.  He is a 77-year-old white male who was admitted for congestive heart failure in 12/2021.  He was found to have atrial fibrillation with a rapid ventricular rate.  He had a cardioversion on 01/11/2022 but could not maintain sinus rhythm.  I saw him on 02/16 and recommended rate control and anticoagulation.  He is currently on diltiazem 120 mg p.o. daily and metoprolol 75 mg p.o. b.i.d.  He had a Holter on 03/04 that reported average ventricular rate 77 beats per minute in persistent atrial fibrillation.  Heart rate ranged from  beats per minute.  Symptomatically, he is doing well with no palpitation, shortness of breath, fatigue or chest pain.    PHYSICAL EXAMINATION:    VITAL SIGNS:  Blood pressure was 117/84, heart rate 76 beats per minute, body weight 161 pounds.  CARDIOVASCULAR EXAMINATION:  Showed irregularly irregular rhythm.  LUNGS:  Clear.  EXTREMITIES:  There was no pedal edema.    IMAGING STUDIES:  EKG showed atrial fibrillation with ventricular rate 60 beats per minute and right bundle branch block.    ASSESSMENT AND RECOMMENDATIONS:  Mr. Wade is doing well symptomatically.  He is in atrial fibrillation with controlled heart rate on the current medications.  I do not recommend further intervention for atrial  fibrillation at this point.  He can continue the current medications and return for followup in 1 year.    The patient is curious about his Lasix which was started while he was admitted for atrial fibrillation with rapid ventricular rate.  I agree for the patient to withhold Lasix while continuing monitoring of the body weight and symptoms.  If he does well without weight gain or worsening of breathing, he can stop Lasix permanently.    Torin Montenegro MD     cc:  Johnathan Sampson MD   [address] [address]        D: 2022   T: 2022   MT: [place]    Name:     ANALILIA ZAVALA  MRN:      [id number]        Account:      [id number]   :      1945           Service Date: 2022     Document: [id number]

## 2022-03-17 NOTE — PROGRESS NOTES
HPI and Plan:   See dictation        Orders Placed This Encounter   Procedures     Follow-Up with Cardiology- EP     EKG 12-lead complete w/read - Clinics (performed today)     EKG 12-lead complete w/read (Future)- to be scheduled       No orders of the defined types were placed in this encounter.      There are no discontinued medications.      Encounter Diagnoses   Name Primary?     Atrial fibrillation with rapid ventricular response (H)      Persistent atrial fibrillation (H) Yes       CURRENT MEDICATIONS:  Current Outpatient Medications   Medication Sig Dispense Refill     ACE/ARB/ARNI NOT PRESCRIBED (INTENTIONAL) Please choose reason not prescribed from choices below.       apixaban ANTICOAGULANT (ELIQUIS) 5 MG tablet Take 1 tablet (5 mg) by mouth 2 times daily 180 tablet 3     diltiazem ER COATED BEADS (CARDIZEM CD) 120 MG 24 hr capsule Take 1 capsule (120 mg) by mouth daily 90 capsule 3     furosemide (LASIX) 20 MG tablet Take 1 tablet (20 mg) by mouth daily Additional 20 mg as needed for weight gain 60 tablet 5     metoprolol tartrate (LOPRESSOR) 50 MG tablet Take 1.5 tablets (75 mg) by mouth 2 times daily 270 tablet 3     rosuvastatin (CRESTOR) 10 MG tablet Take 1 tablet (10 mg) by mouth At Bedtime 90 tablet 0     valACYclovir (VALTREX) 1000 mg tablet daily as needed          ALLERGIES   No Known Allergies    PAST MEDICAL HISTORY:  Past Medical History:   Diagnosis Date     CHF (congestive heart failure) (H) 12/2021    HFrEF - Dr Cordova     Chronic rhinitis     resolved     [facility]     [facility]FrEF (heart failure with reduced ejection fraction) (H) 12/2021    Dr Cordova     Hypertension goal BP (blood pressure) < 140/90 12/2017     Persistent atrial fibrillation (H) 12/2021    in/out, rate controlled, at times     Pneumonia, organism unspecified(486) 1993     Snoring     no sleep apnea - mouth guard     Squamous 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 2021    Dr Abernathy/Darlyn - Rt Chest     Unspecified  hemorrhoids without mention of complication 2006    internal       PAST SURGICAL HISTORY:  Past Surgical History:   Procedure Laterality Date     ANESTHESIA CARDIOVERSION N/A 2022    Procedure: ANESTHESIA, FOR CARDIOVERSION;  Surgeon: GENERIC ANESTHESIA PROVIDER;  Location: [facility] OR     COLONOSCOPY  2012    incomplete - negative, ACBE incomplete - negative, CT colography negative     COLONOSCOPY Bilateral 2021     ganglion cyst removal Left 2021     HC REMOVE TONSILS/ADENOIDS,<13 Y/O  1953    T & A <14y.o.     SIGMOIDOSCOPY,DIAGNOSTIC      normal     STRESS ECHO (METRO)  2007    normal few pac's, few pvc's     SURGICAL HISTORY OF -  Bilateral     fall,  - bilateral IOL     ZZHC COLONOSCOPY THRU STOMA, DIAGNOSTIC  2006    dr tejeda - normal - internal hemorrhiods - due 10 yrs       FAMILY HISTORY:  Family History   Problem Relation Age of Onset     Hypertension Mother      Cancer Father         lung     Eye Disorder Father         Glaucoma     Alzheimer Disease Father      Heart Disease Father      Diabetes Father         type 2     Diabetes Brother         type 2     Coronary Artery Disease Brother      Cerebrovascular Disease Maternal Grandmother      Heart Disease Maternal Grandfather          young heartattack     Diabetes Paternal Grandfather      Cancer Brother         melanoma     Alzheimer Disease Brother      Prostate Cancer Brother      Cerebrovascular Disease Brother        SOCIAL HISTORY:  Social History     Socioeconomic History     Marital status:      Spouse name: Dania     Number of children: 3     Years of education: 19     Highest education level: None   Occupational History     Occupation:       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   Tobacco Use     Smoking status: Never Smoker     Smokeless tobacco: Never Used   Vaping Use     Vaping Use: Never used   Substance and Sexual Activity     Alcohol use: Yes     Alcohol/week: 0.0 - 0.8 standard drinks      "Comment: 0-1 per week avg     Drug use: No     Sexual activity: Yes     Partners: Female   Other Topics Concern      Service Not Asked     Blood Transfusions Not Asked     Caffeine Concern Yes     Comment: occas     Occupational Exposure Not Asked     Hobby Hazards Not Asked     Sleep Concern Not Asked     Stress Concern Not Asked     Weight Concern Not Asked     Special Diet Not Asked     Back Care Not Asked     Exercise Yes     Comment: active     Bike Helmet Not Asked     Seat Belt Yes     Self-Exams Not Asked     Parent/sibling w/ CABG, MI or angioplasty before 65F 55M? No   Social History Narrative     None     Social Determinants of Health     Financial Resource Strain: Not on file   Food Insecurity: Not on file   Transportation Needs: Not on file   Physical Activity: Not on file   Stress: Not on file   Social Connections: Not on file   Intimate Partner Violence: Not on file   Housing Stability: Not on file       Review of Systems:  Skin:  Negative       Eyes:  Negative      ENT:  Negative      Respiratory:  Negative       Cardiovascular:  Negative      Gastroenterology: Negative      Genitourinary:  Negative      Musculoskeletal:  Negative      Neurologic:  Negative      Psychiatric:  Negative      Heme/Lymph/Imm:  Negative      Endocrine:  Negative        Physical Exam:  Vitals: /84   Pulse 76   Ht 1.727 m (5' 8\")   Wt 73 kg (161 lb)   BMI 24.48 kg/m      Constitutional:  cooperative, alert and oriented, well developed, well nourished, in no acute distress        Skin:  warm and dry to the touch, no apparent skin lesions or masses noted          Head:  normocephalic, no masses or lesions        Eyes:  pupils equal and round;conjunctivae and lids unremarkable;sclera white        Lymph:      ENT:  no pallor or cyanosis        Neck:  JVP normal        Respiratory:  clear to auscultation         Cardiac:   irregularly irregular rhythm              pulses full and equal                           "              GI:  abdomen soft;BS normoactive        Extremities and Muscular Skeletal:  no deformities, clubbing, cyanosis, erythema observed;no edema              Neurological:  no gross motor deficits;affect appropriate        Psych:  Alert and Oriented x 3        CC  Torin Montenegro MD  [address]  [address]  [place] [id number]

## 2022-03-17 NOTE — PROGRESS NOTES
Service Date: 03/17/2022    CLINIC NOTE    HISTORY OF PRESENT ILLNESS:  I saw Mr. Wade for followup of atrial fibrillation.  He is a 77-year-old white male who was admitted for congestive heart failure in 12/2021.  He was found to have atrial fibrillation with a rapid ventricular rate.  He had a cardioversion on 01/11/2022 but could not maintain sinus rhythm.  I saw him on 02/16 and recommended rate control and anticoagulation.  He is currently on diltiazem 120 mg p.o. daily and metoprolol 75 mg p.o. b.i.d.  He had a Holter on 03/04 that reported average ventricular rate 77 beats per minute in persistent atrial fibrillation.  Heart rate ranged from  beats per minute.  Symptomatically, he is doing well with no palpitation, shortness of breath, fatigue or chest pain.    PHYSICAL EXAMINATION:    VITAL SIGNS:  Blood pressure was 117/84, heart rate 76 beats per minute, body weight 161 pounds.  CARDIOVASCULAR EXAMINATION:  Showed irregularly irregular rhythm.  LUNGS:  Clear.  EXTREMITIES:  There was no pedal edema.    IMAGING STUDIES:  EKG showed atrial fibrillation with ventricular rate 60 beats per minute and right bundle branch block.    ASSESSMENT AND RECOMMENDATIONS:  Mr. Wade is doing well symptomatically.  He is in atrial fibrillation with controlled heart rate on the current medications.  I do not recommend further intervention for atrial fibrillation at this point.  He can continue the current medications and return for followup in 1 year.    The patient is curious about his Lasix which was started while he was admitted for atrial fibrillation with rapid ventricular rate.  I agree for the patient to withhold Lasix while continuing monitoring of the body weight and symptoms.  If he does well without weight gain or worsening of breathing, he can stop Lasix permanently.    Torin Montenegro MD     cc:  [place] [address]    Torin Montenegro  MD        D: 2022   T: 2022   MT: [place]    Name:     ANALILIA ZAVALA  MRN:      [id number]        Account:      [id number]   :      1945           Service Date: 2022       Document: [id number]

## 2022-04-15 ENCOUNTER — TELEPHONE (OUTPATIENT)
Dept: FAMILY MEDICINE | Facility: CLINIC | Age: 77
End: 2022-04-15
Payer: COMMERCIAL

## 2022-04-19 ENCOUNTER — OFFICE VISIT (OUTPATIENT)
Dept: UROLOGY | Facility: CLINIC | Age: 77
End: 2022-04-19
Payer: COMMERCIAL

## 2022-04-19 VITALS
DIASTOLIC BLOOD PRESSURE: 70 MMHG | WEIGHT: 165 LBS | SYSTOLIC BLOOD PRESSURE: 130 MMHG | BODY MASS INDEX: 25.01 KG/M2 | HEIGHT: 68 IN

## 2022-04-19 DIAGNOSIS — R97.20 ELEVATED PROSTATE SPECIFIC ANTIGEN (PSA): Primary | ICD-10-CM

## 2022-04-19 LAB — PSA SERPL-MCNC: 2.9 UG/L (ref 0–4)

## 2022-04-19 PROCEDURE — 36415 COLL VENOUS BLD VENIPUNCTURE: CPT | Performed by: PHYSICIAN ASSISTANT

## 2022-04-19 PROCEDURE — 84153 ASSAY OF PSA TOTAL: CPT | Performed by: PHYSICIAN ASSISTANT

## 2022-04-19 PROCEDURE — 99214 OFFICE O/P EST MOD 30 MIN: CPT | Performed by: PHYSICIAN ASSISTANT

## 2022-04-19 ASSESSMENT — PAIN SCALES - GENERAL: PAINLEVEL: NO PAIN (0)

## 2022-04-19 NOTE — PROGRESS NOTES
Office Visit Note  [facility]  [phone]    UROLOGIC DIAGNOSES:   Elevated PSA, resolved    CURRENT INTERVENTIONS:   monitoring    HISTORY:   Pleasant 75 y/o. Here for PSA follow up. PSA was rechecked and is down to 2.9. He continues to have mild urinary symptoms. Mild post void dribbling, slower stream and some urgency. He was initially referred for elevated PSA.       PAST MEDICAL HISTORY:   Past Medical History:   Diagnosis Date     CHF (congestive heart failure) (H) 12/2021    HFrEF - Dr Cordova     Chronic rhinitis     resolved     [place]     HFrEF (heart failure with reduced ejection fraction) (H) 12/2021    Dr Cordova     Hypertension goal BP (blood pressure) < 140/90 12/2017     Persistent atrial fibrillation (H) 12/2021    in/out, rate controlled, at times, Dr Montenegro     Pneumonia, organism unspecified(486) 1993     Snoring     no sleep apnea - mouth guard     Squamous 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 2021    Dr Abernathy/Darlyn - Rt Chest     Unspecified hemorrhoids without mention of complication 04/2006    internal       PAST SURGICAL HISTORY:   Past Surgical History:   Procedure Laterality Date     ANESTHESIA CARDIOVERSION N/A 1/11/2022    Procedure: ANESTHESIA, FOR CARDIOVERSION;  Surgeon: GENERIC ANESTHESIA PROVIDER;  Location: [facility] OR     COLONOSCOPY  01/03/2012    incomplete - negative, ACBE incomplete - negative, CT colography negative     COLONOSCOPY Bilateral 09/14/2021     ganglion cyst removal Left 09/02/2021     HC REMOVE TONSILS/ADENOIDS,<13 Y/O  1951    T & A <14y.o.     SIGMOIDOSCOPY,DIAGNOSTIC  1997    normal     STRESS ECHO (METRO)  03/2007    normal few pac's, few pvc's     SURGICAL HISTORY OF -  Bilateral     fall, 2020 - bilateral IOL     ZZHC COLONOSCOPY THRU STOMA, DIAGNOSTIC  05/2006    dr tejeda - normal - internal hemorrhiods - due 10 yrs       FAMILY HISTORY:   Family History   Problem Relation Age of Onset     Hypertension Mother      Cancer Father          lung     Eye Disorder Father         Glaucoma     Alzheimer Disease Father      Heart Disease Father      Diabetes Father         type 2     Diabetes Brother         type 2     Coronary Artery Disease Brother      Cerebrovascular Disease Maternal Grandmother      Heart Disease Maternal Grandfather          young heartattack     Diabetes Paternal Grandfather      Cancer Brother         melanoma     Alzheimer Disease Brother      Prostate Cancer Brother      Cerebrovascular Disease Brother        SOCIAL HISTORY:   Social History     Tobacco Use     Smoking status: Never Smoker     Smokeless tobacco: Never Used   Substance Use Topics     Alcohol use: Yes     Alcohol/week: 0.0 - 0.8 standard drinks     Comment: 0-1 per week avg       Current Outpatient Medications   Medication     ACE/ARB/ARNI NOT PRESCRIBED (INTENTIONAL)     apixaban ANTICOAGULANT (ELIQUIS) 5 MG tablet     diltiazem ER COATED BEADS (CARDIZEM CD) 120 MG 24 hr capsule     furosemide (LASIX) 20 MG tablet     metoprolol tartrate (LOPRESSOR) 50 MG tablet     rosuvastatin (CRESTOR) 10 MG tablet     valACYclovir (VALTREX) 1000 mg tablet     Current Facility-Administered Medications   Medication     lidocaine 1% with EPINEPHrine 1:100,000 injection 3 mL         PHYSICAL EXAM:    PSYCH: NAD  EYES: EOMI    Imaging: None    PSA: 2.9    IMPRESSION:  Elevated PSA, normalized    PLAN:  His PSA has improved from previous levels.  It is now actually in the normal range for his age group.  Would evaluate this further with an MRI or prostate biopsy if elevates, but for now we simply continue to follow the PSA closely.  I recommended that he have another PSA checked in 12 months.  If the PSA does rise more in the future he will need to undergo further evaluation.  Discussed trial of Flomax for mild BPH w/ LUTS. He will think on this.     Leidy Montes De Oca PA-C  [facility]  [phone]    22 minutes spent on the date of the encounter doing chart review, review  of test results, interpretation of tests, patient visit and documentation

## 2022-04-19 NOTE — LETTER
4/19/2022       RE: Amadou Wade  [address] [address] [facility] [address]     Dear Colleague,    Thank you for referring your patient, Amadou Wade, to the [facility] at [facility]. Please see a copy of my visit note below.      Office Visit Note  [facility]  [phone]    UROLOGIC DIAGNOSES:   Elevated PSA, resolved    CURRENT INTERVENTIONS:   monitoring    HISTORY:   Pleasant 77 y/o. Here for PSA follow up. PSA was rechecked and is down to 2.9. He continues to have mild urinary symptoms. Mild post void dribbling, slower stream and some urgency. He was initially referred for elevated PSA.       PAST MEDICAL HISTORY:   Past Medical History:   Diagnosis Date     CHF (congestive heart failure) (H) 12/2021    HFrEF - Dr Cordova     Chronic rhinitis     resolved     [place]     HFrEF (heart failure with reduced ejection fraction) (H) 12/2021    Dr Cordova     Hypertension goal BP (blood pressure) < 140/90 12/2017     Persistent atrial fibrillation (H) 12/2021    in/out, rate controlled, at times, Dr Montenegro     Pneumonia, organism unspecified(486) 1993     Snoring     no sleep apnea - mouth guard     Squamous 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 2021    Dr Abernathy/Darlyn - Rt Chest     Unspecified hemorrhoids without mention of complication 04/2006    internal       PAST SURGICAL HISTORY:   Past Surgical History:   Procedure Laterality Date     ANESTHESIA CARDIOVERSION N/A 1/11/2022    Procedure: ANESTHESIA, FOR CARDIOVERSION;  Surgeon: GENERIC ANESTHESIA PROVIDER;  Location: [facility] OR     COLONOSCOPY  01/03/2012    incomplete - negative, ACBE incomplete - negative, CT colography negative     COLONOSCOPY Bilateral 09/14/2021     ganglion cyst removal Left 09/02/2021     HC REMOVE TONSILS/ADENOIDS,<11 Y/O  1951    T & A <14y.o.     SIGMOIDOSCOPY,DIAGNOSTIC  1997    normal     STRESS ECHO (METRO)  03/2007    normal few pac's,  few pvc's     SURGICAL HISTORY OF -  Bilateral     fall,  - bilateral IOL     ZZHC COLONOSCOPY THRU STOMA, DIAGNOSTIC  2006    dr tejeda - normal - internal hemorrhiods - due 10 yrs       FAMILY HISTORY:   Family History   Problem Relation Age of Onset     Hypertension Mother      Cancer Father         lung     Eye Disorder Father         Glaucoma     Alzheimer Disease Father      Heart Disease Father      Diabetes Father         type 2     Diabetes Brother         type 2     Coronary Artery Disease Brother      Cerebrovascular Disease Maternal Grandmother      Heart Disease Maternal Grandfather          young heartattack     Diabetes Paternal Grandfather      Cancer Brother         melanoma     Alzheimer Disease Brother      Prostate Cancer Brother      Cerebrovascular Disease Brother        SOCIAL HISTORY:   Social History     Tobacco Use     Smoking status: Never Smoker     Smokeless tobacco: Never Used   Substance Use Topics     Alcohol use: Yes     Alcohol/week: 0.0 - 0.8 standard drinks     Comment: 0-1 per week avg       Current Outpatient Medications   Medication     ACE/ARB/ARNI NOT PRESCRIBED (INTENTIONAL)     apixaban ANTICOAGULANT (ELIQUIS) 5 MG tablet     diltiazem ER COATED BEADS (CARDIZEM CD) 120 MG 24 hr capsule     furosemide (LASIX) 20 MG tablet     metoprolol tartrate (LOPRESSOR) 50 MG tablet     rosuvastatin (CRESTOR) 10 MG tablet     valACYclovir (VALTREX) 1000 mg tablet     Current Facility-Administered Medications   Medication     lidocaine 1% with EPINEPHrine 1:100,000 injection 3 mL         PHYSICAL EXAM:    PSYCH: NAD  EYES: EOMI    Imaging: None    PSA: 2.9    IMPRESSION:  Elevated PSA, normalized    PLAN:  His PSA has improved from previous levels.  It is now actually in the normal range for his age group.  Would evaluate this further with an MRI or prostate biopsy if elevates, but for now we simply continue to follow the PSA closely.  I recommended that he have another PSA  checked in 12 months.  If the PSA does rise more in the future he will need to undergo further evaluation.  Discussed trial of Flomax for mild BPH w/ LUTS. He will think on this.     Leidy Montes De Oca PA-C  [facility]  [phone]    22 minutes spent on the date of the encounter doing chart review, review of test results, interpretation of tests, patient visit and documentation

## 2022-05-11 ENCOUNTER — TELEPHONE (OUTPATIENT)
Dept: CARDIOLOGY | Facility: CLINIC | Age: 77
End: 2022-05-11
Payer: COMMERCIAL

## 2022-05-11 NOTE — TELEPHONE ENCOUNTER
[facility] Call Center    Phone Message    May a detailed message be left on voicemail: yes     Reason for Call: Other: Pt would like a call back to icuss what pain medication he can take for pain in his shoulder as he is on other heart medication     Action Taken: Message routed to:  [facility] Cardio    Travel Screening: Not Applicable

## 2022-05-11 NOTE — TELEPHONE ENCOUNTER
Spoke to pt who has R Shoulder pain, which he is not sure how it occurred. Pt did state that he has been out side working and did do some raking. State that in the AM if is quite painful and then after 20 minutes it is better, but does not go away.  Told pt that he may take Tylenol and if needed that he can take Ibuprofen, but that this must be taken with food and can increase the risk to bleed. Pt states understanding.  Also asked if pt has used either ice or heat.  Pt states that he did use ice and that did help. Pt will try the Tylenol or ibuprofen, but if not improving pt has been asked to speak to his PCP. [place]

## 2022-07-05 ENCOUNTER — NURSE TRIAGE (OUTPATIENT)
Dept: FAMILY MEDICINE | Facility: CLINIC | Age: 77
End: 2022-07-05

## 2022-07-05 ENCOUNTER — OFFICE VISIT (OUTPATIENT)
Dept: FAMILY MEDICINE | Facility: CLINIC | Age: 77
End: 2022-07-05
Payer: COMMERCIAL

## 2022-07-05 VITALS
HEART RATE: 86 BPM | OXYGEN SATURATION: 96 % | RESPIRATION RATE: 14 BRPM | DIASTOLIC BLOOD PRESSURE: 71 MMHG | HEIGHT: 68 IN | TEMPERATURE: 97.5 F | SYSTOLIC BLOOD PRESSURE: 108 MMHG | BODY MASS INDEX: 24.51 KG/M2 | WEIGHT: 161.7 LBS

## 2022-07-05 DIAGNOSIS — H10.9 BACTERIAL CONJUNCTIVITIS OF LEFT EYE: Primary | ICD-10-CM

## 2022-07-05 PROCEDURE — 99213 OFFICE O/P EST LOW 20 MIN: CPT | Performed by: PHYSICIAN ASSISTANT

## 2022-07-05 RX ORDER — CIPROFLOXACIN HYDROCHLORIDE 3.5 MG/ML
1-2 SOLUTION/ DROPS TOPICAL EVERY 4 HOURS
Qty: 5 ML | Refills: 0 | Status: SHIPPED | OUTPATIENT
Start: 2022-07-05 | End: 2022-07-12

## 2022-07-05 NOTE — TELEPHONE ENCOUNTER
"  S: Left eye lid swelling  B: Was working outside pulling weeds. Wearing protective gloves \"because I'm so susceptible to poison ivy\" \"I remember brushing a bug away from my eye\"  A: Swelling above and below left eye. Warm pack gave some relief.    \"It is having some kind of secretion that makes it mattery\".    Red, slightly pruritic and tender    \"Feels like there is sand in my eye\". Tried over the counter eyewash that helped somewhat but didn't get rid of anything.    Discomfort: 3/10 \"not in the eye itself, it's outside of that\"    Denies: vision changes, fever, injury,     Reason for Disposition    Eyelid is red and painful (or tender to touch)    Additional Information    Negative: Unresponsive, passed out or very weak    Negative: Difficulty breathing or wheezing    Negative: [1] Difficulty swallowing or slurred speech AND [2] sudden onset    Negative: Sounds like a life-threatening emergency to the triager    Negative: Recent injury to the eye    Negative: Entire face is swollen    Negative: Sacs of clear fluid (blisters) on whites of eyes (allergic cysts)    Negative: Contact with pollen, other allergic substance or eyedrops    Negative: [1] Bee sting AND [2] within last 24 hours    Negative: Insect bite suspected    Negative: Sty suspected (small, painful red lump present on lid margin    Negative: Yellow or green discharge (pus) in the eye    Negative: Redness of white area (sclera) of eye(s)    Negative: [1] SEVERE eyelid swelling (i.e., shut or almost) AND [2] fever    Negative: [1] Eyelid (outer) is very red AND [2] fever    Negative: Patient sounds very sick or weak to the triager    Negative: [1] SEVERE eyelid swelling (i.e., shut or almost) AND [2] involves both eyes      (Exception: itchy eyes, which  are probably an allergic reaction)    Negative: [1] SEVERE eyelid swelling (i.e., shut or almost) AND [2] Taking an ACE Inhibitor medication (e.g., benazepril/LOTENSIN, captopril/CAPOTEN, " "enalapril/VASOTEC, lisinopril/ZESTRIL)    Negative: [1] SEVERE eyelid swelling on one side AND [2] red and painful (or tender to touch)    Negative: [1] SEVERE eyelid swelling on one side AND [2] sinus pain or pressure    Negative: [1] MILD swelling AND [2] fever    Negative: [1] Painful rash AND [2] multiple small blisters grouped together (i.e., dermatomal distribution or \"band\" or \"stripe\")    Protocols used: EYE - SWELLING-A-AH      "

## 2022-07-05 NOTE — PROGRESS NOTES
"  Assessment & Plan     Bacterial conjunctivitis of left eye    Treat with cipro drops.    - ciprofloxacin (CILOXAN) 0.3 % ophthalmic solution; Place 1-2 drops Into the left eye every 4 hours for 7 days             There are no Patient Instructions on file for this visit.    No follow-ups on file.    FLO [facility] IRVING Tobar is a 77 year old, presenting for the following health issues:  Eye Problem (/)      History of Present Illness       Reason for visit:  Swelling and irritation in left eyelid and surrounding tissue that makes it feel like I have sand or dirt in my eye    He eats 2-3 servings of fruits and vegetables daily.He consumes 1 sweetened beverage(s) daily.He exercises with enough effort to increase his heart rate 20 to 29 minutes per day.  He exercises with enough effort to increase his heart rate 3 or less days per week.   He is taking medications regularly.       Eye(s) Problem  Onset/Duration: 5 days  Description:   Location: Left Eye  Pain: YES  Redness: YES  Accompanying Signs & Symptoms:  Discharge/mattering: YES  Swelling: YES  Visual changes: No  Fever: No  Nasal Congestion: No  Bothered by bright lights: No  History:  Trauma: No  Foreign body exposure: YES- Occurred while gardening. Not sure if it was a bug or Poison Ivy exposure  Wearing contacts: No  Precipitating or alleviating factors: gardening  Therapies tried and outcome: None      Review of Systems   Constitutional, HEENT, cardiovascular, pulmonary, gi and gu systems are negative, except as otherwise noted.        Objective    /71 (BP Location: Right arm, Patient Position: Sitting, Cuff Size: Adult Large)   Pulse 86   Temp 97.5  F (36.4  C) (Oral)   Resp 14   Ht 1.727 m (5' 8\")   Wt 73.3 kg (161 lb 11.2 oz)   SpO2 96%   BMI 24.59 kg/m    Body mass index is 24.59 kg/m .       Physical Exam   GENERAL: healthy, alert and no distress  EYES: PERRL, EOMI, conjunctivae and " sclerae normal and eyelids- periorbital edema and erythema OS  MS: no gross musculoskeletal defects noted, no edema  SKIN: no suspicious lesions or rashes  NEURO: Normal strength and tone, mentation intact and speech normal  PSYCH: mentation appears normal, affect normal/bright                    .  ..

## 2022-07-14 NOTE — PATIENT INSTRUCTIONS
- On the left eye, use a clean, warm and damp towel to perform warm compresses daily for at least the next 2 weeks   - If this stye does not resolve, please notify your eye doctor    Cryotherapy    What is it?  Use of a very cold liquid, such as liquid nitrogen, to freeze and destroy abnormal skin cells that need to be removed    What should I expect?  Tenderness and redness  A small blister that might grow and fill with dark purple blood. There may be crusting.  More than one treatment may be needed if the lesions do not go away.    How do I care for the treated area?  Gently wash the area with your hands when bathing.  Use a thin layer of Vaseline to help with healing. You may use a Band-Aid.   The area should heal within 7-10 days and may leave behind a pink or lighter color.   Do not use an antibiotic or Neosporin ointment.   You may take acetaminophen (Tylenol) for pain.     Call your doctor if you have:  Severe pain  Signs of infection (warmth, redness, cloudy yellow drainage, and or a bad smell)  Questions or concerns    Patient Education     Checking for Skin Cancer  You can find cancer early by checking your skin each month. There are 3 kinds of skin cancer. They are melanoma, basal cell carcinoma, and squamous cell carcinoma. Doing monthly skin checks is the best way to find new marks or skin changes. Follow the instructions below for checking your skin.   The ABCDEs of checking moles for melanoma   Check your moles or growths for signs of melanoma using ABCDE:   Asymmetry: the sides of the mole or growth don t match  Border: the edges are ragged, notched, or blurred  Color: the color within the mole or growth varies  Diameter: the mole or growth is larger than 6 mm (size of a pencil eraser)  Evolving: the size, shape, or color of the mole or growth is changing (evolving is not shown in the images below)    Checking for other types of skin cancer  Basal cell carcinoma or squamous cell carcinoma have  symptoms such as:     A spot or mole that looks different from all other marks on your skin  Changes in how an area feels, such as itching, tenderness, or pain  Changes in the skin's surface, such as oozing, bleeding, or scaliness  A sore that does not heal  New swelling or redness beyond the border of a mole    Who s at risk?  Anyone can get skin cancer. But you are at greater risk if you have:   Fair skin, light-colored hair, or light-colored eyes  Many moles or abnormal moles on your skin  A history of sunburns from sunlight or tanning beds  A family history of skin cancer  A history of exposure to radiation or chemicals  A weakened immune system  If you have had skin cancer in the past, you are at risk for recurring skin cancer.   How to check your skin  Do your monthly skin checkups in front of a full-length mirror. Check all parts of your body, including your:   Head (ears, face, neck, and scalp)  Torso (front, back, and sides)  Arms (tops, undersides, upper, and lower armpits)  Hands (palms, backs, and fingers, including under the nails)  Buttocks and genitals  Legs (front, back, and sides)  Feet (tops, soles, toes, including under the nails, and between toes)  If you have a lot of moles, take digital photos of them each month. Make sure to take photos both up close and from a distance. These can help you see if any moles change over time.   Most skin changes are not cancer. But if you see any changes in your skin, call your doctor right away. Only he or she can diagnose a problem. If you have skin cancer, seeing your doctor can be the first step toward getting the treatment that could save your life.   [employer] last reviewed this educational content on 4/1/2019 2000-2020 [employer]. [address], [place] [address]. All rights reserved. This information is not intended as a substitute for professional medical care. Always follow your healthcare professional's instructions.        When should I call my doctor?  If you are worsening or not improving, please, contact us or seek urgent care as noted below.     Who should I call with questions (adults)?  [facility] (adult and pediatric): [phone]  [facility] (adult): [phone]  For urgent needs outside of business hours call the [facility] at [phone] and ask for the dermatology resident on call to be paged  If this is a medical emergency and you are unable to reach an ER, Call [phone]    Who should I call with questions (pediatric)?  [facility]- Pediatric Dermatology  Dr. Yesenia Rebolledo, Dr. Devyn Murphy, Dr. Mariam Nick, JI Farley, Dr. Nat Caraballo, Dr. Yudy Daniels & Dr. Esa Hamilton  Non-urgent nurse triage line; [phone]- Florina and Sindy [facility] Coordinatormacy Moore (/Complex ) [phone]    If you need a prescription refill, please contact your pharmacy. Refills are approved or denied by our Physicians during normal business hours, Monday through Fridays  Per office policy, refills will not be granted if you have not been seen within the past year (or sooner depending on your child's condition)    Scheduling Information:  Pediatric Appointment Scheduling and Call Center [phone]  Radiology Scheduling- [phone]  Sedation Unit Scheduling- [phone]  [place] Scheduling- General [phone]; Pediatric Dermatology [phone]  Main  Services: [phone]  Romanian: [phone]  Monegasque: [phone]  Hmong/Guatemalan/Mongolian: [phone]  Preadmission Nursing Department Fax Number: [phone] (Fax all pre-operative paperwork to this number)    For urgent matters arising during evenings, weekends, or holidays that cannot wait for normal business hours please call [phone] and ask for the dermatology resident on call to be paged.

## 2022-07-14 NOTE — PROGRESS NOTES
[facility]ology [facility] Note - EP    Encounter Date: Jul 15, 2022    Dermatology Problem List:  1. SCCis mid chest,  s/p Efudex for 2 weeks starting 8/2/21  2. AK  - s/p cryo 12/10/21  - left mid cheek, s/p Efudex for 2 weeks starting 8/2/21 and cryo 12/10/21  3. 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 Hordeolum, left upper eyelid  ____________________________________________    Assessment & Plan:     # Actinic keratosis  Discussed that these are pre-cancerous lesions and 1% of them will go on to become non-melanoma skin cancers over their next year of life, which is why we opt treat them.   - Cryotherapy performed today, see procedure note below  - Encouraged daily sun protection with SPF 30+ and UPF clothing    # Milium, left cheek  Discussed the natural history and benign nature of this lesion. Reassurance provided that no additional treatment is necessary. Patient to notify us of any changes.    # Hordeolum, left upper eyelid  - Recommend use of warm compresses for the next 10-14 days until this lesion completely resolves and involutes.  - May also use commercial lid scrubs/cleansers if he desires to cleanse the eyelids daily    # Multiple benign nevi  - No concerning lesions today  - Counseled on ABCDEs of melanoma and sun protection - recommend SPF 30 or higher with frequent application   - Return sooner if noticing changing or symptomatic lesions     # SKs, Cherry angiomata   Discussed the natural history and benign nature of this lesion. Reassurance provided that no additional treatment is necessary.     # History of non-melanoma skin cancer and atypical melanocytic intradermal proliferation with no evidence of recurrence today  - Continue routine skin cancer screening exams  - Encouraged daily sun protection with SPF 30+ and UPF clothing    Procedures Performed:   - Cryotherapy procedure note, location(s): forehead and temples. After  verbal consent and discussion of risks and benefits including, but not limited to, dyspigmentation/scar, blister, and pain, 4 lesion(s) was(were) treated with 1-2 mm freeze border for 1-2 cycles with liquid nitrogen. Post cryotherapy instructions were provided.    Follow-up: 6 months for next skin check    Scribe Disclosure:  I, Carrie Riverahernandezemigdio, am serving as a scribe to document services personally performed by Andre Santizo MD based on data collection and the provider's statements to me.     Netta Payton,  PGY-4  Department of Dermatology  [facility]    Staff Physician Comments:   I saw and evaluated the patient with the resident and I agree with the assessment and plan.  I was present for the entire minor procedure and examination. I have made edits if needed.    Andre Santizo MD  Staff Dermatologist and Dermatopathologist  , Department of Dermatology     ____________________________________________    CC: Derm Problem (6 month skin check- one concerning spot on the L cheek and one or two on the L eyelid )      HPI:  Mr. Amadou aWde is a(n) extremely pleasant 77 year old male who presents today as a return patient for a skin check. The patient was last seen in dermatology on 1/28/22 by myself at which time the patient had an excision on the right shoulder for an atypical intradermal melanocytic proliferation with desmoplasia.     Today, the patient reports 2 areas of concern.  First, he noticed a white spot on the left cheek for the past few months and states it has actually gotten slightly smaller in size but is not painful nor does it bleed.  He also thought he had poison ivy of the left thigh but was actually diagnosed with an eye infection several weeks ago and was prescribed antibiotic eyedrops at that time.  Since then, a lot of the redness and swelling of his left eye has resolved however he still has a gritty sensation in the eye, mainly of the left  upper eyelid portion.    The patient otherwise denies any painful, bleeding, or nonhealing lesions, or any new or changing moles.    Patient is otherwise feeling well, without additional skin concerns.     Labs Reviewed:  N/A    Physical Exam:  Vitals: There were no vitals taken for this visit.  SKIN: Total skin excluding the undergarment areas was performed. The exam included the head/face, neck, both arms, chest, back, abdomen, both legs, digits and/or nails.   - Left upper medial eyelid margin with approximately a 4 mm pink papule  - 2 mm firm white papule on the left cheek  - Right preauricular cheek with a shiny/greasy skin colored papule with yellow globules on dermoscopy  - Pink gritty papules along the left temple and forehead  - Multiple regular brown pigmented macules and papules are identified on the trunk and extremities.   - There are waxy stuck on tan to brown papules on the trunk and extremities.  - There are dome shaped bright red papules on the trunk and extremities.   - Previous sites of skin cancer and atypical melanocytic proliferation examined and there are no signs of recurrence clinically  - No other lesions of concern on areas examined.     Medications:  Current Outpatient Medications   Medication     apixaban ANTICOAGULANT (ELIQUIS) 5 MG tablet     diltiazem ER COATED BEADS (CARDIZEM CD) 120 MG 24 hr capsule     furosemide (LASIX) 20 MG tablet     metoprolol tartrate (LOPRESSOR) 50 MG tablet     rosuvastatin (CRESTOR) 10 MG tablet     valACYclovir (VALTREX) 1000 mg tablet     ACE/ARB/ARNI NOT PRESCRIBED (INTENTIONAL)     Current Facility-Administered Medications   Medication     lidocaine 1% with EPINEPHrine 1:100,000 injection 3 mL      Past Medical History:   Patient Active Problem List   Diagnosis     Cardiac dysrhythmia     Hyperlipidemia LDL goal <130     Advanced directives, counseling/discussion     ED (erectile dysfunction)     Hypertension goal BP (blood pressure) < 140/90      Recurrent cold sores     Squamous 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     Atrial fibrillation with rapid ventricular response (H)     Atrial fibrillation (H)     CHF (congestive heart failure) (H)     HFrEF (heart failure with reduced ejection fraction) (H)     Past Medical History:   Diagnosis Date     CHF (congestive heart failure) (H) 12/2021    HFrEF - Dr Cordova     Chronic rhinitis     resolved     [place]     [facility]     HFrEF (heart failure with reduced ejection fraction) (H) 12/2021    Dr Cordova     Hypertension goal BP (blood pressure) < 140/90 12/2017     Persistent atrial fibrillation (H) 12/2021    in/out, rate controlled, at times, Dr Montenegro     Pneumonia, organism [phone]     Snoring     no sleep apnea - mouth guard     Squamous 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 2021    Dr Abernathy/Darlyn - Rt Chest     Unspecified hemorrhoids without mention of complication 04/2006    internal     This note has been created using voice recognition software; while it has been reviewed, some errors may persist.

## 2022-07-15 ENCOUNTER — OFFICE VISIT (OUTPATIENT)
Dept: FAMILY MEDICINE | Facility: CLINIC | Age: 77
End: 2022-07-15
Payer: COMMERCIAL

## 2022-07-15 DIAGNOSIS — D22.9 MULTIPLE BENIGN NEVI: ICD-10-CM

## 2022-07-15 DIAGNOSIS — H00.014 HORDEOLUM EXTERNUM LEFT UPPER EYELID: ICD-10-CM

## 2022-07-15 DIAGNOSIS — L57.0 ACTINIC KERATOSIS: ICD-10-CM

## 2022-07-15 DIAGNOSIS — L72.0 MILIA: ICD-10-CM

## 2022-07-15 DIAGNOSIS — Z85.828 HISTORY OF NONMELANOMA SKIN CANCER: ICD-10-CM

## 2022-07-15 DIAGNOSIS — D18.01 CHERRY ANGIOMA: ICD-10-CM

## 2022-07-15 DIAGNOSIS — L82.1 SEBORRHEIC KERATOSES: ICD-10-CM

## 2022-07-15 DIAGNOSIS — Z12.83 SCREENING EXAM FOR SKIN CANCER: Primary | ICD-10-CM

## 2022-07-15 PROCEDURE — 17003 DESTRUCT PREMALG LES 2-14: CPT | Mod: GC | Performed by: DERMATOLOGY

## 2022-07-15 PROCEDURE — 99213 OFFICE O/P EST LOW 20 MIN: CPT | Mod: 25 | Performed by: DERMATOLOGY

## 2022-07-15 PROCEDURE — 17000 DESTRUCT PREMALG LESION: CPT | Mod: GC | Performed by: DERMATOLOGY

## 2022-07-15 ASSESSMENT — PAIN SCALES - GENERAL: PAINLEVEL: NO PAIN (0)

## 2022-07-15 NOTE — LETTER
7/15/2022         RE: Amadou Wade  [address] [address] [id number]        Dear Colleague,    Thank you for referring your patient, Amadou Wade, to the [facility]. Please see a copy of my visit note below.    [facility] Dermatology Clinic Note - EP    Encounter Date: Jul 15, 2022    Dermatology Problem List:  1. SCCis mid chest,  s/p Efudex for 2 weeks starting 8/2/21  2. AK  - s/p cryo 12/10/21  - left mid cheek, s/p Efudex for 2 weeks starting 8/2/21 and cryo 12/10/21  3. 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 Hordeolum, left upper eyelid  ____________________________________________    Assessment & Plan:     # Actinic keratosis  Discussed that these are pre-cancerous lesions and 1% of them will go on to become non-melanoma skin cancers over their next year of life, which is why we opt treat them.   - Cryotherapy performed today, see procedure note below  - Encouraged daily sun protection with SPF 30+ and UPF clothing    # Milium, left cheek  Discussed the natural history and benign nature of this lesion. Reassurance provided that no additional treatment is necessary. Patient to notify us of any changes.    # Hordeolum, left upper eyelid  - Recommend use of warm compresses for the next 10-14 days until this lesion completely resolves and involutes.  - May also use commercial lid scrubs/cleansers if he desires to cleanse the eyelids daily    # Multiple benign nevi  - No concerning lesions today  - Counseled on ABCDEs of melanoma and sun protection - recommend SPF 30 or higher with frequent application   - Return sooner if noticing changing or symptomatic lesions     # SKs, Cherry angiomata   Discussed the natural history and benign nature of this lesion. Reassurance provided that no additional treatment is necessary.     # History of non-melanoma skin cancer and atypical melanocytic intradermal proliferation  with no evidence of recurrence today  - Continue routine skin cancer screening exams  - Encouraged daily sun protection with SPF 30+ and UPF clothing    Procedures Performed:   - Cryotherapy procedure note, location(s): forehead and temples. After verbal consent and discussion of risks and benefits including, but not limited to, dyspigmentation/scar, blister, and pain, 4 lesion(s) was(were) treated with 1-2 mm freeze border for 1-2 cycles with liquid nitrogen. Post cryotherapy instructions were provided.    Follow-up: 6 months for next skin check    Scribe Disclosure:  I, Carrie Zacarias, am serving as a scribe to document services personally performed by Andre Santizo MD based on data collection and the provider's statements to me.     Netta Payton,  PGY-4  Department of Dermatology  [facility]    Staff Physician Comments:   I saw and evaluated the patient with the resident and I agree with the assessment and plan.  I was present for the entire minor procedure and examination. I have made edits if needed.    Andre Santizo MD  Staff Dermatologist and Dermatopathologist  , Department of Dermatology     ____________________________________________    CC: Derm Problem (6 month skin check- one concerning spot on the L cheek and one or two on the L eyelid )      HPI:  Mr. Amadou Wade is a(n) extremely pleasant 77 year old male who presents today as a return patient for a skin check. The patient was last seen in dermatology on 1/28/22 by myself at which time the patient had an excision on the right shoulder for an atypical intradermal melanocytic proliferation with desmoplasia.     Today, the patient reports 2 areas of concern.  First, he noticed a white spot on the left cheek for the past few months and states it has actually gotten slightly smaller in size but is not painful nor does it bleed.  He also thought he had poison ivy of the left thigh but was actually  diagnosed with an eye infection several weeks ago and was prescribed antibiotic eyedrops at that time.  Since then, a lot of the redness and swelling of his left eye has resolved however he still has a gritty sensation in the eye, mainly of the left upper eyelid portion.    The patient otherwise denies any painful, bleeding, or nonhealing lesions, or any new or changing moles.    Patient is otherwise feeling well, without additional skin concerns.     Labs Reviewed:  N/A    Physical Exam:  Vitals: There were no vitals taken for this visit.  SKIN: Total skin excluding the undergarment areas was performed. The exam included the head/face, neck, both arms, chest, back, abdomen, both legs, digits and/or nails.   - Left upper medial eyelid margin with approximately a 4 mm pink papule  - 2 mm firm white papule on the left cheek  - Right preauricular cheek with a shiny/greasy skin colored papule with yellow globules on dermoscopy  - Pink gritty papules along the left temple and forehead  - Multiple regular brown pigmented macules and papules are identified on the trunk and extremities.   - There are waxy stuck on tan to brown papules on the trunk and extremities.  - There are dome shaped bright red papules on the trunk and extremities.   - Previous sites of skin cancer and atypical melanocytic proliferation examined and there are no signs of recurrence clinically  - No other lesions of concern on areas examined.     Medications:  Current Outpatient Medications   Medication     apixaban ANTICOAGULANT (ELIQUIS) 5 MG tablet     diltiazem ER COATED BEADS (CARDIZEM CD) 120 MG 24 hr capsule     furosemide (LASIX) 20 MG tablet     metoprolol tartrate (LOPRESSOR) 50 MG tablet     rosuvastatin (CRESTOR) 10 MG tablet     valACYclovir (VALTREX) 1000 mg tablet     ACE/ARB/ARNI NOT PRESCRIBED (INTENTIONAL)     Current Facility-Administered Medications   Medication     lidocaine 1% with EPINEPHrine 1:100,000 injection 3 mL      Past  Medical History:   Patient Active Problem List   Diagnosis     Cardiac dysrhythmia     Hyperlipidemia LDL goal <130     Advanced directives, counseling/discussion     ED (erectile dysfunction)     Hypertension goal BP (blood pressure) < 140/90     Recurrent cold sores     Squamous 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     Atrial fibrillation with rapid ventricular response (H)     Atrial fibrillation (H)     CHF (congestive heart failure) (H)     HFrEF (heart failure with reduced ejection fraction) (H)     Past Medical History:   Diagnosis Date     CHF (congestive heart failure) (H) 12/2021    HFrEF - Dr Cordova     Chronic rhinitis     resolved     [place]     [facility]     HFrEF (heart failure with reduced ejection fraction) (H) 12/2021    Dr Cordova     Hypertension goal BP (blood pressure) < 140/90 12/2017     Persistent atrial fibrillation (H) 12/2021    in/out, rate controlled, at times, Dr Montenegro     Pneumonia, organism [phone]     Snoring     no sleep apnea - mouth guard     Squamous 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 2021    Dr Abernathy/Darlyn - Rt Chest     Unspecified hemorrhoids without mention of complication 04/2006    internal     This note has been created using voice recognition software; while it has been reviewed, some errors may persist.       Again, thank you for allowing me to participate in the care of your patient.        Sincerely,        Andre Santizo MD

## 2022-10-23 ENCOUNTER — HEALTH MAINTENANCE LETTER (OUTPATIENT)
Age: 77
End: 2022-10-23

## 2022-10-31 ENCOUNTER — TELEPHONE (OUTPATIENT)
Dept: FAMILY MEDICINE | Facility: CLINIC | Age: 77
End: 2022-10-31

## 2022-10-31 NOTE — TELEPHONE ENCOUNTER
Did not see record in patient's chart scanned. But spoke with patient to clarify. Patient states that he has a twisted lower part to his intestine. They did remove one or two polyps. Mentioned to patient to have them resend records over.     Cristi Davey

## 2022-10-31 NOTE — TELEPHONE ENCOUNTER
Reason for Call:  Other FYI    Detailed comments: Pt called stating that he is getting notifications that he is due for a colonoscopy, but that he [facility].He states he thought he gave a copy of the report.    Phone Number Patient can be reached at: Home number on file [phone] (home)    Best Time: n/a    Can we leave a detailed message on this number? Not Applicable    Call taken on 10/31/2022 at 10:34 AM by Beatriz Rodríguez

## 2022-10-31 NOTE — TELEPHONE ENCOUNTER
Please abstract the following data from this visit with this patient into the appropriate field in Epic:    Tests that can be patient reported without a hard copy:    Colonoscopy done on this date: 8/20/21 (approximately), by this group: [facility] , results were 1-2 polyps removed .     Other Tests found in the patient's chart through Chart Review/Care Everywhere:        Note to Abstraction: If this section is blank, no results were found via Chart Review/Care Everywhere.

## 2023-02-24 ENCOUNTER — OFFICE VISIT (OUTPATIENT)
Dept: FAMILY MEDICINE | Facility: CLINIC | Age: 78
End: 2023-02-24
Payer: COMMERCIAL

## 2023-02-24 DIAGNOSIS — D22.9 MULTIPLE BENIGN NEVI: ICD-10-CM

## 2023-02-24 DIAGNOSIS — Z12.83 SCREENING EXAM FOR SKIN CANCER: Primary | ICD-10-CM

## 2023-02-24 DIAGNOSIS — L57.0 ACTINIC KERATOSIS: ICD-10-CM

## 2023-02-24 DIAGNOSIS — D48.5 NEOPLASM OF UNCERTAIN BEHAVIOR OF SKIN: ICD-10-CM

## 2023-02-24 DIAGNOSIS — Z85.828 HISTORY OF NONMELANOMA SKIN CANCER: ICD-10-CM

## 2023-02-24 DIAGNOSIS — D18.01 CHERRY ANGIOMA: ICD-10-CM

## 2023-02-24 DIAGNOSIS — L82.1 SEBORRHEIC KERATOSES: ICD-10-CM

## 2023-02-24 PROCEDURE — 11102 TANGNTL BX SKIN SINGLE LES: CPT | Mod: GC | Performed by: DERMATOLOGY

## 2023-02-24 PROCEDURE — 99213 OFFICE O/P EST LOW 20 MIN: CPT | Mod: 25 | Performed by: DERMATOLOGY

## 2023-02-24 PROCEDURE — 17003 DESTRUCT PREMALG LES 2-14: CPT | Mod: XS | Performed by: DERMATOLOGY

## 2023-02-24 PROCEDURE — 88305 TISSUE EXAM BY PATHOLOGIST: CPT | Performed by: PATHOLOGY

## 2023-02-24 PROCEDURE — 17000 DESTRUCT PREMALG LESION: CPT | Mod: XS | Performed by: DERMATOLOGY

## 2023-02-24 ASSESSMENT — PAIN SCALES - GENERAL: PAINLEVEL: NO PAIN (0)

## 2023-02-24 NOTE — PATIENT INSTRUCTIONS
Wound Care After a Biopsy    What is a skin biopsy?  A skin biopsy allows the doctor to examine a very small piece of tissue under the microscope to determine the diagnosis and the best treatment for the skin condition. A local anesthetic (numbing medicine)  is injected with a very small needle into the skin area to be tested. A small piece of skin is taken from the area. Sometimes a suture (stitch) is used.     What are the risks of a skin biopsy?  I will experience scar, bleeding, swelling, pain, crusting and redness. I may experience incomplete removal or recurrence. Risks of this procedure are excessive bleeding, bruising, infection, nerve damage, numbness, thick (hypertrophic or keloidal) scar and non-diagnostic biopsy.    How should I care for my wound for the first 24 hours?  Keep the wound dry and covered for 24 hours  If it bleeds, hold direct pressure on the area for 15 minutes. If bleeding does not stop then go to the emergency room  Avoid strenuous exercise the first 1-2 days or as your doctor instructs you    How should I care for the wound after 24 hours?  After 24 hours, remove the bandage  You may bathe or shower as normal  If you had a scalp biopsy, you can shampoo as usual and can use shower water to clean the biopsy site daily  Clean the wound twice a day with gentle soap and water  Do not scrub, be gentle  Apply white petroleum/Vaseline after cleaning the wound with a cotton swab or a clean finger, and keep the site covered with a Bandaid /bandage. Bandages are not necessary with a scalp biopsy  If you are unable to cover the site with a Bandaid /bandage, re-apply ointment 2-3 times a day to keep the site moist. Moisture will help with healing  Avoid strenuous activity for first 1-2 days  Avoid lakes, rivers, pools, and oceans until the stitches are removed or the site is healed    How do I clean my wound?  Wash hands thoroughly with soap or use hand  before all wound care  Clean the  wound with gentle soap and water  Apply white petroleum/Vaseline  to wound after it is clean  Replace the Bandaid /bandage to keep the wound covered for the first few days or as instructed by your doctor  If you had a scalp biopsy, warm shower water to the area on a daily basis should suffice    What should I use to clean my wound?   Cotton-tipped applicators (Qtips )  White petroleum jelly (Vaseline ). Use a clean new container and use Q-tips to apply.  Bandaids   as needed  Gentle soap     How should I care for my wound long term?  Do not get your wound dirty  Keep up with wound care for one week or until the area is healed.  A small scab will form and fall off by itself when the area is completely healed. The area will be red and will become pink in color as it heals. Sun protection is very important for how your scar will turn out. Sunscreen with an SPF 30 or greater is recommended once the area is healed.  You may return to our clinic for this or you may have it done locally at your doctor s office.  You should have some soreness but it should be mild and slowly go away over several days. Talk to your doctor about using tylenol for pain,    When should I call my doctor?  If you have increased:   Pain or swelling  Pus or drainage (clear or slightly yellow drainage is ok)  Temperature over 100F  Spreading redness or warmth around wound    When will I hear about my results?  The biopsy results can take 2 weeks to come back.  Your results will automatically release to [employer] before your provider has even reviewed them.  The clinic will call you with the results, send you a [employer] message, or have you schedule a follow-up clinic or phone time to discuss the results.  Contact our clinics if you do not hear from us in 2 weeks.    Who should I call with questions?  [facility]: [phone]  [facility]: [phone]  For urgent needs outside of business  hours call the [facility] at [phone] and ask for the dermatology resident on call

## 2023-02-24 NOTE — PROGRESS NOTES
[facility] Dermatology Clinic Note - EP    Encounter Date: Feb 24, 2023    Dermatology Problem List:  0. NUB,central superior abdomen, s/p bx 2/24/23   1. SCCis mid chest,  s/p Efudex for 2 weeks starting 8/2/21  2. AK  - s/p cryo 12/10/21  - left mid cheek, s/p Efudex for 2 weeks starting 8/2/21 and cryo 12/10/21  3. 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 Hordeolum, left upper eyelid  ____________________________________________    Assessment & Plan:     #. NUB, central superior abdomen, s/p bx 2/24/23  - see procedure note below    #. AKs - left helix, left cheek x 2, left forehead x 2, right nasal bridge  - LN2 performed today - see note below    #.  Dilated pore of Fred - back  - Reassurance    #. Suspected subungual hemorrhage - right second digit  History and exam consistent.  - Discussed monitoring and close follow-up for proximal involvement or progression    # Hx NMSC  - No evidence of recurrent disease  - Sun avoidance and protective strategies discussed including use of daily SPF 30+ sunscreen  - ABCDEs of melanoma and s/s of NMSC discussed      Procedures Performed:   - Shave biopsy procedure note, location(s): above. After discussion of benefits and risks including but not limited to bleeding, infection, scar, incomplete removal, recurrence, and non-diagnostic biopsy, written consent and photographs were obtained. The area was cleaned with isopropyl alcohol. 0.5mL of 1% lidocaine with epinephrine was injected to obtain adequate anesthesia of lesion(s). Shave biopsy at site(s) performed. Hemostasis was achieved with aluminium chloride. Petrolatum ointment and a sterile dressing were applied. The patient tolerated the procedure and no complications were noted. The patient was provided with verbal and written post care instructions.     - Cryotherapy procedure note, location(s): above. After verbal consent and discussion of risks and  benefits including, but not limited to, dyspigmentation/scar, blister, and pain, 6 lesion(s) was(were) treated with 1-2 mm freeze border for 1-2 cycles with liquid nitrogen. Post cryotherapy instructions were provided.    Follow-up: 1 year    Scribe Disclosure:  I, Chucho Shipley, am serving as a scribe to document services personally performed by Andre Santizo MD based on data collection and the provider's statements to me.     This patient was staffed with Dr. Santizo.    Shola Iglesias MD  Internal Medicine - Dermatology PGY5    Staff Physician Comments:   I saw and evaluated the patient with the resident and I agree with the assessment and plan.  I was present for the key portions of the above major procedure and examination. I have made edits if needed.    Andre Santizo MD  Staff Dermatologist and Dermatopathologist  , Department of Dermatology     ____________________________________________    CC: Skin Check ([facility]. Hx of BCC)      HPI:  Mr. Amadou Wade is a(n) extremely pleasant 77 year old male who presents today as a return patient for skin check. Last seen in dermatology by me on 7/15/2022, at which time patient received cryotherapy for treatment of AKs.    Today, notes some gritty spots on face and ears. Notes a small papule on back. The patient denies any painful, bleeding, or nonhealing lesions, or any new or changing moles. Patient is otherwise feeling well, without additional skin concerns.     Labs Reviewed:  N/A    Physical Exam:  SKIN: Total skin excluding the undergarment areas was performed. The exam included the head/face, neck, both arms, chest, back, abdomen, both legs, digits and/or nails.   -Homogenous tan macules on sun exposed skin  -Scattered bright red domed papules on trunk and extremities  -Scattered waxy stuck-on papules and brown macules w/o concerning clinical features  -Few brown macuels and fleshy papules w/o concerning dermatoscopic or clinical  features  -Ulcerated shiny papule on central upper abdomen  -Gritty pink papules on face and ear  -Prior bx site left cheek without e/o recurrence  -Prior site(s) of skin cancer surgery well-healed without evidence of recurrence  -Brown purple subungual discoloration of right second toenail with arthritic changes  -No other lesions of concern on areas examined.     Medications:  Current Outpatient Medications   Medication     ACE/ARB/ARNI NOT PRESCRIBED (INTENTIONAL)     apixaban ANTICOAGULANT (ELIQUIS) 5 MG tablet     diltiazem ER COATED BEADS (CARDIZEM CD) 120 MG 24 hr capsule     furosemide (LASIX) 20 MG tablet     metoprolol tartrate (LOPRESSOR) 50 MG tablet     rosuvastatin (CRESTOR) 10 MG tablet     valACYclovir (VALTREX) 1000 mg tablet     Current Facility-Administered Medications   Medication     lidocaine 1% with EPINEPHrine 1:100,000 injection 3 mL      Past Medical History:   Patient Active Problem List   Diagnosis     Cardiac dysrhythmia     Hyperlipidemia LDL goal <130     Advanced directives, counseling/discussion     ED (erectile dysfunction)     Hypertension goal BP (blood pressure) < 140/90     Recurrent cold sores     Squamous 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     Atrial fibrillation with rapid ventricular response (H)     Atrial fibrillation (H)     CHF (congestive heart failure) (H)     HFrEF (heart failure with reduced ejection fraction) (H)     Past Medical History:   Diagnosis Date     CHF (congestive heart failure) (H) 12/2021    HFrEF - Dr Cordova     Chronic rhinitis     resolved     [facility]     HFrEF (heart failure with reduced ejection fraction) (H) 12/2021    Dr Cordova     Hypertension goal BP (blood pressure) < 140/90 12/2017     Persistent atrial fibrillation (H) 12/2021    in/out, rate controlled, at times, Dr Montenegro     Pneumonia, organism [phone]     Snoring     no sleep apnea - mouth guard     Squamous 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 2021      Michela/Darlyn - Rt Chest     Unspecified hemorrhoids without mention of complication 04/2006    internal       CC Andre Santizo MD  [address] [place] [address] on close of this encounter.    This note has been created using voice recognition software; while it has been reviewed, some errors may persist.

## 2023-03-07 ENCOUNTER — TELEPHONE (OUTPATIENT)
Dept: FAMILY MEDICINE | Facility: CLINIC | Age: 78
End: 2023-03-07
Payer: COMMERCIAL

## 2023-03-07 NOTE — TELEPHONE ENCOUNTER
Detailed message left with info from provider and return number [phone] to call with any questions or concerns.  Advised message was sent via my chart per Dr. Santizo also.    Shasha MCCALLUM RN  [facility] [place]  [phone]

## 2023-03-07 NOTE — TELEPHONE ENCOUNTER
----- Message from Andre Santizo MD sent at 3/6/2023  3:10 PM CST -----  Benign lesion - follow up as per chart note 1 year.    Good news - the biopsy showed a harmless keratosis at the biopsy site on the abdomen. As it is harmless, no further treatment is needed at that site.

## 2023-03-27 ENCOUNTER — OFFICE VISIT (OUTPATIENT)
Dept: FAMILY MEDICINE | Facility: CLINIC | Age: 78
End: 2023-03-27
Payer: COMMERCIAL

## 2023-03-27 VITALS
BODY MASS INDEX: 24.57 KG/M2 | HEIGHT: 69 IN | HEART RATE: 68 BPM | SYSTOLIC BLOOD PRESSURE: 122 MMHG | WEIGHT: 165.9 LBS | OXYGEN SATURATION: 98 % | RESPIRATION RATE: 16 BRPM | DIASTOLIC BLOOD PRESSURE: 78 MMHG | TEMPERATURE: 97.9 F

## 2023-03-27 DIAGNOSIS — I50.20 HFREF (HEART FAILURE WITH REDUCED EJECTION FRACTION) (H): ICD-10-CM

## 2023-03-27 DIAGNOSIS — I10 HYPERTENSION GOAL BP (BLOOD PRESSURE) < 140/90: ICD-10-CM

## 2023-03-27 DIAGNOSIS — I48.0 PAROXYSMAL ATRIAL FIBRILLATION (H): ICD-10-CM

## 2023-03-27 DIAGNOSIS — Z00.00 MEDICARE ANNUAL WELLNESS VISIT, SUBSEQUENT: ICD-10-CM

## 2023-03-27 DIAGNOSIS — B00.1 RECURRENT COLD SORES: ICD-10-CM

## 2023-03-27 DIAGNOSIS — Z12.11 SCREEN FOR COLON CANCER: ICD-10-CM

## 2023-03-27 DIAGNOSIS — Z12.5 SCREENING FOR PROSTATE CANCER: ICD-10-CM

## 2023-03-27 DIAGNOSIS — Z51.81 MEDICATION MONITORING ENCOUNTER: ICD-10-CM

## 2023-03-27 DIAGNOSIS — Z00.00 ROUTINE GENERAL MEDICAL EXAMINATION AT A HEALTH CARE FACILITY: Primary | ICD-10-CM

## 2023-03-27 DIAGNOSIS — E78.5 HYPERLIPIDEMIA LDL GOAL <130: ICD-10-CM

## 2023-03-27 DIAGNOSIS — K59.00 CONSTIPATION, UNSPECIFIED CONSTIPATION TYPE: ICD-10-CM

## 2023-03-27 LAB
ALBUMIN SERPL BCG-MCNC: 4 G/DL (ref 3.5–5.2)
ALBUMIN UR-MCNC: 30 MG/DL
ALP SERPL-CCNC: 111 U/L (ref 40–129)
ALT SERPL W P-5'-P-CCNC: 32 U/L (ref 10–50)
ANION GAP SERPL CALCULATED.3IONS-SCNC: 13 MMOL/L (ref 7–15)
APPEARANCE UR: CLEAR
AST SERPL W P-5'-P-CCNC: 34 U/L (ref 10–50)
BACTERIA #/AREA URNS HPF: ABNORMAL /HPF
BILIRUB SERPL-MCNC: 1.1 MG/DL
BILIRUB UR QL STRIP: NEGATIVE
BUN SERPL-MCNC: 12.6 MG/DL (ref 8–23)
CALCIUM SERPL-MCNC: 9.1 MG/DL (ref 8.8–10.2)
CHLORIDE SERPL-SCNC: 105 MMOL/L (ref 98–107)
CHOLEST SERPL-MCNC: 152 MG/DL
CK SERPL-CCNC: 73 U/L (ref 39–308)
COLOR UR AUTO: YELLOW
CREAT SERPL-MCNC: 0.82 MG/DL (ref 0.67–1.17)
CREAT UR-MCNC: 111 MG/DL
DEPRECATED HCO3 PLAS-SCNC: 25 MMOL/L (ref 22–29)
ERYTHROCYTE [DISTWIDTH] IN BLOOD BY AUTOMATED COUNT: 13.7 % (ref 10–15)
GFR SERPL CREATININE-BSD FRML MDRD: >90 ML/MIN/1.73M2
GLUCOSE SERPL-MCNC: 69 MG/DL (ref 70–99)
GLUCOSE UR STRIP-MCNC: NEGATIVE MG/DL
HCT VFR BLD AUTO: 48.6 % (ref 40–53)
HDLC SERPL-MCNC: 50 MG/DL
HGB BLD-MCNC: 15.9 G/DL (ref 13.3–17.7)
HGB UR QL STRIP: NEGATIVE
KETONES UR STRIP-MCNC: NEGATIVE MG/DL
LDLC SERPL CALC-MCNC: 91 MG/DL
LEUKOCYTE ESTERASE UR QL STRIP: NEGATIVE
MCH RBC QN AUTO: 31.1 PG (ref 26.5–33)
MCHC RBC AUTO-ENTMCNC: 32.7 G/DL (ref 31.5–36.5)
MCV RBC AUTO: 95 FL (ref 78–100)
MICROALBUMIN UR-MCNC: 60.9 MG/L
MICROALBUMIN/CREAT UR: 54.86 MG/G CR (ref 0–17)
MUCOUS THREADS #/AREA URNS LPF: PRESENT /LPF
NITRATE UR QL: NEGATIVE
NONHDLC SERPL-MCNC: 102 MG/DL
NT-PROBNP SERPL-MCNC: 1464 PG/ML (ref 0–1800)
PH UR STRIP: 7 [PH] (ref 5–7)
PLATELET # BLD AUTO: 140 10E3/UL (ref 150–450)
POTASSIUM SERPL-SCNC: 4.1 MMOL/L (ref 3.4–5.3)
PROT SERPL-MCNC: 6.6 G/DL (ref 6.4–8.3)
PSA SERPL DL<=0.01 NG/ML-MCNC: 3.3 NG/ML (ref 0–6.5)
RBC # BLD AUTO: 5.11 10E6/UL (ref 4.4–5.9)
RBC #/AREA URNS AUTO: ABNORMAL /HPF
SODIUM SERPL-SCNC: 143 MMOL/L (ref 136–145)
SP GR UR STRIP: 1.02 (ref 1–1.03)
SQUAMOUS #/AREA URNS AUTO: ABNORMAL /LPF
TRIGL SERPL-MCNC: 57 MG/DL
TSH SERPL DL<=0.005 MIU/L-ACNC: 1.59 UIU/ML (ref 0.3–4.2)
UROBILINOGEN UR STRIP-ACNC: 4 E.U./DL
WBC # BLD AUTO: 4.5 10E3/UL (ref 4–11)
WBC #/AREA URNS AUTO: ABNORMAL /HPF

## 2023-03-27 PROCEDURE — 81001 URINALYSIS AUTO W/SCOPE: CPT | Performed by: FAMILY MEDICINE

## 2023-03-27 PROCEDURE — 80061 LIPID PANEL: CPT | Performed by: FAMILY MEDICINE

## 2023-03-27 PROCEDURE — 85027 COMPLETE CBC AUTOMATED: CPT | Performed by: FAMILY MEDICINE

## 2023-03-27 PROCEDURE — 82550 ASSAY OF CK (CPK): CPT | Performed by: FAMILY MEDICINE

## 2023-03-27 PROCEDURE — G0103 PSA SCREENING: HCPCS | Performed by: FAMILY MEDICINE

## 2023-03-27 PROCEDURE — 83880 ASSAY OF NATRIURETIC PEPTIDE: CPT | Performed by: FAMILY MEDICINE

## 2023-03-27 PROCEDURE — 99214 OFFICE O/P EST MOD 30 MIN: CPT | Mod: 25 | Performed by: FAMILY MEDICINE

## 2023-03-27 PROCEDURE — 84443 ASSAY THYROID STIM HORMONE: CPT | Performed by: FAMILY MEDICINE

## 2023-03-27 PROCEDURE — 36415 COLL VENOUS BLD VENIPUNCTURE: CPT | Performed by: FAMILY MEDICINE

## 2023-03-27 PROCEDURE — 82570 ASSAY OF URINE CREATININE: CPT | Performed by: FAMILY MEDICINE

## 2023-03-27 PROCEDURE — 80053 COMPREHEN METABOLIC PANEL: CPT | Performed by: FAMILY MEDICINE

## 2023-03-27 PROCEDURE — G0439 PPPS, SUBSEQ VISIT: HCPCS | Performed by: FAMILY MEDICINE

## 2023-03-27 PROCEDURE — 82043 UR ALBUMIN QUANTITATIVE: CPT | Performed by: FAMILY MEDICINE

## 2023-03-27 RX ORDER — FUROSEMIDE 20 MG
20 TABLET ORAL DAILY PRN
Qty: 60 TABLET | Refills: 5
Start: 2023-03-27

## 2023-03-27 ASSESSMENT — ENCOUNTER SYMPTOMS
FEVER: 0
MYALGIAS: 0
NERVOUS/ANXIOUS: 0
CONSTIPATION: 1
DIZZINESS: 0
WEAKNESS: 0
SHORTNESS OF BREATH: 0
HEADACHES: 0
SORE THROAT: 0
ABDOMINAL PAIN: 0
HEARTBURN: 0
PALPITATIONS: 0
NAUSEA: 0
DYSURIA: 0
COUGH: 0
EYE PAIN: 0
FREQUENCY: 0
JOINT SWELLING: 0
ARTHRALGIAS: 0
HEMATOCHEZIA: 0
CHILLS: 0
PARESTHESIAS: 0
HEMATURIA: 0
DIARRHEA: 1

## 2023-03-27 ASSESSMENT — ACTIVITIES OF DAILY LIVING (ADL): CURRENT_FUNCTION: NO ASSISTANCE NEEDED

## 2023-03-27 NOTE — PATIENT INSTRUCTIONS
Patient Education   Personalized Prevention Plan  You are due for the preventive services outlined below.  Your care team is available to assist you in scheduling these services.  If you have already completed any of these items, please share that information with your care team to update in your medical record.  Health Maintenance Due   Topic Date Due     Heart Failure Action Plan  Never done     COVID-19 Vaccine (5 - Booster for Pfizer series) 12/30/2021     Colorectal Cancer Screening  03/10/2022     Basic Metabolic Panel  09/07/2022     PHQ-2 (once per calendar year)  01/01/2023     ANNUAL REVIEW OF HM ORDERS  03/01/2023     Liver Monitoring Lab  03/07/2023     Cholesterol Lab  03/07/2023     Kidney Microalbumin Urine Test  03/07/2023     FALL RISK ASSESSMENT  03/07/2023     Complete Blood Count  03/07/2023     Annual Wellness Visit  03/07/2023     Thyroid Function Lab  03/07/2023     Prostate Test  04/19/2023

## 2023-03-27 NOTE — LETTER
[facility]  [address] [place] [address]  [phone]                    April 4, 2023    Amadou Wade  [address] [address] [address]      Dear Amadou,    Here is a summary of your recent test results:      -FIT test (screening test for colon cancer) was normal.     We advise:     FIT annually   [facility].     Your test results are enclosed.      Please contact me if you have any questions.           Thank you very much for trusting [facility].     Healthy regards,          Johnathan Sampson M.D.        Results for orders placed or performed in visit on 03/27/23   Comprehensive metabolic panel     Status: Abnormal   Result Value Ref Range    Sodium 143 136 - 145 mmol/L    Potassium 4.1 3.4 - 5.3 mmol/L    Chloride 105 98 - 107 mmol/L    Carbon Dioxide (CO2) 25 22 - 29 mmol/L    Anion Gap 13 7 - 15 mmol/L    Urea Nitrogen 12.6 8.0 - 23.0 mg/dL    Creatinine 0.82 0.67 - 1.17 mg/dL    Calcium 9.1 8.8 - 10.2 mg/dL    Glucose 69 (L) 70 - 99 mg/dL    Alkaline Phosphatase 111 40 - 129 U/L    AST 34 10 - 50 U/L    ALT 32 10 - 50 U/L    Protein Total 6.6 6.4 - 8.3 g/dL    Albumin 4.0 3.5 - 5.2 g/dL    Bilirubin Total 1.1 <=1.2 mg/dL    GFR Estimate >90 >60 mL/min/1.73m2   Lipid panel reflex to direct LDL Fasting     Status: Normal   Result Value Ref Range    Cholesterol 152 <200 mg/dL    Triglycerides 57 <150 mg/dL    Direct Measure HDL 50 >=40 mg/dL    LDL Cholesterol Calculated 91 <=100 mg/dL    Non HDL Cholesterol 102 <130 mg/dL    Narrative    Cholesterol  Desirable:  <200 mg/dL    Triglycerides  Normal:  Less than 150 mg/dL  Borderline High:  150-199 mg/dL  High:  200-499 mg/dL  Very High:  Greater than or equal to 500 mg/dL    Direct Measure HDL  Female:  Greater than or equal to 50 mg/dL   Male:  Greater than or equal to 40 mg/dL    LDL Cholesterol  Desirable:  <100mg/dL  Above Desirable:  100-129 mg/dL   Borderline High:  130-159  mg/dL   High:  160-189 mg/dL   Very High:  >= 190 mg/dL    Non HDL Cholesterol  Desirable:  130 mg/dL  Above Desirable:  130-159 mg/dL  Borderline High:  160-189 mg/dL  High:  190-219 mg/dL  Very High:  Greater than or equal to 220 mg/dL   CBC with platelets     Status: Abnormal   Result Value Ref Range    WBC Count 4.5 4.0 - 11.0 10e3/uL    RBC Count 5.11 4.40 - 5.90 10e6/uL    Hemoglobin 15.9 13.3 - 17.7 g/dL    Hematocrit 48.6 40.0 - 53.0 %    MCV 95 78 - 100 fL    MCH 31.1 26.5 - 33.0 pg    MCHC 32.7 31.5 - 36.5 g/dL    RDW 13.7 10.0 - 15.0 %    Platelet Count 140 (L) 150 - 450 10e3/uL   CK total     Status: Normal   Result Value Ref Range    CK 73 39 - 308 U/L   UA Macroscopic with reflex to Microscopic and Culture     Status: Abnormal    Specimen: Urine, NOS   Result Value Ref Range    Color Urine Yellow Colorless, Straw, Light Yellow, Yellow    Appearance Urine Clear Clear    Glucose Urine Negative Negative mg/dL    Bilirubin Urine Negative Negative    Ketones Urine Negative Negative mg/dL    Specific Gravity Urine 1.020 1.003 - 1.035    Blood Urine Negative Negative    pH Urine 7.0 5.0 - 7.0    Protein Albumin Urine 30 (A) Negative mg/dL    Urobilinogen Urine 4.0 (A) 0.2, 1.0 E.U./dL    Nitrite Urine Negative Negative    Leukocyte Esterase Urine Negative Negative   Albumin Random Urine Quantitative with Creat Ratio     Status: Abnormal   Result Value Ref Range    Creatinine Urine mg/dL 111.0 mg/dL    Albumin Urine mg/L 60.9 mg/L    Albumin Urine mg/g Cr 54.86 (H) 0.00 - 17.00 mg/g Cr   TSH with free T4 reflex     Status: Normal   Result Value Ref Range    TSH 1.59 0.30 - 4.20 uIU/mL   Prostate Specific Antigen Screen     Status: Normal   Result Value Ref Range    Prostate Specific Antigen Screen 3.30 0.00 - 6.50 ng/mL    Narrative    This result is obtained using the Roche Elecsys total PSA method on the ena [id number] immunoassay analyzer. Results obtained with different assay methods or kits cannot be used  interchangeably.   Fecal colorectal cancer screen FIT     Status: Normal   Result Value Ref Range    Occult Blood Screen FIT Negative Negative   BNP-N terminal pro     Status: Normal   Result Value Ref Range    N Terminal Pro BNP Outpatient 1,464 0 - 1,800 pg/mL   UA Microscopic with Reflex to Culture     Status: Abnormal   Result Value Ref Range    Bacteria Urine Few (A) None Seen /HPF    RBC Urine 0-2 0-2 /HPF /HPF    WBC Urine 0-5 0-5 /HPF /HPF    Squamous Epithelials Urine Few (A) None Seen /LPF    Mucus Urine Present (A) None Seen /LPF    Narrative    Urine Culture not indicated

## 2023-03-27 NOTE — PROGRESS NOTES
"[facility] Scar Tobar is a 78 year old who presents for Preventive Visit.    No flowsheet data found.     Patient has been advised of split billing requirements and indicates understanding: Yes  Are you in the first 12 months of your Medicare coverage?  No    Healthy Habits:     In general, how would you rate your overall health?  Good    Frequency of exercise:  2-3 days/week    Duration of exercise:  Other    Do you usually eat at least 4 servings of fruit and vegetables a day, include whole grains    & fiber and avoid regularly eating high fat or \"junk\" foods?  Yes    Taking medications regularly:  Yes    Medication side effects:  None    Ability to successfully perform activities of daily living:  No assistance needed    Home Safety:  No safety concerns identified    Hearing Impairment:  Need to ask people to speak up or repeat themselves and difficulty understanding soft or whispered speech    In the past 6 months, have you been bothered by leaking of urine? Yes    In general, how would you rate your overall mental or emotional health?  Good      PHQ-2 Total Score: 0    Additional concerns today:  No    Have you ever done Advance Care Planning? (For example, a Health Directive, POLST, or a discussion with a medical provider or your loved ones about your wishes): Yes, advance care planning is on file.     Fall risk  Fallen 2 or more times in the past year?: Yes  Any fall with injury in the past year?: No    Cognitive Screening   1) Repeat 3 items (Leader, Season, Table)    2) Clock draw: NORMAL  3) 3 item recall: Recalls 3 objects  Results: 3 items recalled: COGNITIVE IMPAIRMENT LESS LIKELY    Mini-CogTM Copyright JOSE Rucker. Licensed by the author for use in [facility]; reprinted with permission ([email or web address]). All rights reserved.      Do you have sleep apnea, excessive snoring or daytime drowsiness?: yes    Reviewed and updated as needed this visit by clinical " staff   Tobacco  Allergies  Meds  Problems  Med Hx  Surg Hx  Fam Hx          Reviewed and updated as needed this visit by Provider                 Social History     Tobacco Use     Smoking status: Never     Smokeless tobacco: Never   Substance Use Topics     Alcohol use: Yes     Alcohol/week: 0.0 - 1.0 standard drinks     Comment: 0-1 per month avg         Alcohol Use 3/27/2023   Prescreen: >3 drinks/day or >7 drinks/week? No   No flowsheet data found.  Do you have a current opioid prescription? No  Do you use any other controlled substances or medications that are not prescribed by a provider? None      HFrEF / [facility] - [facility] - no sob - no edema    Hyperlipidemia Follow-Up      Are you regularly taking any medication or supplement to lower your cholesterol?   Yes- Crestor    Are you having muscle aches or other side effects that you think could be caused by your cholesterol lowering medication?  No     Recent Labs   Lab Test 03/07/22  0825 03/01/21  0950 09/15/16  0827 06/16/15  0919   CHOL 157 172   < > 145   HDL 51 58   < > 50   LDL 89 102*   < > 84   TRIG 85 60   < > 53   CHOLHDLRATIO  --   --   --  2.9    < > = values in this interval not displayed.     Hypertension Follow-up      Do you check your blood pressure regularly outside of the clinic? Yes     Are you following a low salt diet? Yes    Are your blood pressures ever more than 140 on the top number (systolic) OR more   than 90 on the bottom number (diastolic), for example 140/90? Yes    BP Readings from Last 3 Encounters:   03/27/23 122/78   07/05/22 108/71   04/19/22 130/70     Creatinine   Date Value Ref Range Status   03/07/2022 1.02 0.66 - 1.25 mg/dL Final   03/01/2021 0.96 0.66 - 1.25 mg/dL Final     GFR Estimate   Date Value Ref Range Status   03/07/2022 77 >60 mL/min/1.73m2 Final     Comment:     Effective December 21, 2021 eGFRcr in adults is calculated using the 2021 CKD-EPI creatinine equation which includes age and  gender (Seymour patel al., [place], DOI: 10.1056/ZNMMah1274241)   03/01/2021 78 >60 mL/min/[1.73_m2] Final     Comment:     Non  GFR Calc  Starting 12/18/2018, serum creatinine based estimated GFR (eGFR) will be   calculated using the Chronic Kidney Disease Epidemiology Collaboration   (CKD-EPI) equation.       Hx of Skin cancer - Dr Santizo - recent visit    Recent constipation best with metamucil and apple sauce    Current providers sharing in care for this patient include:     Patient Care Team:  Johnathan Sampson MD as PCP - General  Johnathan Sampson MD as Assigned PCP  Leidy Montes De Oca PA-C as Assigned OBGYN Provider  Andre Santizo MD as Assigned Surgical Provider  Aleksandra Mclain PA-C as Assigned Heart and Vascular Provider  Jean Pierre Rendon MD as MD (Cardiovascular Disease)    The following health maintenance items are reviewed in Epic and correct as of today:  Health Maintenance   Topic Date Due     HF ACTION PLAN  Never done     COVID-19 Vaccine (5 - Booster for Pfizer series) 12/30/2021     COLORECTAL CANCER SCREENING  03/10/2022     BMP  09/07/2022     ALT  03/07/2023     LIPID  03/07/2023     MICROALBUMIN  03/07/2023     TSH W/FREE T4 REFLEX  03/07/2023     PSA  04/19/2023     MEDICARE ANNUAL WELLNESS VISIT  03/27/2024     ANNUAL REVIEW OF HM ORDERS  03/27/2024     FALL RISK ASSESSMENT  03/27/2024     CBC  03/27/2024     ADVANCE CARE PLANNING  03/27/2028     DTAP/TDAP/TD IMMUNIZATION (4 - Td or Tdap) 01/07/2029     HEPATITIS C SCREENING  Completed     PHQ-2 (once per calendar year)  Completed     INFLUENZA VACCINE  Completed     Pneumococcal Vaccine: 65+ Years  Completed     ZOSTER IMMUNIZATION  Completed     IPV IMMUNIZATION  Aged Out     MENINGITIS IMMUNIZATION  Aged Out     Review of Systems   Constitutional: Negative for chills and fever.   HENT: Negative for congestion, ear pain, hearing loss and sore throat.    Eyes: Negative for pain and visual disturbance.   Respiratory: Negative for  cough and shortness of breath.    Cardiovascular: Negative for chest pain, palpitations and peripheral edema.   Gastrointestinal: Positive for constipation and diarrhea. Negative for abdominal pain, heartburn, hematochezia and nausea.   Genitourinary: Positive for impotence. Negative for dysuria, frequency, genital sores, hematuria, penile discharge and urgency.   Musculoskeletal: Negative for arthralgias, joint swelling and myalgias.   Skin: Negative for rash.   Neurological: Negative for dizziness, weakness, headaches and paresthesias.   Psychiatric/Behavioral: Negative for mood changes. The patient is not nervous/anxious.      Health Maintenance     [facility]   [facility]  [facility]              PSA:  pending   DEXA:  NA    Health Maintenance Due   Topic Date Due     HF ACTION PLAN  Never done     COVID-19 Vaccine (5 - Booster for Pfizer series) 12/30/2021     COLORECTAL CANCER SCREENING  03/10/2022     BMP  09/07/2022     ALT  03/07/2023     LIPID  03/07/2023     MICROALBUMIN  03/07/2023     TSH W/FREE T4 REFLEX  03/07/2023     PSA  04/19/2023       Current Problem List    Patient Active Problem List   Diagnosis     Cardiac dysrhythmia     Hyperlipidemia LDL goal <130     Advanced directives, counseling/discussion     ED (erectile dysfunction)     Hypertension goal BP (blood pressure) < 140/90     Recurrent cold sores     Squamous 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     Atrial fibrillation with rapid ventricular response (H)     Atrial fibrillation (H)     CHF (congestive heart failure) (H)     HFrEF (heart failure with reduced ejection fraction) (H)       Past Medical History    Past Medical History:   Diagnosis Date     CHF (congestive heart failure) (H) 12/2021    HFrEF - Dr Cordova     Chronic rhinitis     resolved     [facility]     HFrEF (heart failure with reduced ejection fraction) (H) 12/2021    Dr Cordova     Hypertension goal BP (blood pressure) < 140/90 12/2017     Persistent atrial  fibrillation (H) 12/2021    in/out, rate controlled, at times, Dr Montenegro     Pneumonia, organism unspecified(486) 1993     Snoring     no sleep apnea - mouth guard     Squamous 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 2021    Dr Abernathy/Darlyn - Rt Chest     Unspecified hemorrhoids without mention of complication 04/2006    internal       Past Surgical History    Past Surgical History:   Procedure Laterality Date     ANESTHESIA CARDIOVERSION N/A 01/11/2022    Procedure: ANESTHESIA, FOR CARDIOVERSION;  Surgeon: GENERIC ANESTHESIA PROVIDER;  Location: [facility] OR     COLONOSCOPY  01/03/2012    incomplete - negative, ACBE incomplete - negative, CT colography negative     [facility]OSCOPY Bilateral [facility]    [facility]     ganglion cyst removal Left 09/02/2021     HC REMOVE TONSILS/ADENOIDS,<13 Y/O  1953    T & A <14y.o.     SIGMOIDOSCOPY,DIAGNOSTIC  1997    normal     STRESS ECHO (METRO)  03/2007    normal few pac's, few pvc's     SURGICAL HISTORY OF -  Bilateral     fall, 2020 - bilateral IOL     ZZHC COLONOSCOPY THRU STOMA, DIAGNOSTIC  05/2006    dr tejeda - normal - internal hemorrhiods - due 10 yrs       Current Medications    Current Outpatient Medications   Medication Sig Dispense Refill     ACE/ARB/ARNI NOT PRESCRIBED (INTENTIONAL) Please choose reason not prescribed from choices below.       apixaban ANTICOAGULANT (ELIQUIS) 5 MG tablet Take 1 tablet (5 mg) by mouth 2 times daily 180 tablet 3     diltiazem ER COATED BEADS (CARDIZEM CD) 120 MG 24 hr capsule Take 1 capsule (120 mg) by mouth daily 90 capsule 3     furosemide (LASIX) 20 MG tablet Take 1 tablet (20 mg) by mouth daily as needed 60 tablet 5     metoprolol tartrate (LOPRESSOR) 50 MG tablet Take 1.5 tablets (75 mg) by mouth 2 times daily 270 tablet 3     psyllium (METAMUCIL/KONSYL) 58.6 % powder 1 tsp twice daily       rosuvastatin (CRESTOR) 10 MG tablet Take 1 tablet (10 mg) by mouth At Bedtime 90 tablet 0     valACYclovir (VALTREX) 1000 mg tablet daily as needed           Allergies    Allergies   Allergen Reactions     Extract Of Poison Ivy Itching and Rash     Itchiness, redness, swelling       Immunizations    Immunization History   Administered Date(s) Administered     COVID-19 Vaccine 12+ (Pfizer) 2021, 2021, 2021, 2021     FLU 6-35 months 2018, 10/09/2019     FLUAD(HD)65+ QUAD 10/13/2022     Flu, Unspecified 2007, 10/28/2008, 10/02/2009, 10/20/2010, 10/18/2011, 10/25/2012, 10/24/2013, 10/15/2014, 10/09/2019, 2020     Influenza (High Dose) 3 valent vaccine 10/29/2015, 10/27/2016, 10/26/2017, 2021     Influenza (IIV3) PF 2004, 10/20/2011, 10/30/2012, 10/15/2015     Influenza Vaccine 65+ (Fluzone HD) 2020     Influenza Vaccine >6 months (Alfuria,Fluzone) 10/15/2017, 2018, 2021     MMR 10/15/2004     Pneumo Conj 13-V (2010&after) 2014, 2015     Pneumococcal 23 valent 2006, 2012     Pneumococcal, Unspecified 2012     Poliovirus, inactivated (IPV) 10/15/2004     TD,PF 7+ (Tenivac) 2001     TDAP Vaccine (Adacel) 2014, 2019     TDAP Vaccine (Boostrix) 10/29/2009     Twinrix A/B 10/15/2004, 2004, 2006     Typhoid IM 10/15/2004, 10/14/2016     Typhoid Oral 10/29/2009     Zoster recombinant adjuvanted (SHINGRIX) 2020, 2020     Zoster vaccine, live 2011       Family History    Family History   Problem Relation Age of Onset     Hypertension Mother      Cancer Father         lung     Eye Disorder Father         Glaucoma     Alzheimer Disease Father      Heart Disease Father      Diabetes Father         type 2     Diabetes Brother         type 2     Coronary Artery Disease Brother      Cerebrovascular Disease Maternal Grandmother      Heart Disease Maternal Grandfather          young heartattack     Diabetes Paternal Grandfather      Cancer Brother         melanoma     Alzheimer Disease Brother      Prostate Cancer Brother       Cerebrovascular Disease Brother        Social History    Social History     Socioeconomic History     Marital status:      Spouse name: Dania     Number of children: 3     Years of education: 19     Highest education level: Not on file   Occupational History     Occupation:       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   Tobacco Use     Smoking status: Never     Smokeless tobacco: Never   Vaping Use     Vaping Use: Never used   Substance and Sexual Activity     Alcohol use: Yes     Alcohol/week: 0.0 - 1.0 standard drinks     Comment: 0-1 per month avg     Drug use: No     Sexual activity: Yes     Partners: Female   Other Topics Concern      Service Not Asked     Blood Transfusions Not Asked     Caffeine Concern Yes     Comment: occas     Occupational Exposure Not Asked     Hobby Hazards Not Asked     Sleep Concern Not Asked     Stress Concern Not Asked     Weight Concern Not Asked     Special Diet Not Asked     Back Care Not Asked     Exercise Yes     Comment: active     Bike Helmet Not Asked     Seat Belt Yes     Self-Exams Not Asked     Parent/sibling w/ CABG, MI or angioplasty before 65F 55M? No   Social History Narrative     Not on file     Social Determinants of Health     Financial Resource Strain: Not on file   Food Insecurity: Not on file   Transportation Needs: Not on file   Physical Activity: Not on file   Stress: Not on file   Social Connections: Not on file   Intimate Partner Violence: Not on file   Housing Stability: Not on file       ROS    CONSTITUTIONAL: NEGATIVE for fever, chills, change in weight  INTEGUMENTARY/SKIN: NEGATIVE for worrisome rashes, moles or lesions  EYES: NEGATIVE for vision changes or irritation  ENT/MOUTH: NEGATIVE for ear, mouth and throat problems  RESP: NEGATIVE for significant cough or SOB  BREAST: NEGATIVE for masses, tenderness or discharge  CV: NEGATIVE for chest pain, palpitations or peripheral edema  GI: NEGATIVE for nausea, abdominal pain, heartburn, or  "change in bowel habits  : NEGATIVE for frequency, dysuria, or hematuria  MUSCULOSKELETAL: NEGATIVE for significant arthralgias or myalgia  NEURO: NEGATIVE for weakness, dizziness or paresthesias  ENDOCRINE: NEGATIVE for temperature intolerance, skin/hair changes  HEME: NEGATIVE for bleeding problems  PSYCHIATRIC: NEGATIVE for changes in mood or affect    OBJECTIVE    /78   Pulse 68   Temp 97.9  F (36.6  C) (Tympanic)   Resp 16   Ht 1.753 m (5' 9\")   Wt 75.3 kg (165 lb 14.4 oz)   SpO2 98%   BMI 24.50 kg/m      EXAM:    GENERAL: healthy, alert and no distress  EYES: Eyes grossly normal to inspection, PERRL and conjunctivae and sclerae normal  HENT: ear canals and TM's normal, nose and mouth without ulcers or lesions  NECK: no adenopathy, no asymmetry, masses, or scars and thyroid normal to palpation  RESP: lungs clear to auscultation - no rales, rhonchi or wheezes  CV: regular rate and rhythm, normal S1 S2, no S3 or S4, no murmur, click or rub, no peripheral edema and peripheral pulses strong  ABDOMEN: soft, nontender, no hepatosplenomegaly, no masses and bowel sounds normal   (male): per urology  RECTAL: per urology  MS: no gross musculoskeletal defects noted, no edema  SKIN: no suspicious lesions or rashes  NEURO: Normal strength and tone, mentation intact and speech normal  PSYCH: mentation appears normal, affect normal/bright  LYMPH: no cervical, supraclavicular, axillary, or inguinal adenopathy    DIAGNOSTICS/PROCEDURES    Pending    ASSESSMENT      ICD-10-CM    1. Routine general medical examination at a health care facility  Z00.00 REVIEW OF HEALTH MAINTENANCE PROTOCOL ORDERS     PRIMARY CARE FOLLOW-UP SCHEDULING     Comprehensive metabolic panel     Lipid panel reflex to direct LDL Fasting     CBC with platelets     CK total     UA Macroscopic with reflex to Microscopic and Culture     Albumin Random Urine Quantitative with Creat Ratio     TSH with free T4 reflex     Prostate Specific Antigen " Screen     Fecal colorectal cancer screen FIT     BNP-N terminal pro     Comprehensive metabolic panel     Lipid panel reflex to direct LDL Fasting     CBC with platelets     CK total     UA Macroscopic with reflex to Microscopic and Culture     Albumin Random Urine Quantitative with Creat Ratio     TSH with free T4 reflex     Prostate Specific Antigen Screen     Fecal colorectal cancer screen FIT     BNP-N terminal pro      2. Medicare annual wellness visit, subsequent  Z00.00 REVIEW OF HEALTH MAINTENANCE PROTOCOL ORDERS     PRIMARY CARE FOLLOW-UP SCHEDULING     Comprehensive metabolic panel     Lipid panel reflex to direct LDL Fasting     CBC with platelets     CK total     UA Macroscopic with reflex to Microscopic and Culture     Albumin Random Urine Quantitative with Creat Ratio     TSH with free T4 reflex     Prostate Specific Antigen Screen     Fecal colorectal cancer screen FIT     BNP-N terminal pro     Comprehensive metabolic panel     Lipid panel reflex to direct LDL Fasting     CBC with platelets     CK total     UA Macroscopic with reflex to Microscopic and Culture     Albumin Random Urine Quantitative with Creat Ratio     TSH with free T4 reflex     Prostate Specific Antigen Screen     Fecal colorectal cancer screen FIT     BNP-N terminal pro      3. HFrEF (heart failure with reduced ejection fraction) (H)  I50.20 REVIEW OF HEALTH MAINTENANCE PROTOCOL ORDERS     Comprehensive metabolic panel     Lipid panel reflex to direct LDL Fasting     TSH with free T4 reflex     BNP-N terminal pro     Comprehensive metabolic panel     Lipid panel reflex to direct LDL Fasting     TSH with free T4 reflex     BNP-N terminal pro     furosemide (LASIX) 20 MG tablet     OFFICE/OUTPT VISIT,EST,LEVL III      4. Paroxysmal atrial fibrillation (H)  I48.0 REVIEW OF HEALTH MAINTENANCE PROTOCOL ORDERS     Comprehensive metabolic panel     Lipid panel reflex to direct LDL Fasting     TSH with free T4 reflex     BNP-N terminal  pro     Comprehensive metabolic panel     Lipid panel reflex to direct LDL Fasting     TSH with free T4 reflex     BNP-N terminal pro     furosemide (LASIX) 20 MG tablet     OFFICE/OUTPT VISIT,EST,LEVL III      5. Hypertension goal BP (blood pressure) < 140/90  I10 REVIEW OF HEALTH MAINTENANCE PROTOCOL ORDERS     Comprehensive metabolic panel     UA Macroscopic with reflex to Microscopic and Culture     Albumin Random Urine Quantitative with Creat Ratio     Comprehensive metabolic panel     UA Macroscopic with reflex to Microscopic and Culture     Albumin Random Urine Quantitative with Creat Ratio     furosemide (LASIX) 20 MG tablet     OFFICE/OUTPT VISIT,EST,LEVL III      6. Hyperlipidemia LDL goal <130  E78.5 REVIEW OF HEALTH MAINTENANCE PROTOCOL ORDERS     Comprehensive metabolic panel     Lipid panel reflex to direct LDL Fasting     CK total     Comprehensive metabolic panel     Lipid panel reflex to direct LDL Fasting     CK total     OFFICE/OUTPT VISIT,EST,LEVL III      7. Constipation, unspecified constipation type  K59.00 psyllium (METAMUCIL/KONSYL) 58.6 % powder     OFFICE/OUTPT VISIT,EST,LEVL III      8. Recurrent cold sores  B00.1 REVIEW OF HEALTH MAINTENANCE PROTOCOL ORDERS     OFFICE/OUTPT VISIT,EST,LEVL III      9. Screen for colon cancer  Z12.11 REVIEW OF HEALTH MAINTENANCE PROTOCOL ORDERS     Fecal colorectal cancer screen FIT     Fecal colorectal cancer screen FIT     OFFICE/OUTPT VISIT,EST,LEVL III      10. Screening for prostate cancer  Z12.5 REVIEW OF HEALTH MAINTENANCE PROTOCOL ORDERS     Prostate Specific Antigen Screen     Prostate Specific Antigen Screen     OFFICE/OUTPT VISIT,EST,LEVL III      11. Medication monitoring encounter  Z51.81 REVIEW OF HEALTH MAINTENANCE PROTOCOL ORDERS     PRIMARY CARE FOLLOW-UP SCHEDULING     Comprehensive metabolic panel     Lipid panel reflex to direct LDL Fasting     CBC with platelets     CK total     UA Macroscopic with reflex to Microscopic and Culture      Albumin Random Urine Quantitative with Creat Ratio     TSH with free T4 reflex     BNP-N terminal pro     Comprehensive metabolic panel     Lipid panel reflex to direct LDL Fasting     CBC with platelets     CK total     UA Macroscopic with reflex to Microscopic and Culture     Albumin Random Urine Quantitative with Creat Ratio     TSH with free T4 reflex     BNP-N terminal pro     OFFICE/OUTPT VISIT,EST,LEVL III          PLAN    Discussed treatment/modality options, including risk and benefits, he desires:    advised alcohol consumption 1oz per day or less, advised 1 multivitamin per day, advised calcium 6802-9906 mg/d and Vitamin D 800-1200 IU/d, advised dentist every 6 months, advised diet and exercise, further health care maintenance, further lab(s), immunization(s), medication refill(s) and observation    Discussed controversies surrounding PSA. Specifically reviewed 2017 USPSTF findings recommending discussion of PSA testing for men ages 55-69.  Reviewed findings of the  Randomized Study of Screening for Prostate Cancer which showed a 30% reduction in advanced stage prostate cancer and a 20% reduction in death rate from prostate cancer in this age group. PSA-based screening may prevent up to 2 deaths and up to 3 cases of metastatic disease per 1,000 men screened over 13 years.    We've elected  to do PSA this year after discussing these controversies.    All diagnosis above reviewed and noted above, otherwise stable.      See [facility] [employer] [facility] orders for further details.      1[facility]    2) labs pending     3) immunizations - declines COVID Bivalent    4) [facility] - [facility]    5) Dermatology every 6 months - Dr Santizo    6) Cardiology    7) [facility]    Return in about 1 year (around 3/27/2024) for Complete Physical, Medication Recheck Visit, Follow Up Acute.    Health Maintenance Due   Topic Date Due     HF ACTION PLAN  Never done     COVID-19 Vaccine (5 - Booster  "for Pfizer series) 12/30/2021     COLORECTAL CANCER SCREENING  03/10/2022     BMP  09/07/2022     ALT  03/07/2023     LIPID  03/07/2023     MICROALBUMIN  03/07/2023     TSH W/FREE T4 REFLEX  03/07/2023     PSA  04/19/2023       COUNSELING    Reviewed preventive health counseling, as reflected in patient instructions    BP Readings from Last 1 Encounters:   03/27/23 122/78     Estimated body mass index is 24.5 kg/m  as calculated from the following:    Height as of this encounter: 1.753 m (5' 9\").    Weight as of this encounter: 75.3 kg (165 lb 14.4 oz).           reports that he has never smoked. He has never used smokeless tobacco.      Counseling Resources:    ATP IV Guidelines  Pooled Cohorts Equation Calculator  FRAX Risk Assessment  ICSI Preventive Guidelines  Dietary Guidelines for Americans, 2010  USDA's MyPlate  ASA Prophylaxis  Lung CA Screening           Johnathan Sampson MD, FAAFP     [facility]  [address] [place] [id number]  [email or web address]   Office: [phone]  Fax: [phone]  Pager: [phone]     Identified Health Risks:    I have reviewed Opioid Use Disorder and Substance Use Disorder risk factors and made any needed referrals.     "

## 2023-03-27 NOTE — LETTER
April 3, 2023      Amadou Wade  [id number] [facility] [address]        Dear ,    We are writing to inform you of your test results.    They showed:     Labs are overall quite good     Mildly elevated protein in your urine   Mildly low platelets (clotting)     We advise:     Continue current cares.   Balanced low cholesterol diet.   Regular exercise.     Resulted Orders   Comprehensive metabolic panel   Result Value Ref Range    Sodium 143 136 - 145 mmol/L    Potassium 4.1 3.4 - 5.3 mmol/L    Chloride 105 98 - 107 mmol/L    Carbon Dioxide (CO2) 25 22 - 29 mmol/L    Anion Gap 13 7 - 15 mmol/L    Urea Nitrogen 12.6 8.0 - 23.0 mg/dL    Creatinine 0.82 0.67 - 1.17 mg/dL    Calcium 9.1 8.8 - 10.2 mg/dL    Glucose 69 (L) 70 - 99 mg/dL    Alkaline Phosphatase 111 40 - 129 U/L    AST 34 10 - 50 U/L    ALT 32 10 - 50 U/L    Protein Total 6.6 6.4 - 8.3 g/dL    Albumin 4.0 3.5 - 5.2 g/dL    Bilirubin Total 1.1 <=1.2 mg/dL    GFR Estimate >90 >60 mL/min/1.73m2      Comment:      eGFR calculated using 2021 CKD-EPI equation.   Lipid panel reflex to direct LDL Fasting   Result Value Ref Range    Cholesterol 152 <200 mg/dL    Triglycerides 57 <150 mg/dL    Direct Measure HDL 50 >=40 mg/dL    LDL Cholesterol Calculated 91 <=100 mg/dL    Non HDL Cholesterol 102 <130 mg/dL    Narrative    Cholesterol  Desirable:  <200 mg/dL    Triglycerides  Normal:  Less than 150 mg/dL  Borderline High:  150-199 mg/dL  High:  200-499 mg/dL  Very High:  Greater than or equal to 500 mg/dL    Direct Measure HDL  Female:  Greater than or equal to 50 mg/dL   Male:  Greater than or equal to 40 mg/dL    LDL Cholesterol  Desirable:  <100mg/dL  Above Desirable:  100-129 mg/dL   Borderline High:  130-159 mg/dL   High:  160-189 mg/dL   Very High:  >= 190 mg/dL    Non HDL Cholesterol  Desirable:  130 mg/dL  Above Desirable:  130-159 mg/dL  Borderline High:  160-189 mg/dL  High:  190-219 mg/dL  Very High:  Greater than or equal to 220 mg/dL    CBC with platelets   Result Value Ref Range    WBC Count 4.5 4.0 - 11.0 10e3/uL    RBC Count 5.11 4.40 - 5.90 10e6/uL    Hemoglobin 15.9 13.3 - 17.7 g/dL    Hematocrit 48.6 40.0 - 53.0 %    MCV 95 78 - 100 fL    MCH 31.1 26.5 - 33.0 pg    MCHC 32.7 31.5 - 36.5 g/dL    RDW 13.7 10.0 - 15.0 %    Platelet Count 140 (L) 150 - 450 10e3/uL   CK total   Result Value Ref Range    CK 73 39 - 308 U/L   UA Macroscopic with reflex to Microscopic and Culture   Result Value Ref Range    Color Urine Yellow Colorless, Straw, Light Yellow, Yellow    Appearance Urine Clear Clear    Glucose Urine Negative Negative mg/dL    Bilirubin Urine Negative Negative    Ketones Urine Negative Negative mg/dL    Specific Gravity Urine 1.020 1.003 - 1.035    Blood Urine Negative Negative    pH Urine 7.0 5.0 - 7.0    Protein Albumin Urine 30 (A) Negative mg/dL    Urobilinogen Urine 4.0 (A) 0.2, 1.0 E.U./dL    Nitrite Urine Negative Negative    Leukocyte Esterase Urine Negative Negative   Albumin Random Urine Quantitative with Creat Ratio   Result Value Ref Range    Creatinine Urine mg/dL 111.0 mg/dL      Comment:      The reference ranges have not been established in urine creatinine. The results should be integrated into the clinical context for interpretation.    Albumin Urine mg/L 60.9 mg/L      Comment:      The reference ranges have not been established in urine albumin. The results should be integrated into the clinical context for interpretation.    Albumin Urine mg/g Cr 54.86 (H) 0.00 - 17.00 mg/g Cr      Comment:      Microalbuminuria is defined as an albumin:creatinine ratio of 17 to 299 for males and 25 to 299 for females. A ratio of albumin:creatinine of 300 or higher is indicative of overt proteinuria.  Due to biologic variability, positive results should be confirmed by a second, first-morning random or 24-hour timed urine specimen. If there is discrepancy, a third specimen is recommended. When 2 out of 3 results are in the  microalbuminuria range, this is evidence for incipient nephropathy and warrants increased efforts at glucose control, blood pressure control, and institution of therapy with an angiotensin-converting-enzyme (ACE) inhibitor (if the patient can tolerate it).     TSH with free T4 reflex   Result Value Ref Range    TSH 1.59 0.30 - 4.20 uIU/mL   Prostate Specific Antigen Screen   Result Value Ref Range    Prostate Specific Antigen Screen 3.30 0.00 - 6.50 ng/mL    Narrative    This result is obtained using the Roche Elecsys total PSA method on the ena e801 immunoassay analyzer. Results obtained with different assay methods or kits cannot be used interchangeably.   BNP-N terminal pro   Result Value Ref Range    N Terminal Pro BNP Outpatient 1,464 0 - 1,800 pg/mL      Comment:      Reference range shown and results flagged as abnormal are for the outpatient, non acute settings. Establishing a baseline value for each individual patient is useful for follow-up.    Suggested inpatient cut points for confirming diagnosis of CHF in an acute setting are:  >450 pg/mL (age 18 to less than 50)  >900 pg/mL (age 50 to less than 75)  >1800 pg/mL (75 yrs and older)    An inpatient or emergency department NT-proPBNP <300 pg/mL effectively rules out acute CHF, with 99% negative predictive value.       UA Microscopic with Reflex to Culture   Result Value Ref Range    Bacteria Urine Few (A) None Seen /HPF    RBC Urine 0-2 0-2 /HPF /HPF    WBC Urine 0-5 0-5 /HPF /HPF    Squamous Epithelials Urine Few (A) None Seen /LPF    Mucus Urine Present (A) None Seen /LPF    Narrative    Urine Culture not indicated       If you have any questions or concerns, please call the clinic at the number listed above.       Sincerely,      Johnathan Sampson MD

## 2023-03-27 NOTE — PROGRESS NOTES
"SUBJECTIVE:   Amadou is a 78 year old who presents for Preventive Visit.  No flowsheet data found.{Split Bill scripting  The purpose of this visit is to discuss your medical history and prevent health problems before you are sick. You may be responsible for a co-pay, coinsurance, or deductible if your visit today includes services such as checking on a sore throat, having an x-ray or lab test, or treating and evaluating a new or existing condition :816787}  {Patient advised of split billing (Optional):990768}  Are you in the first 12 months of your Medicare coverage?  { :897876::\"No\"}    Healthy Habits:    In general, how would you rate your overall health?  Good    Frequency of exercise:  2-3 days/week    Duration of exercise:  Other    Do you usually eat at least 4 servings of fruit and vegetables a day, include whole grains    & fiber and avoid regularly eating high fat or \"junk\" foods?  Yes    Taking medications regularly:  Yes (Incomplete)    Medication side effects:  None (Incomplete)    PHQ-2 Total Score:PHQ2 Score: Incomplete.      Have you ever done Advance Care Planning? (For example, a Health Directive, POLST, or a discussion with a medical provider or your loved ones about your wishes): Yes, advance care planning is on file.    {Hearing Test Done (Optional):813000}     Fall risk     {If any of the above assessments are answered yes, consider ordering appropriate referrals (Optional):254319::\"click delete button to remove this line now\"}  Cognitive Screening   1) Repeat 3 items (Leader, Season, Table)  {Get patient's attention, then say, \"I am going to say three words that I want you to remember now and later.  The words are Leader, Season, and Table.  Please say them for me now.\"  If pt. unable after 3 tries, go to next item}  2) Clock draw: {Say the following phrases in order: \"Please draw a clock.  Start by drawing a large [place].  Put all the numbers in the [place] and set the hands to show 11:10 (10 " "past 11).\"  If pt cannot complete in 3 minutes, stop and ask for recall items.  \"NORMAL\" test = all twelve numbers in correct location, and clock hands correctly designating 11:10}{ :57094::\"NORMAL\"}  3) 3 item recall: {Say: \"What were the three words I asked you to remember?\"}{ :643733}  Results: {MINICOG RESULTS:555315}    Mini-CogTM Copyright JOSE Rucker. Licensed by the author for use in [facility] 1jiajie; reprinted with permission (soob@[email or web address]). All rights reserved.      {Do you have sleep apnea, excessive snoring or daytime drowsiness? (Optional):504219}    Reviewed and updated as needed this visit by clinical staff                  Reviewed and updated as needed this visit by Provider                 Social History     Tobacco Use     Smoking status: Never     Smokeless tobacco: Never   Substance Use Topics     Alcohol use: Yes     Alcohol/week: 0.0 - 1.0 standard drinks     Comment: 0-1 per month avg     {Rooming staff  Click this link to complete the Prescreen if response below is not for today's visit  Alcohol Use Prescreen >3 drinks/day or > 7 drinks/week.  If the prescreen question answer is YES, complete the full AUDIT  :497436}    Alcohol Use 3/27/2023   Prescreen: >3 drinks/day or >7 drinks/week? No   No flowsheet data found.  Do you have a current opioid prescription? { :575140}  Do you use any other controlled substances or medications that are not prescribed by a provider? {Substance Use :156822::\"None\"}  {Provider  If there are gaps in the social history shown above, please follow the link and refresh the note Link to Social and Substance History :803614}    {Outside tests to abstract? :199990}    Hypertension Follow-up      Do you check your blood pressure regularly outside of the clinic? { :269848}     Are you following a low salt diet? { :384515}    Are your blood pressures ever more than 140 on the top number (systolic) OR more   than 90 on the bottom number (diastolic), for example " "140/90? { :243533}      Current providers sharing in care for this patient include: {Rooming staff:  Please update Care Team from storyboard, refresh this note and then delete this statement}  Patient Care Team:  Johnathan Sampson MD as PCP - General  Johnathan Sampson MD as Assigned PCP  Leidy Montes De Oca PA-C as Assigned OBGYN Provider  Andre Santizo MD as Assigned Surgical Provider  Aleksandra Mclain PA-C as Assigned Heart and Vascular Provider  Jean Pierre Rendon MD as MD (Cardiovascular Disease)    The following health maintenance items are reviewed in Epic and correct as of today:  Health Maintenance   Topic Date Due     HF ACTION PLAN  Never done     COVID-19 Vaccine (5 - Booster for Pfizer series) 12/30/2021     COLORECTAL CANCER SCREENING  03/10/2022     BMP  09/07/2022     PHQ-2 (once per calendar year)  01/01/2023     ANNUAL REVIEW OF HM ORDERS  03/01/2023     ALT  03/07/2023     LIPID  03/07/2023     MICROALBUMIN  03/07/2023     FALL RISK ASSESSMENT  03/07/2023     CBC  03/07/2023     MEDICARE ANNUAL WELLNESS VISIT  03/07/2023     TSH W/FREE T4 REFLEX  03/07/2023     PSA  04/19/2023     ADVANCE CARE PLANNING  03/07/2027     DTAP/TDAP/TD IMMUNIZATION (4 - Td or Tdap) 01/07/2029     HEPATITIS C SCREENING  Completed     INFLUENZA VACCINE  Completed     Pneumococcal Vaccine: 65+ Years  Completed     ZOSTER IMMUNIZATION  Completed     IPV IMMUNIZATION  Aged Out     MENINGITIS IMMUNIZATION  Aged Out     {Chronicprobdata (optional):164451}  {Decision Support (Optional):136553}        Review of Systems  {ROS COMP (Optional):266449}    OBJECTIVE:   There were no vitals taken for this visit. Estimated body mass index is 24.59 kg/m  as calculated from the following:    Height as of 7/5/22: 1.727 m (5' 8\").    Weight as of 7/5/22: 73.3 kg (161 lb 11.2 oz).  Physical Exam  {Exam (Optional) :475703}    {Diagnostic Test Results (Optional):009366::\"Diagnostic Test Results:\",\"Labs reviewed in Epic\"}    ASSESSMENT / PLAN: "   {Dia Picklist:699732}    {Patient advised of split billing (Optional):318056}      COUNSELING:  {Medicare Counselin}        He reports that he has never smoked. He has never used smokeless tobacco.      Appropriate preventive services were discussed with this patient, including applicable screening as appropriate for cardiovascular disease, diabetes, osteopenia/osteoporosis, and glaucoma.  As appropriate for age/gender, discussed screening for colorectal cancer, prostate cancer, breast cancer, and cervical cancer. Checklist reviewing preventive services available has been given to the patient.    Reviewed patients plan of care and provided an AVS. The {CarePlan:774205} for Amadou meets the Care Plan requirement. This Care Plan has been established and reviewed with the {PATIENT, FAMILY MEMBER, CAREGIVER:774625}.    {Counseling Resources  [place] Preventive Services Task Force  Cholesterol Screening  Health diet/nutrition  Pooled Cohorts Equation Calculator  USDA's MyPlate  ASA Prophylaxis  Lung CA Screening  Osteoporosis prevention/bone health :424823}  {Prostate Cancer Screening  Consider for men 55-69 per guidance from USPSTF :782053}    Johnathan Sampson MD  [facility]    Identified Health Risks:  {Medicare required documentation of substance and opioid use disorders screening :076521}

## 2023-03-29 ENCOUNTER — TRANSFERRED RECORDS (OUTPATIENT)
Dept: HEALTH INFORMATION MANAGEMENT | Facility: CLINIC | Age: 78
End: 2023-03-29
Payer: COMMERCIAL

## 2023-04-01 PROCEDURE — 82274 ASSAY TEST FOR BLOOD FECAL: CPT | Performed by: FAMILY MEDICINE

## 2023-04-02 PROBLEM — I48.91 ATRIAL FIBRILLATION (H): Status: ACTIVE | Noted: 2021-12-17

## 2023-04-02 PROBLEM — I50.9 CHF (CONGESTIVE HEART FAILURE) (H): Status: ACTIVE | Noted: 2021-12-01

## 2023-04-02 PROBLEM — I50.20 HFREF (HEART FAILURE WITH REDUCED EJECTION FRACTION) (H): Status: ACTIVE | Noted: 2021-12-23

## 2023-04-03 LAB — HEMOCCULT STL QL IA: NEGATIVE

## 2023-04-07 PROBLEM — Z86.0100 HISTORY OF COLONIC POLYPS: Status: ACTIVE | Noted: 2023-03-01

## 2023-05-03 ENCOUNTER — OFFICE VISIT (OUTPATIENT)
Dept: CARDIOLOGY | Facility: CLINIC | Age: 78
End: 2023-05-03
Attending: INTERNAL MEDICINE
Payer: COMMERCIAL

## 2023-05-03 VITALS
SYSTOLIC BLOOD PRESSURE: 132 MMHG | WEIGHT: 165 LBS | BODY MASS INDEX: 24.44 KG/M2 | DIASTOLIC BLOOD PRESSURE: 88 MMHG | HEART RATE: 59 BPM | HEIGHT: 69 IN | OXYGEN SATURATION: 97 %

## 2023-05-03 DIAGNOSIS — I48.19 PERSISTENT ATRIAL FIBRILLATION (H): ICD-10-CM

## 2023-05-03 DIAGNOSIS — I50.31 ACUTE DIASTOLIC CONGESTIVE HEART FAILURE (H): ICD-10-CM

## 2023-05-03 DIAGNOSIS — I48.91 ATRIAL FIBRILLATION WITH RAPID VENTRICULAR RESPONSE (H): ICD-10-CM

## 2023-05-03 PROCEDURE — 93000 ELECTROCARDIOGRAM COMPLETE: CPT | Performed by: INTERNAL MEDICINE

## 2023-05-03 PROCEDURE — 99214 OFFICE O/P EST MOD 30 MIN: CPT | Performed by: INTERNAL MEDICINE

## 2023-05-03 NOTE — PROGRESS NOTES
"Electrophysiology/ Clinic Note         H&P and Plan:     REASON FOR VISIT: Electrophysiology evaluation.      HISTORY OF PRESENT ILLNESS: Patient is a pleasant 78-year-old gentleman with a history of hyperlipidemia, permanent atrial fibrillation and preserved EF heart failure, who is here for evaluation.    Patient was previously followed by Dr. Montenegro, who recently retired.  He has been on persistent atrial fibrillation since February of last year.  He is currently on a combination of metoprolol (75 mg twice daily) and diltiazem 120 mg daily.    Today, he informs he is doing well.  He is not taking any Lasix and  appears to be euvolemic on physical exam.  He has no complaints into his visit.  He denies chest pain, shortness of breath, lightheadedness, near-syncope or syncope.    EKG shows A-fib with good heart rate control and incomplete RBBB pattern.  Labs obtained in March showed normal CBC and BMP.    PREVIOUS STUDIES (personally reviewed):  -Echo (12/17/2021): EF of 50-55%.  Mild LVH.  -Holter (2/1/2022): Persistent A-fib.  Average heart rate of 97 bpm (HR ranged from 73 to 136 bpm).      ASSESSMENT AND PLAN:   1. Permanent atrial fibrillation.  Heart rate is well controlled.  Continue therapy with diltiazem and metoprolol.   2.  Embolic prevention.  CHADS-VASc score of 3.  Continue anticoagulation with Eliquis indefinitely.     We will follow-up in a year with an TASIA or earlier as needed.      Jean Pierre Rendon MD    Physical Exam:  Vitals: /88 (BP Location: Right arm, Patient Position: Sitting, Cuff Size: Adult Regular)   Pulse 59   Ht 1.753 m (5' 9\")   Wt 74.8 kg (165 lb)   SpO2 97%   BMI 24.37 kg/m      Constitutional:  AAO x3.  Pt is in NAD.  HEAD: normocephalic.  SKIN: Skin normal color, texture and turgor with no lesions or eruptions.  Eyes: PERRL, EOMI.  ENT:  Supple, normal JVP. No lymphadenopathy or thyroid enlargement.  Chest:  CTAB.  Cardiac:  IIR, variable sound of S1 and Normal S2.  No murmurs " rubs or gallop.    Abdomen:  Normal BS.  Soft, non-tender and non-distended.  No rebound or guarding.    Extremities:  Pedious pulses palpable B/L.  No LE edema noticed.   Neurological: Strength and sensation grossly symmetric and intact throughout.       CURRENT MEDICATIONS:  Current Outpatient Medications   Medication Sig Dispense Refill     ACE/ARB/ARNI NOT PRESCRIBED (INTENTIONAL) Please choose reason not prescribed from choices below.       apixaban ANTICOAGULANT (ELIQUIS) 5 MG tablet Take 1 tablet (5 mg) by mouth 2 times daily 180 tablet 3     diltiazem ER COATED BEADS (CARDIZEM CD) 120 MG 24 hr capsule Take 1 capsule (120 mg) by mouth daily 90 capsule 3     furosemide (LASIX) 20 MG tablet Take 1 tablet (20 mg) by mouth daily as needed 60 tablet 5     metoprolol tartrate (LOPRESSOR) 50 MG tablet Take 1.5 tablets (75 mg) by mouth 2 times daily 270 tablet 3     psyllium (METAMUCIL/KONSYL) 58.6 % powder 1 tsp twice daily       rosuvastatin (CRESTOR) 10 MG tablet Take 1 tablet (10 mg) by mouth At Bedtime 90 tablet 0     valACYclovir (VALTREX) 1000 mg tablet daily as needed          ALLERGIES     Allergies   Allergen Reactions     Poison Ivy Extract Itching and Rash     Itchiness, redness, swelling       PAST MEDICAL HISTORY:  Past Medical History:   Diagnosis Date     CHF (congestive heart failure) (H) 12/2021    HFrEF - Dr Cordova     Chronic rhinitis     resolved     [facility]     HFrEF (heart failure with reduced ejection fraction) (H) 12/2021    Dr Cordova     History of colonic polyps 03/2023    [facility]     Hypertension goal BP (blood pressure) < 140/90 12/2017     Persistent atrial fibrillation (H) 12/2021    in/out, rate controlled, at times, Dr Montenegro     Pneumonia, organism [phone]     Snoring     no sleep apnea - mouth guard     Squamous 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 2021    Dr Abernathy/Darlyn - Rt Chest     Unspecified hemorrhoids without mention of complication 04/2006    internal        PAST SURGICAL HISTORY:  Past Surgical History:   Procedure Laterality Date     ANESTHESIA CARDIOVERSION N/A 01/11/2022    Procedure: ANESTHESIA, FOR CARDIOVERSION;  Surgeon: GENERIC ANESTHESIA PROVIDER;  Location: [facility] OR     COLONOSCOPY  01/03/2012    incomplete - negative, ACBE incomplete - negative, CT colography negative     [facility]ONOSCOPY Bilateral [facility]14/[facility]     COLONOSCOPY  03/2023    polyp x 1 - [facility]     ganglion cyst removal Left 09/02/2021     HC REMOVE TONSILS/ADENOIDS,<11 Y/O  1953    T & A <14y.o.     SIGMOIDOSCOPY,DIAGNOSTIC  1997    normal     STRESS ECHO (METRO)  03/2007    normal few pac's, few pvc's     SURGICAL HISTORY OF -  Bilateral     fall, 2020 - bilateral IOL     ZZHC COLONOSCOPY THRU STOMA, DIAGNOSTIC  05/2006    dr tejeda - normal - internal hemorrhiods - due 10 yrs       FAMILY HISTORY:  The patient's family history was reviewed and is non-contributory for heart disease.    SOCIAL HISTORY:  Social History     Socioeconomic History     Marital status:      Spouse name: Dania     Number of children: 3     Years of education: 19     Highest education level: None   Occupational History     Occupation:       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   Tobacco Use     Smoking status: Never     Smokeless tobacco: Never   Vaping Use     Vaping status: Never Used   Substance and Sexual Activity     Alcohol use: Yes     Alcohol/week: 0.0 - 1.0 standard drinks of alcohol     Comment: 0-1 per month avg     Drug use: No     Sexual activity: Yes     Partners: Female   Other Topics Concern     Caffeine Concern Yes     Comment: occas     Exercise Yes     Comment: active     Seat Belt Yes     Parent/sibling w/ CABG, MI or angioplasty before 65F 55M? No       Review of Systems:  Skin:  not assessed     Eyes:  not assessed    ENT:  not assessed    Respiratory:  Negative    Cardiovascular:  Negative    Gastroenterology: not assessed    Genitourinary:  not assessed    Musculoskeletal:  not assessed     Neurologic:  not assessed    Psychiatric:  not assessed    Heme/Lymph/Imm:  not assessed    Endocrine:  not assessed        Recent Lab Results:  LIPID RESULTS:  Lab Results   Component Value Date    CHOL 152 03/27/2023    CHOL 172 03/01/2021    HDL 50 03/27/2023    HDL 58 03/01/2021    LDL 91 03/27/2023     (H) 03/01/2021    TRIG 57 03/27/2023    TRIG 60 03/01/2021    CHOLHDLRATIO 2.9 06/16/2015       LIVER ENZYME RESULTS:  Lab Results   Component Value Date    AST 34 03/27/2023    AST 18 03/01/2021    ALT 32 03/27/2023    ALT 18 03/01/2021       CBC RESULTS:  Lab Results   Component Value Date    WBC 4.5 03/27/2023    WBC 4.5 03/01/2021    RBC 5.11 03/27/2023    RBC 5.24 03/01/2021    HGB 15.9 03/27/2023    HGB 16.3 03/01/2021    HCT 48.6 03/27/2023    HCT 50.5 03/01/2021    MCV 95 03/27/2023    MCV 96 03/01/2021    MCH 31.1 03/27/2023    MCH 31.1 03/01/2021    MCHC 32.7 03/27/2023    MCHC 32.3 03/01/2021    RDW 13.7 03/27/2023    RDW 13.0 03/01/2021     (L) 03/27/2023     03/01/2021       BMP RESULTS:  Lab Results   Component Value Date     03/27/2023     03/01/2021    POTASSIUM 4.1 03/27/2023    POTASSIUM 4.2 03/07/2022    POTASSIUM 4.0 03/01/2021    CHLORIDE 105 03/27/2023    CHLORIDE 107 03/07/2022    CHLORIDE 108 03/01/2021    CO2 25 03/27/2023    CO2 28 03/07/2022    CO2 30 03/01/2021    ANIONGAP 13 03/27/2023    ANIONGAP 7 03/07/2022    ANIONGAP 2 (L) 03/01/2021    GLC 69 (L) 03/27/2023    GLC 87 03/07/2022    GLC 72 03/01/2021    BUN 12.6 03/27/2023    BUN 16 03/07/2022    BUN 14 03/01/2021    CR 0.82 03/27/2023    CR 0.96 03/01/2021    GFRESTIMATED >90 03/27/2023    GFRESTIMATED 78 03/01/2021    GFRESTBLACK >90 03/01/2021    STEPHEN 9.1 03/27/2023    STEPHEN 9.0 03/01/2021        A1C RESULTS:  Lab Results   Component Value Date    A1C 5.3 06/16/2015       INR RESULTS:  No results found for: INR      ECHOCARDIOGRAM  No results found for this or any previous visit (from the past  8760 hour(s)).      No orders of the defined types were placed in this encounter.    No orders of the defined types were placed in this encounter.    There are no discontinued medications.      Encounter Diagnoses   Name Primary?     Persistent atrial fibrillation (H)      Atrial fibrillation with rapid ventricular response (H)      Acute diastolic congestive heart failure (H)          CC  Torin Montenegro MD  No address on file

## 2023-05-03 NOTE — LETTER
"5/3/2023    Johnathan Sampson MD  [address] [facility] [address]    RE: Amadou Moralesak       Dear Colleague,     I had the pleasure of seeing Amadou Moralesak in the [facility].  Electrophysiology/ Clinic Note         H&P and Plan:     REASON FOR VISIT: Electrophysiology evaluation.      HISTORY OF PRESENT ILLNESS: Patient is a pleasant 78-year-old gentleman with a history of hyperlipidemia, permanent atrial fibrillation and preserved EF heart failure, who is here for evaluation.    Patient was previously followed by Dr. Montenegro, who recently retired.  He has been on persistent atrial fibrillation since February of last year.  He is currently on a combination of metoprolol (75 mg twice daily) and diltiazem 120 mg daily.    Today, he informs he is doing well.  He is not taking any Lasix and  appears to be euvolemic on physical exam.  He has no complaints into his visit.  He denies chest pain, shortness of breath, lightheadedness, near-syncope or syncope.    EKG shows A-fib with good heart rate control and incomplete RBBB pattern.  Labs obtained in March showed normal CBC and BMP.    PREVIOUS STUDIES (personally reviewed):  -Echo (12/17/2021): EF of 50-55%.  Mild LVH.  -Holter (2/1/2022): Persistent A-fib.  Average heart rate of 97 bpm (HR ranged from 73 to 136 bpm).      ASSESSMENT AND PLAN:   1. Permanent atrial fibrillation.  Heart rate is well controlled.  Continue therapy with diltiazem and metoprolol.   2.  Embolic prevention.  CHADS-VASc score of 3.  Continue anticoagulation with Eliquis indefinitely.     We will follow-up in a year with an TASIA or earlier as needed.      Jean Pierre Rendon MD    Physical Exam:  Vitals: /88 (BP Location: Right arm, Patient Position: Sitting, Cuff Size: Adult Regular)   Pulse 59   Ht 1.753 m (5' 9\")   Wt 74.8 kg (165 lb)   SpO2 97%   BMI 24.37 kg/m      Constitutional:  AAO x3.  Pt is in NAD.  HEAD: normocephalic.  SKIN: Skin normal color, texture and " From: Lexie Cardona  To: Nazia Fulton MD  Sent: 10/5/2021 8:55 PM CDT  Subject: Prescription Question    Kelly Cardona needs refill on Restoril 30mg qd   turgor with no lesions or eruptions.  Eyes: PERRL, EOMI.  ENT:  Supple, normal JVP. No lymphadenopathy or thyroid enlargement.  Chest:  CTAB.  Cardiac:  IIR, variable sound of S1 and Normal S2.  No murmurs rubs or gallop.    Abdomen:  Normal BS.  Soft, non-tender and non-distended.  No rebound or guarding.    Extremities:  Pedious pulses palpable B/L.  No LE edema noticed.   Neurological: Strength and sensation grossly symmetric and intact throughout.       CURRENT MEDICATIONS:  Current Outpatient Medications   Medication Sig Dispense Refill    ACE/ARB/ARNI NOT PRESCRIBED (INTENTIONAL) Please choose reason not prescribed from choices below.      apixaban ANTICOAGULANT (ELIQUIS) 5 MG tablet Take 1 tablet (5 mg) by mouth 2 times daily 180 tablet 3    diltiazem ER COATED BEADS (CARDIZEM CD) 120 MG 24 hr capsule Take 1 capsule (120 mg) by mouth daily 90 capsule 3    furosemide (LASIX) 20 MG tablet Take 1 tablet (20 mg) by mouth daily as needed 60 tablet 5    metoprolol tartrate (LOPRESSOR) 50 MG tablet Take 1.5 tablets (75 mg) by mouth 2 times daily 270 tablet 3    psyllium (METAMUCIL/KONSYL) 58.6 % powder 1 tsp twice daily      rosuvastatin (CRESTOR) 10 MG tablet Take 1 tablet (10 mg) by mouth At Bedtime 90 tablet 0    valACYclovir (VALTREX) 1000 mg tablet daily as needed          ALLERGIES     Allergies   Allergen Reactions    Poison Ivy Extract Itching and Rash     Itchiness, redness, swelling       PAST MEDICAL HISTORY:  Past Medical History:   Diagnosis Date    CHF (congestive heart failure) (H) 12/2021    HFrEF - Dr Cordova    Chronic rhinitis     [facility]    [facility]    HFrEF (heart failure with reduced ejection fraction) (H) 12/2021    Dr Cordova    [facility] of [facility] polyps [facility]23    [facility]    Hypertension goal BP (blood pressure) < 140/90 12/2017    Persistent atrial fibrillation (H) 12/2021    in/out, rate controlled, at times, Dr Montenegro    Pneumonia, organism [phone]) [phone]    Snoring     no sleep  apnea - mouth guard    Squamous 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 2021    Dr Abernathy/Darlyn - Rt Chest    Unspecified hemorrhoids without mention of complication 04/2006    internal       PAST SURGICAL HISTORY:  Past Surgical History:   Procedure Laterality Date    ANESTHESIA CARDIOVERSION N/A 01/11/2022    Procedure: ANESTHESIA, FOR CARDIOVERSION;  Surgeon: GENERIC ANESTHESIA PROVIDER;  Location: [facility] OR    COLONOSCOPY  01/03/2012    incomplete - negative, ACBE incomplete - negative, CT colography negative    [facility]OS[facility] B[facility]    COLONOSCOPY  03/2023    pol[facility] x 1 - [facility]    ganglion cyst removal Left 09/02/2021    HC REMOVE TONSILS/ADENOIDS,<13 Y/O  1953    T & A <14y.o.    SIGMOIDOSCOPY,DIAGNOSTIC  1997    normal    STRESS ECHO (METRO)  03/2007    normal few pac's, few pvc's    SURGICAL HISTORY OF -  Bilateral     fall, 2020 - bilateral IOL    ZZHC COLONOSCOPY THRU STOMA, DIAGNOSTIC  05/2006    dr tejeda - normal - internal hemorrhiods - due 10 yrs       FAMILY HISTORY:  The patient's family history was reviewed and is non-contributory for heart disease.    SOCIAL HISTORY:  Social History     Socioeconomic History    Marital status:      Spouse name: Dania    Number of children: 3    Years of education: 19    Highest education level: None   Occupational History    Occupation:       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   Tobacco Use    Smoking status: Never    Smokeless tobacco: Never   Vaping Use    Vaping status: Never Used   Substance and Sexual Activity    Alcohol use: Yes     Alcohol/week: 0.0 - 1.0 standard drinks of alcohol     Comment: 0-1 per month avg    Drug use: No    Sexual activity: Yes     Partners: Female   Other Topics Concern    Caffeine Concern Yes     Comment: occas    Exercise Yes     Comment: active    Seat Belt Yes    Parent/sibling w/ CABG, MI or angioplasty before 65F 55M? No       Review of Systems:  Skin:  not assessed     Eyes:  not assessed    ENT:  not  assessed    Respiratory:  Negative    Cardiovascular:  Negative    Gastroenterology: not assessed    Genitourinary:  not assessed    Musculoskeletal:  not assessed    Neurologic:  not assessed    Psychiatric:  not assessed    Heme/Lymph/Imm:  not assessed    Endocrine:  not assessed        Recent Lab Results:  LIPID RESULTS:  Lab Results   Component Value Date    CHOL 152 03/27/2023    CHOL 172 03/01/2021    HDL 50 03/27/2023    HDL 58 03/01/2021    LDL 91 03/27/2023     (H) 03/01/2021    TRIG 57 03/27/2023    TRIG 60 03/01/2021    CHOLHDLRATIO 2.9 06/16/2015       LIVER ENZYME RESULTS:  Lab Results   Component Value Date    AST 34 03/27/2023    AST 18 03/01/2021    ALT 32 03/27/2023    ALT 18 03/01/2021       CBC RESULTS:  Lab Results   Component Value Date    WBC 4.5 03/27/2023    WBC 4.5 03/01/2021    RBC 5.11 03/27/2023    RBC 5.24 03/01/2021    HGB 15.9 03/27/2023    HGB 16.3 03/01/2021    HCT 48.6 03/27/2023    HCT 50.5 03/01/2021    MCV 95 03/27/2023    MCV 96 03/01/2021    MCH 31.1 03/27/2023    MCH 31.1 03/01/2021    MCHC 32.7 03/27/2023    MCHC 32.3 03/01/2021    RDW 13.7 03/27/2023    RDW 13.0 03/01/2021     (L) 03/27/2023     03/01/2021       BMP RESULTS:  Lab Results   Component Value Date     03/27/2023     03/01/2021    POTASSIUM 4.1 03/27/2023    POTASSIUM 4.2 03/07/2022    POTASSIUM 4.0 03/01/2021    CHLORIDE 105 03/27/2023    CHLORIDE 107 03/07/2022    CHLORIDE 108 03/01/2021    CO2 25 03/27/2023    CO2 28 03/07/2022    CO2 30 03/01/2021    ANIONGAP 13 03/27/2023    ANIONGAP 7 03/07/2022    ANIONGAP 2 (L) 03/01/2021    GLC 69 (L) 03/27/2023    GLC 87 03/07/2022    GLC 72 03/01/2021    BUN 12.6 03/27/2023    BUN 16 03/07/2022    BUN 14 03/01/2021    CR 0.82 03/27/2023    CR 0.96 03/01/2021    GFRESTIMATED >90 03/27/2023    GFRESTIMATED 78 03/01/2021    GFRESTBLACK >90 03/01/2021    STEPHEN 9.1 03/27/2023    STEPHEN 9.0 03/01/2021        A1C RESULTS:  Lab Results   Component  Value Date    A1C 5.3 06/16/2015       INR RESULTS:  No results found for: INR      ECHOCARDIOGRAM  No results found for this or any previous visit (from the past 8760 hour(s)).      No orders of the defined types were placed in this encounter.    No orders of the defined types were placed in this encounter.    There are no discontinued medications.      Encounter Diagnoses   Name Primary?    Persistent atrial fibrillation (H)     Atrial fibrillation with rapid ventricular response (H)     Acute diastolic congestive heart failure (H)          CC  Torin Montenegro MD    Thank you for allowing me to participate in the care of your patient.      Sincerely,     Jean Pierre Rendon MD     [facility]

## 2023-06-10 ENCOUNTER — MYC MEDICAL ADVICE (OUTPATIENT)
Dept: FAMILY MEDICINE | Facility: CLINIC | Age: 78
End: 2023-06-10

## 2023-06-10 DIAGNOSIS — I45.10 RBBB: Primary | ICD-10-CM

## 2023-06-13 PROBLEM — I45.10 RBBB: Status: ACTIVE | Noted: 2023-06-13

## 2023-06-13 NOTE — TELEPHONE ENCOUNTER
Please see my chart message and then the EKG that pt is referring to.     Thank you     Annamarie Goodrich RN, BSN  [facility]

## 2023-06-14 NOTE — TELEPHONE ENCOUNTER
Attempt #1  Called Phone # [phone]      Left a non detailed voicemail to please call back and ask for any available triage nurse regarding your mychart message @ [phone].     Francesca VALERIO RN   [facility] Triage

## 2023-06-14 NOTE — TELEPHONE ENCOUNTER
Please review with patient    ECG notes RBBB and A flutter/fibrillation    See Dr Rendon notes from 5/3 he notes RBBB/A Fib    He advised continue diltiazem/metoprolol and eliquis with 1 yr follow up, agree with treatment plan

## 2023-06-16 NOTE — TELEPHONE ENCOUNTER
[employer] message sent     Francesca VALERIO RN   [facility] Triage

## 2023-06-20 ENCOUNTER — TELEPHONE (OUTPATIENT)
Dept: CARDIOLOGY | Facility: CLINIC | Age: 78
End: 2023-06-20

## 2023-06-20 NOTE — TELEPHONE ENCOUNTER
Spoke to patient who had his tooth extracted today.  He reports he did not hold his blood thinner prior to procedure.  He was instructed to take tylenol and ibuprofen for pain management.    Suggested trying tylenol first to see if this manages his discomfort.  If It does not then he can use ibuprofen sparingly d/t being on AC.  Patient provided verbal understanding regarding above.  BONITA Alvarez

## 2023-06-20 NOTE — TELEPHONE ENCOUNTER
[facility] Call Center    Phone Message    May a detailed message be left on voicemail: yes     Reason for Call: Other:  gave the pt tylenol and advil and they need to know that this is ok     Action Taken: Other: cardio    Travel Screening: Not Applicable   Thank you!  [facility]

## 2023-07-26 ENCOUNTER — ANCILLARY PROCEDURE (OUTPATIENT)
Dept: GENERAL RADIOLOGY | Facility: CLINIC | Age: 78
End: 2023-07-26
Attending: INTERNAL MEDICINE
Payer: COMMERCIAL

## 2023-07-26 ENCOUNTER — OFFICE VISIT (OUTPATIENT)
Dept: PEDIATRICS | Facility: CLINIC | Age: 78
End: 2023-07-26
Payer: COMMERCIAL

## 2023-07-26 ENCOUNTER — HOSPITAL ENCOUNTER (OUTPATIENT)
Dept: CT IMAGING | Facility: CLINIC | Age: 78
Discharge: HOME | End: 2023-07-26
Attending: INTERNAL MEDICINE | Admitting: INTERNAL MEDICINE
Payer: COMMERCIAL

## 2023-07-26 ENCOUNTER — NURSE TRIAGE (OUTPATIENT)
Dept: FAMILY MEDICINE | Facility: CLINIC | Age: 78
End: 2023-07-26

## 2023-07-26 VITALS
BODY MASS INDEX: 23.63 KG/M2 | DIASTOLIC BLOOD PRESSURE: 75 MMHG | TEMPERATURE: 97.6 F | SYSTOLIC BLOOD PRESSURE: 119 MMHG | OXYGEN SATURATION: 98 % | WEIGHT: 160 LBS | HEART RATE: 80 BPM

## 2023-07-26 DIAGNOSIS — S69.92XA WRIST INJURY, LEFT, INITIAL ENCOUNTER: ICD-10-CM

## 2023-07-26 DIAGNOSIS — S09.90XA HEAD INJURY, INITIAL ENCOUNTER: ICD-10-CM

## 2023-07-26 DIAGNOSIS — S09.90XA HEAD INJURY, INITIAL ENCOUNTER: Primary | ICD-10-CM

## 2023-07-26 PROCEDURE — 70450 CT HEAD/BRAIN W/O DYE: CPT

## 2023-07-26 PROCEDURE — 99214 OFFICE O/P EST MOD 30 MIN: CPT | Performed by: INTERNAL MEDICINE

## 2023-07-26 PROCEDURE — 73110 X-RAY EXAM OF WRIST: CPT | Mod: TC | Performed by: RADIOLOGY

## 2023-07-26 NOTE — PATIENT INSTRUCTIONS
PLAN:  1.  Use wrist splint or ACE wrap as needed   2.  Use tylenol for pain  3.  Follow-up with Dr. Sampson next March, sooner if needed

## 2023-07-26 NOTE — Clinical Note
Amadou Mann's head CT was negative. I xrayed his wrist due to swelling and pain, but there was no fracture. His gait is good, and his risk for further falls does not appear high to me.   Thus, I didn't suggest that he stop anticoagulation.  Thanks for the referral. Amrit

## 2023-07-26 NOTE — TELEPHONE ENCOUNTER
"Nurse Triage SBAR    Is this a 2nd Level Triage? YES, LICENSED PRACTITIONER REVIEW IS REQUIRED    Situation: Pt calling to report head injury occurring 1 week ago.     Background: Pt states he was in [place] with his wife staying at a hotel, went to the bathroom at night and hit his head on the corner of the Jacuzzi. Pt reports he \"sat up\" the rest of the morning. Pt returned from [place] last night. Pt is on blood thinner: Eliquis. Pt reports history of a-fib.     Assessment: Pt reports a small lump on his lower skull on the right side, smaller in size than a razia. Pt denies breaking skin on the skull, or seeing blood. Pt reports no pain in his head. Pt denies losing consciousness, headaches, black eyes, or drainage from the nose or ears. Pt also denies changes in vision/speech/mobility, numbness, weakness, tingling, SOB, nausea or vomiting.   Pt reports his left hand is 50% bruised, with a razia sized lump.   Pt also reports intermittent back pain since the incident rated 3-4/10. Pt denies neck pain.   Pt reports he also bruised his left shin, and the skin did break. Chapa bled \"for a short period\", has not bled since.     Protocol Recommended Disposition:   Go To ED/UCC Now (Or To Office With PCP Approval)    Recommendation: Routed to provider for review and recommendations.     Routed to provider    Pt prefers to be called at his cell: [phone].     Reason for Disposition   Taking Coumadin (warfarin) or other strong blood thinner, or known bleeding disorder (e.g., thrombocytopenia)   Age over 65 years with an area of head swelling or bruise    Additional Information   Negative: ACUTE NEUROLOGIC SYMPTOM and symptom present now   Negative: Knocked out (unconscious) > 1 minute   Negative: Seizure (convulsion) occurred  (Exception: Prior history of seizures and now alert and without Acute Neurologic Symptoms.)   Negative: Neck pain after dangerous injury (e.g., MVA, diving, trampoline, contact sports, fall > 10 " feet or 3 meters)  (Exception: Neck pain began > 1 hour after injury.)   Negative: Major bleeding (actively dripping or spurting) that can't be stopped   Negative: Penetrating head injury (e.g., knife, gunshot wound, metal object)   Negative: Sounds like a life-threatening emergency to the triager   Negative: Can't remember what happened (amnesia)   Negative: Vomiting once or more   Negative: Watery or blood-tinged fluid dripping from the nose or ears   Negative: ACUTE NEUROLOGIC SYMPTOM and now fine   Negative: Knocked out (unconscious) < 1 minute and now fine   Negative: Severe headache   Negative: Dangerous injury (e.g., MVA, diving, trampoline, contact sports, fall > 10 feet or 3 meters) or severe blow from hard object (e.g., golf club or baseball bat)   Negative: Large swelling or bruise (> 2 inches or 5 cm)   Negative: Skin is split open or gaping (length > 1/2 inch or 12 mm)   Negative: Bleeding won't stop after 10 minutes of direct pressure (using correct technique)   Negative: One or two 'black eyes' (bruising, purple color of eyelids), and onset within 24 hours of head injury    Protocols used: Head Injury-A-[place]    Tello Zambrano RN on 7/26/2023 at 9:11 AM    This encounter was handled by a team outside your facility.  If action needs to be taken, please route the encounter back to your team at your own clinic, not the sender.  Thank you

## 2023-07-26 NOTE — TELEPHONE ENCOUNTER
Called [facility] provider, Dr. Amrit Robles,  unsure of need to scan. Asks that PCP call.     [phone]    Greer Sanz RN [place] Triage

## 2023-07-26 NOTE — TELEPHONE ENCOUNTER
Dr. Sampson & Dr. Robles have discussed patient, He will be seen at [facility] at 11:30a.     Patient has been called and notified.     Lonny Horne-Matias , [facility] [place]  07/26/23 10:28 AM

## 2023-07-26 NOTE — PROGRESS NOTES
"  ASSESSMENT:      1.  Head injury in older male on anticoagulation.  No skull fracture or bleed.  2.  Soft tissue injuries.  Xray L wrist negative for fracture.  3.  Chronic atrial fib.   Overall fall risk appears low, will not stop anticoagulation.    PLAN:  1.  Use wrist splint or ACE wrap as needed   2.  Use tylenol for pain  3.  Follow-up with Dr. Sampson next March, sooner if needed     Subjective   Amadou is a 78 year old, presenting for the following health issues:  No chief complaint on file.      HPI     Concern -  Pt states he was in [place] with his wife staying at a hotel, went to the bathroom at night and hit his R posterior head on the corner of the Jacuzzi. Pt reports he \"sat up\" the rest of the morning. Pt returned from [place] last night. Pt is on blood thinner: Eliquis. Pt reports history of a-fib. reports a small lump on his lower skull on the right side, smaller in size than a razia. Pt denies breaking skin on the skull, or seeing blood. Pt reports no pain in his head. Pt denies losing consciousness, headaches, black eyes, or drainage from the nose or ears. Pt also denies changes in vision/speech/mobility, numbness, weakness, tingling, SOB, nausea or vomiting.   Pt reports his left hand/wrist is 50% bruised, with a razia sized lump.   Pt also reports intermittent posterior neck area pain at trauma site since the incident rated 3-4/10. Pt denies neck pain.   Onset: 1 week ago  Description: as above  Intensity: mild  Progression of Symptoms:  same  Accompanying Signs & Symptoms: N/A  Previous history of similar problem: N/A  Precipitating factors:        Worsened by: N/A  Alleviating factors:        Improved by: N/A  Therapies tried and outcome: Remained sitting up after the fall.        Review of Systems         Objective    /75 (Patient Position: Sitting)   Pulse 80   Temp 97.6  F (36.4  C) (Oral)   Wt 72.6 kg (160 lb)   SpO2 98%   BMI 23.63 kg/m    There is no height or weight on file to " calculate BMI.  Physical Exam   GENERAL: healthy, alert and no distress  HEAD:  normal, there is a small bruise over R occiput with minimal swelling, mostly nontender   NECK: no adenopathy, no asymmetry, masses, or scars and thyroid normal to palpation  CV: irregularly irregular heart tones without murmur  MS: L hand dorsum bruised, with mobile more firm area, mild tenderness only  SKIN: area of bruising, scrape L upper shin area  NEURO: Normal strength and tone, sensory exam grossly normal, mentation intact, speech normal, cranial nerves 2-12 intact, gait normal and stable including heel/toe/tandem walking, and Romberg normal

## 2023-10-20 ENCOUNTER — TELEPHONE (OUTPATIENT)
Dept: FAMILY MEDICINE | Facility: CLINIC | Age: 78
End: 2023-10-20

## 2023-10-20 NOTE — TELEPHONE ENCOUNTER
Wife Dania called back and rescheduled to Friday 10/27 at 1:15 pm with Dr. Santizo for 6 month skin check.  Scheduled May 3rd at 11:30 am for skin check.    Shasha MCCALLUM RN  [facility]  [phone]

## 2023-10-20 NOTE — CONFIDENTIAL NOTE
[facility] Call Center    Phone Message    May a detailed message be left on voicemail: yes     Reason for Call: Other: Per wife Dania this is the third reschedule as Dr. Santizo is out sick. Rescheduled for next available with Dr. Moore and added to wait list. Please call with work-in appointment if possible. Thank you.      Action Taken: Message routed to:  Other: [facility]    Travel Screening: Not Applicable

## 2023-10-27 ENCOUNTER — OFFICE VISIT (OUTPATIENT)
Dept: FAMILY MEDICINE | Facility: CLINIC | Age: 78
End: 2023-10-27
Payer: COMMERCIAL

## 2023-10-27 DIAGNOSIS — D18.01 CHERRY ANGIOMA: ICD-10-CM

## 2023-10-27 DIAGNOSIS — D22.9 MULTIPLE BENIGN MELANOCYTIC NEVI: Primary | ICD-10-CM

## 2023-10-27 DIAGNOSIS — L82.1 SK (SEBORRHEIC KERATOSIS): ICD-10-CM

## 2023-10-27 DIAGNOSIS — I78.8 CAPILLARITIS: ICD-10-CM

## 2023-10-27 DIAGNOSIS — L57.0 AK (ACTINIC KERATOSIS): ICD-10-CM

## 2023-10-27 DIAGNOSIS — D49.2 NEOPLASM OF UNSPECIFIED BEHAVIOR OF BONE, SOFT TISSUE, AND SKIN: ICD-10-CM

## 2023-10-27 PROCEDURE — 99213 OFFICE O/P EST LOW 20 MIN: CPT | Mod: 25 | Performed by: DERMATOLOGY

## 2023-10-27 PROCEDURE — 88305 TISSUE EXAM BY PATHOLOGIST: CPT | Performed by: PATHOLOGY

## 2023-10-27 PROCEDURE — 17003 DESTRUCT PREMALG LES 2-14: CPT | Mod: XS | Performed by: DERMATOLOGY

## 2023-10-27 PROCEDURE — 17000 DESTRUCT PREMALG LESION: CPT | Mod: XS | Performed by: DERMATOLOGY

## 2023-10-27 PROCEDURE — 11102 TANGNTL BX SKIN SINGLE LES: CPT | Performed by: DERMATOLOGY

## 2023-10-27 NOTE — PROGRESS NOTES
[facility] Dermatology Clinic Note - EP    Encounter Date: Oct 27, 2023    Dermatology Problem List:    # Seborrheic Keratosis, Central superior abdomen (see path.)  -s/p bx 2/24/23  # NUB, R Postauricular Ear  - s/p shave bx 10/27/23  # AK, R Forearm x 2 , L Lateral Forehead   - s/p cryo 10/27/23  # Folliculitis, Central Chest   # Seborrheic Keratoses (grey), R Face   #Capillaritis, bilateral ankles   - Discussed tx options, risks and benefits    Prior Hx:   # NUB, central superior abdomen, s/p bx 2/24/23  # SCCis mid chest,  s/p Efudex for 2 weeks starting 8/2/21  # AK  - s/p cryo 12/10/21  - left mid cheek, s/p Efudex for 2 weeks starting 8/2/21 and cryo 12/10/21  #  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 , right shoulder, s/p excision 1/28/22  # Hordeolum, left upper eyelid  ____________________________________________    Assessment & Plan:     # Seborrheic Keratosis, Central superior abdomen (see path.)  -s/p bx 2/24/23    # NUB, R Postauricular Ear  - s/p shave bx 10/27/23    # AK, R Forearm x 2 , L Lateral Forehead   - s/p cryo 10/27/23    # Folliculitis, Central Chest   # Seborrheic Keratoses (grey), R Face   # Capillaritis, bilateral ankles   - Discussed tx options, risks and benefits    Procedures Performed:     Cryotherapy procedure note: After verbal consent and discussion of risks and benefits including but no limited to dyspigmentation/scar, blister, and pain, 3 was(were) treated with 1-2mm freeze border for 2 cycles with liquid nitrogen. Post cryotherapy instructions were provided.      Shave biopsy: Location(s) R Postauricular Ear.  After discussion of benefits and risks including but not limited to bleeding/bruising, pain/swelling, infection, scar, incomplete removal, nerve damage/numbness, recurrence, and non-diagnostic biopsy,  verbal consent and photographs were obtained. Time-out was performed. The area was cleaned with isopropyl alcohol. 0.5mL of 1% lidocaine with  epinephrine was injected to obtain adequate anesthesia of each lesion. Shave biopsy was performed. Hemostasis was achieved with aluminium chloride. Vaseline and a sterile dressing were applied. The patient tolerated the procedure and no complications were noted. The patient was provided with verbal and written post care instructions.      Follow-up: 1 year    Scribe Disclosure:   I, MARY CALL, am serving as a scribe; to document services personally performed by Andre Santizo MD -based on data collection and the provider's statements to me.     Staff attestation:  The documentation recorded by the scribe accurately reflects the services I personally performed and the decisions I personally made. I have made edits where needed.    Andre Santizo MD  Staff Dermatologist and Dermatopathologist  , Department of Dermatology       ____________________________________________    CC: Derm Problem      HPI:  Mr. Amadou Wade is a(n) extremely pleasant 78 year old male who presents today as a return patient for skin check. Last seen in dermatology by me on 2/24/23. Reports that he has a blister-like lesion on back of the ear.  He states that he recently had a black and red lesion but has since healed.     The patient denies any painful, bleeding, or nonhealing lesions, or any new or changing moles.   Patient is otherwise feeling well, without additional skin concerns.     Labs Reviewed:  [id number]         Final Diagnosis   A(1). Skin, Central superior abdomen, shave:  - Features consistent with seborrheic keratosis, irritated and traumatized - (see description)       Physical Exam:  SKIN: Total skin excluding the undergarment areas was performed. The exam included the head/face, neck, both arms, chest, back, abdomen, both legs, digits and/or nails.   - R Face, cornejo solar lentigines   - R Postauricular Ear, rolled border on edge with shiny texture  - AK x 2- R Forearm, L Lateral Forehead x 1    - Central Chest- folliculitis x 1   - capillaritis of ankles     Medications:  Current Outpatient Medications   Medication     ACE/ARB/ARNI NOT PRESCRIBED (INTENTIONAL)     apixaban ANTICOAGULANT (ELIQUIS) 5 MG tablet     diltiazem ER COATED BEADS (CARDIZEM CD) 120 MG 24 hr capsule     furosemide (LASIX) 20 MG tablet     metoprolol tartrate (LOPRESSOR) 50 MG tablet     psyllium (METAMUCIL/KONSYL) 58.6 % powder     rosuvastatin (CRESTOR) 10 MG tablet     valACYclovir (VALTREX) 1000 mg tablet     Current Facility-Administered Medications   Medication     lidocaine 1% with EPINEPHrine 1:100,000 injection 3 mL      Past Medical History:   Patient Active Problem List   Diagnosis     Cardiac dysrhythmia     Hyperlipidemia LDL goal <130     Advanced directives, counseling/discussion     ED (erectile dysfunction)     Hypertension goal BP (blood pressure) < 140/90     Recurrent cold sores     Squamous 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     Atrial fibrillation (H)     CHF (congestive heart failure) (H)     HFrEF (heart failure with reduced ejection fraction) (H)     History of colonic polyps     RBBB     Past Medical History:   Diagnosis Date     CHF (congestive heart failure) (H) 12/2021    HFrEF - Dr Cordova     Chronic rhinitis     resolved     [facility]     HFrEF (heart failure with reduced ejection fraction) (H) 12/2021    Dr Cordova     [place] of [place] polyps 03/2023    [facility]     Hypertension goal BP (blood pressure) < 140/90 12/2017     Persistent atrial fibrillation (H) 12/2021    in/out, rate controlled, at times, Dr Montenegro/Justice     Pneumonia, organism unspecified([phone]     RBBB      Snoring     no sleep apnea - mouth guard     Squamous 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 2021    Dr Abernathy/Darlyn - Rt Chest     Unspecified hemorrhoids without mention of complication 04/2006    internal       CC Andre Santizo MD  [address] [place] [address] on close of this encounter.    This note  has been created using voice recognition software; while it has been reviewed, some errors may persist.

## 2023-10-27 NOTE — LETTER
10/27/2023         RE: Amadou Wade  [address] [facility] [address]        Dear Colleague,    Thank you for referring your patient, Amadou Wade, to the [facility] [place]. Please see a copy of my visit note below.    [facility]atology [facility] Note - EP    Encounter Date: Oct 27, 2023    Dermatology Problem List:    # Seborrheic Keratosis, Central superior abdomen (see path.)  -s/p bx 2/24/23  # NUB, R Postauricular Ear  - s/p shave bx 10/27/23  # AK, R Forearm x 2 , L Lateral Forehead   - s/p cryo 10/27/23  # Folliculitis, Central Chest   # Seborrheic Keratoses (grey), R Face   #Capillaritis, bilateral ankles   - Discussed tx options, risks and benefits    Prior Hx:   # NUB, central superior abdomen, s/p bx 2/24/23  # SCCis mid chest,  s/p Efudex for 2 weeks starting 8/2/21  # AK  - s/p cryo 12/10/21  - left mid cheek, s/p Efudex for 2 weeks starting 8/2/21 and cryo 12/10/21  #  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 , right shoulder, s/p excision 1/28/22  # Hordeolum, left upper eyelid  ____________________________________________    Assessment & Plan:     # Seborrheic Keratosis, Central superior abdomen (see path.)  -s/p bx 2/24/23    # NUB, R Postauricular Ear  - s/p shave bx 10/27/23    # AK, R Forearm x 2 , L Lateral Forehead   - s/p cryo 10/27/23    # Folliculitis, Central Chest   # Seborrheic Keratoses (grey), R Face   # Capillaritis, bilateral ankles   - Discussed tx options, risks and benefits    Procedures Performed:     Cryotherapy procedure note: After verbal consent and discussion of risks and benefits including but no limited to dyspigmentation/scar, blister, and pain, 3 was(were) treated with 1-2mm freeze border for 2 cycles with liquid nitrogen. Post cryotherapy instructions were provided.      Shave biopsy: Location(s) R Postauricular Ear.  After discussion of benefits and risks including but not limited to bleeding/bruising,  pain/swelling, infection, scar, incomplete removal, nerve damage/numbness, recurrence, and non-diagnostic biopsy,  verbal consent and photographs were obtained. Time-out was performed. The area was cleaned with isopropyl alcohol. 0.5mL of 1% lidocaine with epinephrine was injected to obtain adequate anesthesia of each lesion. Shave biopsy was performed. Hemostasis was achieved with aluminium chloride. Vaseline and a sterile dressing were applied. The patient tolerated the procedure and no complications were noted. The patient was provided with verbal and written post care instructions.      Follow-up: 1 year    Scribe Disclosure:   I, MARY CALL, am serving as a scribe; to document services personally performed by Andre Santizo MD -based on data collection and the provider's statements to me.     Staff attestation:  The documentation recorded by the scribe accurately reflects the services I personally performed and the decisions I personally made. I have made edits where needed.    Andre Santizo MD  Staff Dermatologist and Dermatopathologist  , Department of Dermatology       ____________________________________________    CC: Derm Problem      HPI:  Mr. Amadou Wade is a(n) extremely pleasant 78 year old male who presents today as a return patient for skin check. Last seen in dermatology by me on 2/24/23. Reports that he has a blister-like lesion on back of the ear.  He states that he recently had a black and red lesion but has since healed.     The patient denies any painful, bleeding, or nonhealing lesions, or any new or changing moles.   Patient is otherwise feeling well, without additional skin concerns.     Labs Reviewed:  [id number]         Final Diagnosis   A(1). Skin, Central superior abdomen, shave:  - Features consistent with seborrheic keratosis, irritated and traumatized - (see description)       Physical Exam:  SKIN: Total skin excluding the undergarment areas was  performed. The exam included the head/face, neck, both arms, chest, back, abdomen, both legs, digits and/or nails.   - R Face, cornejo solar lentigines   - R Postauricular Ear, rolled border on edge with shiny texture  - AK x 2- R Forearm, L Lateral Forehead x 1   - Central Chest- folliculitis x 1   - capillaritis of ankles     Medications:  Current Outpatient Medications   Medication     ACE/ARB/ARNI NOT PRESCRIBED (INTENTIONAL)     apixaban ANTICOAGULANT (ELIQUIS) 5 MG tablet     diltiazem ER COATED BEADS (CARDIZEM CD) 120 MG 24 hr capsule     furosemide (LASIX) 20 MG tablet     metoprolol tartrate (LOPRESSOR) 50 MG tablet     psyllium (METAMUCIL/KONSYL) 58.6 % powder     rosuvastatin (CRESTOR) 10 MG tablet     valACYclovir (VALTREX) 1000 mg tablet     Current Facility-Administered Medications   Medication     lidocaine 1% with EPINEPHrine 1:100,000 injection 3 mL      Past Medical History:   Patient Active Problem List   Diagnosis     Cardiac dysrhythmia     Hyperlipidemia LDL goal <130     Advanced directives, counseling/discussion     ED (erectile dysfunction)     Hypertension goal BP (blood pressure) < 140/90     Recurrent cold sores     Squamous 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     Atrial fibrillation (H)     CHF (congestive heart failure) (H)     HFrEF (heart failure with reduced ejection fraction) (H)     History of colonic polyps     RBBB     Past Medical History:   Diagnosis Date     CHF (congestive heart failure) (H) 12/2021    HFrEF - Dr Cordova     Chronic rhinitis     resolved     [facility]     HFrEF (heart failure with reduced ejection fraction) (H) 12/2021    Dr Cordova     [facility] of [facility]     Hypertension goal BP (blood pressure) < 140/90 12/2017     Persistent atrial fibrillation (H) 12/2021    in/out, rate controlled, at times, Dr Montenegro/Justice     Pneumonia, organism [phone]     RBBB      Snoring     no sleep apnea - mouth guard     Squamous cell  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 2021    Dr Abernathy/Darlyn - Rt Chest     Unspecified hemorrhoids without mention of complication 04/2006    internal       CC Andre Santizo MD  [address] [place] [address] on close of this encounter.    This note has been created using voice recognition software; while it has been reviewed, some errors may persist.       Again, thank you for allowing me to participate in the care of your patient.        Sincerely,        Andre Santizo MD

## 2023-11-01 ENCOUNTER — TELEPHONE (OUTPATIENT)
Dept: FAMILY MEDICINE | Facility: CLINIC | Age: 78
End: 2023-11-01

## 2023-11-01 DIAGNOSIS — C44.212 BASAL CELL CARCINOMA (BCC) OF RIGHT POSTAURICULAR REGION: Primary | ICD-10-CM

## 2023-11-01 NOTE — LETTER
[address]  [address]  [phone]        11/3/2023       Amadou Wade  [address] [facility] [address]      Dear Amadou:    You are scheduled for Mohs Surgery on: Thursday December 21st at 8 am.    Please check in at 3rd Floor D[facility]at[facility] Clinic, [address].     You don't need to arrive more than 5-10 minutes prior to your appointment time.     Be sure to eat a good breakfast and bathe and wash your hair prior to surgery.     If you are taking any anti-coagulants that are prescribed by your Doctor (such as Coumadin/Warfarin, Plavix, Aspirin, Ibuprofen), please continue taking them.     However, if you are taking anti-coagulants over the counter without a Doctor's order for a medical condition, please discontinue them 10 days prior to surgery.     Please wear loose comfortable clothing as it could possibly be 4-6 hours until your surgery is completed depending upon how many layers of tissue need to be removed.      Thank you,    Mario Hopper MD

## 2023-11-01 NOTE — TELEPHONE ENCOUNTER
----- Message from Andre Santizo MD sent at 10/31/2023  3:10 PM CDT -----  Please make Mohs referral and ensure a 6 month follow up visit with me for screening. Thanks!    A(1). Skin, Right postauricular, shave:  - Basal cell carcinoma, superficial and nodular types     The biopsy unfortunately showed a basal cell carcinoma.     Because of the location this skin cancer developed on, in order to completely remove the spot while sparing as much normal skin as possible, I'm referring you to our Mohs surgery team for treatment. They will contact you to schedule.     We should check in around 6 months from now for another skin cancer screen.     Please feel free to contact me with any questions. I hope that the site is healing appropriately and that this message finds you well.  Dr. BOATENG   Written by Andre Santizo MD on 10/31/2023  3:10 PM CDT  Seen by patient Amadou Wade on 11/1/2023  8:42 AM

## 2023-11-01 NOTE — TELEPHONE ENCOUNTER
Patient Contact    Attempt # 1    Was call answered?  No.  Left message on voicemail with information to call nurse back at [phone].     Shasha MCCALLUM RN  [facility] [place]  [phone]

## 2023-11-03 NOTE — TELEPHONE ENCOUNTER
S/w pt and went over Dr. Santizo' message below.  Explained and answered all questions about the mohs procedure.  Pt states understanding.  Scheduled with Dr. Hopper at  on Thursday 12/21 at 8 am.    Mohs letter and information sent via my chart and mailed home.    Shasha MCCALLUM RN  [facility] [place]  [phone]

## 2023-11-09 ENCOUNTER — OFFICE VISIT (OUTPATIENT)
Dept: FAMILY MEDICINE | Facility: CLINIC | Age: 78
End: 2023-11-09
Payer: COMMERCIAL

## 2023-11-09 ENCOUNTER — ANCILLARY PROCEDURE (OUTPATIENT)
Dept: GENERAL RADIOLOGY | Facility: CLINIC | Age: 78
End: 2023-11-09
Attending: PHYSICIAN ASSISTANT
Payer: COMMERCIAL

## 2023-11-09 VITALS
HEIGHT: 69 IN | TEMPERATURE: 97.5 F | OXYGEN SATURATION: 98 % | SYSTOLIC BLOOD PRESSURE: 123 MMHG | BODY MASS INDEX: 25.18 KG/M2 | HEART RATE: 54 BPM | DIASTOLIC BLOOD PRESSURE: 79 MMHG | RESPIRATION RATE: 16 BRPM | WEIGHT: 170 LBS

## 2023-11-09 DIAGNOSIS — M25.511 CHRONIC RIGHT SHOULDER PAIN: ICD-10-CM

## 2023-11-09 DIAGNOSIS — I10 HYPERTENSION GOAL BP (BLOOD PRESSURE) < 140/90: ICD-10-CM

## 2023-11-09 DIAGNOSIS — G89.29 CHRONIC RIGHT SHOULDER PAIN: ICD-10-CM

## 2023-11-09 DIAGNOSIS — M25.511 CHRONIC RIGHT SHOULDER PAIN: Primary | ICD-10-CM

## 2023-11-09 DIAGNOSIS — Z85.828 HISTORY OF BASAL CELL CARCINOMA: ICD-10-CM

## 2023-11-09 DIAGNOSIS — G89.29 CHRONIC RIGHT SHOULDER PAIN: Primary | ICD-10-CM

## 2023-11-09 PROCEDURE — 73030 X-RAY EXAM OF SHOULDER: CPT | Mod: TC | Performed by: RADIOLOGY

## 2023-11-09 PROCEDURE — 99213 OFFICE O/P EST LOW 20 MIN: CPT | Performed by: PHYSICIAN ASSISTANT

## 2023-11-09 NOTE — PROGRESS NOTES
Subjective:      Amadou Wade is a 78 year old male with chief complaint of right shoulder pain.  Pain started sometime this summer.  Has been off-and-on.  He feels like it is worst in the morning, then gets better during the day.  Yesterday the pain was probably the worst that is ever been in the morning.  Approximately 8/10 pain scale.  He could not raise his arm above shoulder level.  That prompted him to schedule this visit.  Then today he does not have any pain or limited range of motion at all.  He is right-hand dominant.  No falls or injuries prior to symptoms starting.  No known triggers or changes in activity before symptoms started.  No history of shoulder injuries in the past.  Pain is in the right shoulder, sometimes can go down into the right upper arm a little bit.  He notes that he does have recently diagnosed basal cell carcinoma on his scalp.      Reason for visit:  Pain in the right shoulder and upper arm  Symptom onset:  More than a month  Symptoms include:  Pain in the shoulder area often extending down the back to behind the right shoulder in back of the right lung  Symptom intensity:  Moderate  Symptom progression:  Staying the same  Had these symptoms before:  No  What makes it worse:  Raising the right arm  What makes it better:  Rest and lack of movement      Patient Active Problem List   Diagnosis    Cardiac dysrhythmia    Hyperlipidemia LDL goal <130    Advanced directives, counseling/discussion    ED (erectile dysfunction)    Hypertension goal BP (blood pressure) < 140/90    Recurrent cold sores    Squamous 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    Atrial fibrillation (H)    CHF (congestive heart failure) (H)    HFrEF (heart failure with reduced ejection fraction) (H)    History of colonic polyps    RBBB       Current Outpatient Medications:     ACE/ARB/ARNI NOT PRESCRIBED (INTENTIONAL), Please choose reason not prescribed from choices below., Disp: , Rfl:     apixaban ANTICOAGULANT  "(ELIQUIS) 5 MG tablet, Take 1 tablet (5 mg) by mouth 2 times daily, Disp: 180 tablet, Rfl: 3    diltiazem ER COATED BEADS (CARDIZEM CD) 120 MG 24 hr capsule, Take 1 capsule (120 mg) by mouth daily, Disp: 90 capsule, Rfl: 3    furosemide (LASIX) 20 MG tablet, Take 1 tablet (20 mg) by mouth daily as needed, Disp: 60 tablet, Rfl: 5    metoprolol tartrate (LOPRESSOR) 50 MG tablet, Take 1.5 tablets (75 mg) by mouth 2 times daily, Disp: 270 tablet, Rfl: 3    psyllium (METAMUCIL/KONSYL) 58.6 % powder, 1 tsp twice daily, Disp: , Rfl:     rosuvastatin (CRESTOR) 10 MG tablet, Take 1 tablet (10 mg) by mouth At Bedtime, Disp: 90 tablet, Rfl: 0    valACYclovir (VALTREX) 1000 mg tablet, daily as needed , Disp: , Rfl:     Current Facility-Administered Medications:     lidocaine 1% with EPINEPHrine 1:100,000 injection 3 mL, 3 mL, Intradermal, Once, Andre Santizo MD       Objective:     Allergies   Allergen Reactions    Poison Ivy Extract Itching and Rash     Itchiness, redness, swelling     /79 (BP Location: Left arm, Patient Position: Sitting, Cuff Size: Adult Regular)   Pulse 54   Temp 97.5  F (36.4  C) (Temporal)   Resp 16   Ht 1.753 m (5' 9\")   Wt 77.1 kg (170 lb)   SpO2 98%   BMI 25.10 kg/m    Body mass index is 25.1 kg/m .    Vitals reviewed as above.  General: Patient is alert and oriented x 3, in no apparent distress  Cardiac: irregular rate and rhythm, no murmurs, hx of A fib  Pulmonary: lungs clear to ausculation, no crackles, rales, rhonchi, or wheezing  Musculoskeletal: normal 4/5 strength in major muscle groups in upper and lower extremities bilaterally, normal  strength bilaterally, normal right radial pulse, no significant pain with palpation of right shoulder, no ligament laxity noted in right shoulder, no significant pain in the right superior trapezius muscles, full active range of motion of his arm at the shoulder with minimal discomfort, full extent external and internal rotation, able to " raise his arm above shoulder level today, no pain with pronation and supination of the right hand, no pain with triggered with reaching across to the left shoulder    EXAM: XR SHOULDER RIGHT 2 VIEWS  LOCATION: [facility]  DATE: 11/9/2023  INDICATION:  Chronic right shoulder pain, Chronic right shoulder pain  COMPARISON: None.                                                            IMPRESSION: Normal joint spaces and alignment. No fracture.    I personally reviewed grossly normal x-ray today.  No acute fractures or other significant abnormalities noted.    Assessment and Plan:     1. Chronic right shoulder pain  New concern.  Chronic problem, stable.  Most liekly musculoskeletal in origin.  I discussed this with patient.  X ray is normal.  He declines PT at this time.  Will follow-up with his PCP in the future as needed.  Discussed avoiding NSAIDs, due to his being on Eliquis.  - XR Shoulder Right 2 Views; Future    2. Hypertension goal BP (blood pressure) < 140/90  Chronic, stable.  Following with his PCP.  Well-controlled with diltiazem, furosemide, metoprolol.      This dictation uses voice recognition software, which may contain typographical errors.

## 2023-11-11 PROBLEM — Z85.828 HISTORY OF BASAL CELL CARCINOMA: Status: ACTIVE | Noted: 2023-11-11

## 2023-11-22 ENCOUNTER — TELEPHONE (OUTPATIENT)
Dept: FAMILY MEDICINE | Facility: CLINIC | Age: 78
End: 2023-11-22

## 2023-11-22 NOTE — TELEPHONE ENCOUNTER
S/w pt who states he has BCC back of right ear and is scheduled for mohs surgery on 12/21/23 with Dr. Hopper.  Pt states he has noticed a small spot on the outer right lobe that is shiny and is concerned may be another BCC.  Scheduled with Rissa Casillas on Monday 11/27 at 4:15 pm at  Skin clinic.    Shasha MCCALLUM RN  [facility] [place]  [phone]

## 2023-11-22 NOTE — TELEPHONE ENCOUNTER
Health Call Center    Phone Message    May a detailed message be left on voicemail: yes     Reason for Call: Symptoms or Concerns     If patient has red-flag symptoms, warm transfer to triage line    Current symptom or concern: Pt called and said that pt is scheduled for surgery in [facility] in December but just noticed a growth on the back of his right ear this morning. Pt is worried that it might be related to the lesion that was removed during the biopsy. Please call the pt back to discuss. Thanks     Symptoms have been present for: 24 hour(s)    Has patient previously been seen for this? Yes    By : Dr. Santizo     Date: 10/27/23    Are there any new or worsening symptoms? Yes: see above    Action Taken: Message routed to:  Other: [facility] SKIN    Travel Screening: Not Applicable

## 2023-11-23 NOTE — PLAN OF CARE
Pt admitted from ED around 2200. Soft BP at times but remained above 100 systolic. HR between 65-90. Weaned from 15 mg diltiazem to 10 mg/hr. Pts vitals remaining stable. Tele Afib CVR. Heparin running at 700 units/hr. HepXa recheck at 0915.LS had fine crackles at beginning of shift, now clear. K and mg protocol, recheck this AM. Low sat fat, low Na, no caffeine diet. SBA. BG 82 overnight. Using urinal at bedside with assistance from staff. Receiving IV lasix. Checking serial trops, last two checks within normal limits. Diltiazem running at 10mg/hr.     Significant Event Note    Time of event: 12:10 AM November 23, 2023    Description of event:  I spoke with [place] radiology regarding MRI results.  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  I will defer when this is performed to the rounding provider.    Tunde Walton MD

## 2023-11-27 ENCOUNTER — OFFICE VISIT (OUTPATIENT)
Dept: FAMILY MEDICINE | Facility: CLINIC | Age: 78
End: 2023-11-27
Payer: COMMERCIAL

## 2023-11-27 DIAGNOSIS — C44.212 BASAL CELL CARCINOMA (BCC) OF RIGHT POSTAURICULAR REGION: Primary | ICD-10-CM

## 2023-11-27 DIAGNOSIS — L82.1 SK (SEBORRHEIC KERATOSIS): ICD-10-CM

## 2023-11-27 DIAGNOSIS — R58 ECCHYMOSIS: ICD-10-CM

## 2023-11-27 PROCEDURE — 99213 OFFICE O/P EST LOW 20 MIN: CPT | Performed by: PHYSICIAN ASSISTANT

## 2023-11-27 ASSESSMENT — PAIN SCALES - GENERAL: PAINLEVEL: NO PAIN (0)

## 2023-11-27 NOTE — LETTER
11/27/2023         RE: Amadou Wade  [address] [facility] [address]        Dear Colleague,    Thank you for referring your patient, Amadou Wade, to the [facility] [place]. Please see a copy of my visit note below.    [facility] Dermatology Note  Encounter Date: Nov 27, 2023  Office Visit      Dermatology Problem List:  1. Lesion to Monitor, Right ear - posterior sulcus  - Consistent with ecchymosis   2. Hx NMSC  - BCC, R Postauricular. Scheduled for MMS. 12/21/23  - SCCis mid chest,  s/p Efudex for 2 weeks starting 8/2/21   3. AKs  - s/p cryo 12/10/21  - left mid cheek, s/p Efudex for 2 weeks starting 8/2/21 and cryo 12/10/21  4.  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 If spot is still present in 3-4 months will consider biopsy.    # SKs - R temple, R cheek  - reassured benign, offered LN2, patient declines for now     # BCC - R posterior auricular - well healing bx site  - patient planning for MMS which is scheduled with Dr. Hopper on 12/21/23    Procedures Performed:   None     Follow-up: prn for new or changing lesions    Staff and scribe     Scribe disclosure  I, Isis Ortega, am serving as a scribe to document services personally performed by Rissa Casillas PA-C based on data collection and the provider's statements to me.     All risks, benefits and alternatives were discussed with patient.  Patient is in agreement and understands the assessment and plan.  All questions were answered.    Rissa Casillas PA-C, [facility]  [facility]: Phone: [phone], Fax:  [phone]  [facility]: Phone: [phone],  Fax: [phone]  [facility]: Phone: [phone], Fax: [phone]  ____________________________________________    CC: Derm Problem (Spot on outer side of R ear noticed this spot about a few weeks ago)      Reviewed patients past medical history and pertinent chart review prior to patient's visit today.     HPI:  Mr. Amadou Wade is a 78 year old male who presents today as a return patient for a spot check. BCC back of right ear/mohs on 12/21 with Dr. Hopper     Patient is otherwise feeling well, without additional concerns.    Labs:  None     Physical Exam:  Vitals: There were no vitals taken for this visit.  SKIN: Focused examination of Right ear was performed.   - There are waxy stuck on tan to brown papules on the R temple and R cheek  - 3mm bright red macule on the R ear on the posterior sulcus  - well healed bx site on the R posterior auricular  - No other lesions of concern on areas examined.     Medications:  Current Outpatient Medications   Medication     ACE/ARB/ARNI NOT PRESCRIBED (INTENTIONAL)     apixaban ANTICOAGULANT (ELIQUIS) 5 MG tablet     diltiazem ER COATED BEADS (CARDIZEM CD) 120 MG 24 hr capsule     furosemide (LASIX) 20 MG tablet     metoprolol tartrate (LOPRESSOR) 50 MG tablet     psyllium (METAMUCIL/KONSYL) 58.6 % powder     rosuvastatin (CRESTOR) 10 MG tablet     valACYclovir (VALTREX) 1000 mg tablet     Current Facility-Administered Medications   Medication     lidocaine 1% with EPINEPHrine 1:100,000 injection 3 mL      Past Medical/Surgical History:   Patient Active Problem List   Diagnosis     Cardiac dysrhythmia     Hyperlipidemia LDL goal <130     Advanced directives, counseling/discussion     ED (erectile dysfunction)     Hypertension goal BP (blood pressure) < 140/90     Recurrent cold sores     Squamous 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     Atrial fibrillation (H)     CHF  (congestive heart failure) (H)     HFrEF (heart failure with reduced ejection fraction) (H)     History of colonic polyps     RBBB     History of basal cell carcinoma     Past Medical History:   Diagnosis Date     CHF (congestive heart failure) (H) 12/2021    HFrEF - Dr Cordova     Chronic rhinitis     resolved     [facility]     HFrEF (heart failure with reduced ejection fraction) (H) 12/2021    Dr Cordova     [facility] of [facility] polyps [facility]    [facility]     Hypertension goal BP (blood pressure) < 140/90 12/2017     Persistent atrial fibrillation (H) 12/2021    in/out, rate controlled, at times, Dr Montenegro/Justice     Pneumonia, organism [phone]     RBBB      Snoring     no sleep apnea - mouth guard     Squamous 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 2021    Dr Abernathy/Darlyn - Rt Chest     Unspecified hemorrhoids without mention of complication 04/2006    internal                        Again, thank you for allowing me to participate in the care of your patient.        Sincerely,        Rissa Casillas PA-C

## 2023-11-27 NOTE — PROGRESS NOTES
[facility] Dermatology Note  Encounter Date: Nov 27, 2023  Office Visit      Dermatology Problem List:  1. Lesion to Monitor, Right ear - posterior sulcus  - Consistent with ecchymosis   2. Hx NMSC  - BCC, R Postauricular. Scheduled for MMS. 12/21/23  - SCCis mid chest,  s/p Efudex for 2 weeks starting 8/2/21   3. AKs  - s/p cryo 12/10/21  - left mid cheek, s/p Efudex for 2 weeks starting 8/2/21 and cryo 12/10/21  4.  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 If spot is still present in 3-4 months will consider biopsy.    # SKs - R temple, R cheek  - reassured benign, offered LN2, patient declines for now     # BCC - R posterior auricular - well healing bx site  - patient planning for MMS which is scheduled with Dr. Hopper on 12/21/23    Procedures Performed:   None     Follow-up: prn for new or changing lesions    Staff and scribe     Scribe disclosure  I, Isis Ortega, am serving as a scribe to document services personally performed by Rissa Casillas PA-C based on data collection and the provider's statements to me.     All risks, benefits and alternatives were discussed with patient.  Patient is in agreement and understands the assessment and plan.  All questions were answered.    Rissa Casillas PA-C, MPAS  [facility] [place]: Phone: [phone], Fax: [phone]  [facility]: Phone: [phone],  Fax: [phone]  [facility]: Phone: [phone], Fax: [phone]  ____________________________________________    CC: Derm Problem (Spot on outer side of R ear  noticed this spot about a few weeks ago)      Reviewed patients past medical history and pertinent chart review prior to patient's visit today.     HPI:  Mr. Amadou Wade is a 78 year old male who presents today as a return patient for a spot check. BCC back of right ear/mohs on 12/21 with Dr. Hopper     Patient is otherwise feeling well, without additional concerns.    Labs:  None     Physical Exam:  Vitals: There were no vitals taken for this visit.  SKIN: Focused examination of Right ear was performed.   - There are waxy stuck on tan to brown papules on the R temple and R cheek  - 3mm bright red macule on the R ear on the posterior sulcus  - well healed bx site on the R posterior auricular  - No other lesions of concern on areas examined.     Medications:  Current Outpatient Medications   Medication    ACE/ARB/ARNI NOT PRESCRIBED (INTENTIONAL)    apixaban ANTICOAGULANT (ELIQUIS) 5 MG tablet    diltiazem ER COATED BEADS (CARDIZEM CD) 120 MG 24 hr capsule    furosemide (LASIX) 20 MG tablet    metoprolol tartrate (LOPRESSOR) 50 MG tablet    psyllium (METAMUCIL/KONSYL) 58.6 % powder    rosuvastatin (CRESTOR) 10 MG tablet    valACYclovir (VALTREX) 1000 mg tablet     Current Facility-Administered Medications   Medication    lidocaine 1% with EPINEPHrine 1:100,000 injection 3 mL      Past Medical/Surgical History:   Patient Active Problem List   Diagnosis    Cardiac dysrhythmia    Hyperlipidemia LDL goal <130    Advanced directives, counseling/discussion    ED (erectile dysfunction)    Hypertension goal BP (blood pressure) < 140/90    Recurrent cold sores    Squamous 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    Atrial fibrillation (H)    CHF (congestive heart failure) (H)    HFrEF (heart failure with reduced ejection fraction) (H)    History of colonic polyps    RBBB    History of basal cell carcinoma     Past Medical History:   Diagnosis Date    CHF (congestive heart failure) (H) 12/2021    HFrEF - Dr Tasha Covington  rhinitis     resolved    [facility]    HFrEF (heart failure with reduced ejection fraction) (H) 12/2021    Dr Cordova    [facility] of [facility] polyps [facility]    [facility]    Hypertension goal BP (blood pressure) < 140/90 12/2017    Persistent atrial fibrillation (H) 12/2021    in/out, rate controlled, at times, Dr Montenegro/Justice    Pneumonia, organism unspecified(486) 1993    RBBB     Snoring     no sleep apnea - mouth guard    Squamous 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 2021    Dr Abernathy/Darlyn - Rt Chest    Unspecified hemorrhoids without mention of complication 04/2006    internal

## 2023-12-20 NOTE — PROGRESS NOTES
Surgical Office Location:  [facility]  [address] [place] [id number]

## 2023-12-21 ENCOUNTER — OFFICE VISIT (OUTPATIENT)
Dept: DERMATOLOGY | Facility: CLINIC | Age: 78
End: 2023-12-21
Payer: COMMERCIAL

## 2023-12-21 DIAGNOSIS — D18.01 ANGIOMA OF SKIN: ICD-10-CM

## 2023-12-21 DIAGNOSIS — L82.1 SEBORRHEIC KERATOSES: ICD-10-CM

## 2023-12-21 DIAGNOSIS — C44.212 BASAL CELL CARCINOMA (BCC) OF RIGHT POSTAURICULAR REGION: ICD-10-CM

## 2023-12-21 DIAGNOSIS — D23.9 DERMAL NEVUS: ICD-10-CM

## 2023-12-21 DIAGNOSIS — L81.4 LENTIGO: Primary | ICD-10-CM

## 2023-12-21 PROCEDURE — 17312 MOHS ADDL STAGE: CPT | Performed by: DERMATOLOGY

## 2023-12-21 PROCEDURE — 17311 MOHS 1 STAGE H/N/HF/G: CPT | Performed by: DERMATOLOGY

## 2023-12-21 PROCEDURE — 99213 OFFICE O/P EST LOW 20 MIN: CPT | Mod: 25 | Performed by: DERMATOLOGY

## 2023-12-21 NOTE — LETTER
12/21/2023         RE: Amadou Wade  [address] [facility] [address]        Dear Colleague,    Thank you for referring your patient, Amadou Wade, to the [facility]. Please see a copy of my visit note below.    Surgical Office Location:  [facility]  [address] [place] [address]      Amadou Wade , a 78 year old year old male patient, I was asked to see by Dr. Santizo for basal cell carcinoma on right post ear.  Patient has no other skin complaints today.  Remainder of the HPI, Meds, PMH, Allergies, FH, and SH was reviewed in chart.      Past Medical History:   Diagnosis Date     CHF (congestive heart failure) (H) 12/2021    HFrEF - Dr Cordova     Chronic rhinitis     resolved     [facility]     HFrEF (heart failure with reduced ejection fraction) (H) 12/2021    Dr Cordova     History of colonic polyps 03/2023    [facility]     Hypertension goal BP (blood pressure) < 140/90 12/2017     Persistent atrial fibrillation (H) 12/2021    in/out, rate controlled, at times, Dr Montenegro/Justice     Pneumonia, organism unspecified(486) 1993     RBBB      Snoring     no sleep apnea - mouth guard     Squamous 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 2021    Dr Abernathy/Darlyn - Rt Chest     Unspecified hemorrhoids without mention of complication 04/2006    internal       Past Surgical History:   Procedure Laterality Date     ANESTHESIA CARDIOVERSION N/A 01/11/2022    Procedure: ANESTHESIA, FOR CARDIOVERSION;  Surgeon: GENERIC ANESTHESIA PROVIDER;  Location: [facility] OR     COLONOSCOPY  01/03/2012    incomplete - negative, ACBE incomplete - negative, CT colography negative     [facility]     COLONOSCOPY  03/2023    pol[facility] x 1 - [facility]     ganglion cyst removal Left 09/02/2021     HC REMOVE TONSILS/ADENOIDS,<11 Y/O  1953    T & A <14y.o.     SIGMOIDOSCOPY,DIAGNOSTIC  1997    normal     STRESS ECHO (METRO)  03/2007    normal few pac's, few pvc's      SURGICAL HISTORY OF -  Bilateral     fall,  - bilateral IOL     ZZHC COLONOSCOPY THRU STOMA, DIAGNOSTIC  2006    dr tejeda - normal - internal hemorrhiods - due 10 yrs        Family History   Problem Relation Age of Onset     Hypertension Mother      Cancer Father         lung     Eye Disorder Father         Glaucoma     Alzheimer Disease Father      Heart Disease Father      Diabetes Father         type 2     Diabetes Brother         type 2     Coronary Artery Disease Brother      Cerebrovascular Disease Maternal Grandmother      Heart Disease Maternal Grandfather          young heartattack     Diabetes Paternal Grandfather      Cancer Brother         melanoma     Alzheimer Disease Brother      Prostate Cancer Brother      Cerebrovascular Disease Brother        Social History     Socioeconomic History     Marital status:      Spouse name: Dania     Number of children: 3     Years of education: 19     Highest education level: Not on file   Occupational History     Occupation:       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   Tobacco Use     Smoking status: Never     Smokeless tobacco: Never   Vaping Use     Vaping Use: Never used   Substance and Sexual Activity     Alcohol use: Yes     Alcohol/week: 0.0 - 1.0 standard drinks of alcohol     Comment: 0-1 per month avg     Drug use: No     Sexual activity: Yes     Partners: Female   Other Topics Concern      Service Not Asked     Blood Transfusions Not Asked     Caffeine Concern Yes     Comment: occas     Occupational Exposure Not Asked     Hobby Hazards Not Asked     Sleep Concern Not Asked     Stress Concern Not Asked     Weight Concern Not Asked     Special Diet Not Asked     Back Care Not Asked     Exercise Yes     Comment: active     Bike Helmet Not Asked     Seat Belt Yes     Self-Exams Not Asked     Parent/sibling w/ CABG, MI or angioplasty before 65F 55M? No   Social History Narrative     Not on file     Social Determinants of Health      Financial Resource Strain: Low Risk  (11/9/2023)    Financial Resource Strain      Within the past 12 months, have you or your family members you live with been unable to get utilities (heat, electricity) when it was really needed?: No   Food Insecurity: Low Risk  (11/9/2023)    Food Insecurity      Within the past 12 months, did you worry that your food would run out before you got money to buy more?: No      Within the past 12 months, did the food you bought just not last and you didn t have money to get more?: No   Transportation Needs: Low Risk  (11/9/2023)    Transportation Needs      Within the past 12 months, has lack of transportation kept you from medical appointments, getting your medicines, non-medical meetings or appointments, work, or from getting things that you need?: No   Physical Activity: Not on file   Stress: Not on file   Social Connections: Not on file   Interpersonal Safety: Low Risk  (11/9/2023)    Interpersonal Safety      Do you feel physically and emotionally safe where you currently live?: Yes      Within the past 12 months, have you been hit, slapped, kicked or otherwise physically hurt by someone?: No      Within the past 12 months, have you been humiliated or emotionally abused in other ways by your partner or ex-partner?: No   Housing Stability: Low Risk  (11/9/2023)    Housing Stability      Do you have housing? : Yes      Are you worried about losing your housing?: No       Outpatient Encounter Medications as of 12/21/2023   Medication Sig Dispense Refill     apixaban ANTICOAGULANT (ELIQUIS) 5 MG tablet Take 1 tablet (5 mg) by mouth 2 times daily 180 tablet 3     diltiazem ER COATED BEADS (CARDIZEM CD) 120 MG 24 hr capsule Take 1 capsule (120 mg) by mouth daily 90 capsule 3     furosemide (LASIX) 20 MG tablet Take 1 tablet (20 mg) by mouth daily as needed 60 tablet 5     metoprolol tartrate (LOPRESSOR) 50 MG tablet Take 1.5 tablets (75 mg) by mouth 2 times daily 270 tablet 3      psyllium (METAMUCIL/KONSYL) 58.6 % powder 1 tsp twice daily       rosuvastatin (CRESTOR) 10 MG tablet Take 1 tablet (10 mg) by mouth At Bedtime 90 tablet 0     valACYclovir (VALTREX) 1000 mg tablet daily as needed        ACE/ARB/ARNI NOT PRESCRIBED (INTENTIONAL) Please choose reason not prescribed from choices below. (Patient not taking: Reported on 12/21/2023)       Facility-Administered Encounter Medications as of 12/21/2023   Medication Dose Route Frequency Provider Last Rate Last Admin     lidocaine 1% with EPINEPHrine 1:100,000 injection 3 mL  3 mL Intradermal Once Andre Santizo MD                 Review Of Systems  Skin: As above  Eyes: negative  Ears/Nose/Throat: negative  Respiratory: No shortness of breath, dyspnea on exertion, cough, or hemoptysis  Cardiovascular: negative  Gastrointestinal: negative  Genitourinary: negative  Musculoskeletal: negative  Neurologic: negative  Psychiatric: negative  Hematologic/Lymphatic/Immunologic: negative  Endocrine: negative      O:   NAD, WDWN, Alert & Oriented, Mood & Affect wnl, Vitals stable   General appearance shirley ii   Vitals stable   Alert, oriented and in no acute distress   R post ear 1.5cm pink pearly papule    Stuck on papules and brown macules on trunk and ext    Red papules on trunk   Flesh colored papules on trunk          Eyes: Conjunctivae/lids:Normal     ENT: Lips, buccal mucosa, tongue: normal    MSK:Normal    Cardiovascular: peripheral edema none    Pulm: Breathing Normal    Neuro/Psych: Orientation:Normal; Mood/Affect:Normal      A/P:  1. Seborrheic keratosis, lentigo, angioma, dermal nevus  2. R post ear basal cell carcinoma   It was a pleasure speaking to Amadou Wade today.  Previous clinic  notes and pertinent laboratory tests were reviewed prior to Amadou Wade's visit.  Signs and Symptoms of skin cancer discussed with patient.  Patient encouraged to perform monthly skin exams.  UV precautions reviewed with patient.  Risks of  non-melanoma skin cancer discussed with patient   Return to clinic 1 month  PROCEDURE NOTE  R post ear basal cell carcinoma   MOHS:   Location    The rationale for Mohs surgery was discussed with the patient and consent was obtained.  The risks and benefits as well as alternatives to therapy were discussed, in detail.  Specifically, the risks of infection, scarring, bleeding, prolonged wound healing, incomplete removal, allergy to anesthesia, nerve injury and recurrence were addressed.  Indication for Mohs was Location. Prior to the procedure, the treatment site was clearly identified and, if available, confirmed with previous photos and confirmed by the patient   All components of the Universal Protocol/PAUSE rule were completed.  The Mohs surgeon operated in two distinct and integrated capacities as the surgeon and pathologist.      The area was prepped with Betasept.  A rim of normal appearing skin was marked circumferentially around the lesion.  The area was infiltrated with local anesthesia.  The tumor was first debulked to remove all clinically apparent tumor.  An incision following the standard Mohs approach was done and the specimen was oriented,mapped and placed in 2 block(s).  Each specimen was then chromacoded and processed in the Mohs laboratory using standard Mohs technique and submitted for frozen section histology.  Frozen section analysis showed  residual tumor but CLEAR MARGINS.    1st stage:Orthokeratosis of epidermis with a proliferation of nests of basaloid cells, with peripheral palisading and a haphazard arrangement in the center extending into the dermis, forming nodules.  The tumor cells have hyperchromatic nuclei. Poor cytoplasm and intercellular bridging.      The tumor was excised using standard Mohs technique in 2 stages(s).  CLEAR MARGINS OBTAINED and Final defect size was 2.5 x 2.1 cm.     We discussed the options for wound management in full with the patient including risks/benefits/  possible outcomes.      REPAIR SECOND INTENT: We discussed the options for wound management in full with the patient including risks/benefits/possible outcomes. Decision made to allow the wound to heal by second intention. Cautery was used for for hemostasis. EBL minimal; complications none; wound care routine.  The patient was discharged in good condition and will return in one month or prn for wound evaluation.        Again, thank you for allowing me to participate in the care of your patient.        Sincerely,        Mario Hopper MD

## 2023-12-21 NOTE — NURSING NOTE
Patient came in for bleeding-   Removed bandage-cleaned area- used cautery to stop spot that were bleeding- applied surgical and a pressure bandage with coban  Advised ER if wound starts bleeding after hours    Thank you,    Jacque BARNETTRN BSN  [facility]- [phone]

## 2023-12-21 NOTE — PATIENT INSTRUCTIONS
Open Wound Care     for ______________        No strenuous activity for 48 hours    Take Tylenol as needed for discomfort.                                                .         Do not drink alcoholic beverages for 48 hours.    Keep the pressure bandage in place for 24 hours. If the bandage becomes blood tinged or loose, reinforce it with gauze and tape.        (Refer to the reverse side of this page for management of bleeding).    Remove bandage in 24 hours and begin wound care as follows:     Clean area with tap water using a Q tip or gauze pad, (shower / bathe normally)  Dry wound with Q tip or gauze pad  Apply Aquaphor, Vaseline, Polysporin or Bacitracin Ointment with a Q tip  Do NOT use Neosporin Ointment *  Cover the wound with a band-aid or nonstick gauze pad and paper tape.  Repeat wound care once a day until wound is completely healed.    It is an old wives tale that a wound heals better when it is exposed to air and allowed to dry out. The wound will heal faster with a better cosmetic result if it is kept moist with ointment and covered with a bandage.  Do not let the wound dry out.      Supplies Needed:                Qtips or gauze pads                Polysporin or Bacitracin Ointment                Bandaids or nonstick gauze pads and paper tape    Wound care kits and brown paper tape are available for purchase at   the pharmacy.    BLEEDING:    Use tightly rolled up gauze or cloth to apply direct pressure over the bandage for 20   minutes.  Reapply pressure for an additional 20 minutes if necessary  Call the office or go to the nearest emergency room if pressure fails to stop the bleeding.  Use additional gauze and tape to maintain pressure once the bleeding has stopped.  Begin wound care 24 hours after surgery as directed.                  WOUND HEALING    One week after surgery a pink / red halo will form around the outside of the wound.   This is new skin.  The center of the wound will appear  yellowish white and produce some drainage.  The pink halo will slowly migrate in toward the center of the wound until the wound is covered with new shiny pink skin.  There will be no more drainage when the wound is completely healed.  It will take six months to one year for the redness to fade.  The scar may be itchy, tight and sensitive to extreme temperatures for a year after the surgery.  Massaging the area several times a day for several minutes after the wound is completely healed will help the scar soften and normalize faster. Begin massage only after healing is complete.      In case of emergency call: Dr Hopper: [phone]    [facility]: [phone]    [facility]:[phone]

## 2023-12-21 NOTE — PROGRESS NOTES
Amadou Wade , a 78 year old year old male patient, I was asked to see by Dr. Santizo for basal cell carcinoma on right post ear.  Patient has no other skin complaints today.  Remainder of the HPI, Meds, PMH, Allergies, FH, and SH was reviewed in chart.      Past Medical History:   Diagnosis Date    CHF (congestive heart failure) (H) 12/2021    HFrEF - Dr Cordova    Chronic rhinitis     resolved    [place]     [facility]    HFrEF (heart failure with reduced ejection fraction) (H) 12/2021    Dr Cordova    [facility] of [place] polyps [place]23    [facility]    Hypertension goal BP (blood pressure) < 140/90 12/2017    Persistent atrial fibrillation (H) 12/2021    in/out, rate controlled, at times, Dr Montenegro/Justice    Pneumonia, organism unspecified(486) 1993    RBBB     Snoring     no sleep apnea - mouth guard    Squamous 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 2021    Dr Abernathy/Darlyn - Rt Chest    Unspecified hemorrhoids without mention of complication 04/2006    internal       Past Surgical History:   Procedure Laterality Date    ANESTHESIA CARDIOVERSION N/A 01/11/2022    Procedure: ANESTHESIA, FOR CARDIOVERSION;  Surgeon: GENERIC ANESTHESIA PROVIDER;  Location: [facility] OR    COLONOSCOPY  01/03/2012    incomplete - negative, ACBE incomplete - negative, CT colography negative    [place] [facility]    [facility]    COLONOSCOPY  03/2023    [facility] 1 - [facility]    ganglion cyst removal Left 09/02/2021    HC REMOVE TONSILS/ADENOIDS,<13 Y/O  1953    T & A <14y.o.    SIGMOIDOSCOPY,DIAGNOSTIC  1997    normal    STRESS ECHO (METRO)  03/2007    normal few pac's, few pvc's    SURGICAL HISTORY OF -  Bilateral     fall, 2020 - bilateral IOL    ZZHC COLONOSCOPY THRU STOMA, DIAGNOSTIC  05/2006    dr tejeda - normal - internal hemorrhiods - due 10 yrs        Family History   Problem Relation Age of Onset    Hypertension Mother     Cancer Father         lung    Eye Disorder Father         Glaucoma    Alzheimer Disease Father     Heart Disease Father      Diabetes Father         type 2    Diabetes Brother         type 2    Coronary Artery Disease Brother     Cerebrovascular Disease Maternal Grandmother     Heart Disease Maternal Grandfather          young heartattack    Diabetes Paternal Grandfather     Cancer Brother         melanoma    Alzheimer Disease Brother     Prostate Cancer Brother     Cerebrovascular Disease Brother        Social History     Socioeconomic History    Marital status:      Spouse name: Dania    Number of children: 3    Years of education: 19    Highest education level: Not on file   Occupational History    Occupation:       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   Tobacco Use    Smoking status: Never    Smokeless tobacco: Never   Vaping Use    Vaping Use: Never used   Substance and Sexual Activity    Alcohol use: Yes     Alcohol/week: 0.0 - 1.0 standard drinks of alcohol     Comment: 0-1 per month avg    Drug use: No    Sexual activity: Yes     Partners: Female   Other Topics Concern     Service Not Asked    Blood Transfusions Not Asked    Caffeine Concern Yes     Comment: occas    Occupational Exposure Not Asked    Hobby Hazards Not Asked    Sleep Concern Not Asked    Stress Concern Not Asked    Weight Concern Not Asked    Special Diet Not Asked    Back Care Not Asked    Exercise Yes     Comment: active    Bike Helmet Not Asked    Seat Belt Yes    Self-Exams Not Asked    Parent/sibling w/ CABG, MI or angioplasty before 65F 55M? No   Social History Narrative    Not on file     Social Determinants of Health     Financial Resource Strain: Low Risk  (2023)    Financial Resource Strain     Within the past 12 months, have you or your family members you live with been unable to get utilities (heat, electricity) when it was really needed?: No   Food Insecurity: Low Risk  (2023)    Food Insecurity     Within the past 12 months, did you worry that your food would run out before you got money to buy more?: No     Within the  past 12 months, did the food you bought just not last and you didn t have money to get more?: No   Transportation Needs: Low Risk  (11/9/2023)    Transportation Needs     Within the past 12 months, has lack of transportation kept you from medical appointments, getting your medicines, non-medical meetings or appointments, work, or from getting things that you need?: No   Physical Activity: Not on file   Stress: Not on file   Social Connections: Not on file   Interpersonal Safety: Low Risk  (11/9/2023)    Interpersonal Safety     Do you feel physically and emotionally safe where you currently live?: Yes     Within the past 12 months, have you been hit, slapped, kicked or otherwise physically hurt by someone?: No     Within the past 12 months, have you been humiliated or emotionally abused in other ways by your partner or ex-partner?: No   Housing Stability: Low Risk  (11/9/2023)    Housing Stability     Do you have housing? : Yes     Are you worried about losing your housing?: No       Outpatient Encounter Medications as of 12/21/2023   Medication Sig Dispense Refill    apixaban ANTICOAGULANT (ELIQUIS) 5 MG tablet Take 1 tablet (5 mg) by mouth 2 times daily 180 tablet 3    diltiazem ER COATED BEADS (CARDIZEM CD) 120 MG 24 hr capsule Take 1 capsule (120 mg) by mouth daily 90 capsule 3    furosemide (LASIX) 20 MG tablet Take 1 tablet (20 mg) by mouth daily as needed 60 tablet 5    metoprolol tartrate (LOPRESSOR) 50 MG tablet Take 1.5 tablets (75 mg) by mouth 2 times daily 270 tablet 3    psyllium (METAMUCIL/KONSYL) 58.6 % powder 1 tsp twice daily      rosuvastatin (CRESTOR) 10 MG tablet Take 1 tablet (10 mg) by mouth At Bedtime 90 tablet 0    valACYclovir (VALTREX) 1000 mg tablet daily as needed       ACE/ARB/ARNI NOT PRESCRIBED (INTENTIONAL) Please choose reason not prescribed from choices below. (Patient not taking: Reported on 12/21/2023)       Facility-Administered Encounter Medications as of 12/21/2023   Medication  Dose Route Frequency Provider Last Rate Last Admin    lidocaine 1% with EPINEPHrine 1:100,000 injection 3 mL  3 mL Intradermal Once Andre Santizo MD                 Review Of Systems  Skin: As above  Eyes: negative  Ears/Nose/Throat: negative  Respiratory: No shortness of breath, dyspnea on exertion, cough, or hemoptysis  Cardiovascular: negative  Gastrointestinal: negative  Genitourinary: negative  Musculoskeletal: negative  Neurologic: negative  Psychiatric: negative  Hematologic/Lymphatic/Immunologic: negative  Endocrine: negative      O:   NAD, WDWN, Alert & Oriented, Mood & Affect wnl, Vitals stable   General appearance shirley ii   Vitals stable   Alert, oriented and in no acute distress   R post ear 1.5cm pink pearly papule    Stuck on papules and brown macules on trunk and ext    Red papules on trunk   Flesh colored papules on trunk          Eyes: Conjunctivae/lids:Normal     ENT: Lips, buccal mucosa, tongue: normal    MSK:Normal    Cardiovascular: peripheral edema none    Pulm: Breathing Normal    Neuro/Psych: Orientation:Normal; Mood/Affect:Normal      A/P:  1. Seborrheic keratosis, lentigo, angioma, dermal nevus  2. R post ear basal cell carcinoma   It was a pleasure speaking to Amadou Wade today.  Previous clinic  notes and pertinent laboratory tests were reviewed prior to Amadou Wade's visit.  Signs and Symptoms of skin cancer discussed with patient.  Patient encouraged to perform monthly skin exams.  UV precautions reviewed with patient.  Risks of non-melanoma skin cancer discussed with patient   Return to clinic 1 month  PROCEDURE NOTE  R post ear basal cell carcinoma   MOHS:   Location    The rationale for Mohs surgery was discussed with the patient and consent was obtained.  The risks and benefits as well as alternatives to therapy were discussed, in detail.  Specifically, the risks of infection, scarring, bleeding, prolonged wound healing, incomplete removal, allergy to anesthesia, nerve  injury and recurrence were addressed.  Indication for Mohs was Location. Prior to the procedure, the treatment site was clearly identified and, if available, confirmed with previous photos and confirmed by the patient   All components of the Universal Protocol/PAUSE rule were completed.  The Mohs surgeon operated in two distinct and integrated capacities as the surgeon and pathologist.      The area was prepped with Betasept.  A rim of normal appearing skin was marked circumferentially around the lesion.  The area was infiltrated with local anesthesia.  The tumor was first debulked to remove all clinically apparent tumor.  An incision following the standard Mohs approach was done and the specimen was oriented,mapped and placed in 2 block(s).  Each specimen was then chromacoded and processed in the Mohs laboratory using standard Mohs technique and submitted for frozen section histology.  Frozen section analysis showed  residual tumor but CLEAR MARGINS.    1st stage:Orthokeratosis of epidermis with a proliferation of nests of basaloid cells, with peripheral palisading and a haphazard arrangement in the center extending into the dermis, forming nodules.  The tumor cells have hyperchromatic nuclei. Poor cytoplasm and intercellular bridging.      The tumor was excised using standard Mohs technique in 2 stages(s).  CLEAR MARGINS OBTAINED and Final defect size was 2.5 x 2.1 cm.     We discussed the options for wound management in full with the patient including risks/benefits/ possible outcomes.      REPAIR SECOND INTENT: We discussed the options for wound management in full with the patient including risks/benefits/possible outcomes. Decision made to allow the wound to heal by second intention. Cautery was used for for hemostasis. EBL minimal; complications none; wound care routine.  The patient was discharged in good condition and will return in one month or prn for wound evaluation.

## 2024-01-03 ENCOUNTER — TELEPHONE (OUTPATIENT)
Dept: DERMATOLOGY | Facility: CLINIC | Age: 79
End: 2024-01-03
Payer: COMMERCIAL

## 2024-01-03 NOTE — TELEPHONE ENCOUNTER
Spoke with pt and scheduled nurse only visit,    Thank you,  Linda HUI RN  Dermatology   [phone]

## 2024-01-03 NOTE — TELEPHONE ENCOUNTER
[facility] Call Center    Phone Message    May a detailed message be left on voicemail: yes     Reason for Call: Other: Pt had Mohs on 12/21/23. The spot is still bleeding and There is a flap of skin they are somewhat concerned about. Pt was wondering if he could schedule a time for nursing to look at spot just to ensure it is healing okay. Please call home phone first and then cell if they do not answer the home. Thanks      Action Taken: Other: derm    Travel Screening: Not Applicable

## 2024-01-05 ENCOUNTER — OFFICE VISIT (OUTPATIENT)
Dept: DERMATOLOGY | Facility: CLINIC | Age: 79
End: 2024-01-05
Payer: COMMERCIAL

## 2024-01-05 DIAGNOSIS — L08.0 PYODERMA: Primary | ICD-10-CM

## 2024-01-05 DIAGNOSIS — Z51.89 VISIT FOR WOUND CHECK: ICD-10-CM

## 2024-01-05 PROCEDURE — 99207 PR NO CHARGE NURSE ONLY: CPT

## 2024-01-05 PROCEDURE — 87070 CULTURE OTHR SPECIMN AEROBIC: CPT

## 2024-01-05 NOTE — PROGRESS NOTES
Amadou Wade comes into clinic today at the request of Dr. Hopper Ordering Provider for Wound Check Action taken: culture taken and sent to lab .        This service provided today was under the supervising provider of the day Ann Ybarra PA-C, who was available if needed.    Linda Fontenot RN

## 2024-01-07 LAB — BACTERIA SKIN AEROBE CULT: NORMAL

## 2024-01-08 ENCOUNTER — TELEPHONE (OUTPATIENT)
Dept: DERMATOLOGY | Facility: CLINIC | Age: 79
End: 2024-01-08
Payer: COMMERCIAL

## 2024-01-08 NOTE — TELEPHONE ENCOUNTER
----- Message from Ann Ybarra PA-C sent at 1/8/2024  8:17 AM CST -----  Great news, no bacterial growth. No change in the treatment plan.

## 2024-01-08 NOTE — TELEPHONE ENCOUNTER
Patient Contact    Attempt # 1    Was call answered?  No.  Left message on voicemail with information to call nurse back at [phone].    Shasha MCCALLUM RN  [facility]  [phone]

## 2024-01-09 NOTE — TELEPHONE ENCOUNTER
Called and spoke with pt and gave message from Ann below. Pt voiced understanding.    Thank you,  Linda HUI RN  Dermatology   [phone]

## 2024-02-13 ENCOUNTER — OFFICE VISIT (OUTPATIENT)
Dept: FAMILY MEDICINE | Facility: CLINIC | Age: 79
End: 2024-02-13
Payer: COMMERCIAL

## 2024-02-13 DIAGNOSIS — L82.0 INFLAMED SEBORRHEIC KERATOSIS: ICD-10-CM

## 2024-02-13 DIAGNOSIS — L08.9 PUSTULE: ICD-10-CM

## 2024-02-13 DIAGNOSIS — L82.1 SEBORRHEIC KERATOSIS: ICD-10-CM

## 2024-02-13 DIAGNOSIS — L57.0 ACTINIC KERATOSIS: Primary | ICD-10-CM

## 2024-02-13 PROCEDURE — 99213 OFFICE O/P EST LOW 20 MIN: CPT | Mod: 25 | Performed by: PHYSICIAN ASSISTANT

## 2024-02-13 PROCEDURE — 17003 DESTRUCT PREMALG LES 2-14: CPT | Mod: XS | Performed by: PHYSICIAN ASSISTANT

## 2024-02-13 PROCEDURE — 17110 DESTRUCTION B9 LES UP TO 14: CPT | Performed by: PHYSICIAN ASSISTANT

## 2024-02-13 PROCEDURE — 17000 DESTRUCT PREMALG LESION: CPT | Mod: XS | Performed by: PHYSICIAN ASSISTANT

## 2024-02-13 ASSESSMENT — PAIN SCALES - GENERAL: PAINLEVEL: NO PAIN (0)

## 2024-02-13 NOTE — PATIENT INSTRUCTIONS
Cryotherapy    What is it?  Use of a very cold liquid, such as liquid nitrogen, to freeze and destroy abnormal skin cells that need to be removed    What should I expect?  Tenderness and redness  A small blister that might grow and fill with dark purple blood. There may be crusting.  More than one treatment may be needed if the lesions do not go away.    How do I care for the treated area?  Gently wash the area with your hands when bathing.  Use a thin layer of Vaseline to help with healing. You may use a Band-Aid.   The area should heal within 7-10 days and may leave behind a pink or lighter color.   Do not use an antibiotic or Neosporin ointment.   You may take acetaminophen (Tylenol) for pain.     Call your doctor if you have:  Severe pain  Signs of infection (warmth, redness, cloudy yellow drainage, and or a bad smell)  Questions or concerns    Who should I call with questions?      [facility]: [phone]      [facility]: [phone]      For urgent needs outside of business hours call the [facility] at [phone] and ask for the dermatology resident on call

## 2024-02-13 NOTE — PROGRESS NOTES
[facility] Dermatology Note  Encounter Date: Feb 13, 2024  Office Visit     Reviewed patients past medical history and pertinent chart review prior to patients visit today.     Dermatology Problem List:  1. Lesion to monitor, right ear - posterior sulcus   -resolved - thought to be ecchymosis, 11/27/2023  2. Hx NMSC  - BCC, R Postauricular. S/p MMS. 12/21/23  - SCCis mid chest,  s/p Efudex for 2 weeks starting 8/2/21   3. AKs  - s/p cryo 12/10/21, 2/13/2024  - left mid cheek, s/p Efudex for 2 weeks starting 8/2/21 and cryo 12/10/21  4.  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 ISK   -s/p cryotherapy, face, 2/13/2024      Last FBSE 10/27/23    ____________________________________________    Assessment & Plan:     # actinic keratoses, left lateral neck, nose and left eyebrow  Actinic keratoses are pre-cancerous skin growths caused by sun exposure. Treatment is recommended and medically indicated. Treated with cryotherapy as outlined below.     # Irritated and inflamed Seborrheic Keratosis, right temple and right cheek   Discussed treatment options with patient including no treatment, cryotherapy, and shave removal. Patient prefers cryotherapy today due to irritation and itching as outlined below    # inflammatory pustule, right posterior neck   Lesion of concern on right posterior neck appears consistent with inflammatory pustule.  Patient to begin warm compresses at home.  If lesion persists beyond 1 month he should return to clinic for reevaluation    # Appropriately healing Mohs micrographic surgery site, right postauricular ear  Mohs surgery site from 12/21/2023 appears to be healing appropriately.  Patient can continue Vaseline to site once daily.  He does not need to keep area bandaged any longer.    #Seborrheic keratoses, back  Benign nature of lesions discussed with patient.  No treatment needed.    Procedures Performed:   -  Cryotherapy procedure note, location(s): Left lateral neck, nose and left eyebrow these are treated as actinic keratosis.  The right temple and right cheek were treated as inflamed and irritated seborrheic keratoses.. After verbal consent and discussion of risks and benefits including, but not limited to, dyspigmentation/scar, blister, and pain, 5 lesion(s) was(were) treated with 1-2 mm freeze border for 1-2 cycles with liquid nitrogen. Post cryotherapy instructions were provided.    Patient to follow-up in May 2024 for next FBSE     Kanwal Murrell PA-C  [facility]  Dermatology      Provider Disclosure:   The documentation recorded by the scribe accurately reflects the services I personally performed and the decisions made by me.    All risks, benefits and alternatives were discussed with patient.  Patient is in agreement and understands the assessment and plan.  All questions were answered.  Sun Screen Education was given.   Return to Clinic in 3 months or sooner as needed.   Radha Melendez PA-C   [facility]    _______________________________________    CC: Derm Problem (1.  Concerned about new spot on back of scalp/2.  Follow up on R ear biopsy done 12/21/23 that is still healing)    HPI:  Mr. Amadou Wade is a(n) 78 year old male who presents today as a return patient for a lesion of concern on his right posterior neck.  This lesion has been present for approximately 1 week and can be tender.  He would like to make sure it is not a concern.  Patient would also like his Mohs site evaluated from 12/21/2023.  He had a basal cell carcinoma treated from the right postauricular area.  He continues to apply Vaseline and bandage the area once daily.  He is wondering if it still needs to be bandage.  Additionally, patient has lesions of concern on his lower back that he would like evaluated.  They are rough in nature.    Patient is otherwise feeling well, without additional skin  concerns.      Physical Exam:  SKIN: Focused examination of face, scalp, right ear and lower back was performed.  - left neck, left eyebrow and nose, pink macule with overlying adherent scale consistent with an actinic keratosis   - right temple and cheek, flesh colored to pink, waxy stuck on papules  - lower back, waxy, stuck on tan/brown papules and patches  - right posterior neck, inflammatory pustule   - right postauricular ear, appropriately healing mohs site    - No other lesions of concern on areas examined.     Medications:  Current Outpatient Medications   Medication    ACE/ARB/ARNI NOT PRESCRIBED (INTENTIONAL)    apixaban ANTICOAGULANT (ELIQUIS) 5 MG tablet    diltiazem ER COATED BEADS (CARDIZEM CD) 120 MG 24 hr capsule    furosemide (LASIX) 20 MG tablet    metoprolol tartrate (LOPRESSOR) 50 MG tablet    psyllium (METAMUCIL/KONSYL) 58.6 % powder    rosuvastatin (CRESTOR) 10 MG tablet    valACYclovir (VALTREX) 1000 mg tablet     Current Facility-Administered Medications   Medication    lidocaine 1% with EPINEPHrine 1:100,000 injection 3 mL      Past Medical History:   Patient Active Problem List   Diagnosis    Cardiac dysrhythmia    Hyperlipidemia LDL goal <130    Advanced directives, counseling/discussion    ED (erectile dysfunction)    Hypertension goal BP (blood pressure) < 140/90    Recurrent cold sores    Squamous 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    Atrial fibrillation (H)    CHF (congestive heart failure) (H)    HFrEF (heart failure with reduced ejection fraction) (H)    History of colonic polyps    RBBB    History of basal cell carcinoma     Past Medical History:   Diagnosis Date    CHF (congestive heart failure) (H) 12/2021    HFrEF - Dr Cordova    Chronic rhinitis     resolved    [place]     [facility]    HFrEF (heart failure with reduced ejection fraction) (H) 12/2021    Dr Cordova    History of basal cell carcinoma 12/2023    Rt Posterior ear - BCC - Mohs - Dr Hopper    [place] of colonic polyps  [place]/2023    [facility]    Hypertension goal BP (blood pressure) < 140/90 12/2017    Persistent atrial fibrillation (H) 12/2021    in/out, rate controlled, at times, Dr Montenegro/Justice    Pneumonia, organism unspecified(486) 1993    RBBB     Snoring     no sleep apnea - mouth guard    Squamous 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 2021    Dr Abernathy/Darlyn - Rt Chest    Unspecified hemorrhoids without mention of complication 04/2006    internal       CC Referred Alex, MD  No address on file on close of this encounter.

## 2024-02-13 NOTE — LETTER
2/13/2024         RE: Amadou Wade  [address] [facility] [address]        Dear Colleague,    Thank you for referring your patient, Amadou Wade, to the [facility] [place]. Please see a copy of my visit note below.    [facility] Dermatology Note  Encounter Date: Feb 13, 2024  Office Visit     Reviewed patients past medical history and pertinent chart review prior to patients visit today.     Dermatology Problem List:  1. Lesion to monitor, right ear - posterior sulcus   -resolved - thought to be ecchymosis, 11/27/2023  2. Hx NMSC  - BCC, R Postauricular. S/p MMS. 12/21/23  - SCCis mid chest,  s/p Efudex for 2 weeks starting 8/2/21   3. AKs  - s/p cryo 12/10/21, 2/13/2024  - left mid cheek, s/p Efudex for 2 weeks starting 8/2/21 and cryo 12/10/21  4.  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 ISK   -s/p cryotherapy, face, 2/13/2024      Last FBSE 10/27/23    ____________________________________________    Assessment & Plan:     # actinic keratoses, left lateral neck, nose and left eyebrow  Actinic keratoses are pre-cancerous skin growths caused by sun exposure. Treatment is recommended and medically indicated. Treated with cryotherapy as outlined below.     # Irritated and inflamed Seborrheic Keratosis, right temple and right cheek   Discussed treatment options with patient including no treatment, cryotherapy, and shave removal. Patient prefers cryotherapy today due to irritation and itching as outlined below    # inflammatory pustule, right posterior neck   Lesion of concern on right posterior neck appears consistent with inflammatory pustule.  Patient to begin warm compresses at home.  If lesion persists beyond 1 month he should return to clinic for reevaluation    # Appropriately healing Mohs micrographic surgery site, right postauricular ear  Mohs surgery site from 12/21/2023 appears to be  healing appropriately.  Patient can continue Vaseline to site once daily.  He does not need to keep area bandaged any longer.    #Seborrheic keratoses, back  Benign nature of lesions discussed with patient.  No treatment needed.    Procedures Performed:   - Cryotherapy procedure note, location(s): Left lateral neck, nose and left eyebrow these are treated as actinic keratosis.  The right temple and right cheek were treated as inflamed and irritated seborrheic keratoses.. After verbal consent and discussion of risks and benefits including, but not limited to, dyspigmentation/scar, blister, and pain, 5 lesion(s) was(were) treated with 1-2 mm freeze border for 1-2 cycles with liquid nitrogen. Post cryotherapy instructions were provided.    Patient to follow-up in May 2024 for next FBSE     Kanwal Murrell PA-C  [facility]  Dermatology      Provider Disclosure:   The documentation recorded by the scribe accurately reflects the services I personally performed and the decisions made by me.    All risks, benefits and alternatives were discussed with patient.  Patient is in agreement and understands the assessment and plan.  All questions were answered.  Sun Screen Education was given.   Return to Clinic in 3 months or sooner as needed.   Radha Melendez PA-C   [facility]    _______________________________________    CC: Derm Problem (1.  Concerned about new spot on back of scalp/2.  Follow up on R ear biopsy done 12/21/23 that is still healing)    HPI:  Mr. Amadou Wade is a(n) 78 year old male who presents today as a return patient for a lesion of concern on his right posterior neck.  This lesion has been present for approximately 1 week and can be tender.  He would like to make sure it is not a concern.  Patient would also like his Mohs site evaluated from 12/21/2023.  He had a basal cell carcinoma treated from the right postauricular area.  He continues to apply Vaseline and  bandage the area once daily.  He is wondering if it still needs to be bandage.  Additionally, patient has lesions of concern on his lower back that he would like evaluated.  They are rough in nature.    Patient is otherwise feeling well, without additional skin concerns.      Physical Exam:  SKIN: Focused examination of face, scalp, right ear and lower back was performed.  - left neck, left eyebrow and nose, pink macule with overlying adherent scale consistent with an actinic keratosis   - right temple and cheek, flesh colored to pink, waxy stuck on papules  - lower back, waxy, stuck on tan/brown papules and patches  - right posterior neck, inflammatory pustule   - right postauricular ear, appropriately healing mohs site    - No other lesions of concern on areas examined.     Medications:  Current Outpatient Medications   Medication     ACE/ARB/ARNI NOT PRESCRIBED (INTENTIONAL)     apixaban ANTICOAGULANT (ELIQUIS) 5 MG tablet     diltiazem ER COATED BEADS (CARDIZEM CD) 120 MG 24 hr capsule     furosemide (LASIX) 20 MG tablet     metoprolol tartrate (LOPRESSOR) 50 MG tablet     psyllium (METAMUCIL/KONSYL) 58.6 % powder     rosuvastatin (CRESTOR) 10 MG tablet     valACYclovir (VALTREX) 1000 mg tablet     Current Facility-Administered Medications   Medication     lidocaine 1% with EPINEPHrine 1:100,000 injection 3 mL      Past Medical History:   Patient Active Problem List   Diagnosis     Cardiac dysrhythmia     Hyperlipidemia LDL goal <130     Advanced directives, counseling/discussion     ED (erectile dysfunction)     Hypertension goal BP (blood pressure) < 140/90     Recurrent cold sores     Squamous 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     Atrial fibrillation (H)     CHF (congestive heart failure) (H)     HFrEF (heart failure with reduced ejection fraction) (H)     History of colonic polyps     RBBB     History of basal cell carcinoma     Past Medical History:   Diagnosis Date     CHF (congestive heart failure)  (H) 12/2021    HFrEF - Dr Cordova     Chronic rhinitis     resolved     [place]     [facility]     HFrEF (heart failure with reduced ejection fraction) (H) 12/2021    Dr Cordova     History of basal cell carcinoma 12/2023    Rt Posterior ear - BCC - Mohs - Dr Hopper     [place] of [place] polyps [place]/2023    [facility]     Hypertension goal BP (blood pressure) < 140/90 12/2017     Persistent atrial fibrillation (H) 12/2021    in/out, rate controlled, at times, Dr Montenegro/Justice     Pneumonia, organism [phone]     RBBB      Snoring     no sleep apnea - mouth guard     Squamous 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 2021    Dr Abernathy/Darlyn - Rt Chest     Unspecified hemorrhoids without mention of complication 04/2006    internal       CC Referred Self, MD  No address on file on close of this encounter.       Again, thank you for allowing me to participate in the care of your patient.        Sincerely,        Radha Melendez PA-C

## 2024-03-25 SDOH — HEALTH STABILITY: PHYSICAL HEALTH: ON AVERAGE, HOW MANY MINUTES DO YOU ENGAGE IN EXERCISE AT THIS LEVEL?: 40 MIN

## 2024-03-25 SDOH — HEALTH STABILITY: PHYSICAL HEALTH: ON AVERAGE, HOW MANY DAYS PER WEEK DO YOU ENGAGE IN MODERATE TO STRENUOUS EXERCISE (LIKE A BRISK WALK)?: 5 DAYS

## 2024-03-25 ASSESSMENT — SOCIAL DETERMINANTS OF HEALTH (SDOH): HOW OFTEN DO YOU GET TOGETHER WITH FRIENDS OR RELATIVES?: TWICE A WEEK

## 2024-03-27 ENCOUNTER — OFFICE VISIT (OUTPATIENT)
Dept: FAMILY MEDICINE | Facility: CLINIC | Age: 79
End: 2024-03-27
Attending: FAMILY MEDICINE
Payer: COMMERCIAL

## 2024-03-27 VITALS
SYSTOLIC BLOOD PRESSURE: 118 MMHG | HEIGHT: 69 IN | DIASTOLIC BLOOD PRESSURE: 82 MMHG | TEMPERATURE: 97.4 F | RESPIRATION RATE: 20 BRPM | HEART RATE: 66 BPM | BODY MASS INDEX: 24.14 KG/M2 | WEIGHT: 163 LBS | OXYGEN SATURATION: 98 %

## 2024-03-27 DIAGNOSIS — Z12.11 SCREEN FOR COLON CANCER: ICD-10-CM

## 2024-03-27 DIAGNOSIS — Z51.81 MEDICATION MONITORING ENCOUNTER: ICD-10-CM

## 2024-03-27 DIAGNOSIS — I48.0 PAROXYSMAL ATRIAL FIBRILLATION (H): ICD-10-CM

## 2024-03-27 DIAGNOSIS — Z00.00 ROUTINE GENERAL MEDICAL EXAMINATION AT A HEALTH CARE FACILITY: Primary | ICD-10-CM

## 2024-03-27 DIAGNOSIS — Z85.828 HISTORY OF SKIN CANCER: ICD-10-CM

## 2024-03-27 DIAGNOSIS — I10 HYPERTENSION GOAL BP (BLOOD PRESSURE) < 140/90: ICD-10-CM

## 2024-03-27 DIAGNOSIS — Z12.5 SCREENING FOR PROSTATE CANCER: ICD-10-CM

## 2024-03-27 DIAGNOSIS — E78.5 HYPERLIPIDEMIA LDL GOAL <130: ICD-10-CM

## 2024-03-27 DIAGNOSIS — B00.1 RECURRENT COLD SORES: ICD-10-CM

## 2024-03-27 DIAGNOSIS — Z00.00 MEDICARE ANNUAL WELLNESS VISIT, SUBSEQUENT: ICD-10-CM

## 2024-03-27 DIAGNOSIS — Z86.0100 HISTORY OF COLONIC POLYPS: ICD-10-CM

## 2024-03-27 DIAGNOSIS — I50.20 HFREF (HEART FAILURE WITH REDUCED EJECTION FRACTION) (H): ICD-10-CM

## 2024-03-27 LAB
ALBUMIN SERPL BCG-MCNC: 4.1 G/DL (ref 3.5–5.2)
ALBUMIN UR-MCNC: NEGATIVE MG/DL
ALP SERPL-CCNC: 130 U/L (ref 40–150)
ALT SERPL W P-5'-P-CCNC: 28 U/L (ref 0–70)
ANION GAP SERPL CALCULATED.3IONS-SCNC: 10 MMOL/L (ref 7–15)
APPEARANCE UR: CLEAR
AST SERPL W P-5'-P-CCNC: 26 U/L (ref 0–45)
BILIRUB SERPL-MCNC: 1.3 MG/DL
BILIRUB UR QL STRIP: ABNORMAL
BUN SERPL-MCNC: 13.3 MG/DL (ref 8–23)
CALCIUM SERPL-MCNC: 9.3 MG/DL (ref 8.8–10.2)
CHLORIDE SERPL-SCNC: 102 MMOL/L (ref 98–107)
CHOLEST SERPL-MCNC: 169 MG/DL
CK SERPL-CCNC: 54 U/L (ref 39–308)
COLOR UR AUTO: YELLOW
CREAT SERPL-MCNC: 0.97 MG/DL (ref 0.67–1.17)
CREAT UR-MCNC: 162 MG/DL
DEPRECATED HCO3 PLAS-SCNC: 29 MMOL/L (ref 22–29)
EGFRCR SERPLBLD CKD-EPI 2021: 80 ML/MIN/1.73M2
ERYTHROCYTE [DISTWIDTH] IN BLOOD BY AUTOMATED COUNT: 13.3 % (ref 10–15)
FASTING STATUS PATIENT QL REPORTED: YES
GLUCOSE SERPL-MCNC: 80 MG/DL (ref 70–99)
GLUCOSE UR STRIP-MCNC: NEGATIVE MG/DL
HCT VFR BLD AUTO: 52 % (ref 40–53)
HDLC SERPL-MCNC: 49 MG/DL
HGB BLD-MCNC: 17.3 G/DL (ref 13.3–17.7)
HGB UR QL STRIP: NEGATIVE
KETONES UR STRIP-MCNC: ABNORMAL MG/DL
LDLC SERPL CALC-MCNC: 105 MG/DL
LEUKOCYTE ESTERASE UR QL STRIP: NEGATIVE
MCH RBC QN AUTO: 31.5 PG (ref 26.5–33)
MCHC RBC AUTO-ENTMCNC: 33.3 G/DL (ref 31.5–36.5)
MCV RBC AUTO: 95 FL (ref 78–100)
MICROALBUMIN UR-MCNC: <12 MG/L
MICROALBUMIN/CREAT UR: NORMAL MG/G{CREAT}
NITRATE UR QL: NEGATIVE
NONHDLC SERPL-MCNC: 120 MG/DL
NT-PROBNP SERPL-MCNC: 1660 PG/ML (ref 0–1800)
PH UR STRIP: 5.5 [PH] (ref 5–7)
PLATELET # BLD AUTO: 137 10E3/UL (ref 150–450)
POTASSIUM SERPL-SCNC: 4.1 MMOL/L (ref 3.4–5.3)
PROT SERPL-MCNC: 6.9 G/DL (ref 6.4–8.3)
PSA SERPL DL<=0.01 NG/ML-MCNC: 2.65 NG/ML (ref 0–6.5)
RBC # BLD AUTO: 5.49 10E6/UL (ref 4.4–5.9)
SODIUM SERPL-SCNC: 141 MMOL/L (ref 135–145)
SP GR UR STRIP: 1.02 (ref 1–1.03)
TRIGL SERPL-MCNC: 75 MG/DL
TSH SERPL DL<=0.005 MIU/L-ACNC: 1.83 UIU/ML (ref 0.3–4.2)
UROBILINOGEN UR STRIP-ACNC: 1 E.U./DL
WBC # BLD AUTO: 5.1 10E3/UL (ref 4–11)

## 2024-03-27 PROCEDURE — 84443 ASSAY THYROID STIM HORMONE: CPT | Performed by: FAMILY MEDICINE

## 2024-03-27 PROCEDURE — 80053 COMPREHEN METABOLIC PANEL: CPT | Performed by: FAMILY MEDICINE

## 2024-03-27 PROCEDURE — 82550 ASSAY OF CK (CPK): CPT | Performed by: FAMILY MEDICINE

## 2024-03-27 PROCEDURE — 36415 COLL VENOUS BLD VENIPUNCTURE: CPT | Performed by: FAMILY MEDICINE

## 2024-03-27 PROCEDURE — 82570 ASSAY OF URINE CREATININE: CPT | Performed by: FAMILY MEDICINE

## 2024-03-27 PROCEDURE — G0439 PPPS, SUBSEQ VISIT: HCPCS | Performed by: FAMILY MEDICINE

## 2024-03-27 PROCEDURE — 81003 URINALYSIS AUTO W/O SCOPE: CPT | Performed by: FAMILY MEDICINE

## 2024-03-27 PROCEDURE — 99214 OFFICE O/P EST MOD 30 MIN: CPT | Mod: 25 | Performed by: FAMILY MEDICINE

## 2024-03-27 PROCEDURE — 83880 ASSAY OF NATRIURETIC PEPTIDE: CPT | Performed by: FAMILY MEDICINE

## 2024-03-27 PROCEDURE — 82043 UR ALBUMIN QUANTITATIVE: CPT | Performed by: FAMILY MEDICINE

## 2024-03-27 PROCEDURE — G0103 PSA SCREENING: HCPCS | Performed by: FAMILY MEDICINE

## 2024-03-27 PROCEDURE — 80061 LIPID PANEL: CPT | Performed by: FAMILY MEDICINE

## 2024-03-27 PROCEDURE — 85027 COMPLETE CBC AUTOMATED: CPT | Performed by: FAMILY MEDICINE

## 2024-03-27 NOTE — LETTER
April 1, 2024      Amadou Wade  [address] [facility] [address]        Dear ,    We are writing to inform you of your test results.    Labs are overall quite good, except     Stable mildly low platelets   LDL (bad Cholesterol) is a little high     We advise:     Continue current cares.   Balanced low cholesterol diet.   Regular exercise.     For additional lab test information, labtestsonline.org is an excellent reference.     Resulted Orders   Comprehensive metabolic panel   Result Value Ref Range    Sodium 141 135 - 145 mmol/L      Comment:      Reference intervals for this test were updated on 09/26/2023 to more accurately reflect our healthy population. There may be differences in the flagging of prior results with similar values performed with this method. Interpretation of those prior results can be made in the context of the updated reference intervals.     Potassium 4.1 3.4 - 5.3 mmol/L    Carbon Dioxide (CO2) 29 22 - 29 mmol/L    Anion Gap 10 7 - 15 mmol/L    Urea Nitrogen 13.3 8.0 - 23.0 mg/dL    Creatinine 0.97 0.67 - 1.17 mg/dL    GFR Estimate 80 >60 mL/min/1.73m2    Calcium 9.3 8.8 - 10.2 mg/dL    Chloride 102 98 - 107 mmol/L    Glucose 80 70 - 99 mg/dL    Alkaline Phosphatase 130 40 - 150 U/L      Comment:      Reference intervals for this test were updated on 11/14/2023 to more accurately reflect our healthy population. There may be differences in the flagging of prior results with similar values performed with this method. Interpretation of those prior results can be made in the context of the updated reference intervals.    AST 26 0 - 45 U/L      Comment:      Reference intervals for this test were updated on 6/12/2023 to more accurately reflect our healthy population. There may be differences in the flagging of prior results with similar values performed with this method. Interpretation of those prior results can be made in the context of the updated reference intervals.    ALT  28 0 - 70 U/L      Comment:      Reference intervals for this test were updated on 6/12/2023 to more accurately reflect our healthy population. There may be differences in the flagging of prior results with similar values performed with this method. Interpretation of those prior results can be made in the context of the updated reference intervals.      Protein Total 6.9 6.4 - 8.3 g/dL    Albumin 4.1 3.5 - 5.2 g/dL    Bilirubin Total 1.3 (H) <=1.2 mg/dL   Lipid panel reflex to direct LDL Fasting   Result Value Ref Range    Cholesterol 169 <200 mg/dL    Triglycerides 75 <150 mg/dL    Direct Measure HDL 49 >=40 mg/dL    LDL Cholesterol Calculated 105 (H) <=100 mg/dL    Non HDL Cholesterol 120 <130 mg/dL    Patient Fasting > 8hrs? Yes     Narrative    Cholesterol  Desirable:  <200 mg/dL    Triglycerides  Normal:  Less than 150 mg/dL  Borderline High:  150-199 mg/dL  High:  200-499 mg/dL  Very High:  Greater than or equal to 500 mg/dL    Direct Measure HDL  Female:  Greater than or equal to 50 mg/dL   Male:  Greater than or equal to 40 mg/dL    LDL Cholesterol  Desirable:  <100mg/dL  Above Desirable:  100-129 mg/dL   Borderline High:  130-159 mg/dL   High:  160-189 mg/dL   Very High:  >= 190 mg/dL    Non HDL Cholesterol  Desirable:  130 mg/dL  Above Desirable:  130-159 mg/dL  Borderline High:  160-189 mg/dL  High:  190-219 mg/dL  Very High:  Greater than or equal to 220 mg/dL   CBC with platelets   Result Value Ref Range    WBC Count 5.1 4.0 - 11.0 10e3/uL    RBC Count 5.49 4.40 - 5.90 10e6/uL    Hemoglobin 17.3 13.3 - 17.7 g/dL    Hematocrit 52.0 40.0 - 53.0 %    MCV 95 78 - 100 fL    MCH 31.5 26.5 - 33.0 pg    MCHC 33.3 31.5 - 36.5 g/dL    RDW 13.3 10.0 - 15.0 %    Platelet Count 137 (L) 150 - 450 10e3/uL   CK total   Result Value Ref Range    CK 54 39 - 308 U/L   UA Macroscopic with reflex to Microscopic and Culture   Result Value Ref Range    Color Urine Yellow Colorless, Straw, Light Yellow, Yellow    Appearance  Urine Clear Clear    Glucose Urine Negative Negative mg/dL    Bilirubin Urine Small (A) Negative    Ketones Urine Trace (A) Negative mg/dL    Specific Gravity Urine 1.025 1.003 - 1.035    Blood Urine Negative Negative    pH Urine 5.5 5.0 - 7.0    Protein Albumin Urine Negative Negative mg/dL    Urobilinogen Urine 1.0 0.2, 1.0 E.U./dL    Nitrite Urine Negative Negative    Leukocyte Esterase Urine Negative Negative    Narrative    Microscopic not indicated   Albumin Random Urine Quantitative with Creat Ratio   Result Value Ref Range    Creatinine Urine mg/dL 162.0 mg/dL      Comment:      The reference ranges have not been established in urine creatinine. The results should be integrated into the clinical context for interpretation.    Albumin Urine mg/L <12.0 mg/L      Comment:      The reference ranges have not been established in urine albumin. The results should be integrated into the clinical context for interpretation.    Albumin Urine mg/g Cr        Comment:      Unable to calculate, urine albumin and/or urine creatinine is outside detectable limits.  Microalbuminuria is defined as an albumin:creatinine ratio of 17 to 299 for males and 25 to 299 for females. A ratio of albumin:creatinine of 300 or higher is indicative of overt proteinuria.  Due to biologic variability, positive results should be confirmed by a second, first-morning random or 24-hour timed urine specimen. If there is discrepancy, a third specimen is recommended. When 2 out of 3 results are in the microalbuminuria range, this is evidence for incipient nephropathy and warrants increased efforts at glucose control, blood pressure control, and institution of therapy with an angiotensin-converting-enzyme (ACE) inhibitor (if the patient can tolerate it).     TSH with free T4 reflex   Result Value Ref Range    TSH 1.83 0.30 - 4.20 uIU/mL   Prostate Specific Antigen Screen   Result Value Ref Range    Prostate Specific Antigen Screen 2.65 0.00 - 6.50  ng/mL    Narrative    This result is obtained using the Roche Elecsys total PSA method on the ena e801 immunoassay analyzer. Results obtained with different assay methods or kits cannot be used interchangeably.   BNP-N terminal pro   Result Value Ref Range    N Terminal Pro BNP Outpatient 1,660 0 - 1,800 pg/mL      Comment:      Reference range shown and results flagged as abnormal are for the outpatient, non acute settings. Establishing a baseline value for each individual patient is useful for follow-up.    Suggested inpatient cut points for confirming diagnosis of CHF in an acute setting are:  >450 pg/mL (age 18 to less than 50)  >900 pg/mL (age 50 to less than 75)  >1800 pg/mL (75 yrs and older)    An inpatient or emergency department NT-proPBNP <300 pg/mL effectively rules out acute CHF, with 99% negative predictive value.           If you have any questions or concerns, please call the clinic at the number listed above.       Sincerely,            Johnathan Sampson M.D.

## 2024-03-27 NOTE — LETTER
April 8, 2024      Amadou Wade  [address] [facility] [address]        Dear ,    We are writing to inform you of your test results.    -FIT test (screening test for colon cancer) was normal.     We advise:     Continue current cares     Resulted Orders   Comprehensive metabolic panel   Result Value Ref Range    Sodium 141 135 - 145 mmol/L      Comment:      Reference intervals for this test were updated on 09/26/2023 to more accurately reflect our healthy population. There may be differences in the flagging of prior results with similar values performed with this method. Interpretation of those prior results can be made in the context of the updated reference intervals.     Potassium 4.1 3.4 - 5.3 mmol/L    Carbon Dioxide (CO2) 29 22 - 29 mmol/L    Anion Gap 10 7 - 15 mmol/L    Urea Nitrogen 13.3 8.0 - 23.0 mg/dL    Creatinine 0.97 0.67 - 1.17 mg/dL    GFR Estimate 80 >60 mL/min/1.73m2    Calcium 9.3 8.8 - 10.2 mg/dL    Chloride 102 98 - 107 mmol/L    Glucose 80 70 - 99 mg/dL    Alkaline Phosphatase 130 40 - 150 U/L      Comment:      Reference intervals for this test were updated on 11/14/2023 to more accurately reflect our healthy population. There may be differences in the flagging of prior results with similar values performed with this method. Interpretation of those prior results can be made in the context of the updated reference intervals.    AST 26 0 - 45 U/L      Comment:      Reference intervals for this test were updated on 6/12/2023 to more accurately reflect our healthy population. There may be differences in the flagging of prior results with similar values performed with this method. Interpretation of those prior results can be made in the context of the updated reference intervals.    ALT 28 0 - 70 U/L      Comment:      Reference intervals for this test were updated on 6/12/2023 to more accurately reflect our healthy population. There may be differences in the flagging of prior  results with similar values performed with this method. Interpretation of those prior results can be made in the context of the updated reference intervals.      Protein Total 6.9 6.4 - 8.3 g/dL    Albumin 4.1 3.5 - 5.2 g/dL    Bilirubin Total 1.3 (H) <=1.2 mg/dL   Lipid panel reflex to direct LDL Fasting   Result Value Ref Range    Cholesterol 169 <200 mg/dL    Triglycerides 75 <150 mg/dL    Direct Measure HDL 49 >=40 mg/dL    LDL Cholesterol Calculated 105 (H) <=100 mg/dL    Non HDL Cholesterol 120 <130 mg/dL    Patient Fasting > 8hrs? Yes     Narrative    Cholesterol  Desirable:  <200 mg/dL    Triglycerides  Normal:  Less than 150 mg/dL  Borderline High:  150-199 mg/dL  High:  200-499 mg/dL  Very High:  Greater than or equal to 500 mg/dL    Direct Measure HDL  Female:  Greater than or equal to 50 mg/dL   Male:  Greater than or equal to 40 mg/dL    LDL Cholesterol  Desirable:  <100mg/dL  Above Desirable:  100-129 mg/dL   Borderline High:  130-159 mg/dL   High:  160-189 mg/dL   Very High:  >= 190 mg/dL    Non HDL Cholesterol  Desirable:  130 mg/dL  Above Desirable:  130-159 mg/dL  Borderline High:  160-189 mg/dL  High:  190-219 mg/dL  Very High:  Greater than or equal to 220 mg/dL   CBC with platelets   Result Value Ref Range    WBC Count 5.1 4.0 - 11.0 10e3/uL    RBC Count 5.49 4.40 - 5.90 10e6/uL    Hemoglobin 17.3 13.3 - 17.7 g/dL    Hematocrit 52.0 40.0 - 53.0 %    MCV 95 78 - 100 fL    MCH 31.5 26.5 - 33.0 pg    MCHC 33.3 31.5 - 36.5 g/dL    RDW 13.3 10.0 - 15.0 %    Platelet Count 137 (L) 150 - 450 10e3/uL   CK total   Result Value Ref Range    CK 54 39 - 308 U/L   UA Macroscopic with reflex to Microscopic and Culture   Result Value Ref Range    Color Urine Yellow Colorless, Straw, Light Yellow, Yellow    Appearance Urine Clear Clear    Glucose Urine Negative Negative mg/dL    Bilirubin Urine Small (A) Negative    Ketones Urine Trace (A) Negative mg/dL    Specific Gravity Urine 1.025 1.003 - 1.035    Blood  Urine Negative Negative    pH Urine 5.5 5.0 - 7.0    Protein Albumin Urine Negative Negative mg/dL    Urobilinogen Urine 1.0 0.2, 1.0 E.U./dL    Nitrite Urine Negative Negative    Leukocyte Esterase Urine Negative Negative    Narrative    Microscopic not indicated   Albumin Random Urine Quantitative with Creat Ratio   Result Value Ref Range    Creatinine Urine mg/dL 162.0 mg/dL      Comment:      The reference ranges have not been established in urine creatinine. The results should be integrated into the clinical context for interpretation.    Albumin Urine mg/L <12.0 mg/L      Comment:      The reference ranges have not been established in urine albumin. The results should be integrated into the clinical context for interpretation.    Albumin Urine mg/g Cr        Comment:      Unable to calculate, urine albumin and/or urine creatinine is outside detectable limits.  Microalbuminuria is defined as an albumin:creatinine ratio of 17 to 299 for males and 25 to 299 for females. A ratio of albumin:creatinine of 300 or higher is indicative of overt proteinuria.  Due to biologic variability, positive results should be confirmed by a second, first-morning random or 24-hour timed urine specimen. If there is discrepancy, a third specimen is recommended. When 2 out of 3 results are in the microalbuminuria range, this is evidence for incipient nephropathy and warrants increased efforts at glucose control, blood pressure control, and institution of therapy with an angiotensin-converting-enzyme (ACE) inhibitor (if the patient can tolerate it).     TSH with free T4 reflex   Result Value Ref Range    TSH 1.83 0.30 - 4.20 uIU/mL   Prostate Specific Antigen Screen   Result Value Ref Range    Prostate Specific Antigen Screen 2.65 0.00 - 6.50 ng/mL    Narrative    This result is obtained using the Roche Elecsys total PSA method on the ena e801 immunoassay analyzer. Results obtained with different assay methods or kits cannot be used  interchangeably.   Fecal colorectal cancer screen FIT   Result Value Ref Range    Occult Blood Screen FIT Negative Negative   BNP-N terminal pro   Result Value Ref Range    N Terminal Pro BNP Outpatient 1,660 0 - 1,800 pg/mL      Comment:      Reference range shown and results flagged as abnormal are for the outpatient, non acute settings. Establishing a baseline value for each individual patient is useful for follow-up.    Suggested inpatient cut points for confirming diagnosis of CHF in an acute setting are:  >450 pg/mL (age 18 to less than 50)  >900 pg/mL (age 50 to less than 75)  >1800 pg/mL (75 yrs and older)    An inpatient or emergency department NT-proPBNP <300 pg/mL effectively rules out acute CHF, with 99% negative predictive value.           If you have any questions or concerns, please call the clinic at the number listed above.       Sincerely,            Johnathan Sampson M.D.

## 2024-03-27 NOTE — PROGRESS NOTES
Preventive Care Visit  [facility]  Johnathan Sampson MD, Family Medicine  Mar 27, 2024      Assessment & Plan     Routine general medical examination at a health care facility    - Comprehensive metabolic panel  - Lipid panel reflex to direct LDL Fasting  - CBC with platelets  - CK total  - UA Macroscopic with reflex to Microscopic and Culture  - Albumin Random Urine Quantitative with Creat Ratio  - TSH with free T4 reflex  - Prostate Specific Antigen Screen  - Fecal colorectal cancer screen FIT  - BNP-N terminal pro  - PRIMARY CARE FOLLOW-UP SCHEDULING  - REVIEW OF HEALTH MAINTENANCE PROTOCOL ORDERS  - Comprehensive metabolic panel  - Lipid panel reflex to direct LDL Fasting  - CBC with platelets  - CK total  - UA Macroscopic with reflex to Microscopic and Culture  - Albumin Random Urine Quantitative with Creat Ratio  - TSH with free T4 reflex  - Prostate Specific Antigen Screen  - Fecal colorectal cancer screen FIT  - BNP-N terminal pro  - PRIMARY CARE FOLLOW-UP SCHEDULING  - PRIMARY CARE FOLLOW-UP SCHEDULING    Medicare annual wellness visit, subsequent    - Comprehensive metabolic panel  - Lipid panel reflex to direct LDL Fasting  - CBC with platelets  - CK total  - UA Macroscopic with reflex to Microscopic and Culture  - Albumin Random Urine Quantitative with Creat Ratio  - TSH with free T4 reflex  - Prostate Specific Antigen Screen  - Fecal colorectal cancer screen FIT  - BNP-N terminal pro  - PRIMARY CARE FOLLOW-UP SCHEDULING  - REVIEW OF HEALTH MAINTENANCE PROTOCOL ORDERS  - Comprehensive metabolic panel  - Lipid panel reflex to direct LDL Fasting  - CBC with platelets  - CK total  - UA Macroscopic with reflex to Microscopic and Culture  - Albumin Random Urine Quantitative with Creat Ratio  - TSH with free T4 reflex  - Prostate Specific Antigen Screen  - Fecal colorectal cancer screen FIT  - BNP-N terminal pro  - PRIMARY CARE FOLLOW-UP SCHEDULING  - PRIMARY CARE FOLLOW-UP  SCHEDULING    HFrEF (heart failure with reduced ejection fraction) (H)    - Comprehensive metabolic panel  - Lipid panel reflex to direct LDL Fasting  - UA Macroscopic with reflex to Microscopic and Culture  - Albumin Random Urine Quantitative with Creat Ratio  - TSH with free T4 reflex  - BNP-N terminal pro  - REVIEW OF HEALTH MAINTENANCE PROTOCOL ORDERS  - Comprehensive metabolic panel  - Lipid panel reflex to direct LDL Fasting  - UA Macroscopic with reflex to Microscopic and Culture  - Albumin Random Urine Quantitative with Creat Ratio  - TSH with free T4 reflex  - BNP-N terminal pro  - PRIMARY CARE FOLLOW-UP SCHEDULING  - Adult Cardiology Eval  Referral  - PRIMARY CARE FOLLOW-UP SCHEDULING  - OFFICE/OUTPT VISIT,EST,LEVL IV    Paroxysmal atrial fibrillation (H)    - Comprehensive metabolic panel  - UA Macroscopic with reflex to Microscopic and Culture  - Albumin Random Urine Quantitative with Creat Ratio  - TSH with free T4 reflex  - BNP-N terminal pro  - REVIEW OF HEALTH MAINTENANCE PROTOCOL ORDERS  - Comprehensive metabolic panel  - UA Macroscopic with reflex to Microscopic and Culture  - Albumin Random Urine Quantitative with Creat Ratio  - TSH with free T4 reflex  - BNP-N terminal pro  - PRIMARY CARE FOLLOW-UP SCHEDULING  - Adult Cardiology [facility] Referral  - PRIMARY CARE FOLLOW-UP SCHEDULING  - OFFICE/OUTPT VISIT,EST,LEVL IV    Hypertension goal BP (blood pressure) < 140/90    - Comprehensive metabolic panel  - UA Macroscopic with reflex to Microscopic and Culture  - Albumin Random Urine Quantitative with Creat Ratio  - REVIEW OF HEALTH MAINTENANCE PROTOCOL ORDERS  - Comprehensive metabolic panel  - UA Macroscopic with reflex to Microscopic and Culture  - Albumin Random Urine Quantitative with Creat Ratio  - PRIMARY CARE FOLLOW-UP SCHEDULING  - Adult Cardiology [facility] Referral  - PRIMARY CARE FOLLOW-UP SCHEDULING  - OFFICE/OUTPT VISIT,EST,LEVL IV    Hyperlipidemia LDL goal <130    -  Comprehensive metabolic panel  - Lipid panel reflex to direct LDL Fasting  - CK total  - REVIEW OF HEALTH MAINTENANCE PROTOCOL ORDERS  - Comprehensive metabolic panel  - Lipid panel reflex to direct LDL Fasting  - CK total  - PRIMARY CARE FOLLOW-UP SCHEDULING  - Adult Cardiology [facility] Referral  - PRIMARY CARE FOLLOW-UP SCHEDULING  - OFFICE/OUTPT VISIT,EST,LEVL IV    History of skin cancer    - REVIEW OF HEALTH MAINTENANCE PROTOCOL ORDERS  - PRIMARY CARE FOLLOW-UP SCHEDULING  - PRIMARY CARE FOLLOW-UP SCHEDULING  - OFFICE/OUTPT VISIT,EST,LEVL IV    Recurrent cold sores    - REVIEW OF HEALTH MAINTENANCE PROTOCOL ORDERS  - PRIMARY CARE FOLLOW-UP SCHEDULING  - PRIMARY CARE FOLLOW-UP SCHEDULING  - OFFICE/OUTPT VISIT,EST,LEVL IV    History of colonic polyps    - Fecal colorectal cancer screen FIT  - REVIEW OF HEALTH MAINTENANCE PROTOCOL ORDERS  - Fecal colorectal cancer screen FIT  - PRIMARY CARE FOLLOW-UP SCHEDULING  - PRIMARY CARE FOLLOW-UP SCHEDULING  - OFFICE/OUTPT VISIT,EST,LEVL IV    Screening for prostate cancer    - Prostate Specific Antigen Screen  - REVIEW OF HEALTH MAINTENANCE PROTOCOL ORDERS  - Prostate Specific Antigen Screen  - PRIMARY CARE FOLLOW-UP SCHEDULING  - PRIMARY CARE FOLLOW-UP SCHEDULING  - OFFICE/OUTPT VISIT,EST,LEVL IV    Screen for colon cancer    - Fecal colorectal cancer screen FIT  - REVIEW OF HEALTH MAINTENANCE PROTOCOL ORDERS  - Fecal colorectal cancer screen FIT  - PRIMARY CARE FOLLOW-UP SCHEDULING  - PRIMARY CARE FOLLOW-UP SCHEDULING  - OFFICE/OUTPT VISIT,EST,LEVL IV    Medication monitoring encounter    - Comprehensive metabolic panel  - Lipid panel reflex to direct LDL Fasting  - CBC with platelets  - CK total  - UA Macroscopic with reflex to Microscopic and Culture  - Albumin Random Urine Quantitative with Creat Ratio  - TSH with free T4 reflex  - Prostate Specific Antigen Screen  - Fecal colorectal cancer screen FIT  - BNP-N terminal pro  - PRIMARY CARE FOLLOW-UP SCHEDULING  -  REVIEW OF HEALTH MAINTENANCE PROTOCOL ORDERS  - Comprehensive metabolic panel  - Lipid panel reflex to direct LDL Fasting  - CBC with platelets  - CK total  - UA Macroscopic with reflex to Microscopic and Culture  - Albumin Random Urine Quantitative with Creat Ratio  - TSH with free T4 reflex  - Prostate Specific Antigen Screen  - Fecal colorectal cancer screen FIT  - BNP-N terminal pro  - PRIMARY CARE FOLLOW-UP SCHEDULING  - PRIMARY CARE FOLLOW-UP SCHEDULING  - OFFICE/OUTPT VISIT,EST,LEVL IV    Patient has been advised of split billing requirements and indicates understanding: Yes    Prescription drug management    38 minutes spent by me on the date of the encounter doing chart review, history and exam, documentation and further activities per the note    Counseling  Appropriate preventive services were discussed with this patient, including applicable screening as appropriate for fall prevention, nutrition, physical activity, Tobacco-use cessation, weight loss and cognition.  Checklist reviewing preventive services available has been given to the patient.  Reviewed patient's diet, addressing concerns and/or questions.   The patient was provided with written information regarding signs of hearing loss.   Information on urinary incontinence and treatment options given to patient.     Regular exercise    Plan:    1) [facility]    2) Labs: pending    3) Immunizations: Consider Covid, prevnar 20, RSV    4) Imaging/Diagnostics: NA    5) Consults: [facility], Dermatology, Cardiology    Subjective   Amadou is a 79 year old, presenting for the following:  Physical        3/27/2024     8:38 AM   Additional Questions   Roomed by Maricruz BOATENG     Health Care Directive  Patient has a Health Care Directive on file - has completed    HFrEF - no cp - no sob - no edema - weight stable - [facility] Heart Study    P A Fib - no issues - on eliquis    Hyperlipidemia Follow-Up    Are you regularly taking any medication or  supplement to lower your cholesterol?   Yes- Crestor  Are you having muscle aches or other side effects that you think could be caused by your cholesterol lowering medication?  No    Recent Labs   Lab Test 03/27/23  1000 03/07/22  0825   CHOL 152 157   HDL 50 51   LDL 91 89   TRIG 57 85     Hypertension Follow-up    Do you check your blood pressure regularly outside of the clinic? Yes   Are you following a low salt diet? Yes  Are your blood pressures ever more than 140 on the top number (systolic) OR more   than 90 on the bottom number (diastolic), for example 140/90? No    BP Readings from Last 3 Encounters:   03/27/24 118/82   11/09/23 123/79   07/26/23 119/75     Creatinine   Date Value Ref Range Status   03/27/2023 0.82 0.67 - 1.17 mg/dL Final   03/01/2021 0.96 0.66 - 1.25 mg/dL Final     GFR Estimate   Date Value Ref Range Status   03/27/2023 >90 >60 mL/min/1.73m2 Final     Comment:     eGFR calculated using 2021 CKD-EPI equation.   03/01/2021 78 >60 mL/min/[1.73_m2] Final     Comment:     Non  GFR Calc  Starting 12/18/2018, serum creatinine based estimated GFR (eGFR) will be   calculated using the Chronic Kidney Disease Epidemiology Collaboration   (CKD-EPI) equation.       Hx of skin cancer - Dr Santizo / [place]    Recurrent cold sores        3/25/2024   General Health   How would you rate your overall physical health? Good   Feel stress (tense, anxious, or unable to sleep) To some extent   (!) STRESS CONCERN      3/25/2024   Nutrition   Diet: Low salt         3/25/2024   Exercise   Days per week of moderate/strenous exercise 5 days   Average minutes spent exercising at this level 40 min         3/25/2024   Social Factors   Frequency of gathering with friends or relatives Twice a week   Worry food won't last until get money to buy more No   Food not last or not have enough money for food? No   Do you have housing?  Yes   Are you worried about losing your housing? No   Lack of transportation?  No   Unable to get utilities (heat,electricity)? No         3/25/2024   Activities of Daily Living- Home Safety   Needs help with the following daily activites None of the above   Safety concerns in the home None of the above         3/25/2024   Dental   Dentist two times every year? Yes         3/25/2024   Hearing Screening   Hearing concerns? (!) I FEEL THAT PEOPLE ARE MUMBLING OR NOT SPEAKING CLEARLY.    (!) I NEED TO ASK PEOPLE TO SPEAK UP OR REPEAT THEMSELVES.    (!) TROUBLE UNDERSTANDING SOFT OR WHISPERED SPEECH.         3/25/2024   Driving Risk Screening   Patient/family members have concerns about driving No         3/25/2024   General Alertness/Fatigue Screening   Have you been more tired than usual lately? No         3/25/2024   Urinary Incontinence Screening   Bothered by leaking urine in past 6 months Yes         3/25/2024   TB Screening   Were you born outside of the [place]? No         Today's PHQ-2 Score:       3/27/2024     8:31 AM   PHQ-2 ( 1999 Pf[employer])   Q1: Little interest or pleasure in doing things 0   Q2: Feeling down, depressed or hopeless 0   PHQ-2 Score 0   Q1: Little interest or pleasure in doing things Not at all   Q2: Feeling down, depressed or hopeless Not at all   PHQ-2 Score 0           3/25/2024   Substance Use   Alcohol more than 3/day or more than 7/wk Not Applicable   Do you have a current opioid prescription? No   How severe/bad is pain from 1 to 10? 2/10   Do you use any other substances recreationally? No     Social History     Tobacco Use    Smoking status: Never    Smokeless tobacco: Never   Vaping Use    Vaping Use: Never used   Substance Use Topics    Alcohol use: Yes     Alcohol/week: 0.0 - 1.0 standard drinks of alcohol     Comment: 0-1 per month avg    Drug use: No         ASCVD Risk   The 10-year ASCVD risk score (Allyson MATHEWS, et al., 2019) is: 26.3%    Values used to calculate the score:      Age: 79 years      Sex: Male      Is Non- : No       Diabetic: No      Tobacco smoker: No      Systolic Blood Pressure: 118 mmHg      Is BP treated: Yes      HDL Cholesterol: 50 mg/dL      Total Cholesterol: 152 mg/dL    Fracture Risk Assessment Tool  Link to Frax Calculator  Use the information below to complete the Frax calculator  : 1945  Sex: male  Weight (kg): 73.9 kg (actual weight)  Height (cm): 175.3 cm  Previous Fragility Fracture:  No  History of parent with fractured hip:  No  Current Smoking:  No  Patient has been on glucocorticoids for more than 3 months (5mg/day or more): No  Rheumatoid Arthritis on Problem List:  No  Secondary Osteoporosis on Problem List:  No  Consumes 3 or more units of alcohol per day: No  Femoral Neck BMD (g/cm2)            Reviewed and updated as needed this visit by Provider                    Patient Active Problem List   Diagnosis    Cardiac dysrhythmia    Hyperlipidemia LDL goal <130    Advanced directives, counseling/discussion    ED (erectile dysfunction)    Hypertension goal BP (blood pressure) < 140/90    Recurrent cold sores    Squamous 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    Atrial fibrillation (H)    CHF (congestive heart failure) (H)    HFrEF (heart failure with reduced ejection fraction) (H)    History of colonic polyps    RBBB    History of basal cell carcinoma    History of skin cancer       Past Medical History:   Diagnosis Date    CHF (congestive heart failure) (H) 2021    HFrEF - Dr Cordova    Chronic rhinitis     resolved    [facility]    HFrEF (heart failure with reduced ejection fraction) (H) 2021    Dr Cordova    History of basal cell carcinoma 2023    Rt Posterior ear - BCC - Mohs - Dr Hopper    [facility] of colonic polyps 2023    [facility]    Hypertension goal BP (blood pressure) < 140/90 2017    Persistent atrial fibrillation (H) 2021    in/out, rate controlled, at times, Dr Montenegro/Justice    Pneumonia, organism unspecified(486)     RBBB     Snoring     no sleep apnea - mouth guard     Squamous 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 2021    Dr Abernathy/Darlyn - Rt Chest    Unspecified hemorrhoids without mention of complication 04/2006    internal       Past Surgical History:   Procedure Laterality Date    ANESTHESIA CARDIOVERSION N/A 01/11/2022    Procedure: ANESTHESIA, FOR CARDIOVERSION;  Surgeon: GENERIC ANESTHESIA PROVIDER;  Location: [facility] OR    COLONOSCOPY  01/03/2012    incomplete - negative, ACBE incomplete - negative, CT colography negative    [facility]OS[facility] B[facility]    COLONOSCOPY  03/2023    polyp x 1 - [facility]    ganglion cyst removal Left 09/02/2021    HC REMOVE TONSILS/ADENOIDS,<13 Y/O  1953    T & A <14y.o.    SIGMOIDOSCOPY,DIAGNOSTIC  1997    normal    STRESS ECHO (METRO)  03/2007    normal few pac's, few pvc's    SURGICAL HISTORY OF -  Bilateral 09/2020    Bilateral IOL    ZZHC COLONOSCOPY THRU STOMA, DIAGNOSTIC  05/2006    dr tejeda - normal - internal hemorrhiods - due 10 yrs       Current Outpatient Medications   Medication Sig Dispense Refill    apixaban ANTICOAGULANT (ELIQUIS) 5 MG tablet Take 1 tablet (5 mg) by mouth 2 times daily 180 tablet 3    diltiazem ER COATED BEADS (CARDIZEM CD) 120 MG 24 hr capsule Take 1 capsule (120 mg) by mouth daily 90 capsule 3    furosemide (LASIX) 20 MG tablet Take 1 tablet (20 mg) by mouth daily as needed 60 tablet 5    metoprolol tartrate (LOPRESSOR) 50 MG tablet Take 1.5 tablets (75 mg) by mouth 2 times daily 270 tablet 3    psyllium (METAMUCIL/KONSYL) 58.6 % powder 1 tsp twice daily      rosuvastatin (CRESTOR) 10 MG tablet Take 1 tablet (10 mg) by mouth At Bedtime 90 tablet 0    valACYclovir (VALTREX) 1000 mg tablet daily as needed       ACE/ARB/ARNI NOT PRESCRIBED (INTENTIONAL) Please choose reason not prescribed from choices below.         Allergies   Allergen Reactions    Poison Ivy Extract Itching and Rash     Itchiness, redness, swelling       Family History   Problem Relation Age of Onset    Hypertension Mother     Cancer  Father         lung    Eye Disorder Father         Glaucoma    Alzheimer Disease Father     Heart Disease Father     Diabetes Father         type 2    Diabetes Brother         type 2    Coronary Artery Disease Brother     Cerebrovascular Disease Maternal Grandmother     Heart Disease Maternal Grandfather          young heartattack    Diabetes Paternal Grandfather     Cancer Brother         melanoma    Alzheimer Disease Brother     Prostate Cancer Brother     Cerebrovascular Disease Brother        Social History     Socioeconomic History    Marital status:      Spouse name: Dania    Number of children: 3    Years of education: 19    Highest education level: None   Occupational History    Occupation:       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   Tobacco Use    Smoking status: Never    Smokeless tobacco: Never   Vaping Use    Vaping Use: Never used   Substance and Sexual Activity    Alcohol use: Yes     Alcohol/week: 0.0 - 1.0 standard drinks of alcohol     Comment: 0-1 per month avg    Drug use: No    Sexual activity: Yes     Partners: Female   Other Topics Concern    Caffeine Concern Yes     Comment: occas    Exercise Yes     Comment: active    Seat Belt Yes    Parent/sibling w/ CABG, MI or angioplasty before 65F 55M? No     Social Determinants of Health     Financial Resource Strain: Low Risk  (3/25/2024)    Financial Resource Strain     Within the past 12 months, have you or your family members you live with been unable to get utilities (heat, electricity) when it was really needed?: No   Food Insecurity: Low Risk  (3/25/2024)    Food Insecurity     Within the past 12 months, did you worry that your food would run out before you got money to buy more?: No     Within the past 12 months, did the food you bought just not last and you didn t have money to get more?: No   Transportation Needs: Low Risk  (3/25/2024)    Transportation Needs     Within the past 12 months, has lack of transportation kept you from  medical appointments, getting your medicines, non-medical meetings or appointments, work, or from getting things that you need?: No   Physical Activity: Sufficiently Active (3/25/2024)    Exercise Vital Sign     Days of Exercise per Week: 5 days     Minutes of Exercise per Session: 40 min   Stress: Stress Concern Present (3/25/2024)    [employer] - Occupational Stress Questionnaire     Feeling of Stress : To some extent   Social Connections: Unknown (3/25/2024)    Social Connection and Isolation Panel [NHANES]     Frequency of Social Gatherings with Friends and Family: Twice a week   Interpersonal Safety: Low Risk  (3/27/2024)    Interpersonal Safety     Do you feel physically and emotionally safe where you currently live?: Yes     Within the past 12 months, have you been hit, slapped, kicked or otherwise physically hurt by someone?: No     Within the past 12 months, have you been humiliated or emotionally abused in other ways by your partner or ex-partner?: No   Housing Stability: Low Risk  (3/25/2024)    Housing Stability     Do you have housing? : Yes     Are you worried about losing your housing?: No       Colonoscopy:  last 3/2023, next ?  FIT / Cologuard: pending  PSA: pending    Current providers sharing in care for this patient include:  Patient Care Team:  Johnathan Sampson MD as PCP - General  Johnathan Sampson MD as Assigned PCP  Andre Santizo MD as Assigned Surgical Provider  Jean Pierre Rendon MD as MD (Cardiovascular Disease)  Keisha Moore MD as MD (Dermatology)  Radha Melendez PA-C as Physician Assistant (Dermatology)    The following health maintenance items are reviewed in Epic and correct as of today:  Health Maintenance   Topic Date Due    HF ACTION PLAN  Never done    IPV IMMUNIZATION (2 of 3 - Adult catch-up series) 11/12/2004    RSV VACCINE (Pregnancy & 60+) (1 - 1-dose 60+ series) Never done    COVID-19 Vaccine (5 - 2023-24 season) 09/01/2023    BMP   "09/27/2023    ALT  03/27/2024    LIPID  03/27/2024    MICROALBUMIN  03/27/2024    PSA  03/27/2024    MEDICARE ANNUAL WELLNESS VISIT  03/27/2025    ANNUAL REVIEW OF HM ORDERS  03/27/2025    FALL RISK ASSESSMENT  03/27/2025    CBC  03/27/2025    GLUCOSE  03/27/2026    ADVANCE CARE PLANNING  03/27/2028    DTAP/TDAP/TD IMMUNIZATION (4 - Td or Tdap) 01/07/2029    COLORECTAL CANCER SCREENING  03/29/2033    TSH W/FREE T4 REFLEX  Completed    HEPATITIS C SCREENING  Completed    PHQ-2 (once per calendar year)  Completed    INFLUENZA VACCINE  Completed    Pneumococcal Vaccine: 65+ Years  Completed    ZOSTER IMMUNIZATION  Completed    HPV IMMUNIZATION  Aged Out    MENINGITIS IMMUNIZATION  Aged Out    RSV MONOCLONAL ANTIBODY  Aged Out         Review of Systems  CONSTITUTIONAL: NEGATIVE for fever, chills, change in weight  INTEGUMENTARY/SKIN: NEGATIVE for worrisome rashes, moles or lesions  EYES: NEGATIVE for vision changes or irritation  ENT/MOUTH: NEGATIVE for ear, mouth and throat problems  RESP: NEGATIVE for significant cough or SOB  CV: NEGATIVE for chest pain, palpitations or peripheral edema  GI: NEGATIVE for nausea, abdominal pain, heartburn, or change in bowel habits  : NEGATIVE for frequency, dysuria, or hematuria  MUSCULOSKELETAL: NEGATIVE for significant arthralgias or myalgia  NEURO: NEGATIVE for weakness, dizziness or paresthesias  ENDOCRINE: NEGATIVE for temperature intolerance, skin/hair changes  HEME: NEGATIVE for bleeding problems  PSYCHIATRIC: NEGATIVE for changes in mood or affect     Objective    Exam  /82   Pulse 66   Temp 97.4  F (36.3  C)   Resp 20   Ht 1.753 m (5' 9\")   Wt 73.9 kg (163 lb)   SpO2 98%   BMI 24.07 kg/m     Estimated body mass index is 24.07 kg/m  as calculated from the following:    Height as of this encounter: 1.753 m (5' 9\").    Weight as of this encounter: 73.9 kg (163 lb).    Physical Exam  GENERAL: alert and no distress  EYES: Eyes grossly normal to inspection, PERRL " and conjunctivae and sclerae normal  HENT: ear canals and TM's normal, nose and mouth without ulcers or lesions  NECK: no adenopathy, no asymmetry, masses, or scars  RESP: lungs clear to auscultation - no rales, rhonchi or wheezes  CV: regular rate and rhythm, normal S1 S2, no S3 or S4, no murmur, click or rub, no peripheral edema  ABDOMEN: soft, nontender, no hepatosplenomegaly, no masses and bowel sounds normal   (male): pt declines  RECTAL: pt declines  MS: no gross musculoskeletal defects noted, no edema  SKIN: no suspicious lesions or rashes  NEURO: Normal strength and tone, mentation intact and speech normal  PSYCH: mentation appears normal, affect normal/bright        3/27/2024   Mini Cog   Clock Draw Score 2 Normal   3 Item Recall 3 objects recalled   Mini Cog Total Score 5              Signed Electronically by:              Johnathan Sampson MD, FAAFP     [facility]  [address] [place] [id number]  [email or web address]   Office: [phone]  Fax: [phone]

## 2024-04-02 PROCEDURE — 82274 ASSAY TEST FOR BLOOD FECAL: CPT | Performed by: FAMILY MEDICINE

## 2024-04-05 LAB — HEMOCCULT STL QL IA: NEGATIVE

## 2024-05-17 ENCOUNTER — OFFICE VISIT (OUTPATIENT)
Dept: CARDIOLOGY | Facility: CLINIC | Age: 79
End: 2024-05-17
Payer: COMMERCIAL

## 2024-05-17 DIAGNOSIS — Z00.6 EXAMINATION OF PARTICIPANT OR CONTROL IN CLINICAL RESEARCH: Primary | ICD-10-CM

## 2024-05-17 DIAGNOSIS — Z00.6 EXAMINATION OF PARTICIPANT OR CONTROL IN CLINICAL RESEARCH: ICD-10-CM

## 2024-05-17 DIAGNOSIS — E78.5 HYPERLIPIDEMIA LDL GOAL <130: Primary | ICD-10-CM

## 2024-05-17 DIAGNOSIS — E78.5 HYPERLIPIDEMIA LDL GOAL <100: ICD-10-CM

## 2024-05-17 PROCEDURE — 99207 PR NO CHARGE NURSE ONLY: CPT | Performed by: INTERNAL MEDICINE

## 2024-05-17 PROCEDURE — 99207 PR NO CHARGE-RESEARCH SERVICE: CPT | Performed by: INTERNAL MEDICINE

## 2024-05-17 NOTE — PROGRESS NOTES
VICTORION-1 PREVENT: A randomized, double-blind, placebo-controlled multicenter study to evaluate the effect of inclisiran on preventing major adverse cardiovascular events in high-risk primary prevention patients       Physical Examination  For abnormal findings, please evaluate if the finding is Clinically Significant (by 'CS') or Not Clinically Significant (by 'NCS')  General Appearance Normal  Head and Neck  Normal  Lungs    Normal  Cardiovascular  NCS irregular  Abdomen   Normal  Musculoskeletal/Extremities Normal   Lymph Nodes  Normal  Skin    Normal  Neurological   Normal       Weight: 159.4 lbs   Height: 5'8''  Waist Circumference: 102 cm        Past Medical History:   Diagnosis Date    CHF (congestive heart failure) (H) 12/2021    Chronic rhinitis     Hard of hearing     HFrEF (heart failure with reduced ejection fraction) (H) 12/2021    History of basal cell carcinoma 12/2023    History of colonic polyps 03/2023    Hypertension goal BP (blood pressure) < 140/90 12/2017    Persistent atrial fibrillation (H) 12/2021    Pneumonia, organism unspecified(486) 1993    RBBB     Snoring     Squamous 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 2021    Unspecified hemorrhoids without mention of complication 04/2006        Current Outpatient Medications   Medication Sig Dispense Refill    ACE/ARB/ARNI NOT PRESCRIBED (INTENTIONAL) Please choose reason not prescribed from choices below.      apixaban ANTICOAGULANT (ELIQUIS) 5 MG tablet Take 1 tablet (5 mg) by mouth 2 times daily 180 tablet 3    diltiazem ER COATED BEADS (CARDIZEM CD) 120 MG 24 hr capsule Take 1 capsule (120 mg) by mouth daily 90 capsule 3    furosemide (LASIX) 20 MG tablet Take 1 tablet (20 mg) by mouth daily as needed 60 tablet 5    metoprolol tartrate (LOPRESSOR) 50 MG tablet Take 1.5 tablets (75 mg) by mouth 2 times daily 270 tablet 3    psyllium (METAMUCIL/KONSYL) 58.6 % powder 1 tsp twice daily      rosuvastatin (CRESTOR) 10 MG tablet Take 1 tablet (10  mg) by mouth At Bedtime 90 tablet 0    valACYclovir (VALTREX) 1000 mg tablet daily as needed        Current Facility-Administered Medications   Medication Dose Route Frequency Provider Last Rate Last Admin    lidocaine 1% with EPINEPHrine 1:100,000 injection 3 mL  3 mL Intradermal Once Andre Santizo MD Robert W Nowak will follow up per Research protocol.       Tana Mcclure NP

## 2024-05-17 NOTE — PROGRESS NOTES
A randomized, double-blind, placebo-controlled multicenter study to evaluate the effect of inclisiran on preventing major adverse cardiovascular events in high-risk primary prevention patients (VICTORION-1 PREVENT)    Amadou Wade was seen in clinic today for the screening visit.    Research coordinator met with subject and family to discuss consent and participation in the above noted study.    The study discussion included the following:   Study purpose  Qualifications for participation  Length of study participation  Study procedures  Risks and side effects  Benefits (if any)  Voluntary nature of participation  Alternatives to participation  Confidentiality of records  Financial considerations     Subject asked questions and agreed that he received answers that satisfied him.    Consent form [Version 01.01.04 - [employer] version [id number]] signed on 17 MAY 2024 at 1315. Optional Genetic Consent form [Version 00.00.00 - [employer] version [id number]] signed on 17 MAY 2024 at 1315.    Patient verbalized understanding. A signed copy was offered to the subject & forwarded to medical records. No study procedures were done prior to obtaining informed consent.      HUMBERTO obtained.    Did Subject meet all inclusion criteria? Yes  Did Subject meet any Exclusion criteria? No    Physical Examination done by Tana Mcclure, see documentation.     Blood Pressure and pulse taken 3 times in sitting position after resting for 5 minutes:   [unfilled]    Mean Pulse and Blood Pressure: 60bpm; 134/86    Weight: 72.5 kg  Height: 172.7 cm   Waist Circumference: 102 cm     Smoking Status:   Ever smoked tobacco: No  Type: NA  Amount: NA Pack/Year  Start Date of Use:   End Date of Use (for former user): NA    Fasting lab draw performed without issue Not performed at this visit, patient will return when fasting.   Time NA Central UA collected.    Adverse events, medications, and health status reviewed with subject.   Lifestyle instructions  reviewed with subject.    Patient is on stable dose of statin therapy without changes in last 4 weeks Yes    Is patient on a Moderate Intensity or High Intensity Statin Therapy: Moderate; 10 mg Rosuvastatin       Demographics:    [x]Not  or   [] or    []Not Reported  [] Unknown         Race:     [] or    []   []Black or    [] or Other   [x]White   []Other, specify:       Scott Fontaine   Clinical Trials Office   [phone]      Current Outpatient Medications:     apixaban ANTICOAGULANT (ELIQUIS) 5 MG tablet, Take 1 tablet (5 mg) by mouth 2 times daily, Disp: 180 tablet, Rfl: 3    diltiazem ER COATED BEADS (CARDIZEM CD) 120 MG 24 hr capsule, Take 1 capsule (120 mg) by mouth daily, Disp: 90 capsule, Rfl: 3    furosemide (LASIX) 20 MG tablet, Take 1 tablet (20 mg) by mouth daily as needed, Disp: 60 tablet, Rfl: 5    metoprolol tartrate (LOPRESSOR) 50 MG tablet, Take 1.5 tablets (75 mg) by mouth 2 times daily, Disp: 270 tablet, Rfl:      rosuvastatin (CRESTOR) 10 MG tablet, Take 1 tablet (10 mg) by mouth At Bedtime, Disp: 90 tablet, Rfl: 0      Past Medical History:   Diagnosis Date    CHF (congestive heart failure) (H) 12/2021    HFrEF - Dr Cordova    HFrEF (heart failure with reduced ejection fraction) (H) 12/2021    Dr Cordova    History of basal cell carcinoma 12/2023    Rt Posterior ear - [facility] - Mohs - Dr Hopper    Hypertension goal BP (blood pressure) < 140/90 12/2017    Persistent atrial fibrillation (H) 12/2021    in/out, rate controlled, at times, Dr Montenegro/Justice    Squamous 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 2021    Dr Abernathy/Darlyn - Rt Chest     No data found.  Vitals: There were no vitals taken for this visit.  BMI= There is no height or weight on file to calculate BMI.  Other  male  79 year old

## 2024-05-17 NOTE — LETTER
5/17/2024    Johnathan Sampson MD  [address] [facility] [address]    RE: Amadou Wade       Dear Colleague,     I had the pleasure of seeing Amadou Wade in the [facility].      A randomized, double-blind, placebo-controlled multicenter study to evaluate the effect of inclisiran on preventing major adverse cardiovascular events in high-risk primary prevention patients (VICTORION-1 PREVENT)    Amadou Wade was seen in clinic today for the screening visit.    Research coordinator met with subject and family to discuss consent and participation in the above noted study.    The study discussion included the following:   Study purpose  Qualifications for participation  Length of study participation  Study procedures  Risks and side effects  Benefits (if any)  Voluntary nature of participation  Alternatives to participation  Confidentiality of records  Financial considerations     Subject asked questions and agreed that he received answers that satisfied him.    Consent form [Version 01.01.04 - [employer] version [id number]] signed on 17 MAY 2024 at 1315. Optional Genetic Consent form [Version 00.00.00 - [employer] version [id number]] signed on 17 MAY 2024 at 1315.    Patient verbalized understanding. A signed copy was offered to the subject & forwarded to medical records. No study procedures were done prior to obtaining informed consent.      HUMBERTO obtained.    Did Subject meet all inclusion criteria? Yes  Did Subject meet any Exclusion criteria? No    Physical Examination done by Tana Mcclure, see documentation.     Blood Pressure and pulse taken 3 times in sitting position after resting for 5 minutes:   [unfilled]    Mean Pulse and Blood Pressure: 60bpm; 134/86    Weight: 159.4 lbs   Height: 5'8''  Waist Circumference: 102 cm     Smoking Status:   Ever smoked tobacco: No  Type: NA  Amount: NA Pack/Year  Start Date of Use:   End Date of Use (for former user): NA    Fasting lab draw performed  without issue Not performed at this visit, patient will return when fasting.   Time NA Central UA collected.    Adverse events, medications, and health status reviewed with subject.   Lifestyle instructions reviewed with subject.    Patient is on stable dose of statin therapy without changes in last 4 weeks Yes    Is patient on a Moderate Intensity or High Intensity Statin Therapy: Moderate; 10 mg Rosuvastatin       Demographics:    [x]Not  or   [] or    []Not Reported  [] Unknown         Race:     [] or    []   []Black or    [] or Other   [x]White   []Other, specify:       Scott Fontaine   Clinical Trials Office   [phone]      Current Outpatient Medications:     apixaban ANTICOAGULANT (ELIQUIS) 5 MG tablet, Take 1 tablet (5 mg) by mouth 2 times daily, Disp: 180 tablet, Rfl: 3    diltiazem ER COATED BEADS (CARDIZEM CD) 120 MG 24 hr capsule, Take 1 capsule (120 mg) by mouth daily, Disp: 90 capsule, Rfl: 3    furosemide (LASIX) 20 MG tablet, Take 1 tablet (20 mg) by mouth daily as needed, Disp: 60 tablet, Rfl: 5    metoprolol tartrate (LOPRESSOR) 50 MG tablet, Take 1.5 tablets (75 mg) by mouth 2 times daily, Disp: 270 tablet, Rfl:      rosuvastatin (CRESTOR) 10 MG tablet, Take 1 tablet (10 mg) by mouth At Bedtime, Disp: 90 tablet, Rfl: 0    valACYclovir (VALTREX) 1000 mg tablet, daily as needed , Disp: , Rfl:     Past Medical History:   Diagnosis Date    CHF (congestive heart failure) (H) 12/2021    HFrEF - Dr Cordova    [place]     [facility]    HFrEF (heart failure with reduced ejection fraction) (H) 12/2021    Dr Cordova    History of basal cell carcinoma 12/2023    Rt Posterior ear - [facility] - Mohs - Dr Hopper    [place] of [place]23    [facility]    Hypertension goal BP (blood pressure) < 140/90 12/2017    Persistent atrial fibrillation (H) 12/2021    in/out, rate controlled, at times, Dr Montenegro/Justice Wolfe  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 2021    Dr Abernathy/Darlyn - Rt Chest    Unspecified hemorrhoids without mention of complication 04/2006    internal     No data found.  Vitals: There were no vitals taken for this visit.  BMI= There is no height or weight on file to calculate BMI.  Other  male  79 year old      Thank you for allowing me to participate in the care of your patient.      Sincerely,     Scott SEBASTIAN [facility]  cc:   No referring provider defined for this encounter.

## 2024-05-17 NOTE — LETTER
5/17/2024    Johnathan Sampson MD  [address] [facility] [address]    RE: Amadou Wade       Dear Colleague,     I had the pleasure of seeing Amadou Wade in the [facility].  VICTORION-1 PREVENT: A randomized, double-blind, placebo-controlled multicenter study to evaluate the effect of inclisiran on preventing major adverse cardiovascular events in high-risk primary prevention patients       Physical Examination  For abnormal findings, please evaluate if the finding is Clinically Significant (by 'CS') or Not Clinically Significant (by 'NCS')  General Appearance Normal  Head and Neck  Normal  Lungs    Normal  Cardiovascular  Normal  Abdomen   Normal  Musculoskeletal/Extremities Normal   Lymph Nodes  Normal  Skin    Normal  Neurological   Normal       Weight: 159.4 lbs   Height: 5'8''  Waist Circumference: 102 cm        Past Medical History:   Diagnosis Date    CHF (congestive heart failure) (H) 12/2021    Chronic rhinitis     Hard of hearing     HFrEF (heart failure with reduced ejection fraction) (H) 12/2021    History of basal cell carcinoma 12/2023    History of colonic polyps 03/2023    Hypertension goal BP (blood pressure) < 140/90 12/2017    Persistent atrial fibrillation (H) 12/2021    Pneumonia, organism unspecified(486) 1993    RBBB     Snoring     Squamous 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 2021    Unspecified hemorrhoids without mention of complication 04/2006        Current Outpatient Medications   Medication Sig Dispense Refill    ACE/ARB/ARNI NOT PRESCRIBED (INTENTIONAL) Please choose reason not prescribed from choices below.      apixaban ANTICOAGULANT (ELIQUIS) 5 MG tablet Take 1 tablet (5 mg) by mouth 2 times daily 180 tablet 3    diltiazem ER COATED BEADS (CARDIZEM CD) 120 MG 24 hr capsule Take 1 capsule (120 mg) by mouth daily 90 capsule 3    furosemide (LASIX) 20 MG tablet Take 1 tablet (20 mg) by mouth daily as needed 60 tablet 5    metoprolol tartrate (LOPRESSOR)  50 MG tablet Take 1.5 tablets (75 mg) by mouth 2 times daily 270 tablet 3    psyllium (METAMUCIL/KONSYL) 58.6 % powder 1 tsp twice daily      rosuvastatin (CRESTOR) 10 MG tablet Take 1 tablet (10 mg) by mouth At Bedtime 90 tablet 0    valACYclovir (VALTREX) 1000 mg tablet daily as needed        Current Facility-Administered Medications   Medication Dose Route Frequency Provider Last Rate Last Admin    lidocaine 1% with EPINEPHrine 1:100,000 injection 3 mL  3 mL Intradermal Once Andre Santizo MD Robert W Nowak will follow up per Research protocol.       Tana Mcclure NP        Thank you for allowing me to participate in the care of your patient.      Sincerely,     Tana Mcclure NP, NP     [facility]  cc:   Johnathan Sampson MD  [address] [place] [address]

## 2024-05-22 ENCOUNTER — OFFICE VISIT (OUTPATIENT)
Dept: CARDIOLOGY | Facility: CLINIC | Age: 79
End: 2024-05-22
Payer: COMMERCIAL

## 2024-05-22 DIAGNOSIS — Z00.6 EXAMINATION OF PARTICIPANT OR CONTROL IN CLINICAL RESEARCH: ICD-10-CM

## 2024-05-22 DIAGNOSIS — E78.5 HYPERLIPIDEMIA LDL GOAL <130: Primary | ICD-10-CM

## 2024-05-22 PROCEDURE — 99207 PR NO CHARGE-RESEARCH SERVICE: CPT

## 2024-05-22 NOTE — PROGRESS NOTES
Patient was seen last Friday for screening visit, there were no lab kits available for the study from the sponsor. Patients screening labs were drawn today. Will inform him of his lab results when available and patient is aware he will either screen fail or randomize into the study.  Ina Hess RN

## 2024-05-24 ENCOUNTER — TELEPHONE (OUTPATIENT)
Dept: FAMILY MEDICINE | Facility: CLINIC | Age: 79
End: 2024-05-24

## 2024-05-24 ENCOUNTER — OFFICE VISIT (OUTPATIENT)
Dept: FAMILY MEDICINE | Facility: CLINIC | Age: 79
End: 2024-05-24
Payer: COMMERCIAL

## 2024-05-24 VITALS
DIASTOLIC BLOOD PRESSURE: 76 MMHG | HEART RATE: 62 BPM | TEMPERATURE: 97.5 F | RESPIRATION RATE: 16 BRPM | OXYGEN SATURATION: 98 % | BODY MASS INDEX: 24.73 KG/M2 | HEIGHT: 69 IN | WEIGHT: 167 LBS | SYSTOLIC BLOOD PRESSURE: 120 MMHG

## 2024-05-24 DIAGNOSIS — S61.412A LACERATION OF LEFT HAND WITHOUT FOREIGN BODY, INITIAL ENCOUNTER: Primary | ICD-10-CM

## 2024-05-24 PROCEDURE — 99207 PR NO CHARGE LOS: CPT | Performed by: FAMILY MEDICINE

## 2024-05-24 PROCEDURE — 12001 RPR S/N/AX/GEN/TRNK 2.5CM/<: CPT | Performed by: FAMILY MEDICINE

## 2024-05-24 NOTE — TELEPHONE ENCOUNTER
Patient states he has an appointment     Fell against the stair railing last kait and he went to catch himself and hit his hand on the corner of the railing    Dime size gash on back of left hand     The cut is bleeding on and off.   Patient is on eliquis.       Scheduled for today, ok per provider to come right over.

## 2024-05-24 NOTE — TELEPHONE ENCOUNTER
This pt was seen 05/24/24 and Dr. Lema stitched up some lacerations on his hands.  When I called to set up an appointment for suture removal, he told me that he couldn't remember the care/bandaging instructions.  He was wondering if you could you send him care instructions through [facility]?

## 2024-05-24 NOTE — PROGRESS NOTES
Assessment & Plan     Laceration of left hand without foreign body, initial encounter  Wound sutured with good hemostasis and dressed with bandage  - Less than 2.5 cm in length    After informed consent was obtained, using Hibiclens for cleansing and 1% Lidocaine with epinephrine for anesthetic, with sterile technique, suturing was performed (seven 6-0 Ethilon interrupted sutures and then Steri-Stripped). Antibiotic dressing is applied, and wound care instructions provided.  Be alert for any signs of cutaneous infection. The procedure was well tolerated without complications. Follow up: Return for suture removal in 10 days..    Alex Lema MD       Henry Tobar is a 79 year old, presenting for the following health issues:  Laceration    Last night around 6pm, while walking up the stairs, sandal folded under toes on the carpet and he tripped forward. Tried to catch himself with the railing but missed and hit the top of his hand. Started bleeding steadily right away - put on a triple antibiotic ointment and bandage. Changed once right before bed due to bandage saturation.     Has not completely stopped bleeding as the wound keeps re-opening with moving his hand. Is currently on eliquis, last dose was last night around 9pm. Takes twice daily.     Denies trauma anywhere else on his body, did not hit his head.         5/24/2024     8:33 AM   Additional Questions   Roomed by Kera HOWARD CMA     History of Present Illness       Reason for visit:  Cut on the back of left hand  Symptom onset:  1-3 days ago  Symptoms include:  Bleeding  Symptom intensity:  Moderate  Symptom progression:  Staying the same  Had these symptoms before:  No  What makes it worse:  No  What makes it better:  No    He eats 2-3 servings of fruits and vegetables daily.He consumes 1 sweetened beverage(s) daily.He exercises with enough effort to increase his heart rate 10 to 19 minutes per day.  He exercises with enough effort to increase his heart rate  "5 days per week.   He is taking medications regularly.     Telephone Encounter 5/24/2024    Fell against the stair railing last kait and he went to catch himself and hit his hand on the corner of the railing     Dime size gash on back of left hand      The cut is bleeding on and off.   Patient is on eliquis.         Objective    /76   Pulse 62   Temp 97.5  F (36.4  C) (Tympanic)   Resp 16   Ht 1.753 m (5' 9\")   Wt 75.8 kg (167 lb)   SpO2 98%   BMI 24.66 kg/m    Body mass index is 24.66 kg/m .    Physical Exam    GENERAL: alert, no acute distress  CARDIAC: regular rate, well perfused  RESP: normal respiratory effort  SKIN: 2 cm u shaped laceration to left hand between 4th and 5th MCP joints with some intact skin covering wound, no bruising/wounds otherwise  MSK: hand strength intact, no sensory deficits to hands, normal flexion and extension of fingers and wrists      Signed Electronically by: Amadou Lema MD    "

## 2024-05-28 ENCOUNTER — DOCUMENTATION ONLY (OUTPATIENT)
Dept: CARDIOLOGY | Facility: CLINIC | Age: 79
End: 2024-05-28
Payer: COMMERCIAL

## 2024-05-28 DIAGNOSIS — E78.5 HYPERLIPIDEMIA LDL GOAL <130: Primary | ICD-10-CM

## 2024-05-28 DIAGNOSIS — Z00.6 EXAMINATION OF PARTICIPANT OR CONTROL IN CLINICAL RESEARCH: ICD-10-CM

## 2024-05-28 PROCEDURE — 99207 PR NO CHARGE-RESEARCH SERVICE: CPT | Performed by: INTERNAL MEDICINE

## 2024-05-28 NOTE — PROGRESS NOTES
[facility] V1P Inclusion/Exclusion Criteria:    Study ID: [id number]    Study Description: A randomized, double-blind, placebo-controlled multicenter study to evaluate the effect of inclisiran on preventing major adverse cardiovascular events in high-risk primary prevention patients    Protocol Version: 1.0      Criteria #  Inclusion Criteria (ALL MUST BE YES)  YES/NO/N/A   1 Signed informed consent must be obtained prior to participation in the study   Yes   2 Male or female ?40 but <80 years of age   Yes   3 At an increased risk for a first MACE (i.e., no prior major ASCVD event), defined as any one of the following:  Evidence of atherosclerotic CAD on CT or invasive coronary angiogram defined as a coronary artery stenosis ?20% but <50% in the left main coronary artery or a stenosis ?20% but <70% in any other major epicardial coronary artery  Coronary artery calcium (CAC) score obtained by CT-scan ?100 Agatston units - determined at any time before the screening visit  High 10-year ASCVD risk ?20% (assessed with the Pooled Cohort Equation; refer to Section 10.4)  Intermediate 10-year ASCVD risk 7.5% - <20% (assessed with the Pooled Cohort Equation; refer to Section 10.4) with at least 2 of the following risk-enhancing factors:  A first degree relative with history of premature ASCVD (males, age <55 years; females, age <65 years)  History of premature menopause before age 40  History of maternal pregnancy complications (defined as a history of either preeclampsia, gestational hypertension, or gestational diabetes)  [place]  ancestry  Chronic inflammatory disorders (defined as either rheumatoid arthritis, systemic lupus erythematosus, psoriasis covering ?10% of body surface area, Crohn's disease, ulcerative colitis, or HIV/AIDS)  High-sensitivity C-reactive protein (hsCRP) ?2 mg/L - documented in the 12 months preceding the screening visit, and, in absence of any underlying acute or inflammatory conditions  (including any of the chronic inflammatory disorders listed under criteria 3 d. v.)  Lipoprotein (a) ?50 mg/dL or 125 nmol/L - determined at any time before the screening visit  Estimated glomerular filtration rate (eGFR) <60 mL/min/1.73 m2 - documented between the screening and randomization visits  Ankle branchial index (TALISHA) <0.9 with no symptoms of intermittent claudication  Metabolic syndrome: increased waist circumference, elevated triglycerides, elevated blood pressure, elevated glucose, and low HDL-C are factors and are defined in Section 10.5 - a tally of 3 or more, documented between the screening and randomization visits   No   4 If on a background LLT, the dose should be stable for at least 4 weeks prior to the screening visit and the participant should be willing to remain on this background therapy for the entire duration of the study. Statin requirements are found in   Section 4.1.1   Yes   5 LDL-C ?70 mg/dL (?1.81 mmol/L) but <190 mg/dL (<4.91 mmol/L) at the screening visit   Yes     Criteria # Exclusion Criteria (ALL MUST BE NO) YES/NO/N/A   General      1   History of major ASCVD event defined as any one of the following:  Acute coronary syndrome (ACS) in the 12 months prior to randomization, or  Prior myocardial infarction at any time prior to randomization, or  Prior ischemic stroke at any time prior to randomization, or  Symptomatic peripheral artery disease (PAD) as evidenced by either intermittent claudication, previous revascularization, or amputation due to atherosclerotic disease at any time prior to randomization   No     2 History of, or planned, ischemia-driven revascularization in a coronary or extracoronary arterial bed prior to randomization   No   3 Absence of coronary atherosclerosis on a CT angiogram or an invasive coronary angiogram in the 2 years prior to randomization   No   4 Coronary artery calcium (CAC) score of 0 obtained in the 2 years prior to randomization.   No   5  Active liver disease or hepatic dysfunction, defined as AST or ALT ?3x ULN from central laboratory test at screening visit   No   6   Current or planned renal dialysis or transplantation   No   7 Previous (within 90 days prior to the screening visit), current, or planned treatment with a mAb directed toward PCSK9 (e.g., evolocumab, alirocumab)   No   8 Previous exposure to inclisiran or any other non-mAb PCSK9-targeted therapy, either as an investigational or marketed drug within 2 years prior to randomization   No   9 Severe concomitant non-cardiovascular disease that is expected to reduce life expectancy to less than 5 years   No   10 Use of other investigational drugs within 5 half-lives of enrollment, or within 30 days (e.g., for small molecules), or until the expected pharmacodynamic effect has returned to baseline (e.g., for biologics), whichever is longer   No   11 History of hypersensitivity to any of the study drugs or its excipients or to drugs of similar chemical classes   No   12 Any surgical or medical condition, which in the opinion of the Investigator, may place the participant at higher risk from his/her participation in the study, or is likely to prevent the participant from complying with the requirements of the study or completing the study   No   13   Unwillingness or inability (e.g., physical or cognitive) to comply with study procedures (including adherence to study visits, fasting blood draws and compliance with study treatment regimens), and medication administration (injections) and schedule   No   14   Pregnant or nursing (lactating) women   No   15   Women of child-bearing potential, defined as all women physiologically capable of becoming pregnant, unless they are using effective methods of contraception while taking study treatment which includes for 6 months after last study drug administration. Effective contraception methods include:  Total abstinence (when this is in line with the  preferred and usual lifestyle of the subject. Periodic abstinence (e.g., calendar, ovulation, symptothermal, post-ovulation methods) and withdrawal are not acceptable methods of contraception  Female sterilization (have had surgical bilateral oophorectomy with or without hysterectomy), total hysterectomy, or bilateral tubal ligation at least six weeks before taking study treatment. In case of oophorectomy alone, only when the reproductive status of the woman has been confirmed by follow up hormone level assessment  Male sterilization (at least 6 months prior to screening). For female participants on the study, the vasectomized male partner should be the sole partner for that subject  Barrier methods of contraception: condom or occlusive cap (e.g., diaphragm or cervical/vault caps)  Use of oral, (estrogen and progesterone), injected, or implanted hormonal methods of contraception or other forms of hormonal contraception that have comparable efficacy (failure rate <1%), for example hormone vaginal ring or transdermal hormone contraception or placement of an intrauterine device (IUD) or intrauterine system (IUS)  In case of use of oral contraception women should have been stable on the same pill for a minimum of 3 months before taking investigational drug.  Women are considered post-menopausal and not of childbearing potential if they have had 12 months of natural (spontaneous) amenorrhea with an appropriate clinical profile (e.g., age appropriate, history of vasomotor symptoms). Women are considered not of child-bearing potential if they are post-menopausal or have had surgical bilateral oophorectomy (with or without hysterectomy), total hysterectomy or bilateral tubal ligation at least six weeks prior to enrollment on study. In the case of oophorectomy alone, only when the reproductive status of the woman has been confirmed by follow-up hormone level assessment is she considered to be not of childbearing  "potential.  If local regulations are more stringent than the contraception methods listed above to prevent pregnancy, local regulations apply and will be described in the ICF.   No   16   Persons directly involved in the execution of this protocol including investigational study staff and their family members   No     Did the subject meet all Inclusion Criteria? No    Did the subject meet any Exclusion criteria? No    Please provide detail if any inclusion criteria is marked \"No\" and/or if any exclusion criteria is marked \"Yes\": ASCVD 10 year risk score does not register with age of 79. Patient required a 20% calculation and over. He was notified of this was and thanked for participating in the study.      Ina Hess RN      "

## 2024-06-03 ENCOUNTER — ALLIED HEALTH/NURSE VISIT (OUTPATIENT)
Dept: NURSING | Facility: CLINIC | Age: 79
End: 2024-06-03
Payer: COMMERCIAL

## 2024-06-03 DIAGNOSIS — Z48.02 VISIT FOR SUTURE REMOVAL: Primary | ICD-10-CM

## 2024-06-03 PROCEDURE — 99207 PR NO CHARGE NURSE ONLY: CPT

## 2024-06-03 NOTE — PROGRESS NOTES
From office note  5/24/24 laceration of left hand  (seven 6-0 Ethilon interrupted sutures and then Steri-Stripped). Antibiotic dressing is applied, and wound care instructions provided.  Be alert for any signs of cutaneous infection. The procedure was well tolerated without complications. Follow up: Return for suture removal in 10 days..     Amadou aWde presents to the clinic for removal of sutures and sutures,staples, steri strips. The patient has had sutures in place for 7 days. There has been no patient reported signs or symptoms of infection or drainage.  7 sutures and sutures,staples, staple, steri strips are seen and located on the left hand .     Tetanus status is up to date.     All sutures and sutures,staples, steri strips were easily removed today.   Routine wound care and signs and symptoms of infection  discussed by the RN or provider. Advised to call clinic and talk with a triage nurse or be seen in urgent care if s/s of infection are present.  The patient will follow up as needed.

## 2024-06-06 ENCOUNTER — TRANSFERRED RECORDS (OUTPATIENT)
Dept: CARDIOLOGY | Facility: CLINIC | Age: 79
End: 2024-06-06
Payer: COMMERCIAL

## 2024-06-06 NOTE — PROGRESS NOTES
May 22 research labs reviewed and results are NCS including high bilirubin and highHCT and mildly high atypical lymphocytes, low cholesterol, and trace proteinuria.  LF

## 2024-07-10 ENCOUNTER — OFFICE VISIT (OUTPATIENT)
Dept: CARDIOLOGY | Facility: CLINIC | Age: 79
End: 2024-07-10
Payer: COMMERCIAL

## 2024-07-10 VITALS
WEIGHT: 160 LBS | DIASTOLIC BLOOD PRESSURE: 75 MMHG | BODY MASS INDEX: 23.7 KG/M2 | HEART RATE: 75 BPM | OXYGEN SATURATION: 100 % | RESPIRATION RATE: 15 BRPM | HEIGHT: 69 IN | SYSTOLIC BLOOD PRESSURE: 128 MMHG

## 2024-07-10 DIAGNOSIS — I48.19 PERSISTENT ATRIAL FIBRILLATION (H): Primary | ICD-10-CM

## 2024-07-10 PROCEDURE — 99213 OFFICE O/P EST LOW 20 MIN: CPT | Performed by: NURSE PRACTITIONER

## 2024-07-10 NOTE — LETTER
7/10/2024    Johnathan Sampson MD  [address] [facility] [id number]    RE: Amadou Wade       Dear Colleague,     I had the pleasure of seeing Amadou Wade in the [facility].    Electrophysiology Clinic Progress Note  Amadou Wade MRN# [id number]   YOB: 1945 Age: 79 year old     Primary cardiologist: Dr. Rendon (needs to establish with new ANDREW CHUNG)    Reason for visit: Annual follow up    History of presenting illness:    Amadou Wade is a pleasant 79 year old patient with past medical history significant for:    Permanent atrial fibrillation: s/p DCCV 2022 that restored SR and returned to AF. Currently on rate control strategy with diltiazem and metoprolol  [id number] Score 3: (Age++, HF) anticoagulated on apixaban  HFpEF  Hyperlipidemia  Incomplete RBBB    Today he returns for annual follow and is doing well without cardiopulmonary complaints.  He is tolerating anticoagulation with no concerns for bleeding.  His blood pressure is well-controlled and he denies chest pain or shortness of breath on exertion.  He also denies palpitations, PND, orthopnea.  Occasional have orthostatic symptoms but is compensated by getting up slowly.          Assessment and Plan:     ASSESSMENT:    Permanent atrial fibrillation   s/p DCCV 2022 that restored SR and returned to AF. Currently on rate control strategy with diltiazem and metoprolol  [id number] Score 3: (Age++, HF) anticoagulated on apixaban  Rate controlled on metoprolol tartrate at 75 mg twice daily    PLAN:     Remain on present medical therapy  Return to clinic in 1 year or sooner if needed       Orders this Visit:  Orders Placed This Encounter   Procedures    Follow-Up with Cardiology [facility]AS[facility]     No orders of the defined types were placed in this encounter.    There are no discontinued medications.    Today's clinic visit entailed:  Review of the result(s) of each unique test - EKG, Echo  Prescription drug management  I spent a  "total of 20 minutes on the day of the visit.   Time spent by me doing chart review, history and exam, documentation and further activities per the note  Provider  Link to MDM Help Grid     The level of medical decision making during this visit was of moderate complexity.           Review of Systems:     Review of Systems:  Skin:  not assessed     Eyes:  not assessed    ENT:  not assessed    Respiratory:  Negative    Cardiovascular:    Positive for;fatigue  Gastroenterology: not assessed    Genitourinary:  not assessed    Musculoskeletal:  not assessed    Neurologic:  not assessed    Psychiatric:  not assessed    Heme/Lymph/Imm:  not assessed    Endocrine:  Negative              Physical Exam:     Vitals: /75   Pulse 75   Resp 15   Ht 1.753 m (5' 9\")   Wt 72.6 kg (160 lb)   SpO2 100%   BMI 23.63 kg/m    Constitutional: Well nourished and in no apparent distress.  Eyes: Pupils equal, round.   HEENT: Normocephalic, atraumatic.   Neck: Supple.   Respiratory: Breathing non-labored. Lungs clear to auscultation bilaterally.  Cardiovascular: IRR rate and rhythm, normal S1 and S2. No murmur   Skin: Warm, dry.   Extremities: No edema.  Neurologic: No gross motor deficits. Alert, awake, and oriented to person, place and time.  Psychiatric: Affect appropriate.        CURRENT MEDICATIONS:  Current Outpatient Medications   Medication Sig Dispense Refill    ACE/ARB/ARNI NOT PRESCRIBED (INTENTIONAL) Please choose reason not prescribed from choices below.      apixaban ANTICOAGULANT (ELIQUIS) 5 MG tablet Take 1 tablet (5 mg) by mouth 2 times daily 180 tablet 3    diltiazem ER COATED BEADS (CARDIZEM CD) 120 MG 24 hr capsule Take 1 capsule (120 mg) by mouth daily 90 capsule 3    furosemide (LASIX) 20 MG tablet Take 1 tablet (20 mg) by mouth daily as needed 60 tablet 5    metoprolol tartrate (LOPRESSOR) 50 MG tablet Take 1.5 tablets (75 mg) by mouth 2 times daily 270 tablet 3    psyllium (METAMUCIL/KONSYL) 58.6 % powder 1 " tsp twice daily      rosuvastatin (CRESTOR) 10 MG tablet Take 1 tablet (10 mg) by mouth At Bedtime 90 tablet 0    valACYclovir (VALTREX) 1000 mg tablet daily as needed          ALLERGIES  Allergies   Allergen Reactions    Poison Ivy Extract Itching and Rash     Itchiness, redness, swelling         PAST MEDICAL HISTORY:  Past Medical History:   Diagnosis Date    CHF (congestive heart failure) (H) 12/2021    HFrEF - Dr Cordova    Chronic rhinitis     resolved    [facility] [place] [facility]    HFrEF (heart failure with reduced ejection fraction) (H) 12/2021    Dr Cordova    History of basal cell carcinoma 12/2023    Rt Posterior ear - BCC - Mohs - Dr Hopper    History of colonic polyps 03/2023    [facility]    Hypertension goal BP (blood pressure) < 140/90 12/2017    Persistent atrial fibrillation (H) 12/2021    in/out, rate controlled, at times, Dr Montenegro/Justice    Pneumonia, organism [phone]    RBBB     Snoring     no sleep apnea - mouth guard    Squamous 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 2021    Dr Abernathy/Darlyn - Rt Chest    Unspecified hemorrhoids without mention of complication 04/2006    internal       PAST SURGICAL HISTORY:  Past Surgical History:   Procedure Laterality Date    ANESTHESIA CARDIOVERSION N/A 01/11/2022    Procedure: ANESTHESIA, FOR CARDIOVERSION;  Surgeon: GENERIC ANESTHESIA PROVIDER;  Location: [facility] OR    COLONOSCOPY  01/03/2012    incomplete - negative, ACBE incomplete - negative, CT colography negative    [facility]OS[facility] B[facility]    COLONOSCOPY  03/2023    polyp x 1 - [facility]    ganglion cyst removal Left 09/02/2021    HC REMOVE TONSILS/ADENOIDS,<13 Y/O  1953    T & A <14y.o.    SIGMOIDOSCOPY,DIAGNOSTIC  1997    normal    STRESS ECHO (METRO)  03/2007    normal few pac's, few pvc's    SURGICAL HISTORY OF -  Bilateral 09/2020    Bilateral IOL    ZZHC COLONOSCOPY THRU STOMA, DIAGNOSTIC  05/2006    dr tejeda - normal - internal hemorrhiods - due 10 yrs       FAMILY HISTORY:  Family History    Problem Relation Age of Onset    Hypertension Mother     Cancer Father         lung    Eye Disorder Father         Glaucoma    Alzheimer Disease Father     Heart Disease Father     Diabetes Father         type 2    Diabetes Brother         type 2    Coronary Artery Disease Brother     Cerebrovascular Disease Maternal Grandmother     Heart Disease Maternal Grandfather          young heartattack    Diabetes Paternal Grandfather     Cancer Brother         melanoma    Alzheimer Disease Brother     Prostate Cancer Brother     Cerebrovascular Disease Brother        SOCIAL HISTORY:  Social History     Socioeconomic History    Marital status:      Spouse name: Dania    Number of children: 3    Years of education: 19   Occupational History    Occupation:       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   Tobacco Use    Smoking status: Never    Smokeless tobacco: Never   Vaping Use    Vaping status: Never Used   Substance and Sexual Activity    Alcohol use: Yes     Alcohol/week: 0.0 - 1.0 standard drinks of alcohol     Comment: 0-1 per month avg    Drug use: No    Sexual activity: Yes     Partners: Female   Other Topics Concern    Caffeine Concern Yes     Comment: occas    Exercise Yes     Comment: active    Seat Belt Yes    Parent/sibling w/ CABG, MI or angioplasty before 65F 55M? No     Social Determinants of Health     Financial Resource Strain: Low Risk  (3/25/2024)    Financial Resource Strain     Within the past 12 months, have you or your family members you live with been unable to get utilities (heat, electricity) when it was really needed?: No   Food Insecurity: Low Risk  (3/25/2024)    Food Insecurity     Within the past 12 months, did you worry that your food would run out before you got money to buy more?: No     Within the past 12 months, did the food you bought just not last and you didn t have money to get more?: No   Transportation Needs: Low Risk  (3/25/2024)    Transportation Needs     Within the  past 12 months, has lack of transportation kept you from medical appointments, getting your medicines, non-medical meetings or appointments, work, or from getting things that you need?: No   Physical Activity: Sufficiently Active (3/25/2024)    Exercise Vital Sign     Days of Exercise per Week: 5 days     Minutes of Exercise per Session: 40 min   Stress: Stress Concern Present (3/25/2024)    [employer] - Occupational Stress Questionnaire     Feeling of Stress : To some extent   Social Connections: Unknown (3/25/2024)    Social Connection and Isolation Panel [NHANES]     Frequency of Social Gatherings with Friends and Family: Twice a week   Interpersonal Safety: Low Risk  (3/27/2024)    Interpersonal Safety     Do you feel physically and emotionally safe where you currently live?: Yes     Within the past 12 months, have you been hit, slapped, kicked or otherwise physically hurt by someone?: No     Within the past 12 months, have you been humiliated or emotionally abused in other ways by your partner or ex-partner?: No   Housing Stability: Low Risk  (3/25/2024)    Housing Stability     Do you have housing? : Yes     Are you worried about losing your housing?: No               Thank you for allowing me to participate in the care of your patient.      Sincerely,     ROSENDO Bazan CNP     [facility]  cc:   ROSENDO Mcghee CNP  [address] [place] [id number]

## 2024-07-10 NOTE — PROGRESS NOTES
Electrophysiology Clinic Progress Note  Amadou Wade MRN# [id number]   YOB: 1945 Age: 79 year old     Primary cardiologist: Dr. Rendon (needs to establish with new ANDREW CHUNG)    Reason for visit: Annual follow up    History of presenting illness:    Amadou Wade is a pleasant 79 year old patient with past medical history significant for:    Permanent atrial fibrillation: s/p DCCV 2022 that restored SR and returned to AF. Currently on rate control strategy with diltiazem and metoprolol  [id number] Score 3: (Age++, HF) anticoagulated on apixaban  HFpEF  Hyperlipidemia  Incomplete RBBB    Today he returns for annual follow and is doing well without cardiopulmonary complaints.  He is tolerating anticoagulation with no concerns for bleeding.  His blood pressure is well-controlled and he denies chest pain or shortness of breath on exertion.  He also denies palpitations, PND, orthopnea.  Occasional have orthostatic symptoms but is compensated by getting up slowly.          Assessment and Plan:     ASSESSMENT:    Permanent atrial fibrillation   s/p DCCV 2022 that restored SR and returned to AF. Currently on rate control strategy with diltiazem and metoprolol  [id number] Score 3: (Age++, HF) anticoagulated on apixaban  Rate controlled on metoprolol tartrate at 75 mg twice daily    PLAN:     Remain on present medical therapy  Return to clinic in 1 year or sooner if needed       Orders this Visit:  Orders Placed This Encounter   Procedures    Follow-Up with Cardiology [facility]     No orders of the defined types were placed in this encounter.    There are no discontinued medications.    Today's clinic visit entailed:  Review of the result(s) of each unique test - EKG, Echo  Prescription drug management  I spent a total of 20 minutes on the day of the visit.   Time spent by me doing chart review, history and exam, documentation and further activities per the note  Provider  Link to [facility] Help Grid     The level of  "medical decision making during this visit was of moderate complexity.           Review of Systems:     Review of Systems:  Skin:  not assessed     Eyes:  not assessed    ENT:  not assessed    Respiratory:  Negative    Cardiovascular:    Positive for;fatigue  Gastroenterology: not assessed    Genitourinary:  not assessed    Musculoskeletal:  not assessed    Neurologic:  not assessed    Psychiatric:  not assessed    Heme/Lymph/Imm:  not assessed    Endocrine:  Negative              Physical Exam:     Vitals: /75   Pulse 75   Resp 15   Ht 1.753 m (5' 9\")   Wt 72.6 kg (160 lb)   SpO2 100%   BMI 23.63 kg/m    Constitutional: Well nourished and in no apparent distress.  Eyes: Pupils equal, round.   HEENT: Normocephalic, atraumatic.   Neck: Supple.   Respiratory: Breathing non-labored. Lungs clear to auscultation bilaterally.  Cardiovascular: IRR rate and rhythm, normal S1 and S2. No murmur   Skin: Warm, dry.   Extremities: No edema.  Neurologic: No gross motor deficits. Alert, awake, and oriented to person, place and time.  Psychiatric: Affect appropriate.        CURRENT MEDICATIONS:  Current Outpatient Medications   Medication Sig Dispense Refill    ACE/ARB/ARNI NOT PRESCRIBED (INTENTIONAL) Please choose reason not prescribed from choices below.      apixaban ANTICOAGULANT (ELIQUIS) 5 MG tablet Take 1 tablet (5 mg) by mouth 2 times daily 180 tablet 3    diltiazem ER COATED BEADS (CARDIZEM CD) 120 MG 24 hr capsule Take 1 capsule (120 mg) by mouth daily 90 capsule 3    furosemide (LASIX) 20 MG tablet Take 1 tablet (20 mg) by mouth daily as needed 60 tablet 5    metoprolol tartrate (LOPRESSOR) 50 MG tablet Take 1.5 tablets (75 mg) by mouth 2 times daily 270 tablet 3    psyllium (METAMUCIL/KONSYL) 58.6 % powder 1 tsp twice daily      rosuvastatin (CRESTOR) 10 MG tablet Take 1 tablet (10 mg) by mouth At Bedtime 90 tablet 0    valACYclovir (VALTREX) 1000 mg tablet daily as needed          ALLERGIES  Allergies "   Allergen Reactions    Poison Ivy Extract Itching and Rash     Itchiness, redness, swelling         PAST MEDICAL HISTORY:  Past Medical History:   Diagnosis Date    CHF (congestive heart failure) (H) 12/2021    HFrEF - Dr Cordova    Chronic rhinitis     resolved    [facility]    HFrEF (heart failure with reduced ejection fraction) (H) 12/2021    Dr Cordova    History of basal cell carcinoma 12/2023    Rt Posterior ear - BCC - Mohs - Dr Hopper    History of colonic polyps 03/2023    [facility]    Hypertension goal BP (blood pressure) < 140/90 12/2017    Persistent atrial fibrillation (H) 12/2021    in/out, rate controlled, at times, Dr Montenegro/Justice    Pneumonia, organism [phone]    RBBB     Snoring     no sleep apnea - mouth guard    Squamous 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 2021    Dr Abernathy/Darlyn - Rt Chest    Unspecified hemorrhoids without mention of complication 04/2006    internal       PAST SURGICAL HISTORY:  Past Surgical History:   Procedure Laterality Date    ANESTHESIA CARDIOVERSION N/A 01/11/2022    Procedure: ANESTHESIA, FOR CARDIOVERSION;  Surgeon: GENERIC ANESTHESIA PROVIDER;  Location: [facility] OR    COLONOSCOPY  01/03/2012    incomplete - negative, ACBE incomplete - negative, CT colography negative    [facility]OS[facility] B[facility]    COLONOSCOPY  03/2023    polyp x 1 - [facility]    ganglion cyst removal Left 09/02/2021    HC REMOVE TONSILS/ADENOIDS,<13 Y/O  1953    T & A <14y.o.    SIGMOIDOSCOPY,DIAGNOSTIC  1997    normal    STRESS ECHO ([facility])  03/2007    normal few pac's, few pvc's    SURGICAL HISTORY OF -  Bilateral 09/2020    Bilateral IOL    ZZHC COLONOSCOPY THRU STOMA, DIAGNOSTIC  05/2006    dr tejeda - normal - internal hemorrhiods - due 10 yrs       FAMILY HISTORY:  Family History   Problem Relation Age of Onset    Hypertension Mother     Cancer Father         lung    Eye Disorder Father         Glaucoma    Alzheimer Disease Father     Heart Disease Father     Diabetes Father          type 2    Diabetes Brother         type 2    Coronary Artery Disease Brother     Cerebrovascular Disease Maternal Grandmother     Heart Disease Maternal Grandfather          young heartattack    Diabetes Paternal Grandfather     Cancer Brother         melanoma    Alzheimer Disease Brother     Prostate Cancer Brother     Cerebrovascular Disease Brother        SOCIAL HISTORY:  Social History     Socioeconomic History    Marital status:      Spouse name: Dania    Number of children: 3    Years of education: 19   Occupational History    Occupation:       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   Tobacco Use    Smoking status: Never    Smokeless tobacco: Never   Vaping Use    Vaping status: Never Used   Substance and Sexual Activity    Alcohol use: Yes     Alcohol/week: 0.0 - 1.0 standard drinks of alcohol     Comment: 0-1 per month avg    Drug use: No    Sexual activity: Yes     Partners: Female   Other Topics Concern    Caffeine Concern Yes     Comment: occas    Exercise Yes     Comment: active    Seat Belt Yes    Parent/sibling w/ CABG, MI or angioplasty before 65F 55M? No     Social Determinants of Health     Financial Resource Strain: Low Risk  (3/25/2024)    Financial Resource Strain     Within the past 12 months, have you or your family members you live with been unable to get utilities (heat, electricity) when it was really needed?: No   Food Insecurity: Low Risk  (3/25/2024)    Food Insecurity     Within the past 12 months, did you worry that your food would run out before you got money to buy more?: No     Within the past 12 months, did the food you bought just not last and you didn t have money to get more?: No   Transportation Needs: Low Risk  (3/25/2024)    Transportation Needs     Within the past 12 months, has lack of transportation kept you from medical appointments, getting your medicines, non-medical meetings or appointments, work, or from getting things that you need?: No   Physical  Activity: Sufficiently Active (3/25/2024)    Exercise Vital Sign     Days of Exercise per Week: 5 days     Minutes of Exercise per Session: 40 min   Stress: Stress Concern Present (3/25/2024)    [employer] [employer] [place] [employer] - Occupational Stress Questionnaire     Feeling of Stress : To some extent   Social Connections: Unknown (3/25/2024)    Social Connection and Isolation Panel [NHANES]     Frequency of Social Gatherings with Friends and Family: Twice a week   Interpersonal Safety: Low Risk  (3/27/2024)    Interpersonal Safety     Do you feel physically and emotionally safe where you currently live?: Yes     Within the past 12 months, have you been hit, slapped, kicked or otherwise physically hurt by someone?: No     Within the past 12 months, have you been humiliated or emotionally abused in other ways by your partner or ex-partner?: No   Housing Stability: Low Risk  (3/25/2024)    Housing Stability     Do you have housing? : Yes     Are you worried about losing your housing?: No

## 2025-03-04 ENCOUNTER — TRANSFERRED RECORDS (OUTPATIENT)
Dept: HEALTH INFORMATION MANAGEMENT | Facility: CLINIC | Age: 80
End: 2025-03-04
Payer: COMMERCIAL

## 2025-03-04 LAB
AST SERPL-CCNC: 28 U/L (ref 11–34)
CREATININE (EXTERNAL): 0.9 MG/DL (ref 0.7–1.2)
GLUCOSE (EXTERNAL): 85 MG/DL (ref 70–100)
POTASSIUM (EXTERNAL): 4.1 MMOL/L (ref 3.5–5.1)

## 2025-03-06 ENCOUNTER — MYC MEDICAL ADVICE (OUTPATIENT)
Dept: FAMILY MEDICINE | Facility: CLINIC | Age: 80
End: 2025-03-06
Payer: COMMERCIAL

## 2025-03-06 NOTE — TELEPHONE ENCOUNTER
General Call    Contacts       Contact Date/Time Type Contact Phone/Fax    03/06/2025 12:04 PM CST Phone (Outgoing) SheriAmadou (Self) [phone] (M)    Left Message           Reason for Call:  PCP out 3/27    What are your questions or concerns:  appt needs to be rescheduled due to provider being out - please assist rescheduling appt. DailyLook message sent as well.     Currently pt has 2 physicals on the same day, please advise 1 appt in same day    Date of last appointment with provider: 3/27/24    Could we send this information to you in DailyLook or would you prefer to receive a phone call?:   No preference   Okay to leave a detailed message?: N/A at Other phone number:

## 2025-03-30 SDOH — HEALTH STABILITY: PHYSICAL HEALTH: ON AVERAGE, HOW MANY DAYS PER WEEK DO YOU ENGAGE IN MODERATE TO STRENUOUS EXERCISE (LIKE A BRISK WALK)?: 4 DAYS

## 2025-03-30 SDOH — HEALTH STABILITY: PHYSICAL HEALTH: ON AVERAGE, HOW MANY MINUTES DO YOU ENGAGE IN EXERCISE AT THIS LEVEL?: 40 MIN

## 2025-03-30 ASSESSMENT — ANXIETY QUESTIONNAIRES
1. FEELING NERVOUS, ANXIOUS, OR ON EDGE: NOT AT ALL
8. IF YOU CHECKED OFF ANY PROBLEMS, HOW DIFFICULT HAVE THESE MADE IT FOR YOU TO DO YOUR WORK, TAKE CARE OF THINGS AT HOME, OR GET ALONG WITH OTHER PEOPLE?: NOT DIFFICULT AT ALL
GAD7 TOTAL SCORE: 0
7. FEELING AFRAID AS IF SOMETHING AWFUL MIGHT HAPPEN: NOT AT ALL
6. BECOMING EASILY ANNOYED OR IRRITABLE: NOT AT ALL
GAD7 TOTAL SCORE: 0
GAD7 TOTAL SCORE: 0
2. NOT BEING ABLE TO STOP OR CONTROL WORRYING: NOT AT ALL
5. BEING SO RESTLESS THAT IT IS HARD TO SIT STILL: NOT AT ALL
3. WORRYING TOO MUCH ABOUT DIFFERENT THINGS: NOT AT ALL
4. TROUBLE RELAXING: NOT AT ALL
IF YOU CHECKED OFF ANY PROBLEMS ON THIS QUESTIONNAIRE, HOW DIFFICULT HAVE THESE PROBLEMS MADE IT FOR YOU TO DO YOUR WORK, TAKE CARE OF THINGS AT HOME, OR GET ALONG WITH OTHER PEOPLE: NOT DIFFICULT AT ALL
7. FEELING AFRAID AS IF SOMETHING AWFUL MIGHT HAPPEN: NOT AT ALL

## 2025-03-30 ASSESSMENT — PATIENT HEALTH QUESTIONNAIRE - PHQ9
SUM OF ALL RESPONSES TO PHQ QUESTIONS 1-9: 3
SUM OF ALL RESPONSES TO PHQ QUESTIONS 1-9: 3
10. IF YOU CHECKED OFF ANY PROBLEMS, HOW DIFFICULT HAVE THESE PROBLEMS MADE IT FOR YOU TO DO YOUR WORK, TAKE CARE OF THINGS AT HOME, OR GET ALONG WITH OTHER PEOPLE: NOT DIFFICULT AT ALL

## 2025-03-30 ASSESSMENT — SOCIAL DETERMINANTS OF HEALTH (SDOH): HOW OFTEN DO YOU GET TOGETHER WITH FRIENDS OR RELATIVES?: THREE TIMES A WEEK

## 2025-03-31 ENCOUNTER — OFFICE VISIT (OUTPATIENT)
Dept: FAMILY MEDICINE | Facility: CLINIC | Age: 80
End: 2025-03-31
Attending: FAMILY MEDICINE
Payer: COMMERCIAL

## 2025-03-31 VITALS
OXYGEN SATURATION: 98 % | BODY MASS INDEX: 24.57 KG/M2 | RESPIRATION RATE: 16 BRPM | HEIGHT: 69 IN | TEMPERATURE: 97.9 F | DIASTOLIC BLOOD PRESSURE: 78 MMHG | WEIGHT: 165.9 LBS | HEART RATE: 64 BPM | SYSTOLIC BLOOD PRESSURE: 126 MMHG

## 2025-03-31 DIAGNOSIS — I50.20 HFREF (HEART FAILURE WITH REDUCED EJECTION FRACTION) (H): ICD-10-CM

## 2025-03-31 DIAGNOSIS — I48.0 PAROXYSMAL ATRIAL FIBRILLATION (H): ICD-10-CM

## 2025-03-31 DIAGNOSIS — Z85.828 HISTORY OF SKIN CANCER: ICD-10-CM

## 2025-03-31 DIAGNOSIS — B00.1 RECURRENT COLD SORES: ICD-10-CM

## 2025-03-31 DIAGNOSIS — Z00.00 MEDICARE ANNUAL WELLNESS VISIT, SUBSEQUENT: ICD-10-CM

## 2025-03-31 DIAGNOSIS — Z12.11 SCREEN FOR COLON CANCER: ICD-10-CM

## 2025-03-31 DIAGNOSIS — E78.5 HYPERLIPIDEMIA LDL GOAL <130: ICD-10-CM

## 2025-03-31 DIAGNOSIS — Z12.5 SCREENING FOR PROSTATE CANCER: ICD-10-CM

## 2025-03-31 DIAGNOSIS — Z51.81 MEDICATION MONITORING ENCOUNTER: ICD-10-CM

## 2025-03-31 DIAGNOSIS — Z86.0100 HISTORY OF COLONIC POLYPS: ICD-10-CM

## 2025-03-31 DIAGNOSIS — I10 HYPERTENSION GOAL BP (BLOOD PRESSURE) < 140/90: ICD-10-CM

## 2025-03-31 DIAGNOSIS — Z00.00 ROUTINE GENERAL MEDICAL EXAMINATION AT A HEALTH CARE FACILITY: ICD-10-CM

## 2025-03-31 LAB
ALBUMIN UR-MCNC: ABNORMAL MG/DL
APPEARANCE UR: CLEAR
BACTERIA #/AREA URNS HPF: ABNORMAL /HPF
BILIRUB UR QL STRIP: NEGATIVE
CK SERPL-CCNC: 64 U/L (ref 39–308)
COLOR UR AUTO: YELLOW
CREAT UR-MCNC: 110 MG/DL
GLUCOSE UR STRIP-MCNC: NEGATIVE MG/DL
HGB UR QL STRIP: NEGATIVE
KETONES UR STRIP-MCNC: NEGATIVE MG/DL
LEUKOCYTE ESTERASE UR QL STRIP: NEGATIVE
MICROALBUMIN UR-MCNC: 26.6 MG/L
MICROALBUMIN/CREAT UR: 24.18 MG/G CR (ref 0–17)
MUCOUS THREADS #/AREA URNS LPF: PRESENT /LPF
NITRATE UR QL: NEGATIVE
NT-PROBNP SERPL-MCNC: 1656 PG/ML (ref 0–1800)
PH UR STRIP: 7 [PH] (ref 5–7)
PSA SERPL DL<=0.01 NG/ML-MCNC: 2.9 NG/ML (ref 0–6.5)
RBC #/AREA URNS AUTO: ABNORMAL /HPF
SP GR UR STRIP: 1.02 (ref 1–1.03)
SQUAMOUS #/AREA URNS AUTO: ABNORMAL /LPF
TSH SERPL DL<=0.005 MIU/L-ACNC: 1.25 UIU/ML (ref 0.3–4.2)
UROBILINOGEN UR STRIP-ACNC: 4 E.U./DL
WBC #/AREA URNS AUTO: ABNORMAL /HPF

## 2025-03-31 PROCEDURE — G0439 PPPS, SUBSEQ VISIT: HCPCS | Performed by: FAMILY MEDICINE

## 2025-03-31 PROCEDURE — 36415 COLL VENOUS BLD VENIPUNCTURE: CPT | Performed by: FAMILY MEDICINE

## 2025-03-31 PROCEDURE — 82043 UR ALBUMIN QUANTITATIVE: CPT | Performed by: FAMILY MEDICINE

## 2025-03-31 PROCEDURE — 84443 ASSAY THYROID STIM HORMONE: CPT | Performed by: FAMILY MEDICINE

## 2025-03-31 PROCEDURE — G0103 PSA SCREENING: HCPCS | Performed by: FAMILY MEDICINE

## 2025-03-31 PROCEDURE — 3074F SYST BP LT 130 MM HG: CPT | Performed by: FAMILY MEDICINE

## 2025-03-31 PROCEDURE — 3078F DIAST BP <80 MM HG: CPT | Performed by: FAMILY MEDICINE

## 2025-03-31 PROCEDURE — 83880 ASSAY OF NATRIURETIC PEPTIDE: CPT | Performed by: FAMILY MEDICINE

## 2025-03-31 PROCEDURE — 93000 ELECTROCARDIOGRAM COMPLETE: CPT | Performed by: FAMILY MEDICINE

## 2025-03-31 PROCEDURE — 81001 URINALYSIS AUTO W/SCOPE: CPT | Performed by: FAMILY MEDICINE

## 2025-03-31 PROCEDURE — 82570 ASSAY OF URINE CREATININE: CPT | Performed by: FAMILY MEDICINE

## 2025-03-31 PROCEDURE — 82550 ASSAY OF CK (CPK): CPT | Performed by: FAMILY MEDICINE

## 2025-03-31 NOTE — PROGRESS NOTES
Preventive Care Visit  [facility] [place]  Johnathan Sampson MD, Family Medicine  Mar 31, 2025      Assessment & Plan     Routine general medical examination at a health care facility    - PRIMARY CARE FOLLOW-UP SCHEDULING  - CK total  - UA Macroscopic with reflex to Microscopic and Culture  - Albumin Random Urine Quantitative with Creat Ratio  - TSH with free T4 reflex  - Prostate Specific Antigen Screen  - Fecal colorectal cancer screen FIT  - BNP-N terminal pro  - REVIEW OF HEALTH MAINTENANCE PROTOCOL ORDERS  - PRIMARY CARE FOLLOW-UP SCHEDULING    Medicare annual wellness visit, subsequent    - PRIMARY CARE FOLLOW-UP SCHEDULING  - CK total  - UA Macroscopic with reflex to Microscopic and Culture  - Albumin Random Urine Quantitative with Creat Ratio  - TSH with free T4 reflex  - Prostate Specific Antigen Screen  - Fecal colorectal cancer screen FIT  - BNP-N terminal pro  - REVIEW OF HEALTH MAINTENANCE PROTOCOL ORDERS  - PRIMARY CARE FOLLOW-UP SCHEDULING    HFrEF (heart failure with reduced ejection fraction) (H)    - PRIMARY CARE FOLLOW-UP SCHEDULING  - TSH with free T4 reflex  - BNP-N terminal pro  - REVIEW OF HEALTH MAINTENANCE PROTOCOL ORDERS  - PRIMARY CARE FOLLOW-UP SCHEDULING  - OFFICE/OUTPT VISIT,EST,LEVL IV    Paroxysmal atrial fibrillation (H)    - PRIMARY CARE FOLLOW-UP SCHEDULING  - TSH with free T4 reflex  - BNP-N terminal pro  - REVIEW OF HEALTH MAINTENANCE PROTOCOL ORDERS  - PRIMARY CARE FOLLOW-UP SCHEDULING  - OFFICE/OUTPT VISIT,EST,LEVL IV  - EKG 12-lead complete w/read - Clinics    Hypertension goal BP (blood pressure) < 140/90    - PRIMARY CARE FOLLOW-UP SCHEDULING  - TSH with free T4 reflex  - REVIEW OF HEALTH MAINTENANCE PROTOCOL ORDERS  - PRIMARY CARE FOLLOW-UP SCHEDULING  - OFFICE/OUTPT VISIT,EST,LEVL IV    Hyperlipidemia LDL goal <130    - PRIMARY CARE FOLLOW-UP SCHEDULING  - REVIEW OF HEALTH MAINTENANCE PROTOCOL ORDERS  - PRIMARY CARE FOLLOW-UP SCHEDULING  - OFFICE/OUTPT  VISIT,EST,LEVL IV    History of skin cancer    - PRIMARY CARE FOLLOW-UP SCHEDULING  - REVIEW OF HEALTH MAINTENANCE PROTOCOL ORDERS  - PRIMARY CARE FOLLOW-UP SCHEDULING  - OFFICE/OUTPT VISIT,EST,LEVL IV    Recurrent cold sores    - PRIMARY CARE FOLLOW-UP SCHEDULING  - REVIEW OF HEALTH MAINTENANCE PROTOCOL ORDERS  - PRIMARY CARE FOLLOW-UP SCHEDULING  - OFFICE/OUTPT VISIT,EST,LEVL IV    History of colonic polyps    - PRIMARY CARE FOLLOW-UP SCHEDULING  - REVIEW OF HEALTH MAINTENANCE PROTOCOL ORDERS  - PRIMARY CARE FOLLOW-UP SCHEDULING  - OFFICE/OUTPT VISIT,EST,LEVL IV    Screening for prostate cancer    - PRIMARY CARE FOLLOW-UP SCHEDULING  - Prostate Specific Antigen Screen  - REVIEW OF HEALTH MAINTENANCE PROTOCOL ORDERS  - PRIMARY CARE FOLLOW-UP SCHEDULING  - OFFICE/OUTPT VISIT,EST,LEVL IV    Screen for colon cancer    - PRIMARY CARE FOLLOW-UP SCHEDULING  - Fecal colorectal cancer screen FIT  - REVIEW OF HEALTH MAINTENANCE PROTOCOL ORDERS  - PRIMARY CARE FOLLOW-UP SCHEDULING  - OFFICE/OUTPT VISIT,EST,LEVL IV    Medication monitoring encounter    - PRIMARY CARE FOLLOW-UP SCHEDULING  - CK total  - UA Macroscopic with reflex to Microscopic and Culture  - Albumin Random Urine Quantitative with Creat Ratio  - TSH with free T4 reflex  - Prostate Specific Antigen Screen  - Fecal colorectal cancer screen FIT  - BNP-N terminal pro  - REVIEW OF HEALTH MAINTENANCE PROTOCOL ORDERS  - PRIMARY CARE FOLLOW-UP SCHEDULING  - OFFICE/OUTPT VISIT,EST,LEVL IV    Patient has been advised of split billing requirements and indicates understanding: Yes    Counseling  Appropriate preventive services were addressed with this patient via screening, questionnaire, or discussion as appropriate for fall prevention, nutrition, physical activity, Tobacco-use cessation, social engagement, weight loss and cognition.  Checklist reviewing preventive services available has been given to the patient.  Reviewed patient's diet, addressing concerns and/or  questions.   He is at risk for psychosocial distress and has been provided with information to reduce risk.   The patient was provided with written information regarding signs of hearing loss.   Information on urinary incontinence and treatment options given to patient.     Regular exercise    Plan:    1) [facility]ations: [facility]    2) Labs: reviewed, scanned, pending    3) Immunizations: advised covid, flu, prevnar 20    4) Imaging/Diagnostics: per [facility]    5) Consults: [facility]    Return in about 1 year (around 3/31/2026) for Complete Physical, Medication Recheck Visit, Follow Up Chronic.    45 minutes spent by myself on the date of the encounter doing chart review, history and exam, documentation and further activities per the note.    The longitudinal plan of care for the diagnosis(es)/condition(s) as documented were addressed during this visit. Due to the added complexity in care, I will continue to support Pat in the subsequent management and with ongoing continuity of care.    Shared Decision making completed.    Henry Tobar is a 80 year old, presenting for the following:  Medicare Visit        3/31/2025     9:23 AM   Additional Questions   Roomed by Amrita WORTHINGTON CMA         Patient is fasting.     [facility] Ivan - labs / imaging (gas) for diarrhea / constipation - atonic colon? - [facility]    [facility] - normal cbc / glucose / very good lipids / normal cmp    HFmrEF / P A Fib - cardiology -  Heart - Dr Cordova - no issues - EF = 50-55% - no cp - no sob - no edema    Hyperlipidemia Follow-Up    Are you regularly taking any medication or supplement to lower your cholesterol?   Yes- rosuvastatin  Are you having muscle aches or other side effects that you think could be caused by your cholesterol lowering medication?  No    Recent Labs   Lab Test 03/27/24  0854 03/27/23  1000   CHOL 169 152   HDL 49 50   * 91   TRIG 75 57     Hypertension Follow-up    Do  you check your blood pressure regularly outside of the clinic? Yes   Are you following a low salt diet? Yes  Are your blood pressures ever more than 140 on the top number (systolic) OR more   than 90 on the bottom number (diastolic), for example 140/90? Yes    BP Readings from Last 3 Encounters:   03/31/25 126/78   07/10/24 128/75   05/24/24 120/76     Creatinine   Date Value Ref Range Status   03/27/2024 0.97 0.67 - 1.17 mg/dL Final   03/01/2021 0.96 0.66 - 1.25 mg/dL Final     GFR Estimate   Date Value Ref Range Status   03/27/2024 80 >60 mL/min/1.73m2 Final   03/01/2021 78 >60 mL/min/[1.73_m2] Final     Comment:     Non  GFR Calc  Starting 12/18/2018, serum creatinine based estimated GFR (eGFR) will be   calculated using the Chronic Kidney Disease Epidemiology Collaboration   (CKD-EPI) equation.       Hx of skin cancer - [facility]    Advance Care Planning    Patient has a Health Care Directive on file  Advance care planning document is on file and is current.      3/30/2025   General Health   How would you rate your overall physical health? Good   Feel stress (tense, anxious, or unable to sleep) To some extent   (!) STRESS CONCERN      3/30/2025   Nutrition   Diet: Low salt    Low fat/cholesterol       Multiple values from one day are sorted in reverse-chronological order         3/30/2025   Exercise   Days per week of moderate/strenous exercise 4 days   Average minutes spent exercising at this level 40 min         3/30/2025   Social Factors   Frequency of gathering with friends or relatives Three times a week   Worry food won't last until get money to buy more No   Food not last or not have enough money for food? No   Do you have housing? (Housing is defined as stable permanent housing and does not include staying ouside in a car, in a tent, in an abandoned building, in an overnight shelter, or couch-surfing.) Yes   Are you worried about losing your housing? No   Lack of transportation? No   Unable  to get utilities (heat,electricity)? No         3/30/2025   Fall Risk   Fallen 2 or more times in the past year? No   Trouble with walking or balance? No          3/30/2025   Activities of Daily Living- Home Safety   Needs help with the following daily activites None of the above   Safety concerns in the home None of the above         3/30/2025   Dental   Dentist two times every year? Yes         3/30/2025   Hearing Screening   Hearing concerns? (!) I FEEL THAT PEOPLE ARE MUMBLING OR NOT SPEAKING CLEARLY.    (!) I NEED TO ASK PEOPLE TO SPEAK UP OR REPEAT THEMSELVES.    (!) TROUBLE UNDESTANDING A SPEAKER IN A PUBLIC MEETING OR [employer] SERVICE.    (!) TROUBLE UNDERSTANDING SOFT OR WHISPERED SPEECH.       Multiple values from one day are sorted in reverse-chronological order         3/30/2025   Driving Risk Screening   Patient/family members have concerns about driving No         3/30/2025   General Alertness/Fatigue Screening   Have you been more tired than usual lately? No         3/30/2025   Urinary Incontinence Screening   Bothered by leaking urine in past 6 months Yes           3/25/2024   TB Screening   Were you born outside of the [place]? No         Today's PHQ-9 Score:       3/30/2025     8:25 PM   PHQ-9 SCORE   PHQ-9 Total Score MyChart 3 (Minimal depression)   PHQ-9 Total Score 3        Patient-reported         3/30/2025   Substance Use   Alcohol more than 3/day or more than 7/wk No   Do you have a current opioid prescription? No   How severe/bad is pain from 1 to 10? 1/10   Do you use any other substances recreationally? No     Social History     Tobacco Use    Smoking status: Never    Smokeless tobacco: Never   Vaping Use    Vaping status: Never Used   Substance Use Topics    Alcohol use: Yes     Alcohol/week: 0.0 - 1.0 standard drinks of alcohol     Comment: 0-1 per month avg    Drug use: No         ASCVD Risk   The 10-year ASCVD risk score (Allyson MATHEWS, et al., 2019) is: 31.8%    Values used to  calculate the score:      Age: 80 years      Sex: Male      Is Non- : No      Diabetic: No      Tobacco smoker: No      Systolic Blood Pressure: 126 mmHg      Is BP treated: Yes      HDL Cholesterol: 49 mg/dL      Total Cholesterol: 169 mg/dL    Fracture Risk Assessment Tool  Link to Frax Calculator  Use the information below to complete the Frax calculator  : 1945  Sex: male  Weight (kg): 75.3 kg (actual weight)  Height (cm): 175.3 cm  Previous Fragility Fracture:  No  History of parent with fractured hip:  No  Current Smoking:  No  Patient has been on glucocorticoids for more than 3 months (5mg/day or more): No  Rheumatoid Arthritis on Problem List:  No  Secondary Osteoporosis on Problem List:  No  Consumes 3 or more units of alcohol per day: No  Femoral Neck BMD (g/cm2)                Reviewed and updated as needed this visit by Provider                  Current providers sharing in care for this patient include:  Patient Care Team:  Johnathan Sampson MD as PCP - General  Johnathan Sampson MD as Assigned PCP  Jean Pierre Rendon MD as MD (Cardiovascular Disease)  Keisha Moore MD as MD (Dermatology)  Radha Melendez PA-C as Physician Assistant (Dermatology)  Keisha Moore MD as MD (Dermatology)  Tana Mcclure NP as Assigned Pediatric Specialist Provider  Jojo Bangura APRN CNP as Assigned Heart and Vascular Provider  Radha Melendez PA-C as Assigned Dermatology Provider    The following health maintenance items are reviewed in Epic and correct as of today:  Health Maintenance   Topic Date Due    HF ACTION PLAN  Never done    INFLUENZA VACCINE (1) 2024    COVID-19 Vaccine ( season) 2024    BMP  2024    ALT  2025    LIPID  2025    MICROALBUMIN  2025    PSA  2025    CBC  2025    MEDICARE ANNUAL WELLNESS VISIT  2026    ANNUAL REVIEW OF HM ORDERS  2026    FALL RISK ASSESSMENT  2026     DIABETES SCREENING  03/27/2027    ADVANCE CARE PLANNING  03/27/2028    DTAP/TDAP/TD IMMUNIZATION (4 - Td or Tdap) 01/07/2029    COLORECTAL CANCER SCREENING  03/29/2033    TSH W/FREE T4 REFLEX  Completed    HEPATITIS C SCREENING  Completed    PHQ-2 (once per calendar year)  Completed    Pneumococcal Vaccine: 50+ Years  Completed    ZOSTER IMMUNIZATION  Completed    RSV VACCINE  Completed    HPV IMMUNIZATION  Aged Out    MENINGITIS IMMUNIZATION  Aged Out     Patient Active Problem List   Diagnosis    Cardiac dysrhythmia    Hyperlipidemia LDL goal <130    ED (erectile dysfunction)    Hypertension goal BP (blood pressure) < 140/90    Recurrent cold sores    Squamous 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    Atrial fibrillation (H)    CHF (congestive heart failure) (H)    HFrEF (heart failure with reduced ejection fraction) (H)    History of colonic polyps    RBBB    History of basal cell carcinoma    History of skin cancer       Past Medical History:   Diagnosis Date    CHF (congestive heart failure) (H) 12/2021    HFrEF - Dr Cordova    Chronic rhinitis     resolved    [facility]    HFrEF (heart failure with reduced ejection fraction) (H) 12/2021    Dr Cordova - HFmrEF    History of basal cell carcinoma 12/2023    Rt Posterior ear - BCC - Mohs - Dr Hopper    [facility] of colonic polyps [facility]/2023    [facility]    Hypertension goal BP (blood pressure) < 140/90 12/2017    Persistent atrial fibrillation (H) 12/2021    in/out, rate controlled, at times, Dr Montenegro/Justice    Pneumonia, organism unspecified(486) [phone]    RBBB     Snoring     no sleep apnea - mouth guard    Squamous 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 2021    Dr Abernathy/Darlyn - Rt Chest    Unspecified hemorrhoids without mention of complication 04/2006    internal       Past Surgical History:   Procedure Laterality Date    ANESTHESIA CARDIOVERSION N/A 01/11/2022    Procedure: ANESTHESIA, FOR CARDIOVERSION;  Surgeon: GENERIC ANESTHESIA PROVIDER;  Location:  OR     COLONOSCOPY  2012    incomplete - negative, ACBE incomplete - negative, CT colography negative    [facility]    COLONOSCOPY  2023    polyp x 1 - [facility]    ganglion cyst removal Left 2021    HC REMOVE TONSILS/ADENOIDS,<11 Y/O      T & A <14y.o.    SIGMOIDOSCOPY,DIAGNOSTIC      normal    STRESS ECHO (METRO)  2007    normal few pac's, few pvc's    SURGICAL HISTORY OF -  Bilateral 2020    Bilateral IOL    ZZHC COLONOSCOPY THRU STOMA, DIAGNOSTIC  2006    dr tejeda - normal - internal hemorrhiods - due 10 yrs       Current Outpatient Medications   Medication Sig Dispense Refill    ACE/ARB/ARNI NOT PRESCRIBED (INTENTIONAL) Please choose reason not prescribed from choices below.      apixaban ANTICOAGULANT (ELIQUIS) 5 MG tablet Take 1 tablet (5 mg) by mouth 2 times daily 180 tablet 3    diltiazem ER COATED BEADS (CARDIZEM CD) 120 MG 24 hr capsule Take 1 capsule (120 mg) by mouth daily 90 capsule 3    furosemide (LASIX) 20 MG tablet Take 1 tablet (20 mg) by mouth daily as needed 60 tablet 5    metoprolol tartrate (LOPRESSOR) 50 MG tablet Take 1.5 tablets (75 mg) by mouth 2 times daily 270 tablet 3    psyllium (METAMUCIL/KONSYL) 58.6 % powder 1 tsp twice daily      rosuvastatin (CRESTOR) 10 MG tablet Take 1 tablet (10 mg) by mouth At Bedtime 90 tablet 0    valACYclovir (VALTREX) 1000 mg tablet daily as needed          Allergies   Allergen Reactions    Poison Ivy Extract Itching and Rash     Itchiness, redness, swelling       Family History   Problem Relation Age of Onset    Hypertension Mother     Cancer Father         lung    Eye Disorder Father         Glaucoma    Alzheimer Disease Father     Heart Disease Father     Diabetes Father         type 2    Diabetes Brother         type 2    Coronary Artery Disease Brother     Cerebrovascular Disease Maternal Grandmother     Heart Disease Maternal Grandfather          young heartattack    Diabetes Paternal Grandfather      Cancer Brother         melanoma    Alzheimer Disease Brother     Prostate Cancer Brother     Cerebrovascular Disease Brother        Social History     Socioeconomic History    Marital status:      Spouse name: Dania    Number of children: 3    Years of education: 19   Occupational History    Occupation:       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   Tobacco Use    Smoking status: Never    Smokeless tobacco: Never   Vaping Use    Vaping status: Never Used   Substance and Sexual Activity    Alcohol use: Yes     Alcohol/week: 0.0 - 1.0 standard drinks of alcohol     Comment: 0-1 per month avg    Drug use: No    Sexual activity: Yes     Partners: Female   Other Topics Concern    Caffeine Concern Yes     Comment: occas    Exercise Yes     Comment: active    Seat Belt Yes    Parent/sibling w/ CABG, MI or angioplasty before 65F 55M? No     Social Drivers of Health     Financial Resource Strain: Low Risk  (3/30/2025)    Financial Resource Strain     Within the past 12 months, have you or your family members you live with been unable to get utilities (heat, electricity) when it was really needed?: No   Food Insecurity: Low Risk  (3/30/2025)    Food Insecurity     Within the past 12 months, did you worry that your food would run out before you got money to buy more?: No     Within the past 12 months, did the food you bought just not last and you didn t have money to get more?: No   Transportation Needs: Low Risk  (3/30/2025)    Transportation Needs     Within the past 12 months, has lack of transportation kept you from medical appointments, getting your medicines, non-medical meetings or appointments, work, or from getting things that you need?: No   Physical Activity: Sufficiently Active (3/30/2025)    Exercise Vital Sign     Days of Exercise per Week: 4 days     Minutes of Exercise per Session: 40 min   Stress: Stress Concern Present (3/30/2025)    [employer] [place] [employer] - Occupational Stress  "Questionnaire     Feeling of Stress : To some extent   Social Connections: Unknown (3/30/2025)    Social Connection and Isolation Panel [NHANES]     Frequency of Social Gatherings with Friends and Family: Three times a week   Interpersonal Safety: Low Risk  (3/31/2025)    Interpersonal Safety     Do you feel physically and emotionally safe where you currently live?: Yes     Within the past 12 months, have you been hit, slapped, kicked or otherwise physically hurt by someone?: No     Within the past 12 months, have you been humiliated or emotionally abused in other ways by your partner or ex-partner?: No   Housing Stability: Low Risk  (3/30/2025)    Housing Stability     Do you have housing? : Yes     Are you worried about losing your housing?: No     [facility]  F[facility] / Cologuard[facility]  PSA: pending    Review of Systems  CONSTITUTIONAL: NEGATIVE for fever, chills, change in weight  INTEGUMENTARY/SKIN: NEGATIVE for worrisome rashes, moles or lesions  EYES: NEGATIVE for vision changes or irritation  ENT/MOUTH: NEGATIVE for ear, mouth and throat problems  RESP: NEGATIVE for significant cough or SOB  CV: NEGATIVE for chest pain, palpitations or peripheral edema  GI: NEGATIVE for nausea, abdominal pain, heartburn, or change in bowel habits  : NEGATIVE for frequency, dysuria, or hematuria  MUSCULOSKELETAL: NEGATIVE for significant arthralgias or myalgia  NEURO: NEGATIVE for weakness, dizziness or paresthesias  ENDOCRINE: NEGATIVE for temperature intolerance, skin/hair changes  HEME: NEGATIVE for bleeding problems  PSYCHIATRIC: NEGATIVE for changes in mood or affect     Objective    Exam  /78   Pulse 64   Temp 97.9  F (36.6  C) (Tympanic)   Resp 16   Ht 1.753 m (5' 9\")   Wt 75.3 kg (165 lb 14.4 oz)   SpO2 98%   BMI 24.50 kg/m     Estimated body mass index is 24.5 kg/m  as calculated from the following:    Height as of this encounter: 1.753 m (5' 9\").    Weight as of this encounter: 75.3 kg (165 lb " 14.4 oz).    Physical Exam  GENERAL: alert and no distress  EYES: Eyes grossly normal to inspection, PERRL and conjunctivae and sclerae normal  HENT: ear canals and TM's normal, nose and mouth without ulcers or lesions  NECK: no adenopathy, no asymmetry, masses, or scars  RESP: lungs clear to auscultation - no rales, rhonchi or wheezes  CV: regular rate and rhythm vs irregular, normal S1 S2, no S3 or S4, no murmur, click or rub, no peripheral edema  ABDOMEN: soft, nontender, no hepatosplenomegaly, no masses and bowel sounds normal   (male): pt declines  RECTAL: pt declines  MS: no gross musculoskeletal defects noted, no edema  SKIN: no suspicious lesions or rashes  NEURO: Normal strength and tone, mentation intact and speech normal  PSYCH: mentation appears normal, affect normal/bright        3/31/2025   Mini Cog   Clock Draw Score 2 Normal   3 Item Recall 3 objects recalled   Mini Cog Total Score 5         Signed Electronically by:            Johnathan Sampson MD, FAAFP, DABMille Lacs Health [facility]  [facility]  [address] [place] [id number]  [email or web address]   Office: [phone]  Fax: [phone]         Answers submitted by the patient for this visit:  Patient Health Questionnaire (Submitted on 3/30/2025)  If you checked off any problems, how difficult have these problems made it for you to do your work, take care of things at home, or get along with other people?: Not difficult at all  PHQ9 TOTAL SCORE: 3  Patient Health Questionnaire (G7) (Submitted on 3/30/2025)  RUTH 7 TOTAL SCORE: 0

## 2025-04-09 ENCOUNTER — TRANSFERRED RECORDS (OUTPATIENT)
Dept: HEALTH INFORMATION MANAGEMENT | Facility: CLINIC | Age: 80
End: 2025-04-09
Payer: COMMERCIAL

## 2025-04-21 ENCOUNTER — MEDICAL CORRESPONDENCE (OUTPATIENT)
Dept: HEALTH INFORMATION MANAGEMENT | Facility: CLINIC | Age: 80
End: 2025-04-21
Payer: COMMERCIAL

## 2025-04-21 ENCOUNTER — TELEPHONE (OUTPATIENT)
Dept: FAMILY MEDICINE | Facility: CLINIC | Age: 80
End: 2025-04-21
Payer: COMMERCIAL

## 2025-04-21 NOTE — TELEPHONE ENCOUNTER
FYI - Status Update    Who is Calling: patient    Update: Related to appointment with gastroenterologist. Amadou dropped off a couple papers that he wanted Dr. Sampson to look at. The papers were left in the MA basket at the [place].     Does caller want a call/response back: No

## 2025-04-27 NOTE — TELEPHONE ENCOUNTER
Lab Results   Component Value Date    COL yellow 11/16/2018    UAPP clear 11/16/2018    USPG 1.030 11/16/2018    UPH 5.0 11/16/2018    UPROT neg 11/16/2018    UGLU neg 11/16/2018    UKET neg 11/16/2018    UBILI neg 11/16/2018    URBC neg 11/16/2018    UNITR neg 11/16/2018    UROB neg 11/16/2018    UWBC neg 11/16/2018        Reviewed, please abstract

## 2025-08-12 ENCOUNTER — MYC MEDICAL ADVICE (OUTPATIENT)
Dept: FAMILY MEDICINE | Facility: CLINIC | Age: 80
End: 2025-08-12
Payer: COMMERCIAL

## (undated) RX ORDER — PROPOFOL 10 MG/ML
INJECTION, EMULSION INTRAVENOUS
Status: DISPENSED
Start: 2022-01-11

## (undated) RX ORDER — FENTANYL CITRATE-0.9 % NACL/PF 10 MCG/ML
PLASTIC BAG, INJECTION (ML) INTRAVENOUS
Status: DISPENSED
Start: 2022-01-11